# Patient Record
Sex: FEMALE | Race: WHITE | Employment: OTHER | URBAN - NONMETROPOLITAN AREA
[De-identification: names, ages, dates, MRNs, and addresses within clinical notes are randomized per-mention and may not be internally consistent; named-entity substitution may affect disease eponyms.]

---

## 2017-01-09 RX ORDER — HYDROCODONE BITARTRATE AND ACETAMINOPHEN 7.5; 325 MG/1; MG/1
TABLET ORAL
Qty: 90 TABLET | Refills: 0 | Status: SHIPPED | OUTPATIENT
Start: 2017-01-09 | End: 2017-02-10 | Stop reason: SDUPTHER

## 2017-02-06 RX ORDER — FLUOXETINE HYDROCHLORIDE 20 MG/1
CAPSULE ORAL
Qty: 90 CAPSULE | Refills: 3 | Status: SHIPPED | OUTPATIENT
Start: 2017-02-06 | End: 2018-02-02 | Stop reason: SDUPTHER

## 2017-02-10 RX ORDER — HYDROCODONE BITARTRATE AND ACETAMINOPHEN 7.5; 325 MG/1; MG/1
TABLET ORAL
Qty: 90 TABLET | Refills: 0 | Status: SHIPPED | OUTPATIENT
Start: 2017-02-10 | End: 2017-03-09 | Stop reason: SDUPTHER

## 2017-02-23 RX ORDER — FLURANDRENOLIDE 0.5 MG/G
CREAM TOPICAL
Qty: 120 G | Refills: 0 | Status: SHIPPED | OUTPATIENT
Start: 2017-02-23 | End: 2017-05-22 | Stop reason: SDUPTHER

## 2017-03-03 RX ORDER — OMEPRAZOLE 20 MG/1
CAPSULE, DELAYED RELEASE ORAL
Qty: 90 CAPSULE | Refills: 0 | Status: SHIPPED | OUTPATIENT
Start: 2017-03-03 | End: 2017-05-26 | Stop reason: ALTCHOICE

## 2017-03-03 RX ORDER — CELECOXIB 200 MG/1
CAPSULE ORAL
Qty: 90 CAPSULE | Refills: 0 | Status: SHIPPED | OUTPATIENT
Start: 2017-03-03 | End: 2017-06-08 | Stop reason: SDUPTHER

## 2017-03-05 ENCOUNTER — APPOINTMENT (OUTPATIENT)
Dept: GENERAL RADIOLOGY | Age: 72
End: 2017-03-05
Payer: MEDICARE

## 2017-03-05 ENCOUNTER — HOSPITAL ENCOUNTER (EMERGENCY)
Age: 72
Discharge: HOME | End: 2017-03-05
Attending: FAMILY MEDICINE
Payer: MEDICARE

## 2017-03-05 VITALS
BODY MASS INDEX: 20.92 KG/M2 | HEIGHT: 68 IN | DIASTOLIC BLOOD PRESSURE: 70 MMHG | SYSTOLIC BLOOD PRESSURE: 125 MMHG | HEART RATE: 87 BPM | WEIGHT: 138 LBS | OXYGEN SATURATION: 96 % | RESPIRATION RATE: 16 BRPM | TEMPERATURE: 97.4 F

## 2017-03-05 DIAGNOSIS — R82.71 BACTERIA IN URINE: Primary | ICD-10-CM

## 2017-03-05 DIAGNOSIS — R06.02 SOB (SHORTNESS OF BREATH): ICD-10-CM

## 2017-03-05 LAB
ALBUMIN SERPL-MCNC: 4.2 G/DL (ref 3.5–5.2)
ALP BLD-CCNC: 74 U/L (ref 35–104)
ALT SERPL-CCNC: 11 U/L (ref 5–33)
AMPHETAMINE SCREEN, URINE: NEGATIVE
ANION GAP SERPL CALCULATED.3IONS-SCNC: 11 MMOL/L (ref 7–19)
AST SERPL-CCNC: 18 U/L (ref 5–32)
BACTERIA: ABNORMAL /HPF
BARBITURATE SCREEN URINE: NEGATIVE
BASOPHILS ABSOLUTE: 0.1 K/UL (ref 0–0.2)
BASOPHILS RELATIVE PERCENT: 0.6 % (ref 0–1)
BENZODIAZEPINE SCREEN, URINE: POSITIVE
BILIRUB SERPL-MCNC: 0.4 MG/DL (ref 0.2–1.2)
BILIRUBIN URINE: NEGATIVE
BLOOD, URINE: NEGATIVE
BUN BLDV-MCNC: 17 MG/DL (ref 8–23)
CALCIUM SERPL-MCNC: 9.8 MG/DL (ref 8.8–10.2)
CANNABINOID SCREEN URINE: NEGATIVE
CHLORIDE BLD-SCNC: 103 MMOL/L (ref 98–111)
CLARITY: ABNORMAL
CO2: 27 MMOL/L (ref 22–29)
COCAINE METABOLITE SCREEN URINE: NEGATIVE
COLOR: YELLOW
CREAT SERPL-MCNC: 1 MG/DL (ref 0.5–0.9)
EOSINOPHILS ABSOLUTE: 0.2 K/UL (ref 0–0.6)
EOSINOPHILS RELATIVE PERCENT: 1.2 % (ref 0–5)
EPITHELIAL CELLS, UA: 1 /HPF (ref 0–5)
GFR NON-AFRICAN AMERICAN: 54
GLOBULIN: 2.2 G/DL
GLUCOSE BLD-MCNC: 103 MG/DL (ref 74–109)
GLUCOSE URINE: NEGATIVE MG/DL
HCT VFR BLD CALC: 37.4 % (ref 37–47)
HEMOGLOBIN: 11.5 G/DL (ref 12–16)
HYALINE CASTS: 1 /HPF (ref 0–8)
KETONES, URINE: ABNORMAL MG/DL
LEUKOCYTE ESTERASE, URINE: ABNORMAL
LYMPHOCYTES ABSOLUTE: 1.1 K/UL (ref 1.1–4.5)
LYMPHOCYTES RELATIVE PERCENT: 8.4 % (ref 20–40)
Lab: ABNORMAL
MCH RBC QN AUTO: 26.7 PG (ref 27–31)
MCHC RBC AUTO-ENTMCNC: 30.7 G/DL (ref 33–37)
MCV RBC AUTO: 86.8 FL (ref 81–99)
MONOCYTES ABSOLUTE: 0.8 K/UL (ref 0–0.9)
MONOCYTES RELATIVE PERCENT: 6.4 % (ref 0–10)
NEUTROPHILS ABSOLUTE: 10.3 K/UL (ref 1.5–7.5)
NEUTROPHILS RELATIVE PERCENT: 82.8 % (ref 50–65)
NITRITE, URINE: NEGATIVE
OPIATE SCREEN URINE: POSITIVE
PDW BLD-RTO: 15.7 % (ref 11.5–14.5)
PERFORMED ON: NORMAL
PH UA: 5.5
PLATELET # BLD: 342 K/UL (ref 130–400)
PMV BLD AUTO: 9.8 FL (ref 7.4–10.4)
POC TROPONIN I: 0 NG/ML (ref 0–0.08)
POTASSIUM SERPL-SCNC: 3.8 MMOL/L (ref 3.5–5)
PROTEIN UA: NEGATIVE MG/DL
RBC # BLD: 4.31 M/UL (ref 4.2–5.4)
RBC UA: 0 /HPF (ref 0–4)
SODIUM BLD-SCNC: 141 MMOL/L (ref 136–145)
SPECIFIC GRAVITY UA: 1.02
TOTAL PROTEIN: 6.4 G/DL (ref 6.6–8.7)
TROPONIN: <0.01 NG/ML (ref 0–0.03)
UROBILINOGEN, URINE: 0.2 E.U./DL
WBC # BLD: 12.5 K/UL (ref 4.8–10.8)
WBC UA: 9 /HPF (ref 0–5)

## 2017-03-05 PROCEDURE — 99283 EMERGENCY DEPT VISIT LOW MDM: CPT | Performed by: FAMILY MEDICINE

## 2017-03-05 PROCEDURE — 87185 SC STD ENZYME DETCJ PER NZM: CPT

## 2017-03-05 PROCEDURE — 80307 DRUG TEST PRSMV CHEM ANLYZR: CPT

## 2017-03-05 PROCEDURE — 84484 ASSAY OF TROPONIN QUANT: CPT

## 2017-03-05 PROCEDURE — 93005 ELECTROCARDIOGRAM TRACING: CPT

## 2017-03-05 PROCEDURE — 85025 COMPLETE CBC W/AUTO DIFF WBC: CPT

## 2017-03-05 PROCEDURE — 87086 URINE CULTURE/COLONY COUNT: CPT

## 2017-03-05 PROCEDURE — 99284 EMERGENCY DEPT VISIT MOD MDM: CPT

## 2017-03-05 PROCEDURE — 81001 URINALYSIS AUTO W/SCOPE: CPT

## 2017-03-05 PROCEDURE — 96374 THER/PROPH/DIAG INJ IV PUSH: CPT

## 2017-03-05 PROCEDURE — 80053 COMPREHEN METABOLIC PANEL: CPT

## 2017-03-05 PROCEDURE — 96375 TX/PRO/DX INJ NEW DRUG ADDON: CPT

## 2017-03-05 PROCEDURE — 74022 RADEX COMPL AQT ABD SERIES: CPT

## 2017-03-05 PROCEDURE — 6360000002 HC RX W HCPCS: Performed by: FAMILY MEDICINE

## 2017-03-05 PROCEDURE — 36415 COLL VENOUS BLD VENIPUNCTURE: CPT

## 2017-03-05 PROCEDURE — 87077 CULTURE AEROBIC IDENTIFY: CPT

## 2017-03-05 RX ORDER — ONDANSETRON 2 MG/ML
4 INJECTION INTRAMUSCULAR; INTRAVENOUS ONCE
Status: COMPLETED | OUTPATIENT
Start: 2017-03-05 | End: 2017-03-05

## 2017-03-05 RX ORDER — LEVOFLOXACIN 500 MG/1
500 TABLET, FILM COATED ORAL DAILY
Qty: 10 TABLET | Refills: 0 | Status: SHIPPED | OUTPATIENT
Start: 2017-03-05 | End: 2017-03-15

## 2017-03-05 RX ORDER — AMITRIPTYLINE HYDROCHLORIDE 50 MG/1
50 TABLET, FILM COATED ORAL NIGHTLY
COMMUNITY
End: 2017-07-24 | Stop reason: ALTCHOICE

## 2017-03-05 RX ADMIN — HYDROMORPHONE HYDROCHLORIDE 1 MG: 1 INJECTION, SOLUTION INTRAMUSCULAR; INTRAVENOUS; SUBCUTANEOUS at 15:50

## 2017-03-05 RX ADMIN — ONDANSETRON 4 MG: 2 INJECTION INTRAMUSCULAR; INTRAVENOUS at 15:50

## 2017-03-05 ASSESSMENT — ENCOUNTER SYMPTOMS
DIARRHEA: 0
VOMITING: 0
COUGH: 1
SHORTNESS OF BREATH: 0
ABDOMINAL PAIN: 0
NAUSEA: 0
HEMATOCHEZIA: 0
VISUAL CHANGE: 0

## 2017-03-05 ASSESSMENT — PAIN DESCRIPTION - LOCATION
LOCATION: GENERALIZED
LOCATION: BACK

## 2017-03-05 ASSESSMENT — PAIN DESCRIPTION - DESCRIPTORS
DESCRIPTORS: ACHING
DESCRIPTORS: ACHING

## 2017-03-05 ASSESSMENT — PAIN DESCRIPTION - PAIN TYPE
TYPE: CHRONIC PAIN
TYPE: ACUTE PAIN;CHRONIC PAIN

## 2017-03-05 ASSESSMENT — PAIN SCALES - GENERAL
PAINLEVEL_OUTOF10: 8
PAINLEVEL_OUTOF10: 7
PAINLEVEL_OUTOF10: 7

## 2017-03-07 LAB
ORGANISM: ABNORMAL
URINE CULTURE, ROUTINE: ABNORMAL
URINE CULTURE, ROUTINE: ABNORMAL

## 2017-03-09 RX ORDER — HYDROCODONE BITARTRATE AND ACETAMINOPHEN 7.5; 325 MG/1; MG/1
TABLET ORAL
Qty: 90 TABLET | Refills: 0 | Status: SHIPPED | OUTPATIENT
Start: 2017-03-09 | End: 2017-04-07 | Stop reason: SDUPTHER

## 2017-03-13 LAB
EKG P AXIS: 46 DEGREES
EKG P-R INTERVAL: 118 MS
EKG Q-T INTERVAL: 400 MS
EKG QRS DURATION: 80 MS
EKG QTC CALCULATION (BAZETT): 405 MS
EKG T AXIS: 95 DEGREES

## 2017-03-20 ENCOUNTER — OFFICE VISIT (OUTPATIENT)
Dept: PRIMARY CARE CLINIC | Age: 72
End: 2017-03-20
Payer: MEDICARE

## 2017-03-20 VITALS
RESPIRATION RATE: 18 BRPM | TEMPERATURE: 97.8 F | HEIGHT: 69 IN | SYSTOLIC BLOOD PRESSURE: 138 MMHG | WEIGHT: 144 LBS | OXYGEN SATURATION: 95 % | HEART RATE: 73 BPM | DIASTOLIC BLOOD PRESSURE: 84 MMHG | BODY MASS INDEX: 21.33 KG/M2

## 2017-03-20 DIAGNOSIS — R53.83 FATIGUE, UNSPECIFIED TYPE: ICD-10-CM

## 2017-03-20 DIAGNOSIS — F43.9 STRESS: ICD-10-CM

## 2017-03-20 DIAGNOSIS — J06.9 URI, ACUTE: Primary | ICD-10-CM

## 2017-03-20 PROCEDURE — 1036F TOBACCO NON-USER: CPT | Performed by: FAMILY MEDICINE

## 2017-03-20 PROCEDURE — G8484 FLU IMMUNIZE NO ADMIN: HCPCS | Performed by: FAMILY MEDICINE

## 2017-03-20 PROCEDURE — 3017F COLORECTAL CA SCREEN DOC REV: CPT | Performed by: FAMILY MEDICINE

## 2017-03-20 PROCEDURE — 99214 OFFICE O/P EST MOD 30 MIN: CPT | Performed by: FAMILY MEDICINE

## 2017-03-20 PROCEDURE — 1090F PRES/ABSN URINE INCON ASSESS: CPT | Performed by: FAMILY MEDICINE

## 2017-03-20 PROCEDURE — G8427 DOCREV CUR MEDS BY ELIG CLIN: HCPCS | Performed by: FAMILY MEDICINE

## 2017-03-20 PROCEDURE — G8419 CALC BMI OUT NRM PARAM NOF/U: HCPCS | Performed by: FAMILY MEDICINE

## 2017-03-20 PROCEDURE — 1123F ACP DISCUSS/DSCN MKR DOCD: CPT | Performed by: FAMILY MEDICINE

## 2017-03-20 PROCEDURE — G8399 PT W/DXA RESULTS DOCUMENT: HCPCS | Performed by: FAMILY MEDICINE

## 2017-03-20 PROCEDURE — 4040F PNEUMOC VAC/ADMIN/RCVD: CPT | Performed by: FAMILY MEDICINE

## 2017-03-20 PROCEDURE — 3014F SCREEN MAMMO DOC REV: CPT | Performed by: FAMILY MEDICINE

## 2017-03-20 RX ORDER — PREDNISONE 20 MG/1
40 TABLET ORAL DAILY
Qty: 10 TABLET | Refills: 0 | Status: SHIPPED | OUTPATIENT
Start: 2017-03-20 | End: 2017-03-25

## 2017-03-20 RX ORDER — AZITHROMYCIN 250 MG/1
TABLET, FILM COATED ORAL
Qty: 1 PACKET | Refills: 0 | Status: SHIPPED | OUTPATIENT
Start: 2017-03-20 | End: 2017-03-30

## 2017-03-21 ASSESSMENT — ENCOUNTER SYMPTOMS
RHINORRHEA: 1
DIARRHEA: 0
CHEST TIGHTNESS: 0
SINUS PRESSURE: 0
COUGH: 0
ABDOMINAL PAIN: 0
EYE REDNESS: 0
EYE ITCHING: 0
EYE DISCHARGE: 0
COLOR CHANGE: 0
WHEEZING: 0
NAUSEA: 0
VOMITING: 0

## 2017-03-27 ENCOUNTER — TELEPHONE (OUTPATIENT)
Dept: PRIMARY CARE CLINIC | Age: 72
End: 2017-03-27

## 2017-04-06 ENCOUNTER — TELEPHONE (OUTPATIENT)
Dept: PRIMARY CARE CLINIC | Age: 72
End: 2017-04-06

## 2017-04-07 RX ORDER — HYDROCODONE BITARTRATE AND ACETAMINOPHEN 7.5; 325 MG/1; MG/1
TABLET ORAL
Qty: 90 TABLET | Refills: 0 | Status: SHIPPED | OUTPATIENT
Start: 2017-04-07 | End: 2017-05-08 | Stop reason: SDUPTHER

## 2017-04-10 RX ORDER — AMLODIPINE BESYLATE AND BENAZEPRIL HYDROCHLORIDE 5; 10 MG/1; MG/1
CAPSULE ORAL
Qty: 30 CAPSULE | Refills: 5 | Status: SHIPPED | OUTPATIENT
Start: 2017-04-10 | End: 2017-07-24 | Stop reason: ALTCHOICE

## 2017-04-21 ENCOUNTER — OFFICE VISIT (OUTPATIENT)
Dept: PRIMARY CARE CLINIC | Age: 72
End: 2017-04-21
Payer: MEDICARE

## 2017-04-21 VITALS
WEIGHT: 140 LBS | DIASTOLIC BLOOD PRESSURE: 80 MMHG | HEART RATE: 75 BPM | SYSTOLIC BLOOD PRESSURE: 140 MMHG | HEIGHT: 69 IN | TEMPERATURE: 97.3 F | BODY MASS INDEX: 20.73 KG/M2

## 2017-04-21 DIAGNOSIS — G70.00 MYASTHENIA GRAVIS (HCC): Primary | ICD-10-CM

## 2017-04-21 DIAGNOSIS — I10 ESSENTIAL HYPERTENSION: ICD-10-CM

## 2017-04-21 DIAGNOSIS — R26.89 LOSS OF BALANCE: ICD-10-CM

## 2017-04-21 DIAGNOSIS — H90.5 DEAFNESS CONGENITAL: Chronic | ICD-10-CM

## 2017-04-21 DIAGNOSIS — R42 DIZZINESS: ICD-10-CM

## 2017-04-21 DIAGNOSIS — S81.811A LACERATION OF RIGHT LEG EXCLUDING THIGH, INITIAL ENCOUNTER: ICD-10-CM

## 2017-04-21 DIAGNOSIS — R23.3 EASY BRUISING: ICD-10-CM

## 2017-04-21 DIAGNOSIS — R05.3 PERSISTENT COUGH: ICD-10-CM

## 2017-04-21 PROCEDURE — G8419 CALC BMI OUT NRM PARAM NOF/U: HCPCS | Performed by: FAMILY MEDICINE

## 2017-04-21 PROCEDURE — 99214 OFFICE O/P EST MOD 30 MIN: CPT | Performed by: FAMILY MEDICINE

## 2017-04-21 PROCEDURE — G8399 PT W/DXA RESULTS DOCUMENT: HCPCS | Performed by: FAMILY MEDICINE

## 2017-04-21 PROCEDURE — 1036F TOBACCO NON-USER: CPT | Performed by: FAMILY MEDICINE

## 2017-04-21 PROCEDURE — G8427 DOCREV CUR MEDS BY ELIG CLIN: HCPCS | Performed by: FAMILY MEDICINE

## 2017-04-21 PROCEDURE — 3017F COLORECTAL CA SCREEN DOC REV: CPT | Performed by: FAMILY MEDICINE

## 2017-04-21 PROCEDURE — 1090F PRES/ABSN URINE INCON ASSESS: CPT | Performed by: FAMILY MEDICINE

## 2017-04-21 PROCEDURE — 4040F PNEUMOC VAC/ADMIN/RCVD: CPT | Performed by: FAMILY MEDICINE

## 2017-04-21 PROCEDURE — 3014F SCREEN MAMMO DOC REV: CPT | Performed by: FAMILY MEDICINE

## 2017-04-21 PROCEDURE — 1123F ACP DISCUSS/DSCN MKR DOCD: CPT | Performed by: FAMILY MEDICINE

## 2017-04-21 RX ORDER — CEPHALEXIN 500 MG/1
CAPSULE ORAL
Qty: 40 CAPSULE | Refills: 0 | Status: SHIPPED | OUTPATIENT
Start: 2017-04-21 | End: 2017-05-07 | Stop reason: SDUPTHER

## 2017-04-24 ENCOUNTER — TELEPHONE (OUTPATIENT)
Dept: PRIMARY CARE CLINIC | Age: 72
End: 2017-04-24

## 2017-04-27 ASSESSMENT — ENCOUNTER SYMPTOMS
DIARRHEA: 0
CONSTIPATION: 0
COUGH: 1
NAUSEA: 1
BACK PAIN: 1
SHORTNESS OF BREATH: 1
VOMITING: 0
ABDOMINAL PAIN: 1

## 2017-04-28 ENCOUNTER — TELEPHONE (OUTPATIENT)
Dept: NEUROLOGY | Age: 72
End: 2017-04-28

## 2017-05-05 ENCOUNTER — TELEPHONE (OUTPATIENT)
Dept: PRIMARY CARE CLINIC | Age: 72
End: 2017-05-05

## 2017-05-08 RX ORDER — CEPHALEXIN 500 MG/1
CAPSULE ORAL
Qty: 40 CAPSULE | Refills: 0 | Status: SHIPPED | OUTPATIENT
Start: 2017-05-08 | End: 2017-06-27 | Stop reason: SDUPTHER

## 2017-05-08 RX ORDER — HYDROCODONE BITARTRATE AND ACETAMINOPHEN 7.5; 325 MG/1; MG/1
TABLET ORAL
Qty: 90 TABLET | Refills: 0 | Status: SHIPPED | OUTPATIENT
Start: 2017-05-08 | End: 2017-06-08 | Stop reason: SDUPTHER

## 2017-05-15 ENCOUNTER — TELEPHONE (OUTPATIENT)
Dept: PRIMARY CARE CLINIC | Age: 72
End: 2017-05-15

## 2017-05-15 DIAGNOSIS — R11.0 NAUSEA: Primary | ICD-10-CM

## 2017-05-15 DIAGNOSIS — R10.13 EPIGASTRIC PAIN: ICD-10-CM

## 2017-05-23 RX ORDER — FLURANDRENOLIDE 0.5 MG/G
CREAM TOPICAL
Qty: 1 TUBE | Refills: 1 | Status: SHIPPED | OUTPATIENT
Start: 2017-05-23 | End: 2017-07-18 | Stop reason: CLARIF

## 2017-05-26 ENCOUNTER — OFFICE VISIT (OUTPATIENT)
Dept: GASTROENTEROLOGY | Age: 72
End: 2017-05-26
Payer: MEDICARE

## 2017-05-26 VITALS
HEIGHT: 69 IN | SYSTOLIC BLOOD PRESSURE: 138 MMHG | HEART RATE: 76 BPM | DIASTOLIC BLOOD PRESSURE: 82 MMHG | WEIGHT: 139.6 LBS | OXYGEN SATURATION: 96 % | BODY MASS INDEX: 20.68 KG/M2

## 2017-05-26 DIAGNOSIS — R12 HEARTBURN: ICD-10-CM

## 2017-05-26 DIAGNOSIS — R11.2 NAUSEA AND VOMITING, INTRACTABILITY OF VOMITING NOT SPECIFIED, UNSPECIFIED VOMITING TYPE: ICD-10-CM

## 2017-05-26 DIAGNOSIS — K59.00 CONSTIPATION, UNSPECIFIED CONSTIPATION TYPE: Primary | ICD-10-CM

## 2017-05-26 DIAGNOSIS — R19.4 CHANGE IN BOWEL HABITS: ICD-10-CM

## 2017-05-26 DIAGNOSIS — R10.13 EPIGASTRIC PAIN: ICD-10-CM

## 2017-05-26 DIAGNOSIS — R63.4 WEIGHT LOSS, NON-INTENTIONAL: ICD-10-CM

## 2017-05-26 PROCEDURE — 1036F TOBACCO NON-USER: CPT | Performed by: NURSE PRACTITIONER

## 2017-05-26 PROCEDURE — 4040F PNEUMOC VAC/ADMIN/RCVD: CPT | Performed by: NURSE PRACTITIONER

## 2017-05-26 PROCEDURE — 1123F ACP DISCUSS/DSCN MKR DOCD: CPT | Performed by: NURSE PRACTITIONER

## 2017-05-26 PROCEDURE — G8427 DOCREV CUR MEDS BY ELIG CLIN: HCPCS | Performed by: NURSE PRACTITIONER

## 2017-05-26 PROCEDURE — G8399 PT W/DXA RESULTS DOCUMENT: HCPCS | Performed by: NURSE PRACTITIONER

## 2017-05-26 PROCEDURE — 3014F SCREEN MAMMO DOC REV: CPT | Performed by: NURSE PRACTITIONER

## 2017-05-26 PROCEDURE — 1090F PRES/ABSN URINE INCON ASSESS: CPT | Performed by: NURSE PRACTITIONER

## 2017-05-26 PROCEDURE — 99214 OFFICE O/P EST MOD 30 MIN: CPT | Performed by: NURSE PRACTITIONER

## 2017-05-26 PROCEDURE — G8419 CALC BMI OUT NRM PARAM NOF/U: HCPCS | Performed by: NURSE PRACTITIONER

## 2017-05-26 PROCEDURE — 3017F COLORECTAL CA SCREEN DOC REV: CPT | Performed by: NURSE PRACTITIONER

## 2017-05-26 RX ORDER — POLYETHYLENE GLYCOL 3350 17 G/17G
17 POWDER, FOR SOLUTION ORAL DAILY
Qty: 1 BOTTLE | Refills: 3 | Status: SHIPPED | OUTPATIENT
Start: 2017-05-26 | End: 2017-07-18 | Stop reason: CLARIF

## 2017-05-26 RX ORDER — OMEPRAZOLE 40 MG/1
40 CAPSULE, DELAYED RELEASE ORAL DAILY
Qty: 30 CAPSULE | Refills: 5 | Status: SHIPPED | OUTPATIENT
Start: 2017-05-26 | End: 2017-07-24 | Stop reason: ALTCHOICE

## 2017-06-05 ASSESSMENT — ENCOUNTER SYMPTOMS
CONSTIPATION: 1
VOMITING: 1
BACK PAIN: 1
BLOOD IN STOOL: 0
RECTAL PAIN: 0
CHEST TIGHTNESS: 0
VOICE CHANGE: 0
COUGH: 1
NAUSEA: 1
SORE THROAT: 0
ABDOMINAL PAIN: 1
DIARRHEA: 1
SHORTNESS OF BREATH: 1
ABDOMINAL DISTENTION: 0

## 2017-06-07 ENCOUNTER — TELEPHONE (OUTPATIENT)
Dept: PRIMARY CARE CLINIC | Age: 72
End: 2017-06-07

## 2017-06-09 ENCOUNTER — OFFICE VISIT (OUTPATIENT)
Dept: PRIMARY CARE CLINIC | Age: 72
End: 2017-06-09
Payer: MEDICARE

## 2017-06-09 VITALS
HEART RATE: 60 BPM | BODY MASS INDEX: 20.14 KG/M2 | WEIGHT: 136 LBS | HEIGHT: 69 IN | OXYGEN SATURATION: 95 % | SYSTOLIC BLOOD PRESSURE: 140 MMHG | DIASTOLIC BLOOD PRESSURE: 90 MMHG | TEMPERATURE: 96.7 F

## 2017-06-09 DIAGNOSIS — G70.00 MYASTHENIA GRAVIS (HCC): ICD-10-CM

## 2017-06-09 DIAGNOSIS — I10 ESSENTIAL HYPERTENSION: Primary | ICD-10-CM

## 2017-06-09 DIAGNOSIS — F32.A DEPRESSION, UNSPECIFIED DEPRESSION TYPE: ICD-10-CM

## 2017-06-09 DIAGNOSIS — G89.29 CHRONIC BILATERAL LOW BACK PAIN, WITH SCIATICA PRESENCE UNSPECIFIED: ICD-10-CM

## 2017-06-09 DIAGNOSIS — M54.5 CHRONIC BILATERAL LOW BACK PAIN, WITH SCIATICA PRESENCE UNSPECIFIED: ICD-10-CM

## 2017-06-09 DIAGNOSIS — K21.9 GASTROESOPHAGEAL REFLUX DISEASE, ESOPHAGITIS PRESENCE NOT SPECIFIED: ICD-10-CM

## 2017-06-09 DIAGNOSIS — F51.04 CHRONIC INSOMNIA: ICD-10-CM

## 2017-06-09 PROCEDURE — G8399 PT W/DXA RESULTS DOCUMENT: HCPCS | Performed by: FAMILY MEDICINE

## 2017-06-09 PROCEDURE — 4040F PNEUMOC VAC/ADMIN/RCVD: CPT | Performed by: FAMILY MEDICINE

## 2017-06-09 PROCEDURE — G8427 DOCREV CUR MEDS BY ELIG CLIN: HCPCS | Performed by: FAMILY MEDICINE

## 2017-06-09 PROCEDURE — 99214 OFFICE O/P EST MOD 30 MIN: CPT | Performed by: FAMILY MEDICINE

## 2017-06-09 PROCEDURE — 1036F TOBACCO NON-USER: CPT | Performed by: FAMILY MEDICINE

## 2017-06-09 PROCEDURE — 1090F PRES/ABSN URINE INCON ASSESS: CPT | Performed by: FAMILY MEDICINE

## 2017-06-09 PROCEDURE — 3014F SCREEN MAMMO DOC REV: CPT | Performed by: FAMILY MEDICINE

## 2017-06-09 PROCEDURE — G8419 CALC BMI OUT NRM PARAM NOF/U: HCPCS | Performed by: FAMILY MEDICINE

## 2017-06-09 PROCEDURE — 1123F ACP DISCUSS/DSCN MKR DOCD: CPT | Performed by: FAMILY MEDICINE

## 2017-06-09 PROCEDURE — 3017F COLORECTAL CA SCREEN DOC REV: CPT | Performed by: FAMILY MEDICINE

## 2017-06-09 RX ORDER — HYDROCODONE BITARTRATE AND ACETAMINOPHEN 7.5; 325 MG/1; MG/1
TABLET ORAL
Qty: 90 TABLET | Refills: 0 | Status: SHIPPED | OUTPATIENT
Start: 2017-06-09 | End: 2017-07-06 | Stop reason: SDUPTHER

## 2017-06-09 RX ORDER — CELECOXIB 200 MG/1
CAPSULE ORAL
Qty: 90 CAPSULE | Refills: 3 | Status: SHIPPED | OUTPATIENT
Start: 2017-06-09 | End: 2017-07-24 | Stop reason: ALTCHOICE

## 2017-06-10 PROBLEM — F51.04 CHRONIC INSOMNIA: Status: ACTIVE | Noted: 2017-06-10

## 2017-06-10 ASSESSMENT — ENCOUNTER SYMPTOMS
SHORTNESS OF BREATH: 1
BACK PAIN: 1
GASTROINTESTINAL NEGATIVE: 1

## 2017-06-28 RX ORDER — CEPHALEXIN 500 MG/1
CAPSULE ORAL
Qty: 40 CAPSULE | Refills: 1 | Status: SHIPPED | OUTPATIENT
Start: 2017-06-28 | End: 2017-07-18 | Stop reason: CLARIF

## 2017-07-06 ENCOUNTER — ANESTHESIA (OUTPATIENT)
Dept: ENDOSCOPY | Age: 72
End: 2017-07-06
Payer: MEDICARE

## 2017-07-06 ENCOUNTER — ANESTHESIA EVENT (OUTPATIENT)
Dept: ENDOSCOPY | Age: 72
End: 2017-07-06
Payer: MEDICARE

## 2017-07-06 ENCOUNTER — HOSPITAL ENCOUNTER (OUTPATIENT)
Age: 72
Setting detail: OUTPATIENT SURGERY
Discharge: HOME | End: 2017-07-06
Attending: INTERNAL MEDICINE | Admitting: INTERNAL MEDICINE
Payer: MEDICARE

## 2017-07-06 VITALS
DIASTOLIC BLOOD PRESSURE: 88 MMHG | HEART RATE: 65 BPM | HEIGHT: 69 IN | RESPIRATION RATE: 16 BRPM | SYSTOLIC BLOOD PRESSURE: 154 MMHG | TEMPERATURE: 98.4 F | OXYGEN SATURATION: 100 % | BODY MASS INDEX: 20.73 KG/M2 | WEIGHT: 140 LBS

## 2017-07-06 VITALS
OXYGEN SATURATION: 94 % | DIASTOLIC BLOOD PRESSURE: 52 MMHG | SYSTOLIC BLOOD PRESSURE: 101 MMHG | RESPIRATION RATE: 16 BRPM

## 2017-07-06 PROBLEM — R19.5 DARK STOOLS: Status: ACTIVE | Noted: 2017-07-06

## 2017-07-06 PROBLEM — Z83.71 FAMILY HISTORY OF COLONIC POLYPS: Status: ACTIVE | Noted: 2017-07-06

## 2017-07-06 PROBLEM — R19.8 ALTERNATING CONSTIPATION AND DIARRHEA: Status: ACTIVE | Noted: 2017-07-06

## 2017-07-06 PROBLEM — Z83.719 FAMILY HISTORY OF COLONIC POLYPS: Status: ACTIVE | Noted: 2017-07-06

## 2017-07-06 PROBLEM — R10.13 EPIGASTRIC PAIN: Status: ACTIVE | Noted: 2017-07-06

## 2017-07-06 PROCEDURE — 7100000010 HC PHASE II RECOVERY - FIRST 15 MIN: Performed by: INTERNAL MEDICINE

## 2017-07-06 PROCEDURE — 3700000000 HC ANESTHESIA ATTENDED CARE: Performed by: INTERNAL MEDICINE

## 2017-07-06 PROCEDURE — 2580000003 HC RX 258: Performed by: INTERNAL MEDICINE

## 2017-07-06 PROCEDURE — 7100000011 HC PHASE II RECOVERY - ADDTL 15 MIN: Performed by: INTERNAL MEDICINE

## 2017-07-06 PROCEDURE — 6360000002 HC RX W HCPCS: Performed by: NURSE ANESTHETIST, CERTIFIED REGISTERED

## 2017-07-06 PROCEDURE — 87077 CULTURE AEROBIC IDENTIFY: CPT

## 2017-07-06 PROCEDURE — 3700000001 HC ADD 15 MINUTES (ANESTHESIA): Performed by: INTERNAL MEDICINE

## 2017-07-06 PROCEDURE — 45378 DIAGNOSTIC COLONOSCOPY: CPT | Performed by: INTERNAL MEDICINE

## 2017-07-06 PROCEDURE — 3609012400 HC EGD TRANSORAL BIOPSY SINGLE/MULTIPLE: Performed by: INTERNAL MEDICINE

## 2017-07-06 PROCEDURE — 3609009500 HC COLONOSCOPY DIAGNOSTIC OR SCREENING: Performed by: INTERNAL MEDICINE

## 2017-07-06 PROCEDURE — 43239 EGD BIOPSY SINGLE/MULTIPLE: CPT | Performed by: INTERNAL MEDICINE

## 2017-07-06 PROCEDURE — 2500000003 HC RX 250 WO HCPCS: Performed by: INTERNAL MEDICINE

## 2017-07-06 RX ORDER — LIDOCAINE HYDROCHLORIDE 10 MG/ML
1 INJECTION, SOLUTION INFILTRATION; PERINEURAL ONCE
Status: COMPLETED | OUTPATIENT
Start: 2017-07-06 | End: 2017-07-06

## 2017-07-06 RX ORDER — DICYCLOMINE HYDROCHLORIDE 10 MG/1
10 CAPSULE ORAL 4 TIMES DAILY
Qty: 120 CAPSULE | Refills: 3 | Status: SHIPPED | OUTPATIENT
Start: 2017-07-06 | End: 2017-07-18 | Stop reason: CLARIF

## 2017-07-06 RX ORDER — HYDROCODONE BITARTRATE AND ACETAMINOPHEN 7.5; 325 MG/1; MG/1
TABLET ORAL
Qty: 90 TABLET | Refills: 0 | Status: SHIPPED | OUTPATIENT
Start: 2017-07-08 | End: 2017-08-07 | Stop reason: SDUPTHER

## 2017-07-06 RX ORDER — SODIUM CHLORIDE, SODIUM LACTATE, POTASSIUM CHLORIDE, CALCIUM CHLORIDE 600; 310; 30; 20 MG/100ML; MG/100ML; MG/100ML; MG/100ML
INJECTION, SOLUTION INTRAVENOUS CONTINUOUS
Status: DISCONTINUED | OUTPATIENT
Start: 2017-07-06 | End: 2017-07-06 | Stop reason: HOSPADM

## 2017-07-06 RX ORDER — PROPOFOL 10 MG/ML
INJECTION, EMULSION INTRAVENOUS PRN
Status: DISCONTINUED | OUTPATIENT
Start: 2017-07-06 | End: 2017-07-06 | Stop reason: SDUPTHER

## 2017-07-06 RX ADMIN — SODIUM CHLORIDE, POTASSIUM CHLORIDE, SODIUM LACTATE AND CALCIUM CHLORIDE: 600; 310; 30; 20 INJECTION, SOLUTION INTRAVENOUS at 11:08

## 2017-07-06 RX ADMIN — LIDOCAINE HYDROCHLORIDE 5 ML: 10 INJECTION, SOLUTION INFILTRATION; PERINEURAL at 11:24

## 2017-07-06 RX ADMIN — PROPOFOL 450 MG: 10 INJECTION, EMULSION INTRAVENOUS at 11:24

## 2017-07-06 ASSESSMENT — PAIN SCALES - GENERAL
PAINLEVEL_OUTOF10: 0
PAINLEVEL_OUTOF10: 0

## 2017-07-07 ENCOUNTER — TELEPHONE (OUTPATIENT)
Dept: GASTROENTEROLOGY | Age: 72
End: 2017-07-07

## 2017-07-07 LAB — CLOTEST: NEGATIVE

## 2017-07-17 ENCOUNTER — OFFICE VISIT (OUTPATIENT)
Dept: PRIMARY CARE CLINIC | Age: 72
End: 2017-07-17
Payer: MEDICARE

## 2017-07-17 VITALS
DIASTOLIC BLOOD PRESSURE: 80 MMHG | TEMPERATURE: 98.2 F | SYSTOLIC BLOOD PRESSURE: 122 MMHG | RESPIRATION RATE: 20 BRPM | HEIGHT: 68 IN | HEART RATE: 84 BPM | WEIGHT: 127 LBS | BODY MASS INDEX: 19.25 KG/M2

## 2017-07-17 DIAGNOSIS — N39.0 URINARY TRACT INFECTION WITH HEMATURIA, SITE UNSPECIFIED: ICD-10-CM

## 2017-07-17 DIAGNOSIS — J06.9 UPPER RESPIRATORY TRACT INFECTION, UNSPECIFIED TYPE: ICD-10-CM

## 2017-07-17 DIAGNOSIS — R10.9 ABDOMINAL PAIN, UNSPECIFIED LOCATION: Primary | ICD-10-CM

## 2017-07-17 DIAGNOSIS — R31.9 URINARY TRACT INFECTION WITH HEMATURIA, SITE UNSPECIFIED: ICD-10-CM

## 2017-07-17 DIAGNOSIS — T63.304S: ICD-10-CM

## 2017-07-17 LAB
BILIRUBIN, POC: ABNORMAL
BLOOD URINE, POC: ABNORMAL
CLARITY, POC: CLEAR
COLOR, POC: YELLOW
GLUCOSE URINE, POC: ABNORMAL
KETONES, POC: ABNORMAL
LEUKOCYTE EST, POC: ABNORMAL
NITRITE, POC: POSITIVE
PH, POC: 5
PROTEIN, POC: ABNORMAL
SPECIFIC GRAVITY, POC: 1.03
UROBILINOGEN, POC: 0.2

## 2017-07-17 PROCEDURE — G8420 CALC BMI NORM PARAMETERS: HCPCS | Performed by: FAMILY MEDICINE

## 2017-07-17 PROCEDURE — 3014F SCREEN MAMMO DOC REV: CPT | Performed by: FAMILY MEDICINE

## 2017-07-17 PROCEDURE — 1123F ACP DISCUSS/DSCN MKR DOCD: CPT | Performed by: FAMILY MEDICINE

## 2017-07-17 PROCEDURE — 99214 OFFICE O/P EST MOD 30 MIN: CPT | Performed by: FAMILY MEDICINE

## 2017-07-17 PROCEDURE — 3017F COLORECTAL CA SCREEN DOC REV: CPT | Performed by: FAMILY MEDICINE

## 2017-07-17 PROCEDURE — 4040F PNEUMOC VAC/ADMIN/RCVD: CPT | Performed by: FAMILY MEDICINE

## 2017-07-17 PROCEDURE — 1036F TOBACCO NON-USER: CPT | Performed by: FAMILY MEDICINE

## 2017-07-17 PROCEDURE — G8399 PT W/DXA RESULTS DOCUMENT: HCPCS | Performed by: FAMILY MEDICINE

## 2017-07-17 PROCEDURE — 1090F PRES/ABSN URINE INCON ASSESS: CPT | Performed by: FAMILY MEDICINE

## 2017-07-17 PROCEDURE — G8428 CUR MEDS NOT DOCUMENT: HCPCS | Performed by: FAMILY MEDICINE

## 2017-07-17 PROCEDURE — 81002 URINALYSIS NONAUTO W/O SCOPE: CPT | Performed by: FAMILY MEDICINE

## 2017-07-17 RX ORDER — GABAPENTIN 300 MG/1
CAPSULE ORAL
Qty: 60 CAPSULE | Refills: 5 | Status: SHIPPED | OUTPATIENT
Start: 2017-07-17 | End: 2017-11-02 | Stop reason: SDUPTHER

## 2017-07-17 RX ORDER — SULFAMETHOXAZOLE AND TRIMETHOPRIM 800; 160 MG/1; MG/1
1 TABLET ORAL 2 TIMES DAILY
Qty: 20 TABLET | Refills: 0 | Status: SHIPPED | OUTPATIENT
Start: 2017-07-17 | End: 2017-07-18 | Stop reason: CLARIF

## 2017-07-18 ENCOUNTER — OFFICE VISIT (OUTPATIENT)
Dept: VASCULAR SURGERY | Age: 72
End: 2017-07-18
Payer: MEDICARE

## 2017-07-18 ENCOUNTER — TELEPHONE (OUTPATIENT)
Dept: VASCULAR SURGERY | Age: 72
End: 2017-07-18

## 2017-07-18 ENCOUNTER — HOSPITAL ENCOUNTER (OUTPATIENT)
Dept: ULTRASOUND IMAGING | Age: 72
Discharge: HOME | End: 2017-07-18
Payer: MEDICARE

## 2017-07-18 VITALS
SYSTOLIC BLOOD PRESSURE: 112 MMHG | RESPIRATION RATE: 18 BRPM | TEMPERATURE: 96 F | DIASTOLIC BLOOD PRESSURE: 73 MMHG | HEART RATE: 67 BPM

## 2017-07-18 DIAGNOSIS — R10.13 EPIGASTRIC PAIN: ICD-10-CM

## 2017-07-18 DIAGNOSIS — I77.811 ECTATIC ABDOMINAL AORTA (HCC): ICD-10-CM

## 2017-07-18 DIAGNOSIS — N18.2 CKD (CHRONIC KIDNEY DISEASE), STAGE II: Primary | ICD-10-CM

## 2017-07-18 LAB
ANION GAP SERPL CALCULATED.3IONS-SCNC: 18 MMOL/L (ref 7–19)
BUN BLDV-MCNC: 10 MG/DL (ref 8–23)
CALCIUM SERPL-MCNC: 10 MG/DL (ref 8.8–10.2)
CHLORIDE BLD-SCNC: 101 MMOL/L (ref 98–111)
CO2: 25 MMOL/L (ref 22–29)
CREAT SERPL-MCNC: 0.8 MG/DL (ref 0.5–0.9)
GFR NON-AFRICAN AMERICAN: >60
GLUCOSE BLD-MCNC: 83 MG/DL (ref 74–109)
POTASSIUM SERPL-SCNC: 3.1 MMOL/L (ref 3.5–5)
SODIUM BLD-SCNC: 144 MMOL/L (ref 136–145)

## 2017-07-18 PROCEDURE — G8399 PT W/DXA RESULTS DOCUMENT: HCPCS | Performed by: NURSE PRACTITIONER

## 2017-07-18 PROCEDURE — 1036F TOBACCO NON-USER: CPT | Performed by: NURSE PRACTITIONER

## 2017-07-18 PROCEDURE — 4040F PNEUMOC VAC/ADMIN/RCVD: CPT | Performed by: NURSE PRACTITIONER

## 2017-07-18 PROCEDURE — G8427 DOCREV CUR MEDS BY ELIG CLIN: HCPCS | Performed by: NURSE PRACTITIONER

## 2017-07-18 PROCEDURE — 3014F SCREEN MAMMO DOC REV: CPT | Performed by: NURSE PRACTITIONER

## 2017-07-18 PROCEDURE — 1090F PRES/ABSN URINE INCON ASSESS: CPT | Performed by: NURSE PRACTITIONER

## 2017-07-18 PROCEDURE — G8420 CALC BMI NORM PARAMETERS: HCPCS | Performed by: NURSE PRACTITIONER

## 2017-07-18 PROCEDURE — 3017F COLORECTAL CA SCREEN DOC REV: CPT | Performed by: NURSE PRACTITIONER

## 2017-07-18 PROCEDURE — 93978 VASCULAR STUDY: CPT

## 2017-07-18 PROCEDURE — 1123F ACP DISCUSS/DSCN MKR DOCD: CPT | Performed by: NURSE PRACTITIONER

## 2017-07-18 PROCEDURE — 99213 OFFICE O/P EST LOW 20 MIN: CPT | Performed by: NURSE PRACTITIONER

## 2017-07-18 RX ORDER — AZITHROMYCIN 250 MG/1
250 TABLET, FILM COATED ORAL DAILY
COMMUNITY
End: 2017-07-27

## 2017-07-20 ENCOUNTER — HOSPITAL ENCOUNTER (OUTPATIENT)
Dept: CT IMAGING | Age: 72
Discharge: HOME | End: 2017-07-20
Payer: MEDICARE

## 2017-07-20 DIAGNOSIS — R10.13 EPIGASTRIC PAIN: ICD-10-CM

## 2017-07-20 LAB
GFR NON-AFRICAN AMERICAN: 32
PERFORMED ON: ABNORMAL
POC CREATININE: 1.6 MG/DL (ref 0.3–1.3)
POC SAMPLE TYPE: ABNORMAL

## 2017-07-20 PROCEDURE — 82565 ASSAY OF CREATININE: CPT

## 2017-07-20 RX ORDER — ACETYLCYSTEINE 200 MG/ML
SOLUTION ORAL; RESPIRATORY (INHALATION)
Qty: 6 ML | Refills: 0 | OUTPATIENT
Start: 2017-07-20 | End: 2017-07-24 | Stop reason: ALTCHOICE

## 2017-07-21 ENCOUNTER — OFFICE VISIT (OUTPATIENT)
Dept: PRIMARY CARE CLINIC | Age: 72
End: 2017-07-21
Payer: MEDICARE

## 2017-07-21 VITALS
DIASTOLIC BLOOD PRESSURE: 76 MMHG | HEART RATE: 64 BPM | TEMPERATURE: 97.9 F | WEIGHT: 130 LBS | HEIGHT: 69 IN | BODY MASS INDEX: 19.26 KG/M2 | RESPIRATION RATE: 16 BRPM | SYSTOLIC BLOOD PRESSURE: 128 MMHG

## 2017-07-21 DIAGNOSIS — N39.0 URINARY TRACT INFECTION WITH HEMATURIA, SITE UNSPECIFIED: Primary | ICD-10-CM

## 2017-07-21 DIAGNOSIS — R79.89 ELEVATED SERUM CREATININE: ICD-10-CM

## 2017-07-21 DIAGNOSIS — R31.9 URINARY TRACT INFECTION WITH HEMATURIA, SITE UNSPECIFIED: Primary | ICD-10-CM

## 2017-07-21 LAB
ANION GAP SERPL CALCULATED.3IONS-SCNC: 18 MMOL/L (ref 7–19)
BILIRUBIN, POC: NORMAL
BLOOD URINE, POC: NORMAL
BUN BLDV-MCNC: 13 MG/DL (ref 8–23)
CALCIUM SERPL-MCNC: 9.6 MG/DL (ref 8.8–10.2)
CHLORIDE BLD-SCNC: 96 MMOL/L (ref 98–111)
CLARITY, POC: CLEAR
CO2: 23 MMOL/L (ref 22–29)
COLOR, POC: YELLOW
CREAT SERPL-MCNC: 1.6 MG/DL (ref 0.5–0.9)
GFR NON-AFRICAN AMERICAN: 32
GLUCOSE BLD-MCNC: 82 MG/DL (ref 74–109)
GLUCOSE URINE, POC: NORMAL
KETONES, POC: NORMAL
LEUKOCYTE EST, POC: NORMAL
NITRITE, POC: NORMAL
PH, POC: 5
POTASSIUM SERPL-SCNC: 2.7 MMOL/L (ref 3.5–5)
PROTEIN, POC: NORMAL
SODIUM BLD-SCNC: 137 MMOL/L (ref 136–145)
SPECIFIC GRAVITY, POC: 1010
UROBILINOGEN, POC: 0.2

## 2017-07-21 PROCEDURE — 81002 URINALYSIS NONAUTO W/O SCOPE: CPT | Performed by: FAMILY MEDICINE

## 2017-07-21 PROCEDURE — 3014F SCREEN MAMMO DOC REV: CPT | Performed by: FAMILY MEDICINE

## 2017-07-21 PROCEDURE — G8420 CALC BMI NORM PARAMETERS: HCPCS | Performed by: FAMILY MEDICINE

## 2017-07-21 PROCEDURE — G8427 DOCREV CUR MEDS BY ELIG CLIN: HCPCS | Performed by: FAMILY MEDICINE

## 2017-07-21 PROCEDURE — G8399 PT W/DXA RESULTS DOCUMENT: HCPCS | Performed by: FAMILY MEDICINE

## 2017-07-21 PROCEDURE — 1090F PRES/ABSN URINE INCON ASSESS: CPT | Performed by: FAMILY MEDICINE

## 2017-07-21 PROCEDURE — 3017F COLORECTAL CA SCREEN DOC REV: CPT | Performed by: FAMILY MEDICINE

## 2017-07-21 PROCEDURE — 1036F TOBACCO NON-USER: CPT | Performed by: FAMILY MEDICINE

## 2017-07-21 PROCEDURE — 4040F PNEUMOC VAC/ADMIN/RCVD: CPT | Performed by: FAMILY MEDICINE

## 2017-07-21 PROCEDURE — 36415 COLL VENOUS BLD VENIPUNCTURE: CPT | Performed by: FAMILY MEDICINE

## 2017-07-21 PROCEDURE — 99213 OFFICE O/P EST LOW 20 MIN: CPT | Performed by: FAMILY MEDICINE

## 2017-07-21 PROCEDURE — 1123F ACP DISCUSS/DSCN MKR DOCD: CPT | Performed by: FAMILY MEDICINE

## 2017-07-21 RX ORDER — POTASSIUM CHLORIDE 750 MG/1
10 TABLET, EXTENDED RELEASE ORAL DAILY
Qty: 30 TABLET | Refills: 0 | Status: SHIPPED | OUTPATIENT
Start: 2017-07-21 | End: 2017-07-26 | Stop reason: SDUPTHER

## 2017-07-23 ENCOUNTER — TELEPHONE (OUTPATIENT)
Dept: PRIMARY CARE CLINIC | Age: 72
End: 2017-07-23

## 2017-07-23 ASSESSMENT — ENCOUNTER SYMPTOMS
GASTROINTESTINAL NEGATIVE: 1
RHINORRHEA: 1
SORE THROAT: 1
BACK PAIN: 1
COUGH: 1
SHORTNESS OF BREATH: 0
BACK PAIN: 1
RESPIRATORY NEGATIVE: 1
SINUS PRESSURE: 1

## 2017-07-24 ENCOUNTER — OFFICE VISIT (OUTPATIENT)
Dept: SURGERY | Age: 72
End: 2017-07-24
Payer: MEDICARE

## 2017-07-24 VITALS
SYSTOLIC BLOOD PRESSURE: 100 MMHG | WEIGHT: 127 LBS | HEIGHT: 69 IN | DIASTOLIC BLOOD PRESSURE: 60 MMHG | BODY MASS INDEX: 18.81 KG/M2 | TEMPERATURE: 97.7 F

## 2017-07-24 DIAGNOSIS — L97.312 CHRONIC ULCER OF ANKLE, RIGHT, WITH FAT LAYER EXPOSED (HCC): Primary | ICD-10-CM

## 2017-07-24 PROCEDURE — 4040F PNEUMOC VAC/ADMIN/RCVD: CPT | Performed by: SURGERY

## 2017-07-24 PROCEDURE — 1123F ACP DISCUSS/DSCN MKR DOCD: CPT | Performed by: SURGERY

## 2017-07-24 PROCEDURE — 99203 OFFICE O/P NEW LOW 30 MIN: CPT | Performed by: SURGERY

## 2017-07-24 PROCEDURE — G8399 PT W/DXA RESULTS DOCUMENT: HCPCS | Performed by: SURGERY

## 2017-07-24 PROCEDURE — 3014F SCREEN MAMMO DOC REV: CPT | Performed by: SURGERY

## 2017-07-24 PROCEDURE — 1036F TOBACCO NON-USER: CPT | Performed by: SURGERY

## 2017-07-24 PROCEDURE — 1090F PRES/ABSN URINE INCON ASSESS: CPT | Performed by: SURGERY

## 2017-07-24 PROCEDURE — G8420 CALC BMI NORM PARAMETERS: HCPCS | Performed by: SURGERY

## 2017-07-24 PROCEDURE — 3017F COLORECTAL CA SCREEN DOC REV: CPT | Performed by: SURGERY

## 2017-07-24 PROCEDURE — G8427 DOCREV CUR MEDS BY ELIG CLIN: HCPCS | Performed by: SURGERY

## 2017-07-25 ENCOUNTER — TELEPHONE (OUTPATIENT)
Dept: NEUROLOGY | Age: 72
End: 2017-07-25

## 2017-07-25 ENCOUNTER — NURSE ONLY (OUTPATIENT)
Dept: PRIMARY CARE CLINIC | Age: 72
End: 2017-07-25
Payer: MEDICARE

## 2017-07-25 DIAGNOSIS — E87.6 ACUTE HYPOKALEMIA: Primary | ICD-10-CM

## 2017-07-25 LAB
ANION GAP SERPL CALCULATED.3IONS-SCNC: 16 MMOL/L (ref 7–19)
BUN BLDV-MCNC: 10 MG/DL (ref 8–23)
CALCIUM SERPL-MCNC: 10.2 MG/DL (ref 8.8–10.2)
CHLORIDE BLD-SCNC: 100 MMOL/L (ref 98–111)
CO2: 23 MMOL/L (ref 22–29)
CREAT SERPL-MCNC: 1.4 MG/DL (ref 0.5–0.9)
GFR NON-AFRICAN AMERICAN: 37
GLUCOSE BLD-MCNC: 73 MG/DL (ref 74–109)
POTASSIUM SERPL-SCNC: 3.6 MMOL/L (ref 3.5–5)
SODIUM BLD-SCNC: 139 MMOL/L (ref 136–145)

## 2017-07-25 PROCEDURE — 36415 COLL VENOUS BLD VENIPUNCTURE: CPT | Performed by: FAMILY MEDICINE

## 2017-07-25 ASSESSMENT — ENCOUNTER SYMPTOMS
DIARRHEA: 1
COUGH: 1
VOMITING: 1
COLOR CHANGE: 0
BACK PAIN: 1
SHORTNESS OF BREATH: 1
CONSTIPATION: 1
ABDOMINAL PAIN: 1
EYE ITCHING: 1
NAUSEA: 1

## 2017-07-26 RX ORDER — POTASSIUM CHLORIDE 750 MG/1
10 TABLET, EXTENDED RELEASE ORAL 3 TIMES DAILY
Qty: 90 TABLET | Refills: 1 | Status: ON HOLD | OUTPATIENT
Start: 2017-07-26 | End: 2018-10-30 | Stop reason: HOSPADM

## 2017-07-27 ENCOUNTER — OFFICE VISIT (OUTPATIENT)
Dept: NEUROLOGY | Age: 72
End: 2017-07-27
Payer: MEDICARE

## 2017-07-27 VITALS
RESPIRATION RATE: 16 BRPM | HEART RATE: 72 BPM | DIASTOLIC BLOOD PRESSURE: 78 MMHG | WEIGHT: 124 LBS | BODY MASS INDEX: 19.46 KG/M2 | HEIGHT: 67 IN | SYSTOLIC BLOOD PRESSURE: 123 MMHG

## 2017-07-27 DIAGNOSIS — H81.313 VERTIGO, AURAL, BILATERAL: ICD-10-CM

## 2017-07-27 DIAGNOSIS — G44.221 CHRONIC TENSION-TYPE HEADACHE, INTRACTABLE: ICD-10-CM

## 2017-07-27 DIAGNOSIS — R41.3 MEMORY LOSS: ICD-10-CM

## 2017-07-27 DIAGNOSIS — G60.9 IDIOPATHIC PERIPHERAL NEUROPATHY: ICD-10-CM

## 2017-07-27 DIAGNOSIS — E55.9 VITAMIN D DEFICIENCY: ICD-10-CM

## 2017-07-27 DIAGNOSIS — G70.00 MYASTHENIA GRAVIS WITHOUT ACUTE EXACERBATION (HCC): Primary | ICD-10-CM

## 2017-07-27 PROCEDURE — 1123F ACP DISCUSS/DSCN MKR DOCD: CPT | Performed by: PSYCHIATRY & NEUROLOGY

## 2017-07-27 PROCEDURE — 3017F COLORECTAL CA SCREEN DOC REV: CPT | Performed by: PSYCHIATRY & NEUROLOGY

## 2017-07-27 PROCEDURE — G8420 CALC BMI NORM PARAMETERS: HCPCS | Performed by: PSYCHIATRY & NEUROLOGY

## 2017-07-27 PROCEDURE — 99203 OFFICE O/P NEW LOW 30 MIN: CPT | Performed by: PSYCHIATRY & NEUROLOGY

## 2017-07-27 PROCEDURE — 1036F TOBACCO NON-USER: CPT | Performed by: PSYCHIATRY & NEUROLOGY

## 2017-07-27 PROCEDURE — 1090F PRES/ABSN URINE INCON ASSESS: CPT | Performed by: PSYCHIATRY & NEUROLOGY

## 2017-07-27 PROCEDURE — G8399 PT W/DXA RESULTS DOCUMENT: HCPCS | Performed by: PSYCHIATRY & NEUROLOGY

## 2017-07-27 PROCEDURE — G8427 DOCREV CUR MEDS BY ELIG CLIN: HCPCS | Performed by: PSYCHIATRY & NEUROLOGY

## 2017-07-27 PROCEDURE — 3014F SCREEN MAMMO DOC REV: CPT | Performed by: PSYCHIATRY & NEUROLOGY

## 2017-07-27 PROCEDURE — 4040F PNEUMOC VAC/ADMIN/RCVD: CPT | Performed by: PSYCHIATRY & NEUROLOGY

## 2017-07-27 RX ORDER — CELECOXIB 200 MG/1
200 CAPSULE ORAL DAILY
COMMUNITY
End: 2018-06-08

## 2017-07-27 RX ORDER — AMITRIPTYLINE HYDROCHLORIDE 50 MG/1
100 TABLET, FILM COATED ORAL NIGHTLY
Qty: 30 TABLET | Refills: 5 | Status: SHIPPED | OUTPATIENT
Start: 2017-07-27 | End: 2017-11-17 | Stop reason: SDUPTHER

## 2017-07-27 RX ORDER — AMLODIPINE BESYLATE AND BENAZEPRIL HYDROCHLORIDE 5; 10 MG/1; MG/1
1 CAPSULE ORAL
COMMUNITY
End: 2017-11-02 | Stop reason: SDUPTHER

## 2017-07-27 RX ORDER — OMEPRAZOLE 20 MG/1
20 CAPSULE, DELAYED RELEASE ORAL DAILY
COMMUNITY
End: 2017-07-27

## 2017-07-27 RX ORDER — FLURANDRENOLIDE 0.5 MG/G
CREAM TOPICAL 2 TIMES DAILY
COMMUNITY
End: 2018-08-01 | Stop reason: ALTCHOICE

## 2017-07-27 RX ORDER — HYDROXYZINE HYDROCHLORIDE 25 MG/1
25 TABLET, FILM COATED ORAL 3 TIMES DAILY PRN
COMMUNITY
End: 2019-07-22 | Stop reason: SDUPTHER

## 2017-07-28 ENCOUNTER — TELEPHONE (OUTPATIENT)
Dept: GASTROENTEROLOGY | Age: 72
End: 2017-07-28

## 2017-08-03 RX ORDER — ZOLPIDEM TARTRATE 5 MG/1
TABLET ORAL
Qty: 30 TABLET | Refills: 0 | Status: SHIPPED | OUTPATIENT
Start: 2017-08-03 | End: 2018-02-21 | Stop reason: ALTCHOICE

## 2017-08-07 ENCOUNTER — HOSPITAL ENCOUNTER (OUTPATIENT)
Dept: NON INVASIVE DIAGNOSTICS | Age: 72
Discharge: HOME | End: 2017-08-07
Payer: MEDICARE

## 2017-08-07 ENCOUNTER — HOSPITAL ENCOUNTER (OUTPATIENT)
Dept: WOUND CARE | Age: 72
Discharge: HOME | End: 2017-08-07
Payer: MEDICARE

## 2017-08-07 VITALS
TEMPERATURE: 99.2 F | WEIGHT: 122 LBS | SYSTOLIC BLOOD PRESSURE: 126 MMHG | RESPIRATION RATE: 16 BRPM | BODY MASS INDEX: 19.15 KG/M2 | HEART RATE: 72 BPM | DIASTOLIC BLOOD PRESSURE: 80 MMHG | HEIGHT: 67 IN

## 2017-08-07 DIAGNOSIS — L97.312 CHRONIC ULCER OF ANKLE, RIGHT, WITH FAT LAYER EXPOSED (HCC): ICD-10-CM

## 2017-08-07 DIAGNOSIS — L97.312 NON-PRESSURE CHRONIC ULCER OF RIGHT ANKLE WITH FAT LAYER EXPOSED (HCC): Chronic | ICD-10-CM

## 2017-08-07 PROCEDURE — 97597 DBRDMT OPN WND 1ST 20 CM/<: CPT | Performed by: SURGERY

## 2017-08-07 PROCEDURE — 99213 OFFICE O/P EST LOW 20 MIN: CPT

## 2017-08-07 PROCEDURE — 93923 UPR/LXTR ART STDY 3+ LVLS: CPT

## 2017-08-07 PROCEDURE — 97597 DBRDMT OPN WND 1ST 20 CM/<: CPT

## 2017-08-07 RX ORDER — HYDROCODONE BITARTRATE AND ACETAMINOPHEN 7.5; 325 MG/1; MG/1
TABLET ORAL
Qty: 90 TABLET | Refills: 0 | Status: SHIPPED | OUTPATIENT
Start: 2017-08-07 | End: 2017-09-11 | Stop reason: SDUPTHER

## 2017-08-07 ASSESSMENT — PAIN DESCRIPTION - PROGRESSION: CLINICAL_PROGRESSION: NOT CHANGED

## 2017-08-07 ASSESSMENT — PAIN DESCRIPTION - ONSET: ONSET: ON-GOING

## 2017-08-07 ASSESSMENT — PAIN DESCRIPTION - LOCATION: LOCATION: ANKLE

## 2017-08-07 ASSESSMENT — PAIN DESCRIPTION - ORIENTATION: ORIENTATION: RIGHT

## 2017-08-07 ASSESSMENT — PAIN DESCRIPTION - DESCRIPTORS: DESCRIPTORS: TENDER;SORE

## 2017-08-07 ASSESSMENT — PAIN SCALES - GENERAL: PAINLEVEL_OUTOF10: 4

## 2017-08-07 ASSESSMENT — PAIN DESCRIPTION - FREQUENCY: FREQUENCY: CONTINUOUS

## 2017-08-07 ASSESSMENT — PAIN DESCRIPTION - PAIN TYPE: TYPE: ACUTE PAIN

## 2017-08-09 ENCOUNTER — TELEPHONE (OUTPATIENT)
Dept: PRIMARY CARE CLINIC | Age: 72
End: 2017-08-09

## 2017-08-12 PROBLEM — G44.221 CHRONIC TENSION-TYPE HEADACHE, INTRACTABLE: Status: ACTIVE | Noted: 2017-08-12

## 2017-08-12 PROBLEM — G70.00 MYASTHENIA GRAVIS WITHOUT ACUTE EXACERBATION (HCC): Status: ACTIVE | Noted: 2017-08-12

## 2017-08-12 PROBLEM — H81.319 VERTIGO, AURAL: Status: ACTIVE | Noted: 2017-08-12

## 2017-08-12 PROBLEM — G60.9 IDIOPATHIC PERIPHERAL NEUROPATHY: Status: ACTIVE | Noted: 2017-08-12

## 2017-08-12 ASSESSMENT — ENCOUNTER SYMPTOMS
BLURRED VISION: 0
SHORTNESS OF BREATH: 0
NAUSEA: 1
DIARRHEA: 0
HEMOPTYSIS: 0
SPUTUM PRODUCTION: 0
DOUBLE VISION: 0
VOMITING: 0
COUGH: 0
ABDOMINAL PAIN: 1
CONSTIPATION: 0
BACK PAIN: 1
BLOOD IN STOOL: 0

## 2017-08-15 ENCOUNTER — TELEPHONE (OUTPATIENT)
Dept: VASCULAR SURGERY | Age: 72
End: 2017-08-15

## 2017-08-15 DIAGNOSIS — R26.81 UNSTEADY GAIT: ICD-10-CM

## 2017-08-18 ENCOUNTER — HOSPITAL ENCOUNTER (OUTPATIENT)
Dept: GENERAL RADIOLOGY | Age: 72
Discharge: HOME | End: 2017-08-18
Payer: MEDICARE

## 2017-08-18 ENCOUNTER — OFFICE VISIT (OUTPATIENT)
Dept: PRIMARY CARE CLINIC | Age: 72
End: 2017-08-18
Payer: MEDICARE

## 2017-08-18 VITALS
DIASTOLIC BLOOD PRESSURE: 64 MMHG | HEART RATE: 76 BPM | SYSTOLIC BLOOD PRESSURE: 90 MMHG | RESPIRATION RATE: 16 BRPM | HEIGHT: 67 IN | TEMPERATURE: 97.8 F | WEIGHT: 127 LBS | BODY MASS INDEX: 19.93 KG/M2

## 2017-08-18 DIAGNOSIS — R29.6 FALLS FREQUENTLY: ICD-10-CM

## 2017-08-18 DIAGNOSIS — R53.1 WEAKNESS: ICD-10-CM

## 2017-08-18 DIAGNOSIS — R07.81 RIB PAIN ON RIGHT SIDE: ICD-10-CM

## 2017-08-18 DIAGNOSIS — R07.81 RIB PAIN ON RIGHT SIDE: Primary | ICD-10-CM

## 2017-08-18 DIAGNOSIS — M54.2 NECK PAIN: ICD-10-CM

## 2017-08-18 DIAGNOSIS — R41.0 CONFUSION: ICD-10-CM

## 2017-08-18 DIAGNOSIS — I95.9 HYPOTENSION, UNSPECIFIED HYPOTENSION TYPE: ICD-10-CM

## 2017-08-18 DIAGNOSIS — R91.1 NODULE OF LEFT LUNG: ICD-10-CM

## 2017-08-18 LAB
ANION GAP SERPL CALCULATED.3IONS-SCNC: 13 MMOL/L (ref 7–19)
BASOPHILS ABSOLUTE: 0.1 K/UL (ref 0–0.2)
BASOPHILS RELATIVE PERCENT: 0.7 % (ref 0–1)
BUN BLDV-MCNC: 19 MG/DL (ref 8–23)
CALCIUM SERPL-MCNC: 9.2 MG/DL (ref 8.8–10.2)
CHLORIDE BLD-SCNC: 103 MMOL/L (ref 98–111)
CO2: 22 MMOL/L (ref 22–29)
CREAT SERPL-MCNC: 1.5 MG/DL (ref 0.5–0.9)
EOSINOPHILS ABSOLUTE: 0.6 K/UL (ref 0–0.6)
EOSINOPHILS RELATIVE PERCENT: 6.6 % (ref 0–5)
GFR NON-AFRICAN AMERICAN: 34
GLUCOSE BLD-MCNC: 79 MG/DL (ref 74–109)
HCT VFR BLD CALC: 29.5 % (ref 37–47)
HEMOGLOBIN: 9.2 G/DL (ref 12–16)
LYMPHOCYTES ABSOLUTE: 1.7 K/UL (ref 1.1–4.5)
LYMPHOCYTES RELATIVE PERCENT: 19.2 % (ref 20–40)
MCH RBC QN AUTO: 26.2 PG (ref 27–31)
MCHC RBC AUTO-ENTMCNC: 31.2 G/DL (ref 33–37)
MCV RBC AUTO: 84 FL (ref 81–99)
MONOCYTES ABSOLUTE: 0.8 K/UL (ref 0–0.9)
MONOCYTES RELATIVE PERCENT: 9.4 % (ref 0–10)
NEUTROPHILS ABSOLUTE: 5.5 K/UL (ref 1.5–7.5)
NEUTROPHILS RELATIVE PERCENT: 63.4 % (ref 50–65)
PDW BLD-RTO: 17.2 % (ref 11.5–14.5)
PLATELET # BLD: 382 K/UL (ref 130–400)
PMV BLD AUTO: 9.8 FL (ref 9.4–12.3)
POTASSIUM SERPL-SCNC: 4.2 MMOL/L (ref 3.5–5)
RBC # BLD: 3.51 M/UL (ref 4.2–5.4)
SODIUM BLD-SCNC: 138 MMOL/L (ref 136–145)
WBC # BLD: 8.7 K/UL (ref 4.8–10.8)

## 2017-08-18 PROCEDURE — 36415 COLL VENOUS BLD VENIPUNCTURE: CPT | Performed by: FAMILY MEDICINE

## 2017-08-18 PROCEDURE — 4040F PNEUMOC VAC/ADMIN/RCVD: CPT | Performed by: FAMILY MEDICINE

## 2017-08-18 PROCEDURE — G8420 CALC BMI NORM PARAMETERS: HCPCS | Performed by: FAMILY MEDICINE

## 2017-08-18 PROCEDURE — G8427 DOCREV CUR MEDS BY ELIG CLIN: HCPCS | Performed by: FAMILY MEDICINE

## 2017-08-18 PROCEDURE — 1090F PRES/ABSN URINE INCON ASSESS: CPT | Performed by: FAMILY MEDICINE

## 2017-08-18 PROCEDURE — 1123F ACP DISCUSS/DSCN MKR DOCD: CPT | Performed by: FAMILY MEDICINE

## 2017-08-18 PROCEDURE — 1036F TOBACCO NON-USER: CPT | Performed by: FAMILY MEDICINE

## 2017-08-18 PROCEDURE — 3017F COLORECTAL CA SCREEN DOC REV: CPT | Performed by: FAMILY MEDICINE

## 2017-08-18 PROCEDURE — 3014F SCREEN MAMMO DOC REV: CPT | Performed by: FAMILY MEDICINE

## 2017-08-18 PROCEDURE — G8399 PT W/DXA RESULTS DOCUMENT: HCPCS | Performed by: FAMILY MEDICINE

## 2017-08-18 PROCEDURE — 99214 OFFICE O/P EST MOD 30 MIN: CPT | Performed by: FAMILY MEDICINE

## 2017-08-18 PROCEDURE — 71101 X-RAY EXAM UNILAT RIBS/CHEST: CPT

## 2017-08-23 ENCOUNTER — HOSPITAL ENCOUNTER (OUTPATIENT)
Dept: WOUND CARE | Age: 72
Discharge: HOME | End: 2017-08-23
Payer: MEDICARE

## 2017-08-23 ENCOUNTER — TELEPHONE (OUTPATIENT)
Dept: PRIMARY CARE CLINIC | Age: 72
End: 2017-08-23

## 2017-08-23 VITALS
TEMPERATURE: 98.1 F | WEIGHT: 127 LBS | HEIGHT: 67 IN | BODY MASS INDEX: 19.93 KG/M2 | SYSTOLIC BLOOD PRESSURE: 114 MMHG | HEART RATE: 66 BPM | DIASTOLIC BLOOD PRESSURE: 66 MMHG | RESPIRATION RATE: 16 BRPM

## 2017-08-23 DIAGNOSIS — L97.312 NON-PRESSURE CHRONIC ULCER OF RIGHT ANKLE WITH FAT LAYER EXPOSED (HCC): ICD-10-CM

## 2017-08-23 PROCEDURE — 99213 OFFICE O/P EST LOW 20 MIN: CPT | Performed by: SURGERY

## 2017-08-23 PROCEDURE — 99212 OFFICE O/P EST SF 10 MIN: CPT

## 2017-08-23 ASSESSMENT — PAIN DESCRIPTION - LOCATION: LOCATION: BACK;NECK;LEG

## 2017-08-23 ASSESSMENT — PAIN DESCRIPTION - PROGRESSION: CLINICAL_PROGRESSION: NOT CHANGED

## 2017-08-23 ASSESSMENT — PAIN DESCRIPTION - FREQUENCY: FREQUENCY: CONTINUOUS

## 2017-08-23 ASSESSMENT — PAIN DESCRIPTION - DESCRIPTORS: DESCRIPTORS: ACHING;SORE

## 2017-08-23 ASSESSMENT — PAIN DESCRIPTION - ONSET: ONSET: ON-GOING

## 2017-08-23 ASSESSMENT — PAIN DESCRIPTION - PAIN TYPE: TYPE: ACUTE PAIN

## 2017-08-23 ASSESSMENT — PAIN SCALES - GENERAL: PAINLEVEL_OUTOF10: 4

## 2017-08-23 ASSESSMENT — PAIN DESCRIPTION - ORIENTATION: ORIENTATION: RIGHT

## 2017-08-25 ENCOUNTER — HOSPITAL ENCOUNTER (OUTPATIENT)
Dept: GENERAL RADIOLOGY | Age: 72
Discharge: HOME | End: 2017-08-25
Payer: MEDICARE

## 2017-08-25 DIAGNOSIS — R91.1 NODULE OF LEFT LUNG: ICD-10-CM

## 2017-08-25 DIAGNOSIS — R53.1 WEAKNESS: ICD-10-CM

## 2017-08-25 DIAGNOSIS — R07.81 RIB PAIN ON RIGHT SIDE: ICD-10-CM

## 2017-08-25 DIAGNOSIS — E04.1 THYROID NODULE: Primary | ICD-10-CM

## 2017-08-25 PROCEDURE — 71250 CT THORAX DX C-: CPT

## 2017-08-27 ASSESSMENT — ENCOUNTER SYMPTOMS
SHORTNESS OF BREATH: 0
COUGH: 1
BACK PAIN: 1

## 2017-08-28 ENCOUNTER — NURSE ONLY (OUTPATIENT)
Dept: PRIMARY CARE CLINIC | Age: 72
End: 2017-08-28
Payer: MEDICARE

## 2017-08-28 DIAGNOSIS — D50.0 IRON DEFICIENCY ANEMIA DUE TO CHRONIC BLOOD LOSS: Primary | ICD-10-CM

## 2017-08-28 LAB
BASOPHILS ABSOLUTE: 0.1 K/UL (ref 0–0.2)
BASOPHILS RELATIVE PERCENT: 1.1 % (ref 0–1)
EOSINOPHILS ABSOLUTE: 0.7 K/UL (ref 0–0.6)
EOSINOPHILS RELATIVE PERCENT: 10.3 % (ref 0–5)
HCT VFR BLD CALC: 29.9 % (ref 37–47)
HEMOGLOBIN: 9.3 G/DL (ref 12–16)
LYMPHOCYTES ABSOLUTE: 1.8 K/UL (ref 1.1–4.5)
LYMPHOCYTES RELATIVE PERCENT: 28.3 % (ref 20–40)
MCH RBC QN AUTO: 26.1 PG (ref 27–31)
MCHC RBC AUTO-ENTMCNC: 31.1 G/DL (ref 33–37)
MCV RBC AUTO: 83.8 FL (ref 81–99)
MONOCYTES ABSOLUTE: 0.6 K/UL (ref 0–0.9)
MONOCYTES RELATIVE PERCENT: 9.7 % (ref 0–10)
NEUTROPHILS ABSOLUTE: 3.2 K/UL (ref 1.5–7.5)
NEUTROPHILS RELATIVE PERCENT: 50.1 % (ref 50–65)
PDW BLD-RTO: 17.9 % (ref 11.5–14.5)
PLATELET # BLD: 368 K/UL (ref 130–400)
PMV BLD AUTO: 10.2 FL (ref 9.4–12.3)
RBC # BLD: 3.57 M/UL (ref 4.2–5.4)
WBC # BLD: 6.4 K/UL (ref 4.8–10.8)

## 2017-08-28 PROCEDURE — 36415 COLL VENOUS BLD VENIPUNCTURE: CPT | Performed by: FAMILY MEDICINE

## 2017-08-29 ENCOUNTER — HOSPITAL ENCOUNTER (OUTPATIENT)
Dept: GENERAL RADIOLOGY | Age: 72
Discharge: HOME | End: 2017-08-29
Payer: MEDICARE

## 2017-08-29 DIAGNOSIS — E04.1 THYROID NODULE: ICD-10-CM

## 2017-08-29 PROCEDURE — 76536 US EXAM OF HEAD AND NECK: CPT

## 2017-09-06 ENCOUNTER — HOSPITAL ENCOUNTER (OUTPATIENT)
Dept: WOUND CARE | Age: 72
Discharge: HOME | End: 2017-09-06
Payer: MEDICARE

## 2017-09-06 LAB
BASOPHILS ABSOLUTE: 0.1 K/UL (ref 0–0.2)
BASOPHILS RELATIVE PERCENT: 1.4 % (ref 0–1)
EOSINOPHILS ABSOLUTE: 0.7 K/UL (ref 0–0.6)
EOSINOPHILS RELATIVE PERCENT: 10.1 % (ref 0–5)
HCT VFR BLD CALC: 30.1 % (ref 37–47)
HEMOGLOBIN: 9.2 G/DL (ref 12–16)
LYMPHOCYTES ABSOLUTE: 1.6 K/UL (ref 1.1–4.5)
LYMPHOCYTES RELATIVE PERCENT: 25.4 % (ref 20–40)
MCH RBC QN AUTO: 26.4 PG (ref 27–31)
MCHC RBC AUTO-ENTMCNC: 30.6 G/DL (ref 33–37)
MCV RBC AUTO: 86.5 FL (ref 81–99)
MONOCYTES ABSOLUTE: 0.8 K/UL (ref 0–0.9)
MONOCYTES RELATIVE PERCENT: 11.8 % (ref 0–10)
NEUTROPHILS ABSOLUTE: 3.3 K/UL (ref 1.5–7.5)
NEUTROPHILS RELATIVE PERCENT: 51 % (ref 50–65)
PDW BLD-RTO: 17.3 % (ref 11.5–14.5)
PLATELET # BLD: 347 K/UL (ref 130–400)
PMV BLD AUTO: 10 FL (ref 9.4–12.3)
RBC # BLD: 3.48 M/UL (ref 4.2–5.4)
WBC # BLD: 6.4 K/UL (ref 4.8–10.8)

## 2017-09-11 RX ORDER — HYDROCODONE BITARTRATE AND ACETAMINOPHEN 7.5; 325 MG/1; MG/1
TABLET ORAL
Qty: 90 TABLET | Refills: 0 | Status: SHIPPED | OUTPATIENT
Start: 2017-09-11 | End: 2017-10-10 | Stop reason: SDUPTHER

## 2017-09-13 ENCOUNTER — HOSPITAL ENCOUNTER (OUTPATIENT)
Dept: WOUND CARE | Age: 72
Discharge: HOME | End: 2017-09-13
Payer: MEDICARE

## 2017-09-13 VITALS
RESPIRATION RATE: 16 BRPM | HEIGHT: 67 IN | WEIGHT: 127 LBS | TEMPERATURE: 98.1 F | HEART RATE: 68 BPM | DIASTOLIC BLOOD PRESSURE: 67 MMHG | BODY MASS INDEX: 19.93 KG/M2 | SYSTOLIC BLOOD PRESSURE: 125 MMHG

## 2017-09-13 DIAGNOSIS — L97.312 NON-PRESSURE CHRONIC ULCER OF RIGHT ANKLE WITH FAT LAYER EXPOSED (HCC): ICD-10-CM

## 2017-09-13 PROCEDURE — 97597 DBRDMT OPN WND 1ST 20 CM/<: CPT

## 2017-09-13 PROCEDURE — 97597 DBRDMT OPN WND 1ST 20 CM/<: CPT | Performed by: SURGERY

## 2017-09-13 ASSESSMENT — PAIN DESCRIPTION - DESCRIPTORS: DESCRIPTORS: ACHING;SORE

## 2017-09-13 ASSESSMENT — PAIN DESCRIPTION - ORIENTATION: ORIENTATION: RIGHT

## 2017-09-13 ASSESSMENT — PAIN DESCRIPTION - PROGRESSION: CLINICAL_PROGRESSION: NOT CHANGED

## 2017-09-13 ASSESSMENT — PAIN DESCRIPTION - ONSET: ONSET: ON-GOING

## 2017-09-13 ASSESSMENT — PAIN SCALES - GENERAL: PAINLEVEL_OUTOF10: 2

## 2017-09-13 ASSESSMENT — PAIN DESCRIPTION - PAIN TYPE: TYPE: ACUTE PAIN

## 2017-09-13 ASSESSMENT — PAIN DESCRIPTION - LOCATION: LOCATION: BACK;LEG

## 2017-09-13 ASSESSMENT — PAIN DESCRIPTION - FREQUENCY: FREQUENCY: CONTINUOUS

## 2017-09-27 ENCOUNTER — HOSPITAL ENCOUNTER (OUTPATIENT)
Dept: WOUND CARE | Age: 72
Discharge: HOME | End: 2017-09-27
Payer: MEDICARE

## 2017-09-27 VITALS
TEMPERATURE: 98.9 F | HEART RATE: 68 BPM | DIASTOLIC BLOOD PRESSURE: 68 MMHG | BODY MASS INDEX: 19.93 KG/M2 | RESPIRATION RATE: 16 BRPM | WEIGHT: 127 LBS | HEIGHT: 67 IN | SYSTOLIC BLOOD PRESSURE: 124 MMHG

## 2017-09-27 DIAGNOSIS — I73.9 PERIPHERAL VASCULAR DISEASE (HCC): Primary | Chronic | ICD-10-CM

## 2017-09-27 DIAGNOSIS — L97.312 NON-PRESSURE CHRONIC ULCER OF RIGHT ANKLE WITH FAT LAYER EXPOSED (HCC): ICD-10-CM

## 2017-09-27 PROCEDURE — 97597 DBRDMT OPN WND 1ST 20 CM/<: CPT

## 2017-09-27 PROCEDURE — 97597 DBRDMT OPN WND 1ST 20 CM/<: CPT | Performed by: SURGERY

## 2017-09-27 PROCEDURE — 87205 SMEAR GRAM STAIN: CPT

## 2017-09-27 ASSESSMENT — PAIN SCALES - GENERAL: PAINLEVEL_OUTOF10: 2

## 2017-09-27 ASSESSMENT — PAIN DESCRIPTION - FREQUENCY: FREQUENCY: CONTINUOUS

## 2017-09-27 ASSESSMENT — PAIN DESCRIPTION - PAIN TYPE: TYPE: ACUTE PAIN

## 2017-09-27 ASSESSMENT — PAIN DESCRIPTION - PROGRESSION: CLINICAL_PROGRESSION: NOT CHANGED

## 2017-09-27 ASSESSMENT — PAIN DESCRIPTION - DESCRIPTORS: DESCRIPTORS: ACHING;SORE;TENDER

## 2017-09-27 ASSESSMENT — PAIN DESCRIPTION - LOCATION: LOCATION: BACK;LEG

## 2017-09-27 ASSESSMENT — PAIN DESCRIPTION - ORIENTATION: ORIENTATION: RIGHT

## 2017-09-27 ASSESSMENT — PAIN DESCRIPTION - ONSET: ONSET: ON-GOING

## 2017-10-01 LAB
ANAEROBIC CULTURE: ABNORMAL
GRAM STAIN RESULT: ABNORMAL
WOUND/ABSCESS: ABNORMAL

## 2017-10-02 ENCOUNTER — OFFICE VISIT (OUTPATIENT)
Dept: PRIMARY CARE CLINIC | Age: 72
End: 2017-10-02
Payer: MEDICARE

## 2017-10-02 VITALS
WEIGHT: 126 LBS | DIASTOLIC BLOOD PRESSURE: 72 MMHG | HEART RATE: 64 BPM | SYSTOLIC BLOOD PRESSURE: 118 MMHG | HEIGHT: 67 IN | BODY MASS INDEX: 19.78 KG/M2 | TEMPERATURE: 97.7 F | RESPIRATION RATE: 16 BRPM

## 2017-10-02 DIAGNOSIS — D64.9 ANEMIA, UNSPECIFIED TYPE: Primary | ICD-10-CM

## 2017-10-02 LAB
BASOPHILS ABSOLUTE: 0.1 K/UL (ref 0–0.2)
BASOPHILS RELATIVE PERCENT: 1.1 % (ref 0–1)
EOSINOPHILS ABSOLUTE: 0.8 K/UL (ref 0–0.6)
EOSINOPHILS RELATIVE PERCENT: 10.4 % (ref 0–5)
HCT VFR BLD CALC: 29.2 % (ref 37–47)
HEMOGLOBIN: 9 G/DL (ref 12–16)
LYMPHOCYTES ABSOLUTE: 2.3 K/UL (ref 1.1–4.5)
LYMPHOCYTES RELATIVE PERCENT: 28.9 % (ref 20–40)
MCH RBC QN AUTO: 26.2 PG (ref 27–31)
MCHC RBC AUTO-ENTMCNC: 30.8 G/DL (ref 33–37)
MCV RBC AUTO: 84.9 FL (ref 81–99)
MONOCYTES ABSOLUTE: 0.8 K/UL (ref 0–0.9)
MONOCYTES RELATIVE PERCENT: 9.9 % (ref 0–10)
NEUTROPHILS ABSOLUTE: 4 K/UL (ref 1.5–7.5)
NEUTROPHILS RELATIVE PERCENT: 49.6 % (ref 50–65)
PDW BLD-RTO: 17.3 % (ref 11.5–14.5)
PLATELET # BLD: 346 K/UL (ref 130–400)
PMV BLD AUTO: 10.3 FL (ref 9.4–12.3)
RBC # BLD: 3.44 M/UL (ref 4.2–5.4)
WBC # BLD: 8.1 K/UL (ref 4.8–10.8)

## 2017-10-02 PROCEDURE — 3014F SCREEN MAMMO DOC REV: CPT | Performed by: FAMILY MEDICINE

## 2017-10-02 PROCEDURE — 1123F ACP DISCUSS/DSCN MKR DOCD: CPT | Performed by: FAMILY MEDICINE

## 2017-10-02 PROCEDURE — 1036F TOBACCO NON-USER: CPT | Performed by: FAMILY MEDICINE

## 2017-10-02 PROCEDURE — G8484 FLU IMMUNIZE NO ADMIN: HCPCS | Performed by: FAMILY MEDICINE

## 2017-10-02 PROCEDURE — 4040F PNEUMOC VAC/ADMIN/RCVD: CPT | Performed by: FAMILY MEDICINE

## 2017-10-02 PROCEDURE — 3017F COLORECTAL CA SCREEN DOC REV: CPT | Performed by: FAMILY MEDICINE

## 2017-10-02 PROCEDURE — 1090F PRES/ABSN URINE INCON ASSESS: CPT | Performed by: FAMILY MEDICINE

## 2017-10-02 PROCEDURE — G8399 PT W/DXA RESULTS DOCUMENT: HCPCS | Performed by: FAMILY MEDICINE

## 2017-10-02 PROCEDURE — G8420 CALC BMI NORM PARAMETERS: HCPCS | Performed by: FAMILY MEDICINE

## 2017-10-02 PROCEDURE — G8427 DOCREV CUR MEDS BY ELIG CLIN: HCPCS | Performed by: FAMILY MEDICINE

## 2017-10-02 PROCEDURE — 99213 OFFICE O/P EST LOW 20 MIN: CPT | Performed by: FAMILY MEDICINE

## 2017-10-02 ASSESSMENT — ENCOUNTER SYMPTOMS: RESPIRATORY NEGATIVE: 1

## 2017-10-02 NOTE — MR AVS SNAPSHOT
2 to 4 low-dose aspirin. Wait for an ambulance. Do not try to drive yourself. · You passed out (lost consciousness). Call your doctor now or seek immediate medical care if:  · You have new or increased shortness of breath. · You are dizzy or lightheaded, or you feel like you may faint. · Your fatigue and weakness continue or get worse. · You have any abnormal bleeding, such as:  ¨ Nosebleeds. ¨ Vaginal bleeding that is different (heavier, more frequent, at a different time of the month) than what you are used to. ¨ Bloody or black stools, or rectal bleeding. ¨ Bloody or pink urine. Watch closely for changes in your health, and be sure to contact your doctor if:  · You do not get better as expected. Where can you learn more? Go to [email or web address] and sign in to your [employer] account. Enter R301 in the [employer] box to learn more about \"Anemia: Care Instructions. \"     If you do not have an account, please click on the \"Sign Up Now\" link. Current as of: October 13, 2016  Content Version: 11.3  © 7626-2053 StepUp. Care instructions adapted under license by [facility] ([facility]). If you have questions about a medical condition or this instruction, always ask your healthcare professional. [address] any warranty or liability for your use of this information. Medications and Orders      Your Current Medications Are              HYDROcodone-acetaminophen (NORCO) 7.5-325 MG per tablet TAKE ONE TABLET BY MOUTH EVERY 8 HOURS AS NEEDED FOR PAIN. Misc.  Devices (ROLLER WALKER) MISC 1 each by Does not apply route daily DX Code: R26.81, G70.00, H81.313, G60.9    zolpidem (AMBIEN) 5 MG tablet TAKE ONE TABLET BY MOUTH ONCE DAILY AT BEDTIME AS NEEDED FOR SLEEP FOR CHRONIC INSOMNIA.    celecoxib (CELEBREX) 200 MG capsule Take 200 mg by mouth daily    flurandrenolide (CORDRAN) 0.05 % CREA Apply topically 2 times daily Influenza Virus Vaccine 9/30/2015, 10/21/2014, 12/28/2012    Influenza, Intradermal, Preservative free 10/22/2013    Influenza, Quadv, 3 yrs and older, IM, Preservative Free 12/23/2016    Pneumococcal 13-valent Conjugate ([id number]) 6/22/2016    Pneumococcal Polysaccharide (Pyhiqzzec33) 6/30/2010    Td 8/2/2017, 8/31/1995      Preventive Care        Date Due    Mammograms are recommended every 2 years for low/average risk patients aged 48 - 69, and every year for high risk patients per updated national guidelines. However these guidelines can be individualized by your provider. 5/6/2017    Tetanus Combination Vaccine (1 - Tdap) 8/3/2017    Yearly Flu Vaccine (1) 9/1/2017    Cholesterol Screening 8/5/2019    Colonoscopy 7/6/2022            Supply Visiont Signup           Our records indicate that you have an active [employer] account. You can view your After Visit Summary by going to [email or web address] and logging in with your [email or web address] username and password. If you don't have a [email or web address] username and password but a parent or guardian has access to your record, the parent or guardian should login with their own [email or web address] username and password and access your record to view the After Visit Summary. Additional Information  If you have questions, please contact the physician practice where you receive care. Remember, [email or web address] is NOT to be used for urgent needs. For medical emergencies, dial 911. For questions regarding your [email or web address] account call [phone]. If you have a clinical question, please call your doctor's office.

## 2017-10-02 NOTE — PATIENT INSTRUCTIONS
breath. · You are dizzy or lightheaded, or you feel like you may faint. · Your fatigue and weakness continue or get worse. · You have any abnormal bleeding, such as:  ¨ Nosebleeds. ¨ Vaginal bleeding that is different (heavier, more frequent, at a different time of the month) than what you are used to. ¨ Bloody or black stools, or rectal bleeding. ¨ Bloody or pink urine. Watch closely for changes in your health, and be sure to contact your doctor if:  · You do not get better as expected. Where can you learn more? Go to [email or web address] and sign in to your Mobile Health Consumer account. Enter R301 in the RADEUM box to learn more about \"Anemia: Care Instructions. \"     If you do not have an account, please click on the \"Sign Up Now\" link. Current as of: October 13, 2016  Content Version: 11.3  © 5907-2961 AKT, [employer]. Care instructions adapted under license by [facility] ([facility]). If you have questions about a medical condition or this instruction, always ask your healthcare professional. [place] [address] any warranty or liability for your use of this information.

## 2017-10-02 NOTE — PROGRESS NOTES
normal and behavior is normal. Thought content normal. Cognition and memory are normal.   Affect is brighter, seems happier   Vitals reviewed. Assessment:      1. Anemia, unspecified type  CBC    Ferritin           Plan:      MEDICATIONS:  No orders of the defined types were placed in this encounter. Continue current medications. We will refill when needed. ORDERS:  Orders Placed This Encounter   Procedures    CBC    Ferritin     Home Health will Draw the CBC and ferritin today. I really do not want her driving until I see her again in 3 months because of her memory changes. I'm hoping that some of this is just stressed but we will reevaluate in 3 months. Follow-up:  Return in about 3 months (around 1/2/2018) for recheck. PATIENT INSTRUCTIONS:  Patient Instructions        Anemia: Care Instructions  Your Care Instructions    Anemia is a low level of red blood cells, which carry oxygen throughout your body. Many things can cause anemia. Lack of iron is one of the most common causes. Your body needs iron to make hemoglobin, a substance in red blood cells that carries oxygen from the lungs to your body's cells. Without enough iron, the body produces fewer and smaller red blood cells. As a result, your body's cells do not get enough oxygen, and you feel tired and weak. And you may have trouble concentrating. Bleeding is the most common cause of a lack of iron. You may have heavy menstrual bleeding or bleeding caused by conditions such as ulcers, hemorrhoids, or cancer. Regular use of aspirin or other anti-inflammatory medicines (such as ibuprofen) also can cause bleeding in some people. A lack of iron in your diet also can cause anemia, especially at times when the body needs more iron, such as during pregnancy, infancy, and the teen years. Your doctor may have prescribed iron pills.  It may take several months of treatment for your iron levels to return to normal. Your doctor also may suggest that you eat foods that are rich in iron, such as meat and beans. There are many other causes of anemia. It is not always due to a lack of iron. Finding the specific cause of your anemia will help your doctor find the right treatment for you. Follow-up care is a key part of your treatment and safety. Be sure to make and go to all appointments, and call your doctor if you are having problems. It's also a good idea to know your test results and keep a list of the medicines you take. How can you care for yourself at home? · Take your medicines exactly as prescribed. Call your doctor if you think you are having a problem with your medicine. · If your doctor recommends iron pills, take them as directed:  ¨ Try to take the pills on an empty stomach about 1 hour before or 2 hours after meals. But you may need to take iron with food to avoid an upset stomach. ¨ Do not take antacids or drink milk or caffeine drinks (such as coffee, tea, or cola) at the same time or within 2 hours of the time that you take your iron. They can make it hard for your body to absorb the iron. ¨ Vitamin C (from food or supplements) helps your body absorb iron. Try taking iron pills with a glass of orange juice or some other food that is high in vitamin C, such as citrus fruits. ¨ Iron pills may cause stomach problems, such as heartburn, nausea, diarrhea, constipation, and cramps. Be sure to drink plenty of fluids, and include fruits, vegetables, and fiber in your diet each day. Iron pills often make your bowel movements dark or green. ¨ If you forget to take an iron pill, do not take a double dose of iron the next time you take a pill. ¨ Keep iron pills out of the reach of small children. An overdose of iron can be very dangerous. · Follow your doctor's advice about eating iron-rich foods. These include red meat, shellfish, poultry, eggs, beans, raisins, whole-grain bread, and leafy green vegetables.   · Steam vegetables to help them

## 2017-10-03 RX ORDER — FERROUS SULFATE 325(65) MG
325 TABLET ORAL
Qty: 30 TABLET | Refills: 2 | Status: SHIPPED | OUTPATIENT
Start: 2017-10-03 | End: 2018-03-22 | Stop reason: SDUPTHER

## 2017-10-03 RX ORDER — FERROUS SULFATE 325(65) MG
325 TABLET ORAL
COMMUNITY
End: 2017-10-03 | Stop reason: SDUPTHER

## 2017-10-04 ENCOUNTER — HOSPITAL ENCOUNTER (OUTPATIENT)
Dept: WOUND CARE | Age: 72
Discharge: HOME | End: 2017-10-04
Payer: MEDICARE

## 2017-10-04 VITALS
HEIGHT: 67 IN | BODY MASS INDEX: 19.78 KG/M2 | DIASTOLIC BLOOD PRESSURE: 82 MMHG | HEART RATE: 62 BPM | TEMPERATURE: 98 F | RESPIRATION RATE: 16 BRPM | WEIGHT: 126 LBS | SYSTOLIC BLOOD PRESSURE: 120 MMHG

## 2017-10-04 DIAGNOSIS — L97.312 NON-PRESSURE CHRONIC ULCER OF RIGHT ANKLE WITH FAT LAYER EXPOSED (HCC): ICD-10-CM

## 2017-10-04 PROCEDURE — 97597 DBRDMT OPN WND 1ST 20 CM/<: CPT

## 2017-10-04 PROCEDURE — 97597 DBRDMT OPN WND 1ST 20 CM/<: CPT | Performed by: SURGERY

## 2017-10-04 ASSESSMENT — PAIN DESCRIPTION - PAIN TYPE: TYPE: ACUTE PAIN

## 2017-10-04 ASSESSMENT — PAIN DESCRIPTION - DESCRIPTORS: DESCRIPTORS: ACHING;SORE;TENDER

## 2017-10-04 ASSESSMENT — PAIN DESCRIPTION - LOCATION: LOCATION: ANKLE;BACK;LEG

## 2017-10-04 ASSESSMENT — PAIN DESCRIPTION - PROGRESSION: CLINICAL_PROGRESSION: NOT CHANGED

## 2017-10-04 ASSESSMENT — PAIN DESCRIPTION - ONSET: ONSET: ON-GOING

## 2017-10-04 ASSESSMENT — PAIN DESCRIPTION - ORIENTATION: ORIENTATION: RIGHT

## 2017-10-04 ASSESSMENT — PAIN SCALES - GENERAL: PAINLEVEL_OUTOF10: 0

## 2017-10-04 ASSESSMENT — PAIN DESCRIPTION - FREQUENCY: FREQUENCY: CONTINUOUS

## 2017-10-04 NOTE — PROGRESS NOTES
[facility]   Progress Note and Procedure Note      Vishal Kincaid  MEDICAL RECORD NUMBER:  [id number]  AGE: 67 y.o. GENDER: female  : 1945  EPISODE DATE:  10/4/2017    Subjective:     Chief Complaint   Patient presents with    Wound Check     right ankle         HISTORY of PRESENT ILLNESS HPI     Vishal Kincaid is a 67 y.o. female who presents today for wound/ulcer evaluation.    History of Wound Context: Pt has R lateral ankle ulcer here for eval/treat  Wound/Ulcer Pain Timing/Severity: intermittent  Quality of pain: dull  Severity:  1 / 10   Modifying Factors: None  Associated Signs/Symptoms: edema    Ulcer Identification:  Ulcer Type: venous  Contributing Factors: edema    Wound: R lateral ankle wound        PAST MEDICAL HISTORY        Diagnosis Date    Anxiety     Chronic back pain     severe muscle spasms    Deafness congenital     Depression     Headache     Hypertension     Memory loss     early    Myasthenia (HCC)     Neck pain     Osteoarthritis     Ptosis of eyelid     Weakness of face muscles        PAST SURGICAL HISTORY    Past Surgical History:   Procedure Laterality Date    APPENDECTOMY      CARPAL TUNNEL RELEASE      CATARACT REMOVAL      COLONOSCOPY      COLONOSCOPY   or before    [place]-normal per patient    EYE SURGERY      HERNIA REPAIR      on back of neck    HYSTERECTOMY      MI COLONOSCOPY FLX DX W/COLLJ SPEC WHEN PFRMD N/A 2017    Dr Rosalva Quiroz diverticulosis, 5 yr recall    MI EGD TRANSORAL BIOPSY SINGLE/MULTIPLE N/A 2017    Dr Guy Maxwell, [place] (-)    VARICOSE VEIN SURGERY         FAMILY HISTORY    Family History   Problem Relation Age of Onset    High Blood Pressure Mother     Stroke Mother     Cancer Father     Lung Cancer Father     Hearing Loss Brother      deaf    Hearing Loss Brother      deaf    Colon Polyps Sister     Colon Cancer Neg Hx     Esophageal Cancer Neg Hx     Liver Cancer Neg Hx     Liver Disease Neg Hx     Stomach Cancer Neg Hx     Rectal Cancer Neg Hx        SOCIAL HISTORY    Social History   Substance Use Topics    Smoking status: Never Smoker    Smokeless tobacco: Never Used    Alcohol use No       ALLERGIES    No Known Allergies    MEDICATIONS    Current Outpatient Prescriptions on File Prior to Encounter   Medication Sig Dispense Refill    ferrous sulfate 325 (65 Fe) MG tablet Take 1 tablet by mouth daily (with breakfast) 30 tablet 2    HYDROcodone-acetaminophen (NORCO) 7.5-325 MG per tablet TAKE ONE TABLET BY MOUTH EVERY 8 HOURS AS NEEDED FOR PAIN. 90 tablet 0    Misc. Devices (ROLLER WALKER) MISC 1 each by Does not apply route daily DX Code: R26.81, G70.00, H81.313, G60.9 1 each 0    zolpidem (AMBIEN) 5 MG tablet TAKE ONE TABLET BY MOUTH ONCE DAILY AT BEDTIME AS NEEDED FOR SLEEP FOR CHRONIC INSOMNIA. 30 tablet 0    celecoxib (CELEBREX) 200 MG capsule Take 200 mg by mouth daily      flurandrenolide (CORDRAN) 0.05 % CREA Apply topically 2 times daily      amLODIPine-benazepril (LOTREL) 5-10 MG per capsule Take 1 capsule by mouth      hydrOXYzine (ATARAX) 25 MG tablet Take 25 mg by mouth 3 times daily as needed for Itching      amitriptyline (ELAVIL) 50 MG tablet Take 2 tablets by mouth nightly 30 tablet 5    potassium chloride (KLOR-CON M) 10 MEQ extended release tablet Take 1 tablet by mouth 3 times daily 90 tablet 1    gabapentin (NEURONTIN) 300 MG capsule 1 capsule by mouth twice a day for pain in nerves of feet 60 capsule 5    FLUoxetine (PROZAC) 20 MG capsule TAKE ONE CAPSULE BY MOUTH ONCE DAILY 90 capsule 3    donepezil (ARICEPT) 10 MG tablet TAKE ONE TABLET BY MOUTH ONCE DAILY 90 tablet 2    ondansetron (ZOFRAN) 4 MG tablet TAKE ONE TABLET BY MOUTH TWICE DAILY AS NEEDED FOR SEVERE NAUSEA AND VOMITING. 15 tablet 0    sodium chloride 0.9 % irrigation        No current facility-administered medications on file prior to encounter.         REVIEW OF insufficiency I87.2    Ectatic abdominal aorta (HCC) I77.811    Ectatic abdominal aorta (HCC) I77.811    Chronic insomnia F51.04    Epigastric pain R10.13    Dark stools R19.5    Alternating constipation and diarrhea R19.8    Family history of colonic polyps Z83.71    Non-pressure chronic ulcer of right ankle with fat layer exposed ([facility]) L97.312    Myasthenia gravis without acute exacerbation ([facility]) G70.00    Vertigo, aural H81.319    Chronic tension-type headache, intractable G44.221    Idiopathic peripheral neuropathy G60.9    Unsteady gait R26.81        Procedure Note  Indications:  Based on my examination of this patient's wound(s)/ulcer(s) today, debridement is required to promote healing and evaluate the wound base. Performed by: Omer Montes MD    Consent obtained:  Yes    Time out taken:  Yes    Pain Control: Anesthetic  Anesthetic: 2% Xylocaine Gel       Debridement:Non-excisional Debridement    Using curette the wound(s)/ulcer(s) was/were sharply debrided down through and including the removal of viable and non-viable epidermis and dermis. Devitalized Tissue Debrided:  fibrin, biofilm, slough and exudate    Pre Debridement Measurements:  Are located in the [facility]  Documentation Flow Sheet    Wound/Ulcer #: 2    Percent of Wound/Ulcer Debrided: 100%    Total Surface Area Debrided:  2.4 sq cm       Wound 01/12/16 Bite Ankle Right;Lateral WOUND 1 Right lateral ankle (Active)   Number of days:631       Wound 08/07/17 Bite Ankle Right;Lateral Wound 2, Pressure ulcer, Stage 3 (Active)   Wound Type Wound 10/4/2017  4:02 PM   Wound Pressure Stage  2 10/4/2017  4:02 PM   Dressing Status Dry; Intact 9/27/2017  4:06 PM   Dressing Changed Changed/New 9/27/2017  4:38 PM   Wound Cleansed Rinsed/Irrigated with saline 10/4/2017  4:02 PM   Wound Length (cm) 1.5 cm 10/4/2017  4:10 PM   Wound Width (cm) 1.6 cm 10/4/2017  4:10 PM   Wound Depth (cm)  0.2 10/4/2017  4:10 PM   Calculated Wound Size (cm^2) (l*w) 2.4 cm^2 10/4/2017  4:10 PM   Change in Wound Size % (l*w) -17263 10/4/2017  4:10 PM   Tunneling Position ___ O'Clock 0 10/4/2017  4:02 PM   Undermining Starts ___ O'Clock 0 10/4/2017  4:02 PM   Undermining Ends___ O'Clock 0 10/4/2017  4:02 PM   Undermining Maxium Distance (cm) 0 10/4/2017  4:02 PM   Wound Assessment Black; Brown;Dark edges; Red 10/4/2017  4:02 PM   Margins Defined edges 10/4/2017  4:02 PM   Paloma-wound Assessment Brown;Dry 10/4/2017  4:02 PM   Chamberlain%Wound Bed 0 10/4/2017  4:02 PM   Red%Wound Bed 0 10/4/2017  4:02 PM   Yellow%Wound Bed 10 10/4/2017  4:02 PM   Black%Wound Bed 90 10/4/2017  4:02 PM   Purple%Wound Bed 0 10/4/2017  4:02 PM   Other%Wound Bed 0 10/4/2017  4:02 PM   Drainage Amount Scant 10/4/2017  4:02 PM   Drainage Description Serosanguinous 10/4/2017  4:02 PM   Odor None 10/4/2017  4:02 PM   Debridement per physician Full thickness 10/4/2017  4:10 PM   Time out Yes 10/4/2017  4:10 PM   Procedural Pain 2 10/4/2017  4:10 PM   Post procedural Pain 0 10/4/2017  4:10 PM   Number of days:58       Diabetic/Pressure/Non Pressure Ulcers only:  Ulcer: N/A      Estimated Blood Loss:  Minimal    Hemostasis Achieved:  by pressure    Procedural Pain:  0  / 10     Post Procedural Pain:  0 / 10     Response to treatment:  Well tolerated by patient. Plan:     Treatment Note please see attached Discharge Instructions    In my professional opinion this patient would benefit from HBO Therapy: No    Written patient dismissal instructions given to patient and signed by patient or POA. Discharge Instructions       Visit Discharge/Physician Orders    Discharge condition: Stable    Discharge to: Home    Left via:Private automobile    Accompanied by:  and daughter    ECF/HHA:  [facility] - change unna boot dressing Monday, Wednesday and Friday for the weeks of October 9th and the week of October 16th. And then Monday the 23rd.   Patient will follow up in [address] with Dr. Tanmay Perez on Wednesday the  25th.     Dressing Orders: Right Lateral Ankle  Wash with warm water and soap  Apply Medihoney HCS to wound bed, Copa to pad, wrap from toe to knee with Coflex unna boot. Keep dressing clean and dry. Elevate foot to level or above heart 3-4 times daily and as needed for 30 minutes, to reduce swelling        [facility] followup visit _________3 weeks in [facility] on October 25th____________________  (Please note your next appointment above and if you are unable to keep, kindly give a 24 hour notice. Thank you.)          If you experience any of the following, please call the [address] during business hours:    * Increase in Pain  * Temperature over 101  * Increase in drainage from your wound  * Drainage with a foul odor  * Bleeding  * Increase in swelling  * Need for compression bandage changes due to slippage, breakthrough drainage. If you need medical attention outside of the business hours of the [address] please contact your PCP or go to the nearest emergency room.           Electronically signed by Mary Rasheed MD on 10/4/2017 at 4:23 PM

## 2017-10-04 NOTE — IP AVS SNAPSHOT
this packet, you will find information about the topics listed below:    · Instructions about your medications including a list of your home medications  · A summary of your hospital visit  · Follow-up appointments once you have left the hospital  · Your care plan at home      You may receive a survey regarding the care you received during your stay. Your input is valuable to us. We encourage you to complete and return your survey in the envelope provided. We hope you will choose us in the future for your healthcare needs. Patient Information     Patient Name ADDIS Schwartz 1945      Care Provided at:     Name Address Phone       [address] [phone]            Your Visit    Here you will find information about your visit, including the reason for your visit. Please take this sheet with you when you visit your doctor or other health care provider in the future. It will help determine the best possible medical care for you at that time. If you have any questions once you leave the hospital, please call the department phone number listed below. Why you were here     Your primary diagnosis was:  Not on File      Visit Information     Date & Time Department Dept. Phone    10/4/2017 [facility] WOUND CARE [phone]       Follow-up Appointments    Below is a list of your follow-up and future appointments. This may not be a complete list as you may have made appointments directly with providers that we are not aware of or your providers may have made some for you. Please call your providers to confirm appointments. It is important to keep your appointments. Please bring your current insurance card, photo ID, co-pay, and all medication bottles to your appointment. If self-pay, payment is expected at the time of service.         Future Appointments     10/25/2017 2:00 PM Appointment with Kristin Garduno MD at [address] [place] [address]  ([phone])   Please arrive 15 minutes prior to appointment time, bring insurance card and photo ID. [address] [address]       11/2/2017 11:45 AM     Appointment with Sara Briscoe MD at [address] [facility] ([phone])   Please arrive 15 minutes prior to appointment, bring photo ID and insurance card. [address]       1/3/2018 1:15 PM     Appointment with Tessy Kelsey MD at [id number] [place] [address] ([phone])   Please arrive 15 minutes prior to appointment, bring photo ID and insurance card. [address] [facility]         Preventive Care        Date Due    Mammograms are recommended every 2 years for low/average risk patients aged 48 - 69, and every year for high risk patients per updated national guidelines. However these guidelines can be individualized by your provider. 5/6/2017    Tetanus Combination Vaccine (1 - Tdap) 8/3/2017    Yearly Flu Vaccine (1) 9/1/2017    Cholesterol Screening 8/5/2019    Colonoscopy 7/6/2022                 Care Plan Once You Return Home    This section includes instructions you will need to follow once you leave the hospital.  Your care team will discuss these with you, so you and those caring for you know how to best care for your health needs at home. This section may also include educational information about certain health topics that may be of help to you. Discharge Instructions       Visit Discharge/Physician Orders    Discharge condition: Stable    Discharge to: Home    Left via:Private automobile    Accompanied by:  and daughter    ECF/HHA:  [facility] - change unna boot dressing Monday, Wednesday and Friday for the weeks of October 9th and the week of October 16th. And then Monday the 23rd. Patient will follow up in [address] [facility] [address] with Dr. Darryl Clark on Wednesday the  25th.      ? Review your future test results online . ? Review your discharge instructions provided by your caregivers at discharge    Certain functionality such as prescription refills, scheduling appointments or sending messages to your provider are not activated if your provider does not use [facility] in his/her office    For questions regarding your MyChart account call [phone]. If you have a clinical question, please call your doctor's office. The information on all pages of the After Visit Summary has been reviewed with me, the patient and/or responsible adult, by my health care provider(s). I had the opportunity to ask questions regarding this information. I understand I should dispose of my armband safely at home to protect my health information. A complete copy of the After Visit Summary has been given to me, the patient and/or responsible adult.            Patient Signature/Responsible Adult:____________________    Clinician Signature:_____________________    Date:_____________________    Time:_____________________

## 2017-10-05 ENCOUNTER — TELEPHONE (OUTPATIENT)
Dept: PRIMARY CARE CLINIC | Age: 72
End: 2017-10-05

## 2017-10-10 RX ORDER — HYDROCODONE BITARTRATE AND ACETAMINOPHEN 7.5; 325 MG/1; MG/1
TABLET ORAL
Qty: 90 TABLET | Refills: 0 | Status: SHIPPED | OUTPATIENT
Start: 2017-10-10 | End: 2017-11-06 | Stop reason: SDUPTHER

## 2017-10-11 RX ORDER — CEPHALEXIN 500 MG/1
CAPSULE ORAL
Qty: 40 CAPSULE | Refills: 1 | Status: SHIPPED | OUTPATIENT
Start: 2017-10-11 | End: 2017-12-14 | Stop reason: SDUPTHER

## 2017-10-11 RX ORDER — AMLODIPINE BESYLATE AND BENAZEPRIL HYDROCHLORIDE 5; 10 MG/1; MG/1
CAPSULE ORAL
Qty: 30 CAPSULE | Refills: 5 | Status: SHIPPED | OUTPATIENT
Start: 2017-10-11 | End: 2018-05-01 | Stop reason: SDUPTHER

## 2017-10-25 ENCOUNTER — TELEPHONE (OUTPATIENT)
Dept: PRIMARY CARE CLINIC | Age: 72
End: 2017-10-25

## 2017-10-25 ENCOUNTER — HOSPITAL ENCOUNTER (OUTPATIENT)
Dept: WOUND CARE | Age: 72
Discharge: HOME | End: 2017-10-25
Payer: MEDICARE

## 2017-10-25 VITALS
BODY MASS INDEX: 19.78 KG/M2 | HEART RATE: 60 BPM | TEMPERATURE: 98.2 F | DIASTOLIC BLOOD PRESSURE: 78 MMHG | RESPIRATION RATE: 16 BRPM | SYSTOLIC BLOOD PRESSURE: 118 MMHG | WEIGHT: 126 LBS | HEIGHT: 67 IN

## 2017-10-25 DIAGNOSIS — L97.312 NON-PRESSURE CHRONIC ULCER OF RIGHT ANKLE WITH FAT LAYER EXPOSED (HCC): ICD-10-CM

## 2017-10-25 PROCEDURE — 97597 DBRDMT OPN WND 1ST 20 CM/<: CPT

## 2017-10-25 PROCEDURE — 97597 DBRDMT OPN WND 1ST 20 CM/<: CPT | Performed by: SURGERY

## 2017-10-25 ASSESSMENT — PAIN DESCRIPTION - PROGRESSION: CLINICAL_PROGRESSION: NOT CHANGED

## 2017-10-25 ASSESSMENT — PAIN DESCRIPTION - LOCATION: LOCATION: ANKLE;BACK;LEG

## 2017-10-25 ASSESSMENT — PAIN DESCRIPTION - PAIN TYPE: TYPE: ACUTE PAIN

## 2017-10-25 ASSESSMENT — PAIN SCALES - GENERAL: PAINLEVEL_OUTOF10: 0

## 2017-10-25 ASSESSMENT — PAIN DESCRIPTION - DESCRIPTORS: DESCRIPTORS: ACHING;SORE;TENDER

## 2017-10-25 ASSESSMENT — PAIN DESCRIPTION - ONSET: ONSET: ON-GOING

## 2017-10-25 ASSESSMENT — PAIN DESCRIPTION - ORIENTATION: ORIENTATION: RIGHT

## 2017-10-25 ASSESSMENT — PAIN DESCRIPTION - FREQUENCY: FREQUENCY: CONTINUOUS

## 2017-10-25 NOTE — TELEPHONE ENCOUNTER
I would suggested they take her to the emergency room so they may do a CT scan of her head, lab work and make sure her sodium is not low and also fix her up with a neurologist. I think that would be awfully sudden for the next stage of dementia

## 2017-10-25 NOTE — PLAN OF CARE
Problem: Pain:  Goal: Pain level will decrease  Pain level will decrease   Outcome: Ongoing    Goal: Control of acute pain  Control of acute pain   Outcome: Ongoing    Goal: Control of chronic pain  Control of chronic pain   Outcome: Ongoing      Problem: Wound:  Goal: Will show signs of wound healing; wound closure and no evidence of infection  Will show signs of wound healing; wound closure and no evidence of infection   Outcome: Ongoing

## 2017-10-25 NOTE — TELEPHONE ENCOUNTER
Daughter called and voiced her concerns in regards to her Mother on Monday could not move and yesterday was a good day and today she is talking to people that are not even there. She was wondering if her mother is going into the next stages of her Alzheimer disease. She is just really concerned.

## 2017-10-25 NOTE — PROGRESS NOTES
pain M54.9, G89.29    Memory loss R41.3    Neck pain M54.2    Deafness congenital H90.5    Pulsatile abdominal mass R19.00    Descending thoracic aortic aneurysm (HCC) I71.2    Myasthenia gravis (HCC) G70.00    Essential hypertension I10    Peripheral vascular disease (HCC) I73.9    Venous (peripheral) insufficiency I87.2    Ectatic abdominal aorta (HCC) I77.811    Ectatic abdominal aorta (HCC) I77.811    Chronic insomnia F51.04    Epigastric pain R10.13    Dark stools R19.5    Alternating constipation and diarrhea R19.8    Family history of colonic polyps Z83.71    Non-pressure chronic ulcer of right ankle with fat layer exposed ([address] L97.312    Myasthenia gravis without acute exacerbation (HCC) G70.00    Vertigo, aural H81.319    Chronic tension-type headache, intractable G44.221    Idiopathic peripheral neuropathy G60.9    Unsteady gait R26.81        Procedure Note  Indications:  Based on my examination of this patient's wound(s)/ulcer(s) today, debridement is required to promote healing and evaluate the wound base. Performed by: Keaton Naik MD    Consent obtained:  Yes    Time out taken:  Yes    Pain Control: Anesthetic  Anesthetic: 2% Xylocaine Gel       Debridement:Non-excisional Debridement    Using curette the wound(s)/ulcer(s) was/were sharply debrided down through and including the removal of viable and non-viable epidermis and dermis. Devitalized Tissue Debrided:  fibrin, biofilm, slough and exudate    Pre Debridement Measurements:  Are located in the [place]  Documentation Flow Sheet    Wound/Ulcer #: 2    Percent of Wound/Ulcer Debrided: 100%    Total Surface Area Debrided:  4.4 sq cm       Wound 08/07/17 Arterial ulcer Ankle Right;Lateral Wound 2, Pressure ulcer, Stage 3 (Active)   Wound Type Wound 10/25/2017  2:11 PM   Wound Arterial 10/25/2017  2:11 PM   Dressing Status Dry; Intact 9/27/2017  4:06 PM   Dressing Changed Changed/New 10/25/2017  2:35 PM   Wound Cleansed Other (Comment) 10/25/2017  2:11 PM   Wound Length (cm) 2 cm 10/25/2017  2:18 PM   Wound Width (cm) 2.2 cm 10/25/2017  2:18 PM   Wound Depth (cm)  0.2 10/25/2017  2:18 PM   Calculated Wound Size (cm^2) (l*w) 4.4 cm^2 10/25/2017  2:18 PM   Change in Wound Size % (l*w) -72131 10/25/2017  2:18 PM   Tunneling Position ___ O'Clock 0 10/4/2017  4:02 PM   Undermining Starts ___ O'Clock 0 10/4/2017  4:02 PM   Undermining Ends___ O'Clock 0 10/4/2017  4:02 PM   Undermining Maxium Distance (cm) 0 10/4/2017  4:02 PM   Wound Assessment Slough; Yellow 10/25/2017  2:11 PM   Margins Defined edges 10/25/2017  2:11 PM   Paloma-wound Assessment Pink;Red 10/25/2017  2:11 PM   [place]y%Wound Bed 0 10/25/2017  2:11 PM   Red%Wound Bed 0 10/25/2017  2:11 PM   Yellow%Wound Bed 100 10/25/2017  2:11 PM   Black%Wound Bed 0 10/25/2017  2:11 PM   Purple%Wound Bed 0 10/25/2017  2:11 PM   Other%Wound Bed 0 10/25/2017  2:11 PM   Drainage Amount Large 10/25/2017  2:11 PM   Drainage Description Yellow; Tan 10/25/2017  2:11 PM   Odor None 10/25/2017  2:11 PM   Debridement per physician Full thickness 10/25/2017  2:18 PM   Time out Yes 10/25/2017  2:18 PM   Procedural Pain 2 10/25/2017  2:18 PM   Post procedural Pain 0 10/25/2017  2:18 PM   Number of days: 78       Diabetic/Pressure/Non Pressure Ulcers only:  Ulcer: N/A      Estimated Blood Loss:  Minimal    Hemostasis Achieved:  by pressure    Procedural Pain:  1  / 10     Post Procedural Pain:  0 / 10     Response to treatment:  Well tolerated by patient. Plan:     Treatment Note please see attached Discharge Instructions    In my professional opinion this patient would benefit from HBO Therapy: No    Written patient dismissal instructions given to patient and signed by patient or POA.          Discharge Instructions       Visit Discharge/Physician Orders    Discharge condition: Stable    Discharge to: Home    Left via:Private automobile    Accompanied by:  Family,     ECF/HHA: Dressing Orders:  [facility] - change unna boot dressing this Friday, then  Monday, and Friday next week.       Dressing Orders: Right Lateral Ankle  Wash with warm water and soap  Apply Aquacel AG to wound bed, Copa to pad, wrap from toe to knee with Coflex unna boot. Keep dressing clean and dry. Elevate foot to level or above heart 3-4 times daily and as needed for 30 minutes, to reduce swelling           [facility] followup visit ____2 weeks; will schedule appt with vascular surgeon in [place]._________________________  (Please note your next appointment above and if you are unable to keep, kindly give a 24 hour notice. Thank you.)          If you experience any of the following, please call the [facility] during business hours:    * Increase in Pain  * Temperature over 101  * Increase in drainage from your wound  * Drainage with a foul odor  * Bleeding  * Increase in swelling  * Need for compression bandage changes due to slippage, breakthrough drainage. If you need medical attention outside of the business hours of the [facility] please contact your PCP or go to the nearest emergency room.           Electronically signed by Josh Cannon MD on 10/25/2017 at 2:36 PM

## 2017-11-01 ENCOUNTER — HOSPITAL ENCOUNTER (OUTPATIENT)
Dept: WOUND CARE | Age: 72
Discharge: HOME | End: 2017-11-01
Payer: MEDICARE

## 2017-11-01 VITALS
DIASTOLIC BLOOD PRESSURE: 70 MMHG | HEART RATE: 65 BPM | SYSTOLIC BLOOD PRESSURE: 112 MMHG | BODY MASS INDEX: 19.78 KG/M2 | TEMPERATURE: 98.8 F | HEIGHT: 67 IN | RESPIRATION RATE: 16 BRPM | WEIGHT: 126 LBS

## 2017-11-01 DIAGNOSIS — D64.9 ANEMIA, UNSPECIFIED TYPE: ICD-10-CM

## 2017-11-01 DIAGNOSIS — L97.312 NON-PRESSURE CHRONIC ULCER OF RIGHT ANKLE WITH FAT LAYER EXPOSED (HCC): Primary | Chronic | ICD-10-CM

## 2017-11-01 DIAGNOSIS — L97.312 NON-PRESSURE CHRONIC ULCER OF RIGHT ANKLE WITH FAT LAYER EXPOSED (HCC): Chronic | ICD-10-CM

## 2017-11-01 LAB
ALBUMIN SERPL-MCNC: 3.5 G/DL (ref 3.5–5.2)
ALP BLD-CCNC: 95 U/L (ref 35–104)
ALT SERPL-CCNC: <5 U/L (ref 5–33)
ANION GAP SERPL CALCULATED.3IONS-SCNC: 12 MMOL/L (ref 7–19)
AST SERPL-CCNC: 14 U/L (ref 5–32)
BASOPHILS ABSOLUTE: 0.1 K/UL (ref 0–0.2)
BASOPHILS RELATIVE PERCENT: 1.2 % (ref 0–1)
BILIRUB SERPL-MCNC: 0.3 MG/DL (ref 0.2–1.2)
BUN BLDV-MCNC: 14 MG/DL (ref 8–23)
C-REACTIVE PROTEIN: 0.24 MG/DL (ref 0–0.5)
CALCIUM SERPL-MCNC: 9.6 MG/DL (ref 8.8–10.2)
CHLORIDE BLD-SCNC: 101 MMOL/L (ref 98–111)
CO2: 27 MMOL/L (ref 22–29)
CREAT SERPL-MCNC: 1.1 MG/DL (ref 0.5–0.9)
EOSINOPHILS ABSOLUTE: 0.2 K/UL (ref 0–0.6)
EOSINOPHILS RELATIVE PERCENT: 4.4 % (ref 0–5)
FERRITIN: 41.7 NG/ML (ref 13–150)
GFR NON-AFRICAN AMERICAN: 49
GLUCOSE BLD-MCNC: 76 MG/DL (ref 74–109)
HCT VFR BLD CALC: 32.9 % (ref 37–47)
HEMOGLOBIN: 10 G/DL (ref 12–16)
LYMPHOCYTES ABSOLUTE: 1.4 K/UL (ref 1.1–4.5)
LYMPHOCYTES RELATIVE PERCENT: 29.1 % (ref 20–40)
MCH RBC QN AUTO: 26.8 PG (ref 27–31)
MCHC RBC AUTO-ENTMCNC: 30.4 G/DL (ref 33–37)
MCV RBC AUTO: 88.2 FL (ref 81–99)
MONOCYTES ABSOLUTE: 0.5 K/UL (ref 0–0.9)
MONOCYTES RELATIVE PERCENT: 9.5 % (ref 0–10)
NEUTROPHILS ABSOLUTE: 2.7 K/UL (ref 1.5–7.5)
NEUTROPHILS RELATIVE PERCENT: 55.4 % (ref 50–65)
PDW BLD-RTO: 18.9 % (ref 11.5–14.5)
PLATELET # BLD: 303 K/UL (ref 130–400)
PMV BLD AUTO: 10.9 FL (ref 9.4–12.3)
POTASSIUM SERPL-SCNC: 3.7 MMOL/L (ref 3.5–5)
PREALBUMIN: 15 MG/DL (ref 20–40)
RBC # BLD: 3.73 M/UL (ref 4.2–5.4)
SODIUM BLD-SCNC: 140 MMOL/L (ref 136–145)
TOTAL PROTEIN: 6.3 G/DL (ref 6.6–8.7)
WBC # BLD: 5 K/UL (ref 4.8–10.8)

## 2017-11-01 PROCEDURE — 99213 OFFICE O/P EST LOW 20 MIN: CPT

## 2017-11-01 PROCEDURE — 99213 OFFICE O/P EST LOW 20 MIN: CPT | Performed by: SURGERY

## 2017-11-01 ASSESSMENT — PAIN DESCRIPTION - PROGRESSION: CLINICAL_PROGRESSION: NOT CHANGED

## 2017-11-01 ASSESSMENT — PAIN DESCRIPTION - DESCRIPTORS: DESCRIPTORS: ACHING;SORE;TENDER

## 2017-11-01 ASSESSMENT — PAIN DESCRIPTION - LOCATION: LOCATION: ANKLE

## 2017-11-01 ASSESSMENT — PAIN DESCRIPTION - ORIENTATION: ORIENTATION: RIGHT

## 2017-11-01 ASSESSMENT — PAIN DESCRIPTION - FREQUENCY: FREQUENCY: CONTINUOUS

## 2017-11-01 ASSESSMENT — PAIN DESCRIPTION - ONSET: ONSET: ON-GOING

## 2017-11-01 ASSESSMENT — PAIN DESCRIPTION - PAIN TYPE: TYPE: ACUTE PAIN

## 2017-11-01 ASSESSMENT — PAIN SCALES - GENERAL: PAINLEVEL_OUTOF10: 0

## 2017-11-01 NOTE — PROGRESS NOTES
Wound Care Center  Progress Note       Jayashree Marx  AGE: 67 y.o. GENDER: female  : 1945  TODAY'S DATE:  2017    Subjective:        HISTORY of PRESENT ILLNESS HPI   Ryder Valenzuela is a 67 y.o. female who presents today for wound evaluation. Wound Type:venous and arterial  Wound Location:right ankle(s): lateral  Modifying factors:edema and venous stasis    Patient Active Problem List   Diagnosis Code    Osteoarthritis M19.90    Depression F32.9    Chronic back pain M54.9, G89.29    Memory loss R41.3    Neck pain M54.2    Deafness congenital H90.5    Pulsatile abdominal mass R19.00    Descending thoracic aortic aneurysm (HCC) I71.2    Myasthenia gravis (HCC) G70.00    Essential hypertension I10    Peripheral vascular disease (HCC) I73.9    Venous (peripheral) insufficiency I87.2    Ectatic abdominal aorta (HCC) I77.811    Ectatic abdominal aorta (HCC) I77.811    Chronic insomnia F51.04    Epigastric pain R10.13    Dark stools R19.5    Alternating constipation and diarrhea R19.8    Family history of colonic polyps Z83.71    Non-pressure chronic ulcer of right ankle with fat layer exposed ([facility]) L97.312    Myasthenia gravis without acute exacerbation ([facility]) G70.00    Vertigo, aural H81.319    Chronic tension-type headache, intractable G44.221    Idiopathic peripheral neuropathy G60.9    Unsteady gait R26.81       Ms. Marissa Horton has a past medical history of Anxiety; Chronic back pain; Deafness congenital; Depression; Headache; Hypertension; Memory loss; Myasthenia ([address] Neck pain; Osteoarthritis; Ptosis of eyelid; and Weakness of face muscles. She has a past surgical history that includes Hysterectomy; Appendectomy; Carpal tunnel release; Varicose vein surgery;  Colonoscopy (); eye surgery; hernia repair; Cataract removal; Colonoscopy ( or before); egd transoral biopsy single/multiple (N/A, 2017); and colonoscopy flx dx w/collj spec when pfrmd (N/A, 7/6/2017). Her family history includes Cancer in her father; Colon Polyps in her sister; Hearing Loss in her brother and brother; High Blood Pressure in her mother; Audery Kraft in her father; Stroke in her mother. Ms. Sana Pereira reports that she has never smoked. She has never used smokeless tobacco. She reports that she does not drink alcohol or use drugs. Her current medication list consists of     Current Outpatient Prescriptions on File Prior to Encounter   Medication Sig Dispense Refill    cephALEXin (KEFLEX) 500 MG capsule TAKE TWO CAPSULES BY MOUTH TWICE DAILY FOR INFECTION ON  LOWER  LEGS. 40 capsule 1    amLODIPine-benazepril (LOTREL) 5-10 MG per capsule TAKE ONE CAPSULE BY MOUTH ONCE DAILY FOR BLOOD PRESSURE 30 capsule 5    HYDROcodone-acetaminophen (NORCO) 7.5-325 MG per tablet TAKE ONE TABLET BY MOUTH EVERY 8 HOURS AS NEEDED FOR PAIN. 90 tablet 0    ferrous sulfate 325 (65 Fe) MG tablet Take 1 tablet by mouth daily (with breakfast) 30 tablet 2    Misc. Devices (ROLLER WALKER) MISC 1 each by Does not apply route daily DX Code: R26.81, G70.00, H81.313, G60.9 1 each 0    zolpidem (AMBIEN) 5 MG tablet TAKE ONE TABLET BY MOUTH ONCE DAILY AT BEDTIME AS NEEDED FOR SLEEP FOR CHRONIC INSOMNIA.  30 tablet 0    celecoxib (CELEBREX) 200 MG capsule Take 200 mg by mouth daily      flurandrenolide (CORDRAN) 0.05 % CREA Apply topically 2 times daily      amLODIPine-benazepril (LOTREL) 5-10 MG per capsule Take 1 capsule by mouth      hydrOXYzine (ATARAX) 25 MG tablet Take 25 mg by mouth 3 times daily as needed for Itching      amitriptyline (ELAVIL) 50 MG tablet Take 2 tablets by mouth nightly 30 tablet 5    potassium chloride (KLOR-CON M) 10 MEQ extended release tablet Take 1 tablet by mouth 3 times daily 90 tablet 1    gabapentin (NEURONTIN) 300 MG capsule 1 capsule by mouth twice a day for pain in nerves of feet 60 capsule 5    FLUoxetine (PROZAC) 20 MG capsule TAKE ONE CAPSULE BY MOUTH ONCE DAILY 90 capsule 3    donepezil (ARICEPT) 10 MG tablet TAKE ONE TABLET BY MOUTH ONCE DAILY 90 tablet 2    ondansetron (ZOFRAN) 4 MG tablet TAKE ONE TABLET BY MOUTH TWICE DAILY AS NEEDED FOR SEVERE NAUSEA AND VOMITING. 15 tablet 0    sodium chloride 0.9 % irrigation        No current facility-administered medications on file prior to encounter. ALLERGIES    Review of patient's allergies indicates no known allergies. Objective:         Vitals:    11/01/17 1415   BP: 112/70   Pulse: 65   Resp: 16   Temp: 98.8 °F (37.1 °C)        /70   Pulse 65   Temp 98.8 °F (37.1 °C) (Temporal)   Resp 16   Ht 5' 7\" (1.702 m)   Wt 126 lb (57.2 kg)   BMI 19.73 kg/m²     ROS:  Constitutional - Alert and oriented x 3, no complaints, pleasant co-operative  Integumentary - edema improved, no erythema, no cellulitis, no new wounds   The patients pain isPain Level: 0 Pain Type: Acute pain. Wound Measurements and Assessment:  Wound 08/07/17 Arterial ulcer Ankle Right;Lateral Wound 2, Pressure ulcer, Stage 3 (Active)   Wound Type Wound 11/1/2017  2:15 PM   Wound Arterial 11/1/2017  2:15 PM   Dressing Status Dry; Intact 9/27/2017  4:06 PM   Dressing Changed Changed/New 11/1/2017  2:34 PM   Wound Cleansed Other (Comment) 11/1/2017  2:15 PM   Wound Length (cm) 2 cm 11/1/2017  2:15 PM   Wound Width (cm) 2 cm 11/1/2017  2:15 PM   Wound Depth (cm)  0.2 11/1/2017  2:15 PM   Calculated Wound Size (cm^2) (l*w) 4 cm^2 11/1/2017  2:15 PM   Change in Wound Size % (l*w) -19900 11/1/2017  2:15 PM   Tunneling Position ___ O'Clock 0 10/4/2017  4:02 PM   Undermining Starts ___ O'Clock 0 10/4/2017  4:02 PM   Undermining Ends___ O'Clock 0 10/4/2017  4:02 PM   Undermining Maxium Distance (cm) 0 10/4/2017  4:02 PM   Wound Assessment Pink 11/1/2017  2:15 PM   Margins Defined edges 11/1/2017  2:15 PM   Paloma-wound Assessment Pink 11/1/2017  2:15 PM   [place]%Wound Bed 100 11/1/2017  2:15 PM   Red%Wound Bed 0 11/1/2017  2:15 PM     ECF/HHA: [facility] - change unna boot dressing this Friday, then  Monday, and Friday next week.       Dressing Orders: Right Lateral Ankle  Wash with warm water and soap  Apply Aquacel AG to wound bed, Mepilex Border dressing   Keep dressing clean and dry. Elevate foot to level or above heart 3-4 times daily and as needed for 30 minutes, to reduce swelling      Lab work today. Follow up with Dr. Duarte Chavarria in the morning at [place] office. [facility] followup visit ________tomorrow with Dr. Gómez_____________________  (Please note your next appointment above and if you are unable to keep, kindly give a 24 hour notice. Thank you.)          If you experience any of the following, please call the [address] [place] [address] during business hours:    * Increase in Pain  * Temperature over 101  * Increase in drainage from your wound  * Drainage with a foul odor  * Bleeding  * Increase in swelling  * Need for compression bandage changes due to slippage, breakthrough drainage. If you need medical attention outside of the business hours of the [address] [place] [address] [employer] please contact your PCP or go to the nearest emergency room.           Electronically signed by Edilson Julian MD on 11/1/2017 at 2:34 PM

## 2017-11-02 ENCOUNTER — OFFICE VISIT (OUTPATIENT)
Dept: NEUROLOGY | Age: 72
End: 2017-11-02
Payer: MEDICARE

## 2017-11-02 ENCOUNTER — HOSPITAL ENCOUNTER (OUTPATIENT)
Dept: WOUND CARE | Age: 72
Discharge: HOME | End: 2017-11-02
Payer: MEDICARE

## 2017-11-02 VITALS
DIASTOLIC BLOOD PRESSURE: 76 MMHG | OXYGEN SATURATION: 98 % | HEIGHT: 67 IN | SYSTOLIC BLOOD PRESSURE: 123 MMHG | HEART RATE: 74 BPM | WEIGHT: 120 LBS | BODY MASS INDEX: 18.83 KG/M2

## 2017-11-02 VITALS
TEMPERATURE: 98.7 F | SYSTOLIC BLOOD PRESSURE: 106 MMHG | HEART RATE: 69 BPM | HEIGHT: 67 IN | BODY MASS INDEX: 19.78 KG/M2 | RESPIRATION RATE: 16 BRPM | WEIGHT: 126 LBS | DIASTOLIC BLOOD PRESSURE: 68 MMHG

## 2017-11-02 DIAGNOSIS — G44.221 CHRONIC TENSION-TYPE HEADACHE, INTRACTABLE: ICD-10-CM

## 2017-11-02 DIAGNOSIS — G60.9 IDIOPATHIC PERIPHERAL NEUROPATHY: ICD-10-CM

## 2017-11-02 DIAGNOSIS — G70.00 MYASTHENIA GRAVIS WITHOUT ACUTE EXACERBATION (HCC): Primary | ICD-10-CM

## 2017-11-02 DIAGNOSIS — L97.312 NON-PRESSURE CHRONIC ULCER OF RIGHT ANKLE WITH FAT LAYER EXPOSED (HCC): ICD-10-CM

## 2017-11-02 DIAGNOSIS — I87.2 VENOUS (PERIPHERAL) INSUFFICIENCY: ICD-10-CM

## 2017-11-02 DIAGNOSIS — M79.604 RIGHT LEG PAIN: Primary | ICD-10-CM

## 2017-11-02 PROCEDURE — G8399 PT W/DXA RESULTS DOCUMENT: HCPCS | Performed by: PSYCHIATRY & NEUROLOGY

## 2017-11-02 PROCEDURE — 29580 STRAPPING UNNA BOOT: CPT

## 2017-11-02 PROCEDURE — 1123F ACP DISCUSS/DSCN MKR DOCD: CPT | Performed by: PSYCHIATRY & NEUROLOGY

## 2017-11-02 PROCEDURE — 3017F COLORECTAL CA SCREEN DOC REV: CPT | Performed by: PSYCHIATRY & NEUROLOGY

## 2017-11-02 PROCEDURE — 4040F PNEUMOC VAC/ADMIN/RCVD: CPT | Performed by: PSYCHIATRY & NEUROLOGY

## 2017-11-02 PROCEDURE — G8484 FLU IMMUNIZE NO ADMIN: HCPCS | Performed by: PSYCHIATRY & NEUROLOGY

## 2017-11-02 PROCEDURE — 3014F SCREEN MAMMO DOC REV: CPT | Performed by: PSYCHIATRY & NEUROLOGY

## 2017-11-02 PROCEDURE — 1090F PRES/ABSN URINE INCON ASSESS: CPT | Performed by: PSYCHIATRY & NEUROLOGY

## 2017-11-02 PROCEDURE — G8427 DOCREV CUR MEDS BY ELIG CLIN: HCPCS | Performed by: PSYCHIATRY & NEUROLOGY

## 2017-11-02 PROCEDURE — 99213 OFFICE O/P EST LOW 20 MIN: CPT | Performed by: PSYCHIATRY & NEUROLOGY

## 2017-11-02 PROCEDURE — 99213 OFFICE O/P EST LOW 20 MIN: CPT | Performed by: SURGERY

## 2017-11-02 PROCEDURE — 1036F TOBACCO NON-USER: CPT | Performed by: PSYCHIATRY & NEUROLOGY

## 2017-11-02 PROCEDURE — G8420 CALC BMI NORM PARAMETERS: HCPCS | Performed by: PSYCHIATRY & NEUROLOGY

## 2017-11-02 RX ORDER — GABAPENTIN 300 MG/1
CAPSULE ORAL
Qty: 90 CAPSULE | Refills: 5 | Status: SHIPPED | OUTPATIENT
Start: 2017-11-02 | End: 2018-05-29 | Stop reason: SDUPTHER

## 2017-11-02 ASSESSMENT — PAIN DESCRIPTION - LOCATION: LOCATION: ANKLE

## 2017-11-02 ASSESSMENT — PAIN DESCRIPTION - PROGRESSION: CLINICAL_PROGRESSION: NOT CHANGED

## 2017-11-02 ASSESSMENT — PAIN DESCRIPTION - DESCRIPTORS: DESCRIPTORS: ACHING;SORE;TENDER

## 2017-11-02 ASSESSMENT — PAIN SCALES - GENERAL: PAINLEVEL_OUTOF10: 2

## 2017-11-02 ASSESSMENT — PAIN DESCRIPTION - PAIN TYPE: TYPE: ACUTE PAIN

## 2017-11-02 ASSESSMENT — PAIN DESCRIPTION - FREQUENCY: FREQUENCY: CONTINUOUS

## 2017-11-02 ASSESSMENT — PAIN DESCRIPTION - ONSET: ONSET: ON-GOING

## 2017-11-02 ASSESSMENT — PAIN DESCRIPTION - ORIENTATION: ORIENTATION: RIGHT

## 2017-11-02 NOTE — PROGRESS NOTES
Patient Care Team:  Aliyah Virk MD as PCP - General (Family Medicine)  Aliyah Virk MD as PCP - S Attributed Provider  Jasmyn Hadley MD (Family Medicine)  DENIZ Cummings as Advanced Practice Nurse (Nurse Practitioner)  Aliyah Virk MD as Referring Physician (Family Medicine)    TODAY'S DATE:  11/2/2017     HISTORY of PRESENT ILLNESS HPI   Iqra Jasmine is a 67 y.o. female who presents today for wound evaluation. Wound Type:venous and arterial  Wound Location:right ankle(s): lower, lateral  Modifying factors:edema, venous stasis, shear force, arterial insufficiency and decreased tissue oxygenation    Patient Active Problem List   Diagnosis Code    Osteoarthritis M19.90    Depression F32.9    Chronic back pain M54.9, G89.29    Memory loss R41.3    Neck pain M54.2    Deafness congenital H90.5    Pulsatile abdominal mass R19.00    Descending thoracic aortic aneurysm (HCC) I71.2    Myasthenia gravis ([facility]) G70.00    Essential hypertension I10    Peripheral vascular disease (HCC) I73.9    Venous (peripheral) insufficiency I87.2    Ectatic abdominal aorta (HCC) I77.811    Ectatic abdominal aorta (HCC) I77.811    Chronic insomnia F51.04    Epigastric pain R10.13    Dark stools R19.5    Alternating constipation and diarrhea R19.8    Family history of colonic polyps Z83.71    Non-pressure chronic ulcer of right ankle with fat layer exposed ([facility]) L97.312    Myasthenia gravis without acute exacerbation ([facility]) G70.00    Vertigo, aural H81.319    Chronic tension-type headache, intractable G44.221    Idiopathic peripheral neuropathy G60.9    Unsteady gait R26.81       She reports she developed a wound on right leg. This started 3 month(s) ago. She believes this is not healing. She has been applying antibiotic ointment. She has not had  fever or chills. She has a history of see above.     Iqra Jasmine is a 67 y.o. female with the following history reviewed and recorded in [employer]:  Patient Active Problem List    Diagnosis Date Noted    Unsteady gait 08/15/2017    Myasthenia gravis without acute exacerbation ([facility] 08/12/2017    Vertigo, aural 08/12/2017    Chronic tension-type headache, intractable 08/12/2017    Idiopathic peripheral neuropathy 08/12/2017    Non-pressure chronic ulcer of right ankle with fat layer exposed ([facility] 08/07/2017    Epigastric pain 07/06/2017    Dark stools 07/06/2017    Alternating constipation and diarrhea 07/06/2017    Family history of colonic polyps 07/06/2017    Chronic insomnia 06/10/2017    Ectatic abdominal aorta (HCC) 07/13/2016    Ectatic abdominal aorta ([facility] 07/13/2016    Peripheral vascular disease ([facility] 01/12/2016    Venous (peripheral) insufficiency 01/12/2016     Updating Deprecated Diagnoses      Essential hypertension 12/07/2015    Myasthenia gravis ([facility] 06/25/2014    Pulsatile abdominal mass 11/04/2013    Descending thoracic aortic aneurysm (HCC) 11/04/2013    Osteoarthritis     Depression     Chronic back pain     Memory loss     Neck pain     Deafness congenital      Current Outpatient Prescriptions   Medication Sig Dispense Refill    cephALEXin (KEFLEX) 500 MG capsule TAKE TWO CAPSULES BY MOUTH TWICE DAILY FOR INFECTION ON  LOWER  LEGS. 40 capsule 1    amLODIPine-benazepril (LOTREL) 5-10 MG per capsule TAKE ONE CAPSULE BY MOUTH ONCE DAILY FOR BLOOD PRESSURE 30 capsule 5    HYDROcodone-acetaminophen (NORCO) 7.5-325 MG per tablet TAKE ONE TABLET BY MOUTH EVERY 8 HOURS AS NEEDED FOR PAIN. 90 tablet 0    ferrous sulfate 325 (65 Fe) MG tablet Take 1 tablet by mouth daily (with breakfast) 30 tablet 2    Misc. Devices (ROLLER WALKER) MISC 1 each by Does not apply route daily DX Code: R26.81, G70.00, H81.313, G60.9 1 each 0    zolpidem (AMBIEN) 5 MG tablet TAKE ONE TABLET BY MOUTH ONCE DAILY AT BEDTIME AS NEEDED FOR SLEEP FOR CHRONIC INSOMNIA.  30 tablet 0    celecoxib (CELEBREX) 200 MG capsule Take 200 mg by Yellow%Wound Bed 5 11/2/2017  9:22 AM   Black%Wound Bed 0 11/1/2017  2:15 PM   Purple%Wound Bed 0 11/1/2017  2:15 PM   Other%Wound Bed 0 11/1/2017  2:15 PM   Drainage Amount Moderate 11/2/2017  9:22 AM   Drainage Description Yellow; Tan 11/2/2017  9:22 AM   Odor None 11/2/2017  9:22 AM   Debridement per physician None 11/2/2017  9:46 AM   Time out N/A 11/2/2017  9:46 AM   Procedural Pain 0 11/1/2017  2:24 PM   Post procedural Pain 0 11/1/2017  2:24 PM   Number of days: 86      The patients pain isPain Level: 2 Pain Type: Acute pain. Wound is is unchanged. Please refer to nursing measurements and assessment regarding wound pre and post debridement. Risk factors for atherosclerosis of all vascular beds have been reviewed with the patient including:  Family history, tobacco abuse in all forms, elevated cholesterol, hyperlipidemia, and diabetes. Options have been discussed with the patient including continued medical management vs. proceeding with further studies. Patient has opted to proceed with continued medical management. Imaging: VIS ordered         Assessment    1. Right leg pain    2. Non-pressure chronic ulcer of right ankle with fat layer exposed ([address]    3. Venous (peripheral) insufficiency          Plan    Continue with local wound care , compression. Etiology of wound not clear, likely combination venous/arterial, possible neuropathic. Will check venous insufficiency study, pending results consider for arteriogram- if no major vascular issues consider appligraft. May also consider podiatric consult as she has some foot deformity issues. Plan for wound - Dress per physician order  Treatment:     Compression : Yes   Offloading : No   Dressing : as above   Additional Therapy :as above   Discussed appropriate home care of this wound. Wound redressed. Patient instructions were given. Follow up: 1 week.   Recommend no smoking  Offloading instructions given

## 2017-11-02 NOTE — PROGRESS NOTES
adenopathy, no carotid bruit, no JVD, supple, symmetrical, trachea midline and thyroid not enlarged, symmetric, no tenderness/mass/nodules  Lungs: clear to auscultation bilaterally  Heart: regular rate and rhythm, S1, S2 normal, no murmur, click, rub or gallop  Abdomen: soft, non-tender; bowel sounds normal; no masses,  no organomegaly  Extremities: extremities normal, atraumatic, no cyanosis or edema  Neurologic:  Extraocular movements are intact without nystagmus. Visual fields are full to confrontation. Facial movements are symmetrical and normal.  Speech is precise. Extremity strength is normal in both uppers and lowers. Deep tendon reflexes are intact and symmetrical in the UEs and at the knees but absent at the ankles. Rapid alternating movements are unimpaired. Finger-to-nose testing is performed well, without dysmetria. She has a mild postural tremor of her hands and arms. Gait is wide based. Pertinent Diagnostic Studies:  None available. Assessment:       ICD-10-CM ICD-9-CM    1. Myasthenia gravis without acute exacerbation ([facility]) G70.00 358.00    2. Idiopathic peripheral neuropathy G60.9 356.9    3. Chronic tension-type headache, intractable G44.221 339.12    Her MG is stable. She has an idiopathic peripheral neuropathy and chronic tension headaches. Plan:   1. Increase the gabapentin to 300 mg three times a day. She was informed of potential side effects. 2. Get the ordered labs  3. FU in 3 months.     Electronically signed by Fernanda Bell MD on 11/2/2017

## 2017-11-06 RX ORDER — HYDROCODONE BITARTRATE AND ACETAMINOPHEN 7.5; 325 MG/1; MG/1
TABLET ORAL
Qty: 90 TABLET | Refills: 0 | Status: SHIPPED | OUTPATIENT
Start: 2017-11-06 | End: 2017-12-06 | Stop reason: SDUPTHER

## 2017-11-08 ENCOUNTER — HOSPITAL ENCOUNTER (OUTPATIENT)
Dept: WOUND CARE | Age: 72
Discharge: HOME | End: 2017-11-08

## 2017-11-08 ENCOUNTER — TELEPHONE (OUTPATIENT)
Dept: NEUROLOGY | Age: 72
End: 2017-11-08

## 2017-11-08 NOTE — TELEPHONE ENCOUNTER
Patient has been shaking really bad and really week. Patient is needing help with getting up in the morning. Patients daughter called and we need clarification what her elavil dosage is and or if she is to stop. They also want to know lab results.

## 2017-11-09 ENCOUNTER — HOSPITAL ENCOUNTER (OUTPATIENT)
Dept: WOUND CARE | Age: 72
Discharge: HOME | End: 2017-11-09
Payer: MEDICARE

## 2017-11-09 ENCOUNTER — HOSPITAL ENCOUNTER (OUTPATIENT)
Dept: NON INVASIVE DIAGNOSTICS | Age: 72
Discharge: HOME | End: 2017-11-09
Payer: MEDICARE

## 2017-11-09 VITALS
WEIGHT: 120 LBS | SYSTOLIC BLOOD PRESSURE: 124 MMHG | TEMPERATURE: 97.5 F | HEIGHT: 67 IN | RESPIRATION RATE: 18 BRPM | HEART RATE: 61 BPM | BODY MASS INDEX: 18.83 KG/M2 | DIASTOLIC BLOOD PRESSURE: 74 MMHG

## 2017-11-09 DIAGNOSIS — I87.2 VENOUS (PERIPHERAL) INSUFFICIENCY: ICD-10-CM

## 2017-11-09 DIAGNOSIS — M79.604 RIGHT LEG PAIN: ICD-10-CM

## 2017-11-09 DIAGNOSIS — L97.312 NON-PRESSURE CHRONIC ULCER OF RIGHT ANKLE WITH FAT LAYER EXPOSED (HCC): ICD-10-CM

## 2017-11-09 PROCEDURE — 93971 EXTREMITY STUDY: CPT

## 2017-11-09 PROCEDURE — 29580 STRAPPING UNNA BOOT: CPT

## 2017-11-09 ASSESSMENT — PAIN DESCRIPTION - LOCATION: LOCATION: ANKLE

## 2017-11-09 ASSESSMENT — PAIN DESCRIPTION - ONSET: ONSET: GRADUAL

## 2017-11-09 ASSESSMENT — PAIN DESCRIPTION - DESCRIPTORS: DESCRIPTORS: ACHING;SORE;TENDER

## 2017-11-09 ASSESSMENT — PAIN DESCRIPTION - ORIENTATION: ORIENTATION: RIGHT

## 2017-11-09 ASSESSMENT — PAIN SCALES - GENERAL: PAINLEVEL_OUTOF10: 6

## 2017-11-09 ASSESSMENT — PAIN DESCRIPTION - FREQUENCY: FREQUENCY: INTERMITTENT

## 2017-11-09 ASSESSMENT — PAIN DESCRIPTION - PAIN TYPE: TYPE: ACUTE PAIN

## 2017-11-09 ASSESSMENT — PAIN DESCRIPTION - PROGRESSION: CLINICAL_PROGRESSION: GRADUALLY WORSENING

## 2017-11-16 ENCOUNTER — HOSPITAL ENCOUNTER (OUTPATIENT)
Dept: WOUND CARE | Age: 72
Discharge: HOME | End: 2017-11-16
Payer: MEDICARE

## 2017-11-16 DIAGNOSIS — L97.312 NON-PRESSURE CHRONIC ULCER OF RIGHT ANKLE WITH FAT LAYER EXPOSED (HCC): ICD-10-CM

## 2017-11-16 PROCEDURE — 29580 STRAPPING UNNA BOOT: CPT

## 2017-11-16 PROCEDURE — 99213 OFFICE O/P EST LOW 20 MIN: CPT | Performed by: SURGERY

## 2017-11-16 NOTE — PROGRESS NOTES
Epigastric pain    Dark stools    Alternating constipation and diarrhea    Family history of colonic polyps    Non-pressure chronic ulcer of right ankle with fat layer exposed ([facility]    Myasthenia gravis without acute exacerbation (HCC)    Vertigo, aural    Chronic tension-type headache, intractable    Idiopathic peripheral neuropathy    Unsteady gait          Plan:          Plan for wound - Dress per physician order  Treatment:     Compression : Yes   Offloading : Yes   Dressing : as before   Additional Therapy : same     1. Discussed appropriate home care of this wound. Wound redressed. 2. Patient instructions were given. 3. Follow up: 4 week(s). Discharge Instructions       Visit Discharge/Physician Orders    Discharge condition: Stable    Discharge to: Home    Left via:Private automobile    Accompanied by: family  ECF/HHA: [facility]     Dressing Orders: Right Lateral Ankle: Soap and water wash, apply Aquacel Ag to wound bed, Copa to pad, wrap from toe to knee with Coflex unna boot Change 2 x week. Prefer Monday and Thursday. Keep dressing clean and dry. Treatment Orders: Protein rich diet (unless restricted by your physician); Multivitamin daily; Elevate legs when sitting, avoid standing for long periods of time. [address] [place] [address],[address] followup visit _____________________________  (Please note your next appointment above and if you are unable to keep, kindly give a 24 hour notice. Thank you.)          If you experience any of the following, please call the [facility] during business hours:    * Increase in Pain  * Temperature over 101  * Increase in drainage from your wound  * Drainage with a foul odor  * Bleeding  * Increase in swelling  * Need for compression bandage changes due to slippage, breakthrough drainage. If you need medical attention outside of the business hours of the [facility] please contact your PCP or go to the nearest emergency room.              Electronically signed by Zeb Salgado MD on 11/16/2017 at 12:04 PM

## 2017-11-17 RX ORDER — AMITRIPTYLINE HYDROCHLORIDE 50 MG/1
TABLET, FILM COATED ORAL
Qty: 60 TABLET | Refills: 5 | Status: SHIPPED | OUTPATIENT
Start: 2017-11-17 | End: 2018-08-01 | Stop reason: SDUPTHER

## 2017-11-21 RX ORDER — DONEPEZIL HYDROCHLORIDE 10 MG/1
TABLET, FILM COATED ORAL
Qty: 90 TABLET | Refills: 2 | Status: SHIPPED | OUTPATIENT
Start: 2017-11-21 | End: 2018-09-08 | Stop reason: SDUPTHER

## 2017-12-06 RX ORDER — HYDROCODONE BITARTRATE AND ACETAMINOPHEN 7.5; 325 MG/1; MG/1
TABLET ORAL
Qty: 90 TABLET | Refills: 0 | Status: SHIPPED | OUTPATIENT
Start: 2017-12-06 | End: 2018-01-03 | Stop reason: SDUPTHER

## 2017-12-06 RX ORDER — CEPHALEXIN 500 MG/1
CAPSULE ORAL
Qty: 40 CAPSULE | Refills: 1 | OUTPATIENT
Start: 2017-12-06

## 2017-12-14 RX ORDER — CEPHALEXIN 500 MG/1
CAPSULE ORAL
Qty: 40 CAPSULE | Refills: 1 | Status: SHIPPED | OUTPATIENT
Start: 2017-12-14 | End: 2018-02-21 | Stop reason: ALTCHOICE

## 2017-12-21 ENCOUNTER — HOSPITAL ENCOUNTER (OUTPATIENT)
Dept: WOUND CARE | Age: 72
Discharge: HOME | End: 2017-12-21
Payer: MEDICARE

## 2017-12-21 VITALS
RESPIRATION RATE: 16 BRPM | HEIGHT: 67 IN | HEART RATE: 60 BPM | TEMPERATURE: 98.9 F | SYSTOLIC BLOOD PRESSURE: 124 MMHG | BODY MASS INDEX: 18.83 KG/M2 | DIASTOLIC BLOOD PRESSURE: 65 MMHG | WEIGHT: 120 LBS

## 2017-12-21 DIAGNOSIS — L97.312 NON-PRESSURE CHRONIC ULCER OF RIGHT ANKLE WITH FAT LAYER EXPOSED (HCC): ICD-10-CM

## 2017-12-21 PROCEDURE — 29581 APPL MULTLAYER CMPRN SYS LEG: CPT

## 2017-12-21 PROCEDURE — 99213 OFFICE O/P EST LOW 20 MIN: CPT | Performed by: SURGERY

## 2017-12-21 PROCEDURE — 99213 OFFICE O/P EST LOW 20 MIN: CPT

## 2017-12-21 ASSESSMENT — PAIN SCALES - GENERAL: PAINLEVEL_OUTOF10: 0

## 2017-12-21 NOTE — PROGRESS NOTES
Dariusz Gore is a 67 y.o. female patient. Stable NATHAN RLE. No sig change, no worse, measurements about same. Main complaint is itching of wound    Current Outpatient Prescriptions   Medication Sig Dispense Refill    cephALEXin (KEFLEX) 500 MG capsule TAKE TWO CAPSULES BY MOUTH TWICE DAILY FOR INFECTION ON  LOWER  LEGS 40 capsule 1    HYDROcodone-acetaminophen (NORCO) 7.5-325 MG per tablet TAKE ONE TABLET BY MOUTH EVERY 8 HOURS AS NEEDED FOR PAIN. 90 tablet 0    donepezil (ARICEPT) 10 MG tablet TAKE ONE TABLET BY MOUTH ONCE DAILY 90 tablet 2    amitriptyline (ELAVIL) 50 MG tablet TAKE TWO TABLETS BY MOUTH NIGHTLY 60 tablet 5    gabapentin (NEURONTIN) 300 MG capsule 1 capsule by mouth three times a day for nerve pain in feet 90 capsule 5    amLODIPine-benazepril (LOTREL) 5-10 MG per capsule TAKE ONE CAPSULE BY MOUTH ONCE DAILY FOR BLOOD PRESSURE 30 capsule 5    ferrous sulfate 325 (65 Fe) MG tablet Take 1 tablet by mouth daily (with breakfast) 30 tablet 2    zolpidem (AMBIEN) 5 MG tablet TAKE ONE TABLET BY MOUTH ONCE DAILY AT BEDTIME AS NEEDED FOR SLEEP FOR CHRONIC INSOMNIA. 30 tablet 0    celecoxib (CELEBREX) 200 MG capsule Take 200 mg by mouth daily      flurandrenolide (CORDRAN) 0.05 % CREA Apply topically 2 times daily      hydrOXYzine (ATARAX) 25 MG tablet Take 25 mg by mouth 3 times daily as needed for Itching      potassium chloride (KLOR-CON M) 10 MEQ extended release tablet Take 1 tablet by mouth 3 times daily 90 tablet 1    FLUoxetine (PROZAC) 20 MG capsule TAKE ONE CAPSULE BY MOUTH ONCE DAILY 90 capsule 3    ondansetron (ZOFRAN) 4 MG tablet TAKE ONE TABLET BY MOUTH TWICE DAILY AS NEEDED FOR SEVERE NAUSEA AND VOMITING. 15 tablet 0    sodium chloride 0.9 % irrigation       Misc. Devices (ROLLER WALKER) MISC 1 each by Does not apply route daily DX Code: R26.81, G70.00, H81.313, G60.9 1 each 0     No current facility-administered medications for this encounter.       No Known

## 2018-01-03 RX ORDER — HYDROCODONE BITARTRATE AND ACETAMINOPHEN 7.5; 325 MG/1; MG/1
TABLET ORAL
Qty: 90 TABLET | Refills: 0 | Status: SHIPPED | OUTPATIENT
Start: 2018-01-03 | End: 2018-02-05 | Stop reason: SDUPTHER

## 2018-01-04 ENCOUNTER — HOSPITAL ENCOUNTER (OUTPATIENT)
Dept: WOUND CARE | Age: 73
Discharge: HOME | End: 2018-01-04
Payer: MEDICARE

## 2018-01-04 VITALS
DIASTOLIC BLOOD PRESSURE: 62 MMHG | RESPIRATION RATE: 16 BRPM | SYSTOLIC BLOOD PRESSURE: 120 MMHG | HEIGHT: 67 IN | WEIGHT: 120 LBS | BODY MASS INDEX: 18.83 KG/M2 | TEMPERATURE: 98.2 F | HEART RATE: 60 BPM

## 2018-01-04 DIAGNOSIS — I87.2 VENOUS (PERIPHERAL) INSUFFICIENCY: Primary | ICD-10-CM

## 2018-01-04 DIAGNOSIS — L97.312 NON-PRESSURE CHRONIC ULCER OF RIGHT ANKLE WITH FAT LAYER EXPOSED (HCC): ICD-10-CM

## 2018-01-04 PROCEDURE — 15271 SKIN SUB GRAFT TRNK/ARM/LEG: CPT | Performed by: SURGERY

## 2018-01-04 PROCEDURE — C5271 LOW COST SKIN SUBSTITUTE APP: HCPCS

## 2018-01-04 PROCEDURE — 15271 SKIN SUB GRAFT TRNK/ARM/LEG: CPT

## 2018-01-04 ASSESSMENT — PAIN DESCRIPTION - FREQUENCY: FREQUENCY: INTERMITTENT

## 2018-01-04 ASSESSMENT — PAIN DESCRIPTION - DESCRIPTORS: DESCRIPTORS: ACHING;SORE;TENDER

## 2018-01-04 ASSESSMENT — PAIN DESCRIPTION - ONSET: ONSET: GRADUAL

## 2018-01-04 ASSESSMENT — PAIN SCALES - GENERAL: PAINLEVEL_OUTOF10: 0

## 2018-01-04 ASSESSMENT — PAIN DESCRIPTION - LOCATION: LOCATION: ANKLE

## 2018-01-04 ASSESSMENT — PAIN DESCRIPTION - ORIENTATION: ORIENTATION: RIGHT

## 2018-01-04 ASSESSMENT — PAIN DESCRIPTION - PROGRESSION: CLINICAL_PROGRESSION: GRADUALLY IMPROVING

## 2018-01-04 NOTE — PROGRESS NOTES
[facility]   Progress Note and Procedure Note      Jayashreejose Marx  AGE: 67 y.o. GENDER: female  : 1945  TODAY'S DATE:  2018    Subjective:      Here for appligraf insertion for NATHAN, no complaints      HISTORY of PRESENT ILLNESS HPI   Sirena Baker is a 67 y.o. female who presents today for wound evaluation.     Wound Type:venous  Wound Location:right leg  Modifying factors:venous stasis    Patient Active Problem List   Diagnosis Code    Osteoarthritis M19.90    Depression F32.9    Chronic back pain M54.9, G89.29    Memory loss R41.3    Neck pain M54.2    Deafness congenital H90.5    Pulsatile abdominal mass R19.00    Descending thoracic aortic aneurysm (HCC) I71.2    Myasthenia gravis (HCC) G70.00    Essential hypertension I10    Peripheral vascular disease (HCC) I73.9    Venous (peripheral) insufficiency I87.2    Ectatic abdominal aorta (HCC) I77.811    Ectatic abdominal aorta (HCC) I77.811    Chronic insomnia F51.04    Epigastric pain R10.13    Dark stools R19.5    Alternating constipation and diarrhea R19.8    Family history of colonic polyps Z83.71    Non-pressure chronic ulcer of right ankle with fat layer exposed (HCC) L97.312    Myasthenia gravis without acute exacerbation ([facility]) G70.00    Vertigo, aural H81.319    Chronic tension-type headache, intractable G44.221    Idiopathic peripheral neuropathy G60.9    Unsteady gait R26.81       ALLERGIES    No Known Allergies        Objective:      /62   Pulse 60   Temp 98.2 °F (36.8 °C) (Temporal)   Resp 16   Ht 5' 7\" (1.702 m)   Wt 120 lb (54.4 kg)   BMI 18.79 kg/m²     Post Debridement Measurements and Assessment:  Wound 17 Venous ulcer Ankle Right;Lateral Wound 2 (Active)   Wound Image   2018  9:30 AM   Wound Type Wound 2018  9:30 AM   Wound Venous 2018  9:30 AM   Dressing Status Old drainage 2018  9:30 AM   Dressing Changed Changed/New 2017 12:45 PM Dressing/Treatment Other (Comment) 11/2/2017 10:53 AM   Wound Cleansed Other (Comment) 1/4/2018  9:30 AM   Wound Length (cm) 1.1 cm 1/4/2018  9:30 AM   Wound Width (cm) 1.1 cm 1/4/2018  9:30 AM   Wound Depth (cm)  0.1 1/4/2018  9:30 AM   Calculated Wound Size (cm^2) (l*w) 1.21 cm^2 1/4/2018  9:30 AM   Change in Wound Size % (l*w) -5950 1/4/2018  9:30 AM   Tunneling Position ___ O'Clock 0 1/4/2018  9:30 AM   Undermining Starts ___ O'Clock 0 1/4/2018  9:30 AM   Undermining Ends___ O'Clock 0 1/4/2018  9:30 AM   Undermining Maxium Distance (cm) 0 1/4/2018  9:30 AM   Wound Assessment Granulation tissue 1/4/2018  9:30 AM   Drainage Amount Moderate 1/4/2018  9:30 AM   Drainage Description Serosanguinous 1/4/2018  9:30 AM   Odor None 1/4/2018  9:30 AM   Margins Defined edges 1/4/2018  9:30 AM   [place]-wound Assessment Pink 1/4/2018  9:30 AM   [place]%Wound Bed 80 1/4/2018  9:30 AM   Red%Wound Bed 0 1/4/2018  9:30 AM   Yellow%Wound Bed 20 1/4/2018  9:30 AM   Black%Wound Bed 0 1/4/2018  9:30 AM   Purple%Wound Bed 0 1/4/2018  9:30 AM   Other%Wound Bed 0 1/4/2018  9:30 AM   Debridement per physician None 12/21/2017 10:47 AM   Time out N/A 12/21/2017 10:47 AM   Procedural Pain 0 11/1/2017  2:24 PM   Post procedural Pain 0 11/1/2017  2:24 PM   Number of days: 149      The patients pain isPain Level: 0  . Wound is is unchanged. Please refer to nursing measurements and assessment regarding wound pre and post debridement.     Procedure:  Skin Substitute Application    Performed by: Dennis Hylton MD    Ulcer Type:venous    Consent obtained: Yes    Time out taken: Yes    Product Utilized:       yes  [x] Apligraf   [x]44 sq/cm   []88 sq/cm    []132 sq/cm  []176  Sq/cm        [] Grafix   [] 16 mm   [] 1.5 cm x 2 cm     [] 2 cm x 3 cm  [] 3 cm x 4 cm   []  5 x 5 cm         [] PriMatrix   [] 9 sq/cm   []16 sq/cm    []25 sq/cm  []36 sq/cm         [] PuraPly AM   []4 sq/cm   []8 sq/cm    []25 sq/cm   []54sq/cm Wasted 39 sq/cm     Reason for Waste Wound Size smaller then product size      Surgically Fixated: Yes    Secured With: Steri Strips and Mepitel     Procedural Pain: 0/10     Post Procedural Pain: 0 / 10    Response to Treatment:  Well tolerated by patient. Problem List Items Addressed This Visit     * (Principal)Non-pressure chronic ulcer of right ankle with fat layer exposed ([address] (Chronic)      Other Visit Diagnoses    None. Assessment:      Patient Active Problem List   Diagnosis    Osteoarthritis    Depression    Chronic back pain    Memory loss    Neck pain    Deafness congenital    Pulsatile abdominal mass    Descending thoracic aortic aneurysm (HCC)    Myasthenia gravis ([address]    Essential hypertension    Peripheral vascular disease (HCC)    Venous (peripheral) insufficiency    Ectatic abdominal aorta (HCC)    Ectatic abdominal aorta (HCC)    Chronic insomnia    Epigastric pain    Dark stools    Alternating constipation and diarrhea    Family history of colonic polyps    Non-pressure chronic ulcer of right ankle with fat layer exposed ([address]    Myasthenia gravis without acute exacerbation (HCC)    Vertigo, aural    Chronic tension-type headache, intractable    Idiopathic peripheral neuropathy    Unsteady gait          Plan:          Plan for wound - Dress per physician order  Treatment:     Compression : Yes   Offloading : No   Dressing : as below   Additional Therapy : as below     1. Discussed appropriate home care of this wound. Wound redressed. 2. Patient instructions were given. 3. Follow up: 2 week(s).                          Electronically signed by Nickolas Orellana MD on 1/4/2018 at 11:02 AM

## 2018-01-09 ENCOUNTER — HOSPITAL ENCOUNTER (OUTPATIENT)
Dept: WOUND CARE | Age: 73
Discharge: HOME | End: 2018-01-09
Payer: MEDICARE

## 2018-01-09 VITALS
SYSTOLIC BLOOD PRESSURE: 124 MMHG | HEIGHT: 67 IN | BODY MASS INDEX: 18.83 KG/M2 | HEART RATE: 60 BPM | TEMPERATURE: 98.2 F | WEIGHT: 120 LBS | RESPIRATION RATE: 16 BRPM | DIASTOLIC BLOOD PRESSURE: 60 MMHG

## 2018-01-09 DIAGNOSIS — G60.9 IDIOPATHIC PERIPHERAL NEUROPATHY: Primary | ICD-10-CM

## 2018-01-09 DIAGNOSIS — I87.2 VENOUS (PERIPHERAL) INSUFFICIENCY: ICD-10-CM

## 2018-01-09 DIAGNOSIS — I73.9 PERIPHERAL VASCULAR DISEASE (HCC): Chronic | ICD-10-CM

## 2018-01-09 DIAGNOSIS — L97.312 NON-PRESSURE CHRONIC ULCER OF RIGHT ANKLE WITH FAT LAYER EXPOSED (HCC): ICD-10-CM

## 2018-01-09 PROCEDURE — 29581 APPL MULTLAYER CMPRN SYS LEG: CPT

## 2018-01-09 ASSESSMENT — PAIN DESCRIPTION - PROGRESSION: CLINICAL_PROGRESSION: GRADUALLY IMPROVING

## 2018-01-09 ASSESSMENT — PAIN DESCRIPTION - DESCRIPTORS: DESCRIPTORS: ACHING;SORE;TENDER

## 2018-01-09 ASSESSMENT — PAIN SCALES - GENERAL: PAINLEVEL_OUTOF10: 0

## 2018-01-09 ASSESSMENT — PAIN DESCRIPTION - ONSET: ONSET: GRADUAL

## 2018-01-09 ASSESSMENT — PAIN DESCRIPTION - FREQUENCY: FREQUENCY: INTERMITTENT

## 2018-01-09 ASSESSMENT — PAIN DESCRIPTION - PAIN TYPE: TYPE: ACUTE PAIN

## 2018-01-09 ASSESSMENT — PAIN DESCRIPTION - ORIENTATION: ORIENTATION: RIGHT

## 2018-01-09 ASSESSMENT — PAIN DESCRIPTION - LOCATION: LOCATION: ANKLE

## 2018-01-09 NOTE — PLAN OF CARE
Problem: Wound:  Goal: Will show signs of wound healing; wound closure and no evidence of infection  Will show signs of wound healing; wound closure and no evidence of infection   Outcome: Ongoing      Problem: Venous:  Goal: Signs of wound healing will improve  Signs of wound healing will improve   Outcome: Ongoing

## 2018-01-10 ENCOUNTER — OFFICE VISIT (OUTPATIENT)
Dept: PRIMARY CARE CLINIC | Age: 73
End: 2018-01-10
Payer: MEDICARE

## 2018-01-10 VITALS
WEIGHT: 124.4 LBS | SYSTOLIC BLOOD PRESSURE: 132 MMHG | RESPIRATION RATE: 18 BRPM | OXYGEN SATURATION: 97 % | TEMPERATURE: 97.4 F | HEART RATE: 60 BPM | DIASTOLIC BLOOD PRESSURE: 78 MMHG | BODY MASS INDEX: 18.43 KG/M2 | HEIGHT: 69 IN

## 2018-01-10 DIAGNOSIS — G89.29 CHRONIC BILATERAL LOW BACK PAIN, WITH SCIATICA PRESENCE UNSPECIFIED: Primary | ICD-10-CM

## 2018-01-10 DIAGNOSIS — M54.5 CHRONIC BILATERAL LOW BACK PAIN, WITH SCIATICA PRESENCE UNSPECIFIED: Primary | ICD-10-CM

## 2018-01-10 DIAGNOSIS — F32.A DEPRESSION, UNSPECIFIED DEPRESSION TYPE: ICD-10-CM

## 2018-01-10 PROCEDURE — 3017F COLORECTAL CA SCREEN DOC REV: CPT | Performed by: FAMILY MEDICINE

## 2018-01-10 PROCEDURE — 1036F TOBACCO NON-USER: CPT | Performed by: FAMILY MEDICINE

## 2018-01-10 PROCEDURE — G8399 PT W/DXA RESULTS DOCUMENT: HCPCS | Performed by: FAMILY MEDICINE

## 2018-01-10 PROCEDURE — 1123F ACP DISCUSS/DSCN MKR DOCD: CPT | Performed by: FAMILY MEDICINE

## 2018-01-10 PROCEDURE — G8419 CALC BMI OUT NRM PARAM NOF/U: HCPCS | Performed by: FAMILY MEDICINE

## 2018-01-10 PROCEDURE — 1090F PRES/ABSN URINE INCON ASSESS: CPT | Performed by: FAMILY MEDICINE

## 2018-01-10 PROCEDURE — 4040F PNEUMOC VAC/ADMIN/RCVD: CPT | Performed by: FAMILY MEDICINE

## 2018-01-10 PROCEDURE — 99213 OFFICE O/P EST LOW 20 MIN: CPT | Performed by: FAMILY MEDICINE

## 2018-01-10 PROCEDURE — G8484 FLU IMMUNIZE NO ADMIN: HCPCS | Performed by: FAMILY MEDICINE

## 2018-01-10 PROCEDURE — G8427 DOCREV CUR MEDS BY ELIG CLIN: HCPCS | Performed by: FAMILY MEDICINE

## 2018-01-10 PROCEDURE — 3014F SCREEN MAMMO DOC REV: CPT | Performed by: FAMILY MEDICINE

## 2018-01-11 ENCOUNTER — HOSPITAL ENCOUNTER (OUTPATIENT)
Dept: WOUND CARE | Age: 73
Discharge: HOME | End: 2018-01-11
Payer: MEDICARE

## 2018-01-11 VITALS
WEIGHT: 124 LBS | HEIGHT: 69 IN | TEMPERATURE: 97.5 F | SYSTOLIC BLOOD PRESSURE: 138 MMHG | RESPIRATION RATE: 18 BRPM | DIASTOLIC BLOOD PRESSURE: 75 MMHG | HEART RATE: 65 BPM | BODY MASS INDEX: 18.37 KG/M2

## 2018-01-11 DIAGNOSIS — L97.312 NON-PRESSURE CHRONIC ULCER OF RIGHT ANKLE WITH FAT LAYER EXPOSED (HCC): ICD-10-CM

## 2018-01-11 PROCEDURE — 29581 APPL MULTLAYER CMPRN SYS LEG: CPT

## 2018-01-11 ASSESSMENT — PAIN SCALES - GENERAL: PAINLEVEL_OUTOF10: 0

## 2018-01-12 ASSESSMENT — ENCOUNTER SYMPTOMS
RESPIRATORY NEGATIVE: 1
BACK PAIN: 1

## 2018-01-15 ENCOUNTER — HOSPITAL ENCOUNTER (OUTPATIENT)
Dept: WOUND CARE | Age: 73
Discharge: HOME | End: 2018-01-15

## 2018-01-18 ENCOUNTER — HOSPITAL ENCOUNTER (OUTPATIENT)
Dept: WOUND CARE | Age: 73
Discharge: HOME | End: 2018-01-18

## 2018-01-25 ENCOUNTER — HOSPITAL ENCOUNTER (OUTPATIENT)
Dept: WOUND CARE | Age: 73
Discharge: HOME | End: 2018-01-25
Payer: MEDICARE

## 2018-01-25 VITALS
DIASTOLIC BLOOD PRESSURE: 63 MMHG | HEART RATE: 58 BPM | BODY MASS INDEX: 18.37 KG/M2 | HEIGHT: 69 IN | SYSTOLIC BLOOD PRESSURE: 116 MMHG | WEIGHT: 124 LBS | TEMPERATURE: 97.1 F | RESPIRATION RATE: 20 BRPM

## 2018-01-25 DIAGNOSIS — L97.312 NON-PRESSURE CHRONIC ULCER OF RIGHT ANKLE WITH FAT LAYER EXPOSED (HCC): ICD-10-CM

## 2018-01-25 PROCEDURE — 99214 OFFICE O/P EST MOD 30 MIN: CPT | Performed by: NURSE PRACTITIONER

## 2018-01-25 PROCEDURE — 99213 OFFICE O/P EST LOW 20 MIN: CPT

## 2018-01-25 RX ORDER — DOXYCYCLINE HYCLATE 100 MG
100 TABLET ORAL 2 TIMES DAILY
Qty: 20 TABLET | Refills: 0 | Status: SHIPPED | OUTPATIENT
Start: 2018-01-25 | End: 2018-02-04

## 2018-01-25 ASSESSMENT — PAIN DESCRIPTION - DESCRIPTORS: DESCRIPTORS: ACHING

## 2018-01-25 ASSESSMENT — PAIN DESCRIPTION - PAIN TYPE: TYPE: ACUTE PAIN

## 2018-01-25 ASSESSMENT — PAIN DESCRIPTION - LOCATION: LOCATION: ANKLE

## 2018-01-25 ASSESSMENT — PAIN DESCRIPTION - ONSET: ONSET: GRADUAL

## 2018-01-25 ASSESSMENT — PAIN DESCRIPTION - FREQUENCY: FREQUENCY: INTERMITTENT

## 2018-01-25 ASSESSMENT — PAIN DESCRIPTION - ORIENTATION: ORIENTATION: RIGHT

## 2018-01-25 ASSESSMENT — PAIN SCALES - GENERAL: PAINLEVEL_OUTOF10: 4

## 2018-01-25 ASSESSMENT — PAIN DESCRIPTION - PROGRESSION: CLINICAL_PROGRESSION: GRADUALLY WORSENING

## 2018-01-25 NOTE — PROGRESS NOTES
Esophageal Cancer Neg Hx     Liver Cancer Neg Hx     Liver Disease Neg Hx     Stomach Cancer Neg Hx     Rectal Cancer Neg Hx        SOCIAL HISTORY    Social History   Substance Use Topics    Smoking status: Never Smoker    Smokeless tobacco: Never Used    Alcohol use No       ALLERGIES    No Known Allergies    MEDICATIONS    Current Outpatient Prescriptions on File Prior to Encounter   Medication Sig Dispense Refill    HYDROcodone-acetaminophen (Amayayn March) 7.5-325 MG per tablet TAKE ONE TABLET BY MOUTH EVERY 8 HOURS AS NEEDED FOR PAIN. 90 tablet 0    cephALEXin (KEFLEX) 500 MG capsule TAKE TWO CAPSULES BY MOUTH TWICE DAILY FOR INFECTION ON  LOWER  LEGS 40 capsule 1    donepezil (ARICEPT) 10 MG tablet TAKE ONE TABLET BY MOUTH ONCE DAILY 90 tablet 2    amitriptyline (ELAVIL) 50 MG tablet TAKE TWO TABLETS BY MOUTH NIGHTLY 60 tablet 5    gabapentin (NEURONTIN) 300 MG capsule 1 capsule by mouth three times a day for nerve pain in feet 90 capsule 5    amLODIPine-benazepril (LOTREL) 5-10 MG per capsule TAKE ONE CAPSULE BY MOUTH ONCE DAILY FOR BLOOD PRESSURE 30 capsule 5    Misc. Devices (ROLLER WALKER) MISC 1 each by Does not apply route daily DX Code: R26.81, G70.00, H81.313, G60.9 1 each 0    zolpidem (AMBIEN) 5 MG tablet TAKE ONE TABLET BY MOUTH ONCE DAILY AT BEDTIME AS NEEDED FOR SLEEP FOR CHRONIC INSOMNIA. 30 tablet 0    celecoxib (CELEBREX) 200 MG capsule Take 200 mg by mouth daily      flurandrenolide (CORDRAN) 0.05 % CREA Apply topically 2 times daily      hydrOXYzine (ATARAX) 25 MG tablet Take 25 mg by mouth 3 times daily as needed for Itching      potassium chloride (KLOR-CON M) 10 MEQ extended release tablet Take 1 tablet by mouth 3 times daily 90 tablet 1    FLUoxetine (PROZAC) 20 MG capsule TAKE ONE CAPSULE BY MOUTH ONCE DAILY 90 capsule 3    ondansetron (ZOFRAN) 4 MG tablet TAKE ONE TABLET BY MOUTH TWICE DAILY AS NEEDED FOR SEVERE NAUSEA AND VOMITING.  15 tablet 0    ferrous sulfate 325 (65 Pain  * Temperature over 101  * Increase in drainage from your wound  * Drainage with a foul odor  * Bleeding  * Increase in swelling  * Need for compression bandage changes due to slippage, breakthrough drainage. If you need medical attention outside of the business hours of the [address] [place] [address] Road please contact your PCP or go to the nearest emergency room.           Electronically signed by DENIZ Weeks on 1/25/2018 at 11:29 AM

## 2018-01-31 ENCOUNTER — HOSPITAL ENCOUNTER (OUTPATIENT)
Dept: WOUND CARE | Age: 73
Discharge: HOME | End: 2018-01-31
Payer: MEDICARE

## 2018-01-31 VITALS
SYSTOLIC BLOOD PRESSURE: 118 MMHG | WEIGHT: 124 LBS | TEMPERATURE: 96.1 F | HEIGHT: 69 IN | DIASTOLIC BLOOD PRESSURE: 74 MMHG | RESPIRATION RATE: 20 BRPM | HEART RATE: 66 BPM | BODY MASS INDEX: 18.37 KG/M2

## 2018-01-31 DIAGNOSIS — L97.312 NON-PRESSURE CHRONIC ULCER OF RIGHT ANKLE WITH FAT LAYER EXPOSED (HCC): ICD-10-CM

## 2018-01-31 PROCEDURE — 87075 CULTR BACTERIA EXCEPT BLOOD: CPT

## 2018-01-31 PROCEDURE — 99213 OFFICE O/P EST LOW 20 MIN: CPT

## 2018-01-31 PROCEDURE — 87070 CULTURE OTHR SPECIMN AEROBIC: CPT

## 2018-01-31 PROCEDURE — 99214 OFFICE O/P EST MOD 30 MIN: CPT | Performed by: NURSE PRACTITIONER

## 2018-01-31 PROCEDURE — 87205 SMEAR GRAM STAIN: CPT

## 2018-01-31 NOTE — PROGRESS NOTES
Brother      deaf   [facility] Hearing Loss Brother      deaf    Colon Polyps Sister     Colon Cancer Neg Hx     Esophageal Cancer Neg Hx     Liver Cancer Neg Hx     Liver Disease Neg Hx     Stomach Cancer Neg Hx     Rectal Cancer Neg Hx        SOCIAL HISTORY    Social History   Substance Use Topics    Smoking status: Never Smoker    Smokeless tobacco: Never Used    Alcohol use No       ALLERGIES    No Known Allergies    MEDICATIONS    Current Outpatient Prescriptions on File Prior to Encounter   Medication Sig Dispense Refill    doxycycline hyclate (VIBRA-TABS) 100 MG tablet Take 1 tablet by mouth 2 times daily for 10 days 20 tablet 0    HYDROcodone-acetaminophen (NORCO) 7.5-325 MG per tablet TAKE ONE TABLET BY MOUTH EVERY 8 HOURS AS NEEDED FOR PAIN. 90 tablet 0    cephALEXin (KEFLEX) 500 MG capsule TAKE TWO CAPSULES BY MOUTH TWICE DAILY FOR INFECTION ON  LOWER  LEGS 40 capsule 1    donepezil (ARICEPT) 10 MG tablet TAKE ONE TABLET BY MOUTH ONCE DAILY 90 tablet 2    amitriptyline (ELAVIL) 50 MG tablet TAKE TWO TABLETS BY MOUTH NIGHTLY 60 tablet 5    gabapentin (NEURONTIN) 300 MG capsule 1 capsule by mouth three times a day for nerve pain in feet 90 capsule 5    amLODIPine-benazepril (LOTREL) 5-10 MG per capsule TAKE ONE CAPSULE BY MOUTH ONCE DAILY FOR BLOOD PRESSURE 30 capsule 5    ferrous sulfate 325 (65 Fe) MG tablet Take 1 tablet by mouth daily (with breakfast) 30 tablet 2    zolpidem (AMBIEN) 5 MG tablet TAKE ONE TABLET BY MOUTH ONCE DAILY AT BEDTIME AS NEEDED FOR SLEEP FOR CHRONIC INSOMNIA.  30 tablet 0    celecoxib (CELEBREX) 200 MG capsule Take 200 mg by mouth daily      flurandrenolide (CORDRAN) 0.05 % CREA Apply topically 2 times daily      hydrOXYzine (ATARAX) 25 MG tablet Take 25 mg by mouth 3 times daily as needed for Itching      potassium chloride (KLOR-CON M) 10 MEQ extended release tablet Take 1 tablet by mouth 3 times daily 90 tablet 1    FLUoxetine (PROZAC) 20 MG capsule TAKE ONE Patient Active Problem List   Diagnosis Code    Osteoarthritis M19.90    Depression F32.9    Chronic back pain M54.9, G89.29    Memory loss R41.3    Neck pain M54.2    Deafness congenital H90.5    Pulsatile abdominal mass R19.00    Descending thoracic aortic aneurysm (HCC) I71.2    Myasthenia gravis ([facility]) G70.00    Essential hypertension I10    Peripheral vascular disease (HCC) I73.9    Venous (peripheral) insufficiency I87.2    Ectatic abdominal aorta (HCC) I77.811    Ectatic abdominal aorta (HCC) I77.811    Chronic insomnia F51.04    Epigastric pain R10.13    Dark stools R19.5    Alternating constipation and diarrhea R19.8    Family history of colonic polyps Z83.71    Non-pressure chronic ulcer of right ankle with fat layer exposed ([facility]) L97.312    Myasthenia gravis without acute exacerbation ([facility]) G70.00    Vertigo, aural H81.319    Chronic tension-type headache, intractable G44.221    Idiopathic peripheral neuropathy G60.9    Unsteady gait R26.81        Procedure Note  Indications:  Based on my examination of this patient's wound(s)/ulcer(s) today, debridement is not required to promote healing and evaluate the wound base. Performed by: DENIZ Matthews      Wound 08/07/17 Venous ulcer Ankle Right;Lateral Wound 2 (Active)   Wound Image    1/31/2018 10:34 AM   Wound Type Wound 1/31/2018 10:34 AM   Wound Venous 1/31/2018 10:34 AM   Dressing Status Old drainage; Intact 1/31/2018 10:34 AM   Dressing Changed Changed/New 1/25/2018 12:05 PM   Dressing/Treatment Other (Comment) 11/2/2017 10:53 AM   Wound Cleansed Rinsed/Irrigated with saline 1/31/2018 10:34 AM   Wound Length (cm) 2 cm 1/31/2018 10:34 AM   Wound Width (cm) 1.5 cm 1/31/2018 10:34 AM   Wound Depth (cm)  0.2 1/31/2018 10:34 AM   Calculated Wound Size (cm^2) (l*w) 3 cm^2 1/31/2018 10:34 AM   Change in Wound Size % (l*w) -98243 1/31/2018 10:34 AM   Tunneling Position ___ O'Clock 0 1/9/2018 10:06 AM   Undermining Starts

## 2018-02-04 LAB
ANAEROBIC CULTURE: ABNORMAL
GRAM STAIN RESULT: ABNORMAL
WOUND/ABSCESS: ABNORMAL

## 2018-02-05 RX ORDER — HYDROCODONE BITARTRATE AND ACETAMINOPHEN 7.5; 325 MG/1; MG/1
TABLET ORAL
Qty: 90 TABLET | Refills: 0 | Status: CANCELLED | OUTPATIENT
Start: 2018-02-05 | End: 2018-03-07

## 2018-02-06 RX ORDER — HYDROCODONE BITARTRATE AND ACETAMINOPHEN 7.5; 325 MG/1; MG/1
TABLET ORAL
Qty: 90 TABLET | Refills: 0 | Status: SHIPPED | OUTPATIENT
Start: 2018-02-06 | End: 2018-03-05 | Stop reason: SDUPTHER

## 2018-02-07 ENCOUNTER — HOSPITAL ENCOUNTER (OUTPATIENT)
Dept: WOUND CARE | Age: 73
Discharge: HOME | End: 2018-02-07
Payer: MEDICARE

## 2018-02-07 ENCOUNTER — HOSPITAL ENCOUNTER (OUTPATIENT)
Dept: GENERAL RADIOLOGY | Age: 73
Discharge: HOME | End: 2018-02-07
Payer: MEDICARE

## 2018-02-07 ENCOUNTER — PREP FOR PROCEDURE (OUTPATIENT)
Dept: VASCULAR SURGERY | Age: 73
End: 2018-02-07

## 2018-02-07 VITALS
BODY MASS INDEX: 18.37 KG/M2 | HEIGHT: 69 IN | WEIGHT: 124 LBS | TEMPERATURE: 96.8 F | HEART RATE: 63 BPM | SYSTOLIC BLOOD PRESSURE: 109 MMHG | RESPIRATION RATE: 18 BRPM | DIASTOLIC BLOOD PRESSURE: 69 MMHG

## 2018-02-07 DIAGNOSIS — L97.312 NON-PRESSURE CHRONIC ULCER OF RIGHT ANKLE WITH FAT LAYER EXPOSED (HCC): ICD-10-CM

## 2018-02-07 DIAGNOSIS — I73.9 PERIPHERAL VASCULAR DISEASE (HCC): Chronic | ICD-10-CM

## 2018-02-07 DIAGNOSIS — E44.1 MILD PROTEIN-CALORIE MALNUTRITION (HCC): Primary | ICD-10-CM

## 2018-02-07 PROCEDURE — 73610 X-RAY EXAM OF ANKLE: CPT

## 2018-02-07 PROCEDURE — 99213 OFFICE O/P EST LOW 20 MIN: CPT

## 2018-02-07 PROCEDURE — 99214 OFFICE O/P EST MOD 30 MIN: CPT | Performed by: SURGERY

## 2018-02-07 RX ORDER — SODIUM CHLORIDE 9 MG/ML
INJECTION, SOLUTION INTRAVENOUS CONTINUOUS
Status: CANCELLED | OUTPATIENT
Start: 2018-02-07

## 2018-02-07 RX ORDER — TRIAMCINOLONE ACETONIDE 0.25 MG/G
CREAM TOPICAL
Qty: 1 TUBE | Refills: 1 | Status: SHIPPED | OUTPATIENT
Start: 2018-02-07 | End: 2020-01-22

## 2018-02-07 RX ORDER — SODIUM CHLORIDE 0.9 % (FLUSH) 0.9 %
10 SYRINGE (ML) INJECTION PRN
Status: CANCELLED | OUTPATIENT
Start: 2018-02-07

## 2018-02-07 ASSESSMENT — PAIN DESCRIPTION - PAIN TYPE: TYPE: ACUTE PAIN

## 2018-02-07 ASSESSMENT — PAIN DESCRIPTION - PROGRESSION: CLINICAL_PROGRESSION: NOT CHANGED

## 2018-02-07 ASSESSMENT — PAIN DESCRIPTION - DESCRIPTORS: DESCRIPTORS: TENDER

## 2018-02-07 ASSESSMENT — PAIN DESCRIPTION - ONSET: ONSET: GRADUAL

## 2018-02-07 ASSESSMENT — PAIN DESCRIPTION - LOCATION: LOCATION: ANKLE

## 2018-02-07 ASSESSMENT — PAIN DESCRIPTION - ORIENTATION: ORIENTATION: RIGHT

## 2018-02-07 ASSESSMENT — PAIN DESCRIPTION - FREQUENCY: FREQUENCY: INTERMITTENT

## 2018-02-07 ASSESSMENT — PAIN SCALES - GENERAL: PAINLEVEL_OUTOF10: 2

## 2018-02-07 NOTE — PLAN OF CARE
Problem: Pain:  Goal: Pain level will decrease  Pain level will decrease   Outcome: Ongoing    Goal: Control of acute pain  Control of acute pain   Outcome: Ongoing    Goal: Control of chronic pain  Control of chronic pain   Outcome: Ongoing      Problem: Wound:  Goal: Will show signs of wound healing; wound closure and no evidence of infection  Will show signs of wound healing; wound closure and no evidence of infection   Outcome: Ongoing      Problem: Venous:  Goal: Signs of wound healing will improve  Signs of wound healing will improve   Outcome: Ongoing

## 2018-02-19 ENCOUNTER — HOSPITAL ENCOUNTER (OUTPATIENT)
Dept: INTERVENTIONAL RADIOLOGY/VASCULAR | Age: 73
Discharge: HOME | End: 2018-02-19
Payer: MEDICARE

## 2018-02-19 VITALS
BODY MASS INDEX: 18.83 KG/M2 | HEART RATE: 58 BPM | OXYGEN SATURATION: 94 % | SYSTOLIC BLOOD PRESSURE: 158 MMHG | TEMPERATURE: 97.9 F | WEIGHT: 120 LBS | DIASTOLIC BLOOD PRESSURE: 77 MMHG | RESPIRATION RATE: 14 BRPM | HEIGHT: 67 IN

## 2018-02-19 DIAGNOSIS — L97.312 NON-PRESSURE CHRONIC ULCER OF RIGHT ANKLE WITH FAT LAYER EXPOSED (HCC): ICD-10-CM

## 2018-02-19 PROBLEM — S91.001A WOUND OF RIGHT ANKLE: Status: ACTIVE | Noted: 2018-02-19

## 2018-02-19 LAB
ANION GAP SERPL CALCULATED.3IONS-SCNC: 9 MMOL/L (ref 7–19)
BASOPHILS ABSOLUTE: 0.1 K/UL (ref 0–0.2)
BASOPHILS RELATIVE PERCENT: 1.1 % (ref 0–1)
BUN BLDV-MCNC: 22 MG/DL (ref 8–23)
CALCIUM SERPL-MCNC: 9.4 MG/DL (ref 8.8–10.2)
CHLORIDE BLD-SCNC: 102 MMOL/L (ref 98–111)
CO2: 28 MMOL/L (ref 22–29)
CREAT SERPL-MCNC: 1 MG/DL (ref 0.5–0.9)
EOSINOPHILS ABSOLUTE: 1 K/UL (ref 0–0.6)
EOSINOPHILS RELATIVE PERCENT: 15.6 % (ref 0–5)
GFR NON-AFRICAN AMERICAN: 54
GLUCOSE BLD-MCNC: 77 MG/DL (ref 74–109)
HCT VFR BLD CALC: 33.1 % (ref 37–47)
HEMOGLOBIN: 10.6 G/DL (ref 12–16)
LYMPHOCYTES ABSOLUTE: 2.2 K/UL (ref 1.1–4.5)
LYMPHOCYTES RELATIVE PERCENT: 34.9 % (ref 20–40)
MCH RBC QN AUTO: 30.5 PG (ref 27–31)
MCHC RBC AUTO-ENTMCNC: 32 G/DL (ref 33–37)
MCV RBC AUTO: 95.4 FL (ref 81–99)
MONOCYTES ABSOLUTE: 0.7 K/UL (ref 0–0.9)
MONOCYTES RELATIVE PERCENT: 10.5 % (ref 0–10)
NEUTROPHILS ABSOLUTE: 2.4 K/UL (ref 1.5–7.5)
NEUTROPHILS RELATIVE PERCENT: 37.7 % (ref 50–65)
PDW BLD-RTO: 14.3 % (ref 11.5–14.5)
PLATELET # BLD: 231 K/UL (ref 130–400)
PMV BLD AUTO: 9.6 FL (ref 9.4–12.3)
POTASSIUM SERPL-SCNC: 4.2 MMOL/L (ref 3.5–5)
PREALBUMIN: 20 MG/DL (ref 20–40)
RBC # BLD: 3.47 M/UL (ref 4.2–5.4)
SODIUM BLD-SCNC: 139 MMOL/L (ref 136–145)
WBC # BLD: 6.3 K/UL (ref 4.8–10.8)

## 2018-02-19 PROCEDURE — 6360000004 HC RX CONTRAST MEDICATION: Performed by: SURGERY

## 2018-02-19 PROCEDURE — C1769 GUIDE WIRE: HCPCS

## 2018-02-19 PROCEDURE — G0269 OCCLUSIVE DEVICE IN VEIN ART: HCPCS | Performed by: SURGERY

## 2018-02-19 PROCEDURE — 84134 ASSAY OF PREALBUMIN: CPT

## 2018-02-19 PROCEDURE — 2500000003 HC RX 250 WO HCPCS: Performed by: SURGERY

## 2018-02-19 PROCEDURE — 75716 ARTERY X-RAYS ARMS/LEGS: CPT | Performed by: SURGERY

## 2018-02-19 PROCEDURE — 75625 CONTRAST EXAM ABDOMINL AORTA: CPT | Performed by: SURGERY

## 2018-02-19 PROCEDURE — 99153 MOD SED SAME PHYS/QHP EA: CPT | Performed by: SURGERY

## 2018-02-19 PROCEDURE — 2580000003 HC RX 258: Performed by: SURGERY

## 2018-02-19 PROCEDURE — 6360000002 HC RX W HCPCS: Performed by: SURGERY

## 2018-02-19 PROCEDURE — 99152 MOD SED SAME PHYS/QHP 5/>YRS: CPT | Performed by: SURGERY

## 2018-02-19 PROCEDURE — 85025 COMPLETE CBC W/AUTO DIFF WBC: CPT

## 2018-02-19 PROCEDURE — 36200 PLACE CATHETER IN AORTA: CPT | Performed by: SURGERY

## 2018-02-19 PROCEDURE — 80048 BASIC METABOLIC PNL TOTAL CA: CPT

## 2018-02-19 PROCEDURE — 36415 COLL VENOUS BLD VENIPUNCTURE: CPT

## 2018-02-19 RX ORDER — SODIUM CHLORIDE 9 MG/ML
INJECTION, SOLUTION INTRAVENOUS CONTINUOUS
Status: ACTIVE | OUTPATIENT
Start: 2018-02-19 | End: 2018-02-19

## 2018-02-19 RX ORDER — HEPARIN SODIUM 5000 [USP'U]/ML
INJECTION, SOLUTION INTRAVENOUS; SUBCUTANEOUS
Status: COMPLETED | OUTPATIENT
Start: 2018-02-19 | End: 2018-02-19

## 2018-02-19 RX ORDER — SODIUM CHLORIDE 9 MG/ML
INJECTION, SOLUTION INTRAVENOUS CONTINUOUS
Status: DISCONTINUED | OUTPATIENT
Start: 2018-02-19 | End: 2018-02-21 | Stop reason: HOSPADM

## 2018-02-19 RX ORDER — IODIXANOL 320 MG/ML
INJECTION, SOLUTION INTRAVASCULAR
Status: COMPLETED | OUTPATIENT
Start: 2018-02-19 | End: 2018-02-19

## 2018-02-19 RX ORDER — SODIUM CHLORIDE 0.9 % (FLUSH) 0.9 %
10 SYRINGE (ML) INJECTION PRN
Status: DISCONTINUED | OUTPATIENT
Start: 2018-02-19 | End: 2018-02-21 | Stop reason: HOSPADM

## 2018-02-19 RX ORDER — FENTANYL CITRATE 50 UG/ML
INJECTION, SOLUTION INTRAMUSCULAR; INTRAVENOUS
Status: COMPLETED | OUTPATIENT
Start: 2018-02-19 | End: 2018-02-19

## 2018-02-19 RX ORDER — MIDAZOLAM HYDROCHLORIDE 1 MG/ML
INJECTION INTRAMUSCULAR; INTRAVENOUS
Status: COMPLETED | OUTPATIENT
Start: 2018-02-19 | End: 2018-02-19

## 2018-02-19 RX ORDER — LIDOCAINE HYDROCHLORIDE 20 MG/ML
INJECTION, SOLUTION INFILTRATION; PERINEURAL
Status: COMPLETED | OUTPATIENT
Start: 2018-02-19 | End: 2018-02-19

## 2018-02-19 RX ADMIN — MIDAZOLAM HYDROCHLORIDE 0.5 MG: 1 INJECTION, SOLUTION INTRAMUSCULAR; INTRAVENOUS at 09:46

## 2018-02-19 RX ADMIN — MIDAZOLAM HYDROCHLORIDE 0.5 MG: 1 INJECTION, SOLUTION INTRAMUSCULAR; INTRAVENOUS at 09:47

## 2018-02-19 RX ADMIN — HEPARIN SODIUM 10000 UNITS: 5000 INJECTION, SOLUTION INTRAVENOUS; SUBCUTANEOUS at 09:48

## 2018-02-19 RX ADMIN — CEFAZOLIN SODIUM 1 G: 1 INJECTION, SOLUTION INTRAVENOUS at 09:15

## 2018-02-19 RX ADMIN — LIDOCAINE HYDROCHLORIDE 10 ML: 20 INJECTION, SOLUTION INFILTRATION; PERINEURAL at 09:48

## 2018-02-19 RX ADMIN — IODIXANOL 94 ML: 320 INJECTION, SOLUTION INTRAVASCULAR at 10:03

## 2018-02-19 RX ADMIN — SODIUM CHLORIDE: 9 INJECTION, SOLUTION INTRAVENOUS at 10:30

## 2018-02-19 RX ADMIN — FENTANYL CITRATE 25 MCG: 50 INJECTION, SOLUTION INTRAMUSCULAR; INTRAVENOUS at 09:47

## 2018-02-19 RX ADMIN — SODIUM CHLORIDE: 9 INJECTION, SOLUTION INTRAVENOUS at 07:30

## 2018-02-19 RX ADMIN — FENTANYL CITRATE 50 MCG: 50 INJECTION, SOLUTION INTRAMUSCULAR; INTRAVENOUS at 09:50

## 2018-02-19 NOTE — H&P (VIEW-ONLY)
HYSTERECTOMY        [place] COLONOSCOPY FLX DX W/COLLJ SPEC WHEN PFRMD N/A 7/6/2017     Dr Aime Hampton diverticulosis, 5 yr recall    [place] EGD TRANSORAL BIOPSY SINGLE/MULTIPLE N/A 7/6/2017     Dr Nima Vanegas, [place] (-)    VARICOSE VEIN SURGERY             Family History          Family History   Problem Relation Age of Onset    High Blood Pressure Mother      Stroke Mother      Cancer Father      Lung Cancer Father      Hearing Loss Brother         deaf    Hearing Loss Brother         deaf    Colon Polyps Sister      Colon Cancer Neg Hx      Esophageal Cancer Neg Hx      Liver Cancer Neg Hx      Liver Disease Neg Hx      Stomach Cancer Neg Hx      Rectal Cancer Neg Hx                Social History   Substance Use Topics    Smoking status: Never Smoker    Smokeless tobacco: Never Used    Alcohol use No            Review of Systems     Constitutional  no significant activity change, appetite change, or unexpected weight change. No fever or chills. No diaphoresis or significant fatigue. HENT  no significant rhinorrhea or epistaxis. Chronic head aches. Congenital deafness. Eyes  no sudden vision change or amaurosis. Respiratory  no significant shortness of breath, wheezing, or stridor. No apnea, cough, or chest tightness associated with shortness of breath. Cardiovascular  no chest pain, syncope, or significant dizziness. No palpitations or significant leg swelling. Patient reports ? Has a bit of staggering gait claudication. Gastrointestinal  no abdominal swelling or pain. No blood in stool. No severe constipation, diarrhea, nausea, or vomiting. Genitourinary  No difficulty urinating, dysuria, frequency, or urgency. No flank pain or hematuria. Musculoskeletal  no back pain, gait disturbance, or myalgia. Skin  no color change, , pallor, Right ankle wound. Itching and very faint rash where bandages were. Neurologic  no dizziness, facial asymmetry, or light headedness.   No seizures. No speech difficulty or lateralizing weakness. Hematologic  no easy bruising or excessive bleeding. Psychiatric  no severe anxiety or nervousness. No confusion. All other review of systems are negative.     Physical Exam     /69   Pulse 63   Temp 96.8 °F (36 °C) (Temporal)   Resp 18   Ht 5' 9\" (1.753 m)   Wt 124 lb (56.2 kg)   BMI 18.31 kg/m²      Constitutional  well developed, ? well nourished. No diaphoresis or acute distress. HENT  head normocephalic. Septum appears midline. Eyes  conjunctiva normal.  EOMS normal.  No exudate. No icterus. Neck- ROM appears normal, no tracheal deviation. Cardiovascular  Regular rate and rhythm. Heart sounds are normal.  No murmur, rub, or gallop. Carotid pulses are 2+ to palpation bilaterally without bruit. Extremities - Radial and brachial pulses are 2+ to palpation bilaterally. Right femoral pulse: present 2+; Right popliteal pulse: barely palpable Right DP: barely palpable; Right PT present 1+; Left femoral pulse: present 2+; Left popliteal pulse: barely palpable; Left DP: barely palpable; Left PT: present 1+ Would lateral right ankle over malleolus  No cyanosis, clubbing, or significant edema. No signs atheroembolic event. Pulmonary  effort appears normal.  No respiratory distress. Lungs - Breath sounds normal. No wheezes or rales. GI - Abdomen  soft, non tender, bowel sounds X 4 quadrants. No guarding or rebound tenderness. No distension or palpable mass. Genitourinary  deferred. Musculoskeletal  ROM appears normal.  No significant edema. Neurologic  alert and oriented X 3. Physiologic.   Psychiatric  mood, affect, and behavior appear normal.  Judgment and thought processes appear normal.  Skin  wound right ankle, mild faint rash right lower leg.     Post Debridement Measurements and Assessment:       Wound 08/07/17 Venous ulcer Ankle Right;Lateral Wound 2 (Active)   Wound Image    2/7/2018  8:22 AM   Wound 2/7/2018  8:22 AM   Wound Assessment Granulation tissue;Pink;Red;Slough; Yellow 2/7/2018  8:22 AM   Drainage Amount Moderate 2/7/2018  8:22 AM   Drainage Description Serosanguinous 2/7/2018  8:22 AM   Odor None 2/7/2018  8:22 AM   Margins Attached edges 2/7/2018  8:22 AM   [place]-wound Assessment Dry; Intact; Pink 2/7/2018  8:22 AM   [place]%Wound Bed 90 2/7/2018  8:22 AM   Yellow%Wound Bed 10 2/7/2018  8:22 AM   Debridement per physician None 2/7/2018  9:14 AM   Time out N/A 2/7/2018  9:14 AM   Number of days: 0      The patients pain isPain Level: 2 Pain Type: Acute pain. Wound is has slightly improved. According to Edgardo Pennington, I have not seen before. Please refer to nursing measurements and assessment regarding wound      Risk factors for atherosclerosis of all vascular beds have been reviewed with the patient including:  Family history, tobacco abuse in all forms, elevated cholesterol, hyperlipidemia, and diabetes.        Options have been discussed with the patient including continued medical management vs. proceeding with X-ray to check for osteo, and Aortagram and runoff possible angioplasty. Patient has opted to proceed with continued medical management, and proceed with further work up. Impression [facility] 8-7-17        Based on ankle brachial indices and doppler waveforms, the patient has    mildly diminished flow to the bilateral lower extremity arterial system at   [facility]        Signature        ----------------------------------------------------------------    Electronically signed by Jean Paul Castellano MD(Interpreting    physician) on 08/07/2017 05:44 PM    ----------------------------------------------------------------       Velocities are measured in cm/s ; Diameters are measured in mm       Pressures   +--------------------------------------++--------+-----+----+--------+-----+   !                                      ! ! Right   !     !Left!        !     !

## 2018-02-21 ENCOUNTER — HOSPITAL ENCOUNTER (OUTPATIENT)
Dept: WOUND CARE | Age: 73
Discharge: HOME | End: 2018-02-21
Payer: MEDICARE

## 2018-02-21 VITALS
RESPIRATION RATE: 18 BRPM | WEIGHT: 120 LBS | SYSTOLIC BLOOD PRESSURE: 121 MMHG | HEART RATE: 67 BPM | HEIGHT: 67 IN | DIASTOLIC BLOOD PRESSURE: 74 MMHG | BODY MASS INDEX: 18.83 KG/M2 | TEMPERATURE: 98.1 F

## 2018-02-21 DIAGNOSIS — L97.312 NON-PRESSURE CHRONIC ULCER OF RIGHT ANKLE WITH FAT LAYER EXPOSED (HCC): ICD-10-CM

## 2018-02-21 PROCEDURE — 29581 APPL MULTLAYER CMPRN SYS LEG: CPT

## 2018-02-21 PROCEDURE — 99213 OFFICE O/P EST LOW 20 MIN: CPT | Performed by: SURGERY

## 2018-02-21 ASSESSMENT — PAIN DESCRIPTION - PROGRESSION: CLINICAL_PROGRESSION: NOT CHANGED

## 2018-02-21 ASSESSMENT — PAIN DESCRIPTION - PAIN TYPE: TYPE: ACUTE PAIN

## 2018-02-21 ASSESSMENT — PAIN DESCRIPTION - ONSET: ONSET: ON-GOING

## 2018-02-21 ASSESSMENT — PAIN SCALES - GENERAL: PAINLEVEL_OUTOF10: 0

## 2018-02-21 ASSESSMENT — PAIN DESCRIPTION - DESCRIPTORS: DESCRIPTORS: SORE

## 2018-02-21 ASSESSMENT — PAIN DESCRIPTION - LOCATION: LOCATION: ANKLE

## 2018-02-21 ASSESSMENT — PAIN DESCRIPTION - ORIENTATION: ORIENTATION: RIGHT

## 2018-02-21 ASSESSMENT — PAIN DESCRIPTION - FREQUENCY: FREQUENCY: INTERMITTENT

## 2018-02-21 NOTE — PROGRESS NOTES
[facility]   Progress Note and Procedure Note      Jayashree Marx  AGE: 67 y.o. GENDER: female  : 1945  TODAY'S DATE:  2018    Subjective:      CC- right ankle wound  HISTORY of PRESENT ILLNESS HPI   Laura Reza is a 67 y.o. female who presents today for wound evaluation. Wound Type:venous  Wound Location:right ankle(s): lateral  Modifying factors:venous stasis and shear force    Patient Active Problem List   Diagnosis Code    Osteoarthritis M19.90    Depression F32.9    Chronic back pain M54.9, G89.29    Memory loss R41.3    Neck pain M54.2    Deafness congenital H90.5    Pulsatile abdominal mass R19.00    Descending thoracic aortic aneurysm (HCC) I71.2    Myasthenia gravis (HCC) G70.00    Essential hypertension I10    Peripheral vascular disease (HCC) I73.9    Venous (peripheral) insufficiency I87.2    Ectatic abdominal aorta (HCC) I77.811    Ectatic abdominal aorta (HCC) I77.811    Chronic insomnia F51.04    Epigastric pain R10.13    Dark stools R19.5    Alternating constipation and diarrhea R19.8    Family history of colonic polyps Z83.71    Non-pressure chronic ulcer of right ankle with fat layer exposed ([address] L97.312    Myasthenia gravis without acute exacerbation ([facility]) G70.00    Vertigo, aural H81.319    Chronic tension-type headache, intractable G44.221    Idiopathic peripheral neuropathy G60.9    Unsteady gait R26.81    Wound of right ankle S91.001A       ALLERGIES    No Known Allergies        Objective:      /74   Pulse 67   Temp 98.1 °F (36.7 °C) (Temporal)   Resp 18   Ht 5' 7\" (1.702 m)   Wt 120 lb (54.4 kg)   BMI 18.79 kg/m²     Post Debridement Measurements and Assessment:  Wound 17 Venous ulcer Ankle Right;Lateral Wound 2 (Active)   Wound Image    2018  8:22 AM   Wound Type Wound 2018  8:22 AM   Wound Venous 2018  8:22 AM   Dressing Status Old drainage; Intact 2018  8:22 AM   Dressing Changed Changed/New  X ray right ankle 2-7-18   Impression   No acute bony abnormality. Soft tissue swelling and ulceration of the lateral malleolus. No   underlying bony abnormality/bony erosion. The calcaneal spur. Signed by Dr Benedict Sykes on 2/7/2018 10:29 AM      Angiography 2-19-18   Impression   1. 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 2. Left leg with normal arterial findings. Signed by Dr Andrew Cast on 2/19/2018 10:39 AM       I went over findings of angiogram and x rays with patient. Also reviewed the hard copies and reports. At this point I think this wound is mainly venous and as such compression and local care will be mainstay's of treatment. Discussed options of compression with patient. She developed dermatitis with Co-flex. She tolerated Proforte without 3rd layer. Will start with Duoderm to auto lyse slough, may need to be switched to Hypraferra blue or aquacell AG. Steroids if she developes dermatitis. I went over this with VALENTÍN Talamantes who will see her in [address] And went over plan. 20 minutes in [facility]. Plan:          Plan for wound - Dress per physician order  Treatment:     Compression : Yes   Offloading : Yes   Dressing : see avs   Additional Therapy :      1. Discussed appropriate home care of this wound. Wound redressed. 2. Patient instructions were given. 3. Follow up: 1 at [facility] clinic week(s).                            Electronically signed by Kinsey Clemons MD on 2/21/2018 at 10:01 AM

## 2018-02-28 ENCOUNTER — HOSPITAL ENCOUNTER (OUTPATIENT)
Dept: WOUND CARE | Age: 73
Discharge: HOME | End: 2018-02-28
Payer: MEDICARE

## 2018-02-28 VITALS
SYSTOLIC BLOOD PRESSURE: 118 MMHG | HEIGHT: 67 IN | TEMPERATURE: 98 F | BODY MASS INDEX: 18.83 KG/M2 | WEIGHT: 120 LBS | RESPIRATION RATE: 18 BRPM | HEART RATE: 62 BPM | DIASTOLIC BLOOD PRESSURE: 72 MMHG

## 2018-02-28 DIAGNOSIS — L97.312 NON-PRESSURE CHRONIC ULCER OF RIGHT ANKLE WITH FAT LAYER EXPOSED (HCC): ICD-10-CM

## 2018-02-28 PROCEDURE — 99213 OFFICE O/P EST LOW 20 MIN: CPT

## 2018-02-28 PROCEDURE — 99214 OFFICE O/P EST MOD 30 MIN: CPT | Performed by: NURSE PRACTITIONER

## 2018-02-28 ASSESSMENT — PAIN SCALES - GENERAL: PAINLEVEL_OUTOF10: 0

## 2018-02-28 NOTE — PROGRESS NOTES
[address]   Progress Note and Procedure Note      Remy Gould  MEDICAL RECORD NUMBER:  [id number]  AGE: 67 y.o. GENDER: female  : 1945  EPISODE DATE:  2018    Subjective:     Chief Complaint   Patient presents with    Wound Check     wound healing         HISTORY of PRESENT ILLNESS HPI     Remy Gould is a 67 y.o. female who presents today for wound/ulcer evaluation right ankle wound. History of Wound Context: Right ankle wound   Wound/Ulcer Pain Timing/Severity: waxing and waning  Quality of pain: N/A  Severity:  3 / 10   Modifying Factors: None  Associated Signs/Symptoms: edema    Ulcer Identification:  Ulcer Type: venous  Contributing Factors: edema and venous stasis    PAST MEDICAL HISTORY        Diagnosis Date    Anxiety     Chronic back pain     severe muscle spasms    Deafness congenital     Depression     Headache     Hypertension     Memory loss     early    Myasthenia (HCC)     Neck pain     Osteoarthritis     Ptosis of eyelid     Weakness of face muscles     Wound of right ankle 2016    open wound to lateral side of right ankle        PAST SURGICAL HISTORY    Past Surgical History:   Procedure Laterality Date    APPENDECTOMY      CARPAL TUNNEL RELEASE      CATARACT REMOVAL      COLONOSCOPY      COLONOSCOPY   or before    [place]-normal per patient    EYE SURGERY      HERNIA REPAIR      on back of neck    HYSTERECTOMY      MI COLONOSCOPY FLX DX W/COLLJ SPEC WHEN PFRMD N/A 2017    Dr Jerona Nissen diverticulosis, 5 yr recall    [place] EGD TRANSORAL BIOPSY SINGLE/MULTIPLE N/A 2017    Dr Ricardo Ariza, [place] (-)    VARICOSE VEIN SURGERY      VASCULAR SURGERY  2018    [facility]. Aortogram and runoff. Left common femoral artery, 5 Turkmen sheath.        FAMILY HISTORY    Family History   Problem Relation Age of Onset    High Blood Pressure Mother     Stroke Mother     Cancer Father     Lung Cancer Father     congenital H90.5    Pulsatile abdominal mass R19.00    Descending thoracic aortic aneurysm (HCC) I71.2    Myasthenia gravis (HCC) G70.00    Essential hypertension I10    Peripheral vascular disease (HCC) I73.9    Venous (peripheral) insufficiency I87.2    Ectatic abdominal aorta (HCC) I77.811    Ectatic abdominal aorta (HCC) I77.811    Chronic insomnia F51.04    Epigastric pain R10.13    Dark stools R19.5    Alternating constipation and diarrhea R19.8    Family history of colonic polyps Z83.71    Non-pressure chronic ulcer of right ankle with fat layer exposed ([facility] [address] L97.312    Myasthenia gravis without acute exacerbation (HCC) G70.00    Vertigo, aural H81.319    Chronic tension-type headache, intractable G44.221    Idiopathic peripheral neuropathy G60.9    Unsteady gait R26.81    Wound of right ankle S91.001A        Procedure Note  Indications:  Based on my examination of this patient's wound(s)/ulcer(s) today, debridement is not required to promote healing and evaluate the wound base.     Performed by: DENIZ Nava    Wound 01/12/16 Bite Ankle Right;Lateral WOUND 1 Right lateral ankle (Active)   Number of days: 778       Wound 08/07/17 Venous ulcer Ankle Right;Lateral Wound 2 (Active)   Wound Image    2/21/2018  9:01 AM   Wound Type Wound 2/28/2018  1:47 PM   Wound Venous 2/28/2018  1:47 PM   Dressing Status Old drainage 2/28/2018  1:47 PM   Dressing Changed Changed/New 2/7/2018 10:04 AM   Dressing/Treatment Other (Comment) 11/2/2017 10:53 AM   Wound Cleansed Rinsed/Irrigated with saline 2/28/2018  1:47 PM   Wound Length (cm) 1.5 cm 2/28/2018  1:47 PM   Wound Width (cm) 1.1 cm 2/28/2018  1:47 PM   Wound Depth (cm)  0.2 2/28/2018  1:47 PM   Calculated Wound Size (cm^2) (l*w) 1.65 cm^2 2/28/2018  1:47 PM   Change in Wound Size % (l*w) -8150 2/28/2018  1:47 PM   Distance Tunneling (cm) 0 cm 2/28/2018  1:47 PM   Tunneling Position ___ O'Clock 0 2/28/2018  1:47 PM   Undermining Starts ___ O'Clock 0 2/28/2018  1:47 PM   Undermining Ends___ O'Clock 0 2/28/2018  1:47 PM   Undermining Maxium Distance (cm) 0 2/21/2018  9:01 AM   Wound Assessment Slough; Yellow;Pink 2/28/2018  1:47 PM   Drainage Amount Small 2/28/2018  1:47 PM   Drainage Description Serosanguinous 2/28/2018  1:47 PM   Odor None 2/28/2018  1:47 PM   Margins Attached edges 2/28/2018  1:47 PM   Paloma-wound Assessment Red 2/28/2018  1:47 PM   Non-staged Wound Description Full thickness 2/28/2018  1:47 PM   [place]%Wound Bed 10 2/28/2018  1:47 PM   Red%Wound Bed 5 2/28/2018  1:47 PM   Yellow%Wound Bed 85 2/28/2018  1:47 PM   Black%Wound Bed 0 2/28/2018  1:47 PM   Purple%Wound Bed 0 2/28/2018  1:47 PM   Other%Wound Bed 0 2/28/2018  1:47 PM   Debridement per physician None 2/28/2018  1:47 PM   Time out N/A 2/28/2018  1:47 PM   Procedural Pain 0 1/4/2018 10:30 AM   Post procedural Pain 0 1/4/2018 10:30 AM   Number of days: 204       Plan:     Treatment Note please see attached Discharge Instructions    In my professional opinion this patient would benefit from HBO Therapy: No    Written patient dismissal instructions given to patient and signed by patient or POA. 1. [place] [facility]         [place]charge Instructions       Visit Discharge/Physician Orders    Discharge condition: Stable    Discharge to: Home    Left via:Private automobile    Accompanied by:       ECF/HHA:  [facility]  Please change Profore with 3rd layer left out. Do this on Mondays and Fridays until further notice. Thank you! Dressing Orders: Soap and water wash, Duoderm cut slightly larger than the wound, Profore with 3rd layer left out,  Change this 3 times for 2 weeks, then twice weekly. It will be changed at [facility] On Wednesdays. If this wrap gets too tight, have your daughter remove and go back to the gel dressing until time for next wrap to be applied.     Treatment Orders: Protein rich diet (unless restricted by your physician); Multivitamin daily; Elevate legs when sitting, avoid standing for long periods of time. [address] [facility] [facility] followup visit ______1 week [place]/Jt with Tammy______________________  (Please note your next appointment above and if you are unable to keep, kindly give a 24 hour notice. Thank you.)        Dressing Orders:   Right Ankle Wound: At this point I think this wound is mainly venous and as such compression and local care will be mainstay's of treatment. Discussed options of compression with patient. She developed dermatitis with Co-flex. She tolerated Proforte without 3rd layer. Will start with Duoderm to auto lyse slough, may need to be switched to Hypraferra blue or aquacell AG. Steroids if she developes dermatitis. I went over this with VALENTÍN Mata who will see her in [address] And went over plan. 20 minutes in [facility]. If you experience any of the following, please call the [address] [place] [address] during business hours:    * Increase in Pain  * Temperature over 101  * Increase in drainage from your wound  * Drainage with a foul odor  * Bleeding  * Increase in swelling  * Need for compression bandage changes due to slippage, breakthrough drainage. If you need medical attention outside of the business hours of the [facility] [address] please contact your PCP or go to the nearest emergency room.           Electronically signed by DENIZ Randall on 2/28/2018 at 2:16 PM

## 2018-03-05 RX ORDER — HYDROCODONE BITARTRATE AND ACETAMINOPHEN 7.5; 325 MG/1; MG/1
TABLET ORAL
Qty: 90 TABLET | Refills: 0 | Status: SHIPPED | OUTPATIENT
Start: 2018-03-05 | End: 2018-04-05 | Stop reason: SDUPTHER

## 2018-03-07 ENCOUNTER — HOSPITAL ENCOUNTER (OUTPATIENT)
Dept: WOUND CARE | Age: 73
Discharge: HOME | End: 2018-03-07
Payer: MEDICARE

## 2018-03-07 VITALS
WEIGHT: 120 LBS | HEART RATE: 68 BPM | HEIGHT: 67 IN | TEMPERATURE: 99.6 F | RESPIRATION RATE: 18 BRPM | SYSTOLIC BLOOD PRESSURE: 107 MMHG | DIASTOLIC BLOOD PRESSURE: 69 MMHG | BODY MASS INDEX: 18.83 KG/M2

## 2018-03-07 DIAGNOSIS — L97.312 NON-PRESSURE CHRONIC ULCER OF RIGHT ANKLE WITH FAT LAYER EXPOSED (HCC): ICD-10-CM

## 2018-03-07 PROCEDURE — 29581 APPL MULTLAYER CMPRN SYS LEG: CPT

## 2018-03-07 PROCEDURE — 99214 OFFICE O/P EST MOD 30 MIN: CPT | Performed by: NURSE PRACTITIONER

## 2018-03-07 ASSESSMENT — PAIN DESCRIPTION - ORIENTATION: ORIENTATION: RIGHT

## 2018-03-07 ASSESSMENT — PAIN DESCRIPTION - PROGRESSION: CLINICAL_PROGRESSION: NOT CHANGED

## 2018-03-07 ASSESSMENT — PAIN DESCRIPTION - PAIN TYPE: TYPE: ACUTE PAIN

## 2018-03-07 ASSESSMENT — PAIN DESCRIPTION - ONSET: ONSET: ON-GOING

## 2018-03-07 ASSESSMENT — PAIN SCALES - GENERAL: PAINLEVEL_OUTOF10: 0

## 2018-03-07 ASSESSMENT — PAIN DESCRIPTION - FREQUENCY: FREQUENCY: INTERMITTENT

## 2018-03-07 ASSESSMENT — PAIN DESCRIPTION - LOCATION: LOCATION: ANKLE

## 2018-03-07 ASSESSMENT — PAIN DESCRIPTION - DESCRIPTORS: DESCRIPTORS: TENDER;SORE

## 2018-03-07 NOTE — PROGRESS NOTES
[address]   Progress Note and Procedure Note      Grant Carroll  MEDICAL RECORD NUMBER:  [id number]  AGE: 67 y.o. GENDER: female  : 1945  EPISODE DATE:  3/7/2018    Subjective:     No chief complaint on file. HISTORY of PRESENT ILLNESS HPI     Jayashree Sinclair is a 67 y.o. female who presents today for wound/ulcer evaluation to right ankle. History of Wound Context: Right ankle wound   Wound/Ulcer Pain Timing/Severity: none  Quality of pain: N/A  Severity:  0 / 10   Modifying Factors: None  Associated Signs/Symptoms: none    Ulcer Identification:  Ulcer Type: venous  Contributing Factors: edema and venous stasis        PAST MEDICAL HISTORY        Diagnosis Date    Anxiety     Chronic back pain     severe muscle spasms    Deafness congenital     Depression     Headache     Hypertension     Memory loss     early    Myasthenia (HCC)     Neck pain     Osteoarthritis     Ptosis of eyelid     Weakness of face muscles     Wound of right ankle 2016    open wound to lateral side of right ankle        PAST SURGICAL HISTORY    Past Surgical History:   Procedure Laterality Date    APPENDECTOMY      CARPAL TUNNEL RELEASE      CATARACT REMOVAL      COLONOSCOPY      COLONOSCOPY   or before    [place]-normal per patient    EYE SURGERY      HERNIA REPAIR      on back of neck    HYSTERECTOMY      NH COLONOSCOPY FLX DX W/COLLJ SPEC WHEN PFRMD N/A 2017    Dr Yip-minimal diverticulosis, 5 yr recall    [place] EGD TRANSORAL BIOPSY SINGLE/MULTIPLE N/A 2017    Dr Kathya Wing, [place] (-)    [id number] VASCULAR SURGERY  2018    [facility]. Aortogram and runoff. Left common femoral artery, 5 Italian sheath.        FAMILY HISTORY    Family History   Problem Relation Age of Onset    High Blood Pressure Mother     Stroke Mother     Cancer Father     Lung Cancer Father     Hearing Loss Brother      deaf    Hearing Loss Brother deaf    Colon Polyps Sister     Colon Cancer Neg Hx     Esophageal Cancer Neg Hx     Liver Cancer Neg Hx     Liver Disease Neg Hx     Stomach Cancer Neg Hx     Rectal Cancer Neg Hx        SOCIAL HISTORY    Social History   Substance Use Topics    Smoking status: Never Smoker    Smokeless tobacco: Never Used    Alcohol use No       ALLERGIES    No Known Allergies    MEDICATIONS    Current Outpatient Prescriptions on File Prior to Encounter   Medication Sig Dispense Refill    HYDROcodone-acetaminophen ([facility]) 7.5-325 MG per tablet TAKE ONE TABLET BY MOUTH EVERY 8 HOURS AS NEEDED FOR PAIN. 90 tablet 0    triamcinolone (KENALOG) 0.025 % cream Apply Topically 1 Tube 1    collagenase (SANTYL) 250 UNIT/GM ointment Apply topically twice daily. 1 Tube 3    FLUoxetine (PROZAC) 20 MG capsule 1 capsule by mouth once a day for anxiety and depression 90 capsule 3    donepezil (ARICEPT) 10 MG tablet TAKE ONE TABLET BY MOUTH ONCE DAILY 90 tablet 2    amitriptyline (ELAVIL) 50 MG tablet TAKE TWO TABLETS BY MOUTH NIGHTLY 60 tablet 5    gabapentin (NEURONTIN) 300 MG capsule 1 capsule by mouth three times a day for nerve pain in feet 90 capsule 5    amLODIPine-benazepril (LOTREL) 5-10 MG per capsule TAKE ONE CAPSULE BY MOUTH ONCE DAILY FOR BLOOD PRESSURE 30 capsule 5    ferrous sulfate 325 (65 Fe) MG tablet Take 1 tablet by mouth daily (with breakfast) 30 tablet 2    Misc.  Devices (ROLLER WALKER) MISC 1 each by Does not apply route daily DX Code: R26.81, G70.00, H81.313, G60.9 1 each 0    celecoxib (CELEBREX) 200 MG capsule Take 200 mg by mouth daily      flurandrenolide (CORDRAN) 0.05 % CREA Apply topically 2 times daily      hydrOXYzine (ATARAX) 25 MG tablet Take 25 mg by mouth 3 times daily as needed for Itching      potassium chloride (KLOR-CON M) 10 MEQ extended release tablet Take 1 tablet by mouth 3 times daily 90 tablet 1    ondansetron (ZOFRAN) 4 MG tablet TAKE ONE TABLET BY MOUTH TWICE DAILY AS NEEDED

## 2018-03-14 ENCOUNTER — HOSPITAL ENCOUNTER (OUTPATIENT)
Dept: WOUND CARE | Age: 73
Discharge: HOME | End: 2018-03-14
Payer: MEDICARE

## 2018-03-14 VITALS
BODY MASS INDEX: 18.83 KG/M2 | WEIGHT: 120 LBS | SYSTOLIC BLOOD PRESSURE: 125 MMHG | HEIGHT: 67 IN | HEART RATE: 60 BPM | DIASTOLIC BLOOD PRESSURE: 70 MMHG | RESPIRATION RATE: 18 BRPM | TEMPERATURE: 98.3 F

## 2018-03-14 DIAGNOSIS — L97.312 NON-PRESSURE CHRONIC ULCER OF RIGHT ANKLE WITH FAT LAYER EXPOSED (HCC): ICD-10-CM

## 2018-03-14 PROCEDURE — 99212 OFFICE O/P EST SF 10 MIN: CPT

## 2018-03-14 PROCEDURE — 99214 OFFICE O/P EST MOD 30 MIN: CPT | Performed by: NURSE PRACTITIONER

## 2018-03-14 ASSESSMENT — PAIN DESCRIPTION - ONSET: ONSET: ON-GOING

## 2018-03-14 ASSESSMENT — PAIN DESCRIPTION - LOCATION: LOCATION: ANKLE

## 2018-03-14 ASSESSMENT — PAIN DESCRIPTION - ORIENTATION: ORIENTATION: RIGHT

## 2018-03-14 ASSESSMENT — PAIN DESCRIPTION - DESCRIPTORS: DESCRIPTORS: TENDER;SORE

## 2018-03-14 ASSESSMENT — PAIN DESCRIPTION - PAIN TYPE: TYPE: ACUTE PAIN

## 2018-03-14 ASSESSMENT — PAIN SCALES - GENERAL: PAINLEVEL_OUTOF10: 0

## 2018-03-14 ASSESSMENT — PAIN DESCRIPTION - FREQUENCY: FREQUENCY: INTERMITTENT

## 2018-03-14 ASSESSMENT — PAIN DESCRIPTION - PROGRESSION: CLINICAL_PROGRESSION: NOT CHANGED

## 2018-03-14 NOTE — PROGRESS NOTES
[address]   Progress Note and Procedure Note      Jono Anderson  MEDICAL RECORD NUMBER:  [id number]  AGE: 67 y.o. GENDER: female  : 1945  EPISODE DATE:  3/14/2018    Subjective:     Chief Complaint   Patient presents with    Wound Check     right ankle wound         HISTORY of PRESENT ILLNESS HPI     Jono Anderson is a 67 y.o. female who presents today for wound/ulcer evaluation to left ankle. History of Wound Context: left ankle wound   Wound/Ulcer Pain Timing/Severity: none  Quality of pain: N/A  Severity:  0 / 10   Modifying Factors: None  Associated Signs/Symptoms: none    Ulcer Identification:  Ulcer Type: venous  Contributing Factors: edema and venous stasis        PAST MEDICAL HISTORY        Diagnosis Date    Anxiety     Chronic back pain     severe muscle spasms    Deafness congenital     Depression     Headache     Hypertension     Memory loss     early    Myasthenia (HCC)     Neck pain     Osteoarthritis     Ptosis of eyelid     Weakness of face muscles     Wound of right ankle 2016    open wound to lateral side of right ankle        PAST SURGICAL HISTORY    Past Surgical History:   Procedure Laterality Date    APPENDECTOMY      CARPAL TUNNEL RELEASE      CATARACT REMOVAL      COLONOSCOPY      COLONOSCOPY   or before    [place]-normal per patient    EYE SURGERY      HERNIA REPAIR      on back of neck    HYSTERECTOMY      CO COLONOSCOPY FLX DX W/COLLJ SPEC WHEN PFRMD N/A 2017    Dr Yip-minimal diverticulosis, 5 yr recall    [place] EGD TRANSORAL BIOPSY SINGLE/MULTIPLE N/A 2017    Dr Franck Vargas, [place] (-)    [id number] VASCULAR SURGERY  2018    TJR. Aortogram and runoff. Left common femoral artery, 5 Portuguese sheath.        FAMILY HISTORY    Family History   Problem Relation Age of Onset    High Blood Pressure Mother     Stroke Mother     Cancer Father     Lung Cancer Father     Hearing WALKER) MISC 1 each by Does not apply route daily DX Code: R26.81, G70.00, H81.313, G60.9 1 each 0    sodium chloride 0.9 % irrigation        No current facility-administered medications on file prior to encounter. REVIEW OF SYSTEMS    Pertinent items are noted in HPI.     Objective:      /70   Pulse 60   Temp 98.3 °F (36.8 °C) (Temporal)   Resp 18   Ht 5' 7\" (1.702 m)   Wt 120 lb (54.4 kg)   BMI 18.79 kg/m²     Wt Readings from Last 3 Encounters:   03/14/18 120 lb (54.4 kg)   03/07/18 120 lb (54.4 kg)   02/28/18 120 lb (54.4 kg)       PHYSICAL EXAM    General Appearance: alert and oriented to person, place and time, well developed and well- nourished, in no acute distress  Skin: warm and dry, no rash or erythema  Head: normocephalic and atraumatic  Eyes: pupils equal, round, and reactive to light, extraocular eye movements intact, conjunctivae normal  ENT: tympanic membrane, external ear and ear canal normal bilaterally, nose without deformity, nasal mucosa and turbinates normal without polyps  Neck: supple and non-tender without mass, no thyromegaly or thyroid nodules, no cervical lymphadenopathy  Pulmonary/Chest: clear to auscultation bilaterally- no wheezes, rales or rhonchi, normal air movement, no respiratory distress  Cardiovascular: normal rate, regular rhythm, normal S1 and S2, no murmurs, rubs, clicks, or gallops, distal pulses intact, no carotid bruits  Abdomen: soft, non-tender, non-distended, normal bowel sounds, no masses or organomegaly  Extremities: no cyanosis, clubbing or edema  Musculoskeletal: normal range of motion, no joint swelling, deformity or tenderness  Neurologic: reflexes normal and symmetric, no cranial nerve deficit, gait, coordination and speech normal      Assessment:      Patient Active Problem List   Diagnosis Code    Osteoarthritis M19.90    Depression F32.9    Chronic back pain M54.9, G89.29    Memory loss R41.3    Neck pain M54.2    Deafness congenital H90.5    Pulsatile abdominal mass R19.00    Descending thoracic aortic aneurysm (HCC) I71.2    Myasthenia gravis (HCC) G70.00    Essential hypertension I10    Peripheral vascular disease (HCC) I73.9    Venous (peripheral) insufficiency I87.2    Ectatic abdominal aorta (HCC) I77.811    Ectatic abdominal aorta (HCC) I77.811    Chronic insomnia F51.04    Epigastric pain R10.13    Dark stools R19.5    Alternating constipation and diarrhea R19.8    Family history of colonic polyps Z83.71    Non-pressure chronic ulcer of right ankle with fat layer exposed ([facility] [address] L97.312    Myasthenia gravis without acute exacerbation (HCC) G70.00    Vertigo, aural H81.319    Chronic tension-type headache, intractable G44.221    Idiopathic peripheral neuropathy G60.9    Unsteady gait R26.81    Wound of right ankle S91.001A        Procedure Note  Indications:  Based on my examination of this patient's wound(s)/ulcer(s) today, debridement is not required to promote healing and evaluate the wound base.     Performed by: DENIZ Weeks      Wound 01/12/16 Bite Ankle Right;Lateral WOUND 1 Right lateral ankle (Active)   Number of days: 792       Wound 08/07/17 Venous ulcer Ankle Right;Lateral Wound 2 (Active)   Wound Image   3/14/2018  3:20 PM   Wound Type Wound 3/14/2018  3:20 PM   Wound Venous 3/14/2018  3:20 PM   Dressing Status Old drainage 3/14/2018  3:20 PM   Dressing Changed Changed/New 2/7/2018 10:04 AM   Dressing/Treatment Other (Comment) 11/2/2017 10:53 AM   Wound Cleansed Rinsed/Irrigated with saline 3/14/2018  3:20 PM   Wound Length (cm) 1 cm 3/14/2018  3:20 PM   Wound Width (cm) 0.6 cm 3/14/2018  3:20 PM   Wound Depth (cm)  0.1 3/14/2018  3:20 PM   Calculated Wound Size (cm^2) (l*w) 0.6 cm^2 3/14/2018  3:20 PM   Change in Wound Size % (l*w) -2900 3/14/2018  3:20 PM   Distance Tunneling (cm) 0 cm 3/14/2018  3:20 PM   Tunneling Position ___ O'Clock 0 3/14/2018  3:20 PM   Undermining Starts ___ O'Clock 0 3/14/2018  3:20 PM   Undermining Ends___ O'Clock 0 3/14/2018  3:20 PM   Undermining Maxium Distance (cm) 0 3/14/2018  3:20 PM   [address]; Yellow;Pink;Granulation tissue 3/14/2018  3:20 PM   Drainage Amount Small 3/14/2018  3:20 PM   Drainage Description Serosanguinous 3/14/2018  3:20 PM   Odor None 3/14/2018  3:20 PM   Margins Attached edges 3/14/2018  3:20 PM   Paloma-wound Assessment Pink 3/14/2018  3:20 PM   Non-staged Wound Description Full thickness 3/14/2018  3:20 PM   [place]%Wound Bed 25 3/14/2018  3:20 PM   Red%Wound Bed 50 3/14/2018  3:20 PM   Yellow%Wound Bed 25 3/14/2018  3:20 PM   Black%Wound Bed 0 3/14/2018  3:20 PM   Purple%Wound Bed 0 3/14/2018  3:20 PM   Other%Wound Bed 0 3/14/2018  3:20 PM   Debridement per physician None 3/14/2018  3:20 PM   Time out N/A 3/14/2018  3:20 PM   Procedural Pain 0 3/14/2018  3:20 PM   Post procedural Pain 0 3/14/2018  3:20 PM   Number of days: 218       Plan:     Treatment Note please see attached Discharge Instructions    In my professional opinion this patient would benefit from HBO Therapy: No    Written patient dismissal instructions given to patient and signed by patient or POA. 1. Follow up in 1 week  2. Continue wrap and promogran        Discharge Instructions       Visit Discharge/Physician Orders     Discharge condition: Stable     Discharge to: Home     Left via:Private automobile     Accompanied by:        ECF/HHA: [id number] change Profore with 3rd layer left out.  Do this on Mondays and Fridays until further notice.  Thank you!     Dressing Orders: Soap and water wash, Profore with 3rd layer left out,  Change this 3 times for 2 weeks, then twice weekly. Oneda Drier will be changed at [facility] On Wednesdays.     If this wrap gets too tight, have your daughter remove and go back to the gel dressing until time for next wrap to be applied.     Treatment Orders: Protein rich diet (unless restricted by your physician);  Multivitamin daily;

## 2018-03-21 ENCOUNTER — HOSPITAL ENCOUNTER (OUTPATIENT)
Dept: WOUND CARE | Age: 73
Discharge: HOME | End: 2018-03-21
Payer: MEDICARE

## 2018-03-21 VITALS — DIASTOLIC BLOOD PRESSURE: 89 MMHG | TEMPERATURE: 97.8 F | SYSTOLIC BLOOD PRESSURE: 110 MMHG | RESPIRATION RATE: 18 BRPM

## 2018-03-21 PROCEDURE — 99214 OFFICE O/P EST MOD 30 MIN: CPT | Performed by: NURSE PRACTITIONER

## 2018-03-21 PROCEDURE — 29581 APPL MULTLAYER CMPRN SYS LEG: CPT

## 2018-03-21 ASSESSMENT — PAIN SCALES - GENERAL: PAINLEVEL_OUTOF10: 0

## 2018-03-21 NOTE — PROGRESS NOTES
Drainage Amount Small 3/21/2018  3:25 PM   Drainage Description Serosanguinous 3/21/2018  3:25 PM   Odor None 3/21/2018  3:25 PM   Margins Attached edges 3/21/2018  3:25 PM   Paloma-wound Assessment Pink 3/21/2018  3:25 PM   Non-staged Wound Description Full thickness 3/21/2018  3:25 PM   [place]%Wound Bed 25 3/21/2018  3:25 PM   Red%Wound Bed 50 3/21/2018  3:25 PM   Yellow%Wound Bed 25 3/21/2018  3:25 PM   Black%Wound Bed 0 3/21/2018  3:25 PM   Purple%Wound Bed 0 3/21/2018  3:25 PM   Other%Wound Bed 0 3/21/2018  3:25 PM   Debridement per physician None 3/21/2018  3:25 PM   Time out N/A 3/21/2018  3:25 PM   Procedural Pain 0 3/21/2018  3:25 PM   Post procedural Pain 0 3/21/2018  3:25 PM   Number of days: 225      The patients pain isPain Level: 0  . Wound is has improved. Please refer to nursing measurements and assessment regarding wound pre and post debridement. Risk factors for atherosclerosis of all vascular beds have been reviewed with the patient including:  Family history, tobacco abuse in all forms, elevated cholesterol, hyperlipidemia, and diabetes. Assessment    Right ankle wound   Non pressure wound to right ankle     Plan    1. Follow up in 1 week [address] for wound - Dress per physician order  Treatment:     Compression : Yes   Offloading : No   Dressing : SEE AVS     Discussed appropriate home care of this wound. Wound redressed. Patient instructions were given.   Recommend no smoking  Offloading instructions given    Visit Discharge/Physician Orders     Discharge condition: Stable     Discharge to: Home     Left via:Private automobile     Accompanied by:        KESHAWN/MECCA: SHERICE RETREAT  Please change Profore with 3rd layer left out.  Do this on Mondays and Fridays until further notice.  Thank you!     Dressing Orders: Soap and water wash, Promogran to wound bed,  Cover with moistened Aquacel AG, wrap with  Profore with 3rd layer left out,  Change this 3 times for 2 weeks, then twice weekly. Nusrat Rater will be changed at [facility] On Wednesdays.     If this wrap gets too tight, have your daughter remove and go back to the gel dressing until time for next wrap to be applied.     Treatment Orders: Protein rich diet (unless restricted by your physician); Multivitamin daily; Elevate legs when sitting, avoid standing for long periods of time.        [facility] followup visit ______1 week Joan/Jt with DENIZ Eckert   (Please note your next appointment above and if you are unable to keep, kindly give a 24 hour notice. Thank you.)         If you experience any of the following, please call the [facility] during business hours:     * Increase in Pain  * Temperature over 101  * Increase in drainage from your wound  * Drainage with a foul odor  * Bleeding  * Increase in swelling  * Need for compression bandage changes due to slippage, breakthrough drainage.     If you need medical attention outside of the business hours of the [address] please contact your PCP or go to the nearest emergency room.

## 2018-03-28 ENCOUNTER — HOSPITAL ENCOUNTER (OUTPATIENT)
Dept: WOUND CARE | Age: 73
Discharge: HOME | End: 2018-03-28
Payer: MEDICARE

## 2018-03-28 DIAGNOSIS — L97.312 NON-PRESSURE CHRONIC ULCER OF RIGHT ANKLE WITH FAT LAYER EXPOSED (HCC): ICD-10-CM

## 2018-03-28 PROCEDURE — 99214 OFFICE O/P EST MOD 30 MIN: CPT | Performed by: NURSE PRACTITIONER

## 2018-03-28 PROCEDURE — 99212 OFFICE O/P EST SF 10 MIN: CPT

## 2018-03-28 NOTE — PROGRESS NOTES
Myasthenia gravis ([facility] G70.00    Essential hypertension I10    Peripheral vascular disease ([facility] I73.9    Venous (peripheral) insufficiency I87.2    Ectatic abdominal aorta (HCC) I77.811    Ectatic abdominal aorta (HCC) I77.811    Chronic insomnia F51.04    Epigastric pain R10.13    Dark stools R19.5    Alternating constipation and diarrhea R19.8    Family history of colonic polyps Z83.71    Non-pressure chronic ulcer of right ankle with fat layer exposed ([facility]) L97.312    Myasthenia gravis without acute exacerbation ([facility]) G70.00    Vertigo, aural H81.319    Chronic tension-type headache, intractable G44.221    Idiopathic peripheral neuropathy G60.9    Unsteady gait R26.81    Wound of right ankle S91.001A        Procedure Note  Indications:  Based on my examination of this patient's wound(s)/ulcer(s) today, debridement is not required to promote healing and evaluate the wound base.     Performed by: DENIZ Mar      Wound 01/12/16 Bite Ankle Right;Lateral WOUND 1 Right lateral ankle (Active)   Number of days: 806       Wound 08/07/17 Venous ulcer Ankle Right;Lateral Wound 2 (Active)   Wound Image   3/28/2018  2:55 PM   Wound Type Wound 3/28/2018  2:55 PM   Wound Venous 3/28/2018  2:55 PM   Dressing Status Old drainage 3/28/2018  2:55 PM   Wound Cleansed Rinsed/Irrigated with saline 3/28/2018  2:55 PM   Wound Length (cm) 0.3 cm 3/28/2018  2:55 PM   Wound Width (cm) 0.2 cm 3/28/2018  2:55 PM   Wound Depth (cm)  0.1 3/28/2018  2:55 PM   Calculated Wound Size (cm^2) (l*w) 0.06 cm^2 3/28/2018  2:55 PM   Change in Wound Size % (l*w) -200 3/28/2018  2:55 PM   Distance Tunneling (cm) 0 cm 3/28/2018  2:55 PM   Tunneling Position ___ O'Clock 0 3/28/2018  2:55 PM   Undermining Starts ___ O'Clock 0 3/28/2018  2:55 PM   Undermining Ends___ O'Clock 0 3/28/2018  2:55 PM   Undermining Maxium Distance (cm) 0 3/28/2018  2:55 PM   Wound Assessment Pink 3/28/2018  2:55 PM   Drainage Amount Scant 3/28/2018 notice. Thank you.)          If you experience any of the following, please call the [address] during business hours:    * Increase in Pain  * Temperature over 101  * Increase in drainage from your wound  * Drainage with a foul odor  * Bleeding  * Increase in swelling  * Need for compression bandage changes due to slippage, breakthrough drainage. If you need medical attention outside of the business hours of the [address] please contact your PCP or go to the nearest emergency room.           Electronically signed by DENIZ Ontiveros on 3/28/2018 at 3:06 PM

## 2018-04-04 ENCOUNTER — HOSPITAL ENCOUNTER (OUTPATIENT)
Dept: WOUND CARE | Age: 73
Discharge: HOME | End: 2018-04-04
Payer: MEDICARE

## 2018-04-04 VITALS
DIASTOLIC BLOOD PRESSURE: 63 MMHG | SYSTOLIC BLOOD PRESSURE: 95 MMHG | RESPIRATION RATE: 18 BRPM | HEART RATE: 60 BPM | TEMPERATURE: 97.8 F | WEIGHT: 120 LBS | BODY MASS INDEX: 18.83 KG/M2 | HEIGHT: 67 IN

## 2018-04-04 DIAGNOSIS — L97.312 NON-PRESSURE CHRONIC ULCER OF RIGHT ANKLE WITH FAT LAYER EXPOSED (HCC): ICD-10-CM

## 2018-04-04 PROCEDURE — 29581 APPL MULTLAYER CMPRN SYS LEG: CPT

## 2018-04-04 PROCEDURE — 99213 OFFICE O/P EST LOW 20 MIN: CPT | Performed by: NURSE PRACTITIONER

## 2018-04-04 ASSESSMENT — PAIN DESCRIPTION - ORIENTATION: ORIENTATION: RIGHT

## 2018-04-04 ASSESSMENT — PAIN DESCRIPTION - ONSET: ONSET: ON-GOING

## 2018-04-04 ASSESSMENT — PAIN DESCRIPTION - PROGRESSION: CLINICAL_PROGRESSION: NOT CHANGED

## 2018-04-04 ASSESSMENT — PAIN DESCRIPTION - LOCATION: LOCATION: ANKLE

## 2018-04-05 ENCOUNTER — TELEPHONE (OUTPATIENT)
Dept: PRIMARY CARE CLINIC | Age: 73
End: 2018-04-05

## 2018-04-05 RX ORDER — HYDROCODONE BITARTRATE AND ACETAMINOPHEN 7.5; 325 MG/1; MG/1
TABLET ORAL
Qty: 90 TABLET | Refills: 0 | Status: SHIPPED | OUTPATIENT
Start: 2018-04-05 | End: 2018-05-07 | Stop reason: SDUPTHER

## 2018-04-11 ENCOUNTER — HOSPITAL ENCOUNTER (OUTPATIENT)
Dept: WOUND CARE | Age: 73
Discharge: HOME | End: 2018-04-11
Payer: MEDICARE

## 2018-04-11 ENCOUNTER — OFFICE VISIT (OUTPATIENT)
Dept: PRIMARY CARE CLINIC | Age: 73
End: 2018-04-11
Payer: MEDICARE

## 2018-04-11 VITALS
OXYGEN SATURATION: 95 % | WEIGHT: 121.8 LBS | BODY MASS INDEX: 18.04 KG/M2 | TEMPERATURE: 97.6 F | DIASTOLIC BLOOD PRESSURE: 72 MMHG | RESPIRATION RATE: 16 BRPM | HEART RATE: 63 BPM | HEIGHT: 69 IN | SYSTOLIC BLOOD PRESSURE: 110 MMHG

## 2018-04-11 DIAGNOSIS — L97.312 NON-PRESSURE CHRONIC ULCER OF RIGHT ANKLE WITH FAT LAYER EXPOSED (HCC): ICD-10-CM

## 2018-04-11 DIAGNOSIS — N36.2 URETHRAL CARUNCLE: ICD-10-CM

## 2018-04-11 DIAGNOSIS — R39.198 SUBJECTIVE CHANGE IN URINATION: Primary | ICD-10-CM

## 2018-04-11 DIAGNOSIS — H53.2 DIPLOPIA: ICD-10-CM

## 2018-04-11 DIAGNOSIS — N81.10 FEMALE CYSTOCELE: ICD-10-CM

## 2018-04-11 LAB
BILIRUBIN, POC: ABNORMAL
BLOOD URINE, POC: ABNORMAL
CLARITY, POC: CLEAR
COLOR, POC: YELLOW
GLUCOSE URINE, POC: ABNORMAL
KETONES, POC: ABNORMAL
LEUKOCYTE EST, POC: ABNORMAL
NITRITE, POC: ABNORMAL
PH, POC: 5
PROTEIN, POC: ABNORMAL
SPECIFIC GRAVITY, POC: 1.02
UROBILINOGEN, POC: 0.2

## 2018-04-11 PROCEDURE — G8399 PT W/DXA RESULTS DOCUMENT: HCPCS | Performed by: FAMILY MEDICINE

## 2018-04-11 PROCEDURE — 81002 URINALYSIS NONAUTO W/O SCOPE: CPT | Performed by: FAMILY MEDICINE

## 2018-04-11 PROCEDURE — 1090F PRES/ABSN URINE INCON ASSESS: CPT | Performed by: FAMILY MEDICINE

## 2018-04-11 PROCEDURE — G8427 DOCREV CUR MEDS BY ELIG CLIN: HCPCS | Performed by: FAMILY MEDICINE

## 2018-04-11 PROCEDURE — 99213 OFFICE O/P EST LOW 20 MIN: CPT | Performed by: FAMILY MEDICINE

## 2018-04-11 PROCEDURE — G8419 CALC BMI OUT NRM PARAM NOF/U: HCPCS | Performed by: FAMILY MEDICINE

## 2018-04-11 PROCEDURE — 1123F ACP DISCUSS/DSCN MKR DOCD: CPT | Performed by: FAMILY MEDICINE

## 2018-04-11 PROCEDURE — 99212 OFFICE O/P EST SF 10 MIN: CPT

## 2018-04-11 PROCEDURE — 4040F PNEUMOC VAC/ADMIN/RCVD: CPT | Performed by: FAMILY MEDICINE

## 2018-04-11 PROCEDURE — 3014F SCREEN MAMMO DOC REV: CPT | Performed by: FAMILY MEDICINE

## 2018-04-11 PROCEDURE — 99213 OFFICE O/P EST LOW 20 MIN: CPT | Performed by: NURSE PRACTITIONER

## 2018-04-11 PROCEDURE — 1036F TOBACCO NON-USER: CPT | Performed by: FAMILY MEDICINE

## 2018-04-11 PROCEDURE — 3017F COLORECTAL CA SCREEN DOC REV: CPT | Performed by: FAMILY MEDICINE

## 2018-04-11 ASSESSMENT — PATIENT HEALTH QUESTIONNAIRE - PHQ9
SUM OF ALL RESPONSES TO PHQ QUESTIONS 1-9: 1
2. FEELING DOWN, DEPRESSED OR HOPELESS: 1
1. LITTLE INTEREST OR PLEASURE IN DOING THINGS: 0
SUM OF ALL RESPONSES TO PHQ9 QUESTIONS 1 & 2: 1

## 2018-04-11 ASSESSMENT — ENCOUNTER SYMPTOMS
RESPIRATORY NEGATIVE: 1
GASTROINTESTINAL NEGATIVE: 1

## 2018-04-13 LAB
ORGANISM: ABNORMAL
URINE CULTURE, ROUTINE: ABNORMAL
URINE CULTURE, ROUTINE: ABNORMAL

## 2018-04-16 RX ORDER — SULFAMETHOXAZOLE AND TRIMETHOPRIM 800; 160 MG/1; MG/1
1 TABLET ORAL 2 TIMES DAILY
Qty: 20 TABLET | Refills: 0 | Status: SHIPPED | OUTPATIENT
Start: 2018-04-16 | End: 2018-04-26

## 2018-04-25 ENCOUNTER — HOSPITAL ENCOUNTER (OUTPATIENT)
Dept: WOUND CARE | Age: 73
Discharge: HOME | End: 2018-04-25
Payer: MEDICARE

## 2018-04-25 VITALS
TEMPERATURE: 97.2 F | BODY MASS INDEX: 17.92 KG/M2 | RESPIRATION RATE: 16 BRPM | HEIGHT: 69 IN | SYSTOLIC BLOOD PRESSURE: 112 MMHG | HEART RATE: 60 BPM | DIASTOLIC BLOOD PRESSURE: 70 MMHG | WEIGHT: 121 LBS

## 2018-04-25 DIAGNOSIS — L97.312 NON-PRESSURE CHRONIC ULCER OF RIGHT ANKLE WITH FAT LAYER EXPOSED (HCC): ICD-10-CM

## 2018-04-25 PROCEDURE — 99213 OFFICE O/P EST LOW 20 MIN: CPT | Performed by: NURSE PRACTITIONER

## 2018-04-25 PROCEDURE — 99212 OFFICE O/P EST SF 10 MIN: CPT

## 2018-04-25 ASSESSMENT — PAIN SCALES - GENERAL: PAINLEVEL_OUTOF10: 0

## 2018-04-25 ASSESSMENT — PAIN DESCRIPTION - PAIN TYPE: TYPE: ACUTE PAIN

## 2018-04-25 ASSESSMENT — PAIN DESCRIPTION - ONSET: ONSET: ON-GOING

## 2018-04-25 ASSESSMENT — PAIN DESCRIPTION - DESCRIPTORS: DESCRIPTORS: TENDER

## 2018-04-25 ASSESSMENT — PAIN DESCRIPTION - ORIENTATION: ORIENTATION: RIGHT

## 2018-04-25 ASSESSMENT — PAIN DESCRIPTION - LOCATION: LOCATION: ANKLE

## 2018-04-25 ASSESSMENT — PAIN DESCRIPTION - FREQUENCY: FREQUENCY: INTERMITTENT

## 2018-04-25 ASSESSMENT — PAIN DESCRIPTION - PROGRESSION: CLINICAL_PROGRESSION: NOT CHANGED

## 2018-05-02 RX ORDER — AMLODIPINE BESYLATE AND BENAZEPRIL HYDROCHLORIDE 5; 10 MG/1; MG/1
CAPSULE ORAL
Qty: 30 CAPSULE | Refills: 5 | Status: SHIPPED | OUTPATIENT
Start: 2018-05-02 | End: 2018-11-19 | Stop reason: SDUPTHER

## 2018-05-07 ENCOUNTER — TELEPHONE (OUTPATIENT)
Dept: PRIMARY CARE CLINIC | Age: 73
End: 2018-05-07

## 2018-05-07 RX ORDER — HYDROCODONE BITARTRATE AND ACETAMINOPHEN 7.5; 325 MG/1; MG/1
TABLET ORAL
Qty: 90 TABLET | Refills: 0 | Status: SHIPPED | OUTPATIENT
Start: 2018-05-07 | End: 2018-06-06 | Stop reason: SDUPTHER

## 2018-05-29 RX ORDER — GABAPENTIN 300 MG/1
CAPSULE ORAL
Qty: 90 CAPSULE | Refills: 5 | Status: SHIPPED | OUTPATIENT
Start: 2018-05-29 | End: 2019-01-02 | Stop reason: SDUPTHER

## 2018-06-06 ENCOUNTER — TELEPHONE (OUTPATIENT)
Dept: PRIMARY CARE CLINIC | Age: 73
End: 2018-06-06

## 2018-06-06 DIAGNOSIS — M54.50 CHRONIC BILATERAL LOW BACK PAIN WITHOUT SCIATICA: Primary | ICD-10-CM

## 2018-06-06 DIAGNOSIS — G89.29 CHRONIC BILATERAL LOW BACK PAIN WITHOUT SCIATICA: Primary | ICD-10-CM

## 2018-06-06 RX ORDER — HYDROCODONE BITARTRATE AND ACETAMINOPHEN 7.5; 325 MG/1; MG/1
TABLET ORAL
Qty: 90 TABLET | Refills: 0 | Status: SHIPPED | OUTPATIENT
Start: 2018-06-06 | End: 2018-07-05 | Stop reason: SDUPTHER

## 2018-06-08 RX ORDER — CELECOXIB 200 MG/1
CAPSULE ORAL
Qty: 90 CAPSULE | Refills: 3 | Status: SHIPPED | OUTPATIENT
Start: 2018-06-08 | End: 2020-01-22

## 2018-06-21 NOTE — PLAN OF CARE
Problem: Pain:  Goal: Pain level will decrease  Pain level will decrease   Outcome: Ongoing    Goal: Control of acute pain  Control of acute pain   Outcome: Ongoing    Goal: Control of chronic pain  Control of chronic pain   Outcome: Ongoing      Problem: Wound:  Goal: Will show signs of wound healing; wound closure and no evidence of infection  Will show signs of wound healing; wound closure and no evidence of infection   Outcome: Ongoing
(2) assistive person

## 2018-07-05 DIAGNOSIS — G89.29 CHRONIC BILATERAL LOW BACK PAIN WITHOUT SCIATICA: ICD-10-CM

## 2018-07-05 DIAGNOSIS — M54.50 CHRONIC BILATERAL LOW BACK PAIN WITHOUT SCIATICA: ICD-10-CM

## 2018-07-06 RX ORDER — HYDROCODONE BITARTRATE AND ACETAMINOPHEN 7.5; 325 MG/1; MG/1
TABLET ORAL
Qty: 90 TABLET | Refills: 0 | Status: SHIPPED | OUTPATIENT
Start: 2018-07-06 | End: 2018-07-21

## 2018-07-20 ENCOUNTER — OFFICE VISIT (OUTPATIENT)
Dept: PRIMARY CARE CLINIC | Age: 73
End: 2018-07-20
Payer: MEDICARE

## 2018-07-20 VITALS
HEART RATE: 61 BPM | HEIGHT: 69 IN | WEIGHT: 122.8 LBS | TEMPERATURE: 97.5 F | OXYGEN SATURATION: 93 % | SYSTOLIC BLOOD PRESSURE: 122 MMHG | BODY MASS INDEX: 18.19 KG/M2 | DIASTOLIC BLOOD PRESSURE: 82 MMHG | RESPIRATION RATE: 18 BRPM

## 2018-07-20 DIAGNOSIS — G89.29 CHRONIC BILATERAL LOW BACK PAIN, WITH SCIATICA PRESENCE UNSPECIFIED: ICD-10-CM

## 2018-07-20 DIAGNOSIS — I87.2 VENOUS (PERIPHERAL) INSUFFICIENCY: ICD-10-CM

## 2018-07-20 DIAGNOSIS — I77.811 ECTATIC ABDOMINAL AORTA (HCC): ICD-10-CM

## 2018-07-20 DIAGNOSIS — M54.5 CHRONIC BILATERAL LOW BACK PAIN, WITH SCIATICA PRESENCE UNSPECIFIED: ICD-10-CM

## 2018-07-20 DIAGNOSIS — I71.23 DESCENDING THORACIC AORTIC ANEURYSM: ICD-10-CM

## 2018-07-20 DIAGNOSIS — F33.42 RECURRENT MAJOR DEPRESSIVE DISORDER, IN FULL REMISSION (HCC): ICD-10-CM

## 2018-07-20 DIAGNOSIS — I73.9 PERIPHERAL VASCULAR DISEASE (HCC): Chronic | ICD-10-CM

## 2018-07-20 DIAGNOSIS — I10 ESSENTIAL HYPERTENSION: Primary | ICD-10-CM

## 2018-07-20 DIAGNOSIS — Z79.899 MEDICATION MANAGEMENT: ICD-10-CM

## 2018-07-20 DIAGNOSIS — G70.00 MYASTHENIA GRAVIS (HCC): ICD-10-CM

## 2018-07-20 DIAGNOSIS — N28.9 RENAL INSUFFICIENCY: ICD-10-CM

## 2018-07-20 PROBLEM — S91.001A WOUND OF RIGHT ANKLE: Status: RESOLVED | Noted: 2018-02-19 | Resolved: 2018-07-20

## 2018-07-20 PROBLEM — L97.312 NON-PRESSURE CHRONIC ULCER OF RIGHT ANKLE WITH FAT LAYER EXPOSED (HCC): Chronic | Status: RESOLVED | Noted: 2017-08-07 | Resolved: 2018-07-20

## 2018-07-20 PROBLEM — R19.5 DARK STOOLS: Status: RESOLVED | Noted: 2017-07-06 | Resolved: 2018-07-20

## 2018-07-20 LAB
ALBUMIN SERPL-MCNC: 4.2 G/DL (ref 3.5–5.2)
ALP BLD-CCNC: 102 U/L (ref 35–104)
ALT SERPL-CCNC: 10 U/L (ref 5–33)
AMPHETAMINE SCREEN, URINE: NORMAL
ANION GAP SERPL CALCULATED.3IONS-SCNC: 13 MMOL/L (ref 7–19)
AST SERPL-CCNC: 18 U/L (ref 5–32)
BARBITURATE SCREEN, URINE: NORMAL
BENZODIAZEPINE SCREEN, URINE: NORMAL
BILIRUB SERPL-MCNC: <0.2 MG/DL (ref 0.2–1.2)
BUN BLDV-MCNC: 26 MG/DL (ref 8–23)
BUPRENORPHINE URINE: NORMAL
CALCIUM SERPL-MCNC: 9.9 MG/DL (ref 8.8–10.2)
CHLORIDE BLD-SCNC: 104 MMOL/L (ref 98–111)
CO2: 24 MMOL/L (ref 22–29)
COCAINE METABOLITE SCREEN URINE: NORMAL
CREAT SERPL-MCNC: 1 MG/DL (ref 0.5–0.9)
GABAPENTIN SCREEN, URINE: NORMAL
GFR NON-AFRICAN AMERICAN: 54
GLUCOSE BLD-MCNC: 96 MG/DL (ref 74–109)
METHADONE SCREEN, URINE: NORMAL
METHAMPHETAMINE, URINE: NORMAL
OPIATE SCREEN URINE: NORMAL
OXYCODONE SCREEN URINE: NORMAL
PHENCYCLIDINE SCREEN URINE: NORMAL
POTASSIUM SERPL-SCNC: 4.5 MMOL/L (ref 3.5–5)
PROPOXYPHENE SCREEN, URINE: NORMAL
SODIUM BLD-SCNC: 141 MMOL/L (ref 136–145)
THC SCREEN, URINE: NORMAL
TOTAL PROTEIN: 6.8 G/DL (ref 6.6–8.7)
TRICYCLIC ANTIDEPRESSANTS, UR: POSITIVE

## 2018-07-20 PROCEDURE — 3017F COLORECTAL CA SCREEN DOC REV: CPT | Performed by: FAMILY MEDICINE

## 2018-07-20 PROCEDURE — G8427 DOCREV CUR MEDS BY ELIG CLIN: HCPCS | Performed by: FAMILY MEDICINE

## 2018-07-20 PROCEDURE — 1101F PT FALLS ASSESS-DOCD LE1/YR: CPT | Performed by: FAMILY MEDICINE

## 2018-07-20 PROCEDURE — 36415 COLL VENOUS BLD VENIPUNCTURE: CPT | Performed by: FAMILY MEDICINE

## 2018-07-20 PROCEDURE — 1090F PRES/ABSN URINE INCON ASSESS: CPT | Performed by: FAMILY MEDICINE

## 2018-07-20 PROCEDURE — G8419 CALC BMI OUT NRM PARAM NOF/U: HCPCS | Performed by: FAMILY MEDICINE

## 2018-07-20 PROCEDURE — 80305 DRUG TEST PRSMV DIR OPT OBS: CPT | Performed by: FAMILY MEDICINE

## 2018-07-20 PROCEDURE — 1123F ACP DISCUSS/DSCN MKR DOCD: CPT | Performed by: FAMILY MEDICINE

## 2018-07-20 PROCEDURE — 1036F TOBACCO NON-USER: CPT | Performed by: FAMILY MEDICINE

## 2018-07-20 PROCEDURE — 4040F PNEUMOC VAC/ADMIN/RCVD: CPT | Performed by: FAMILY MEDICINE

## 2018-07-20 PROCEDURE — G8399 PT W/DXA RESULTS DOCUMENT: HCPCS | Performed by: FAMILY MEDICINE

## 2018-07-20 PROCEDURE — 99214 OFFICE O/P EST MOD 30 MIN: CPT | Performed by: FAMILY MEDICINE

## 2018-07-20 ASSESSMENT — PATIENT HEALTH QUESTIONNAIRE - PHQ9
1. LITTLE INTEREST OR PLEASURE IN DOING THINGS: 0
2. FEELING DOWN, DEPRESSED OR HOPELESS: 1
SUM OF ALL RESPONSES TO PHQ9 QUESTIONS 1 & 2: 1
SUM OF ALL RESPONSES TO PHQ QUESTIONS 1-9: 1

## 2018-07-22 ASSESSMENT — ENCOUNTER SYMPTOMS
SHORTNESS OF BREATH: 0
WHEEZING: 0
SINUS PAIN: 0
BACK PAIN: 1
RHINORRHEA: 1
SINUS PRESSURE: 1
GASTROINTESTINAL NEGATIVE: 1
COUGH: 1

## 2018-07-22 NOTE — PROGRESS NOTES
Subjective:      Patient ID: Riya Fuentes is a 75 y.o. female who comes in today with her  for a checkup. HPI. She is deaf. She is here initially because she was taking Norco for her chronic back pain with sciatica. She says that this does control her pain. It does not make her sleepy. She also takes gabapentin. Review of her last BMP in February revealed a creatinine of 1.0 with a GFR of 54. BUN was normal at that time. She says by the end of the day she will start \"seeing double\". Her eyes will feel weak and tired. She has a history of myasthenia gravis. She is wearing her compression knee highs for her venous insufficiency and says it does help. She puts them on in the morning and takes them on at night. Her legs are not swelling as much. She also has a history of atherosclerosis in the aorta and enlargement of the aorta. She denies any abdominal pain or back pain other than her back pain mentioned above. She is complaining of a cough which is occasionally productive of clear phlegm, postnasal drainage and head congestion. She is taking Sudafed and an antihistamine but it doesn't seem to help a lot. She denies any fever or chills. She does have a history of depression but says she is doing better. She is currently on Prozac. She occasionally will have trouble falling asleep and uses Elavil which may help some of her pain symptoms also. She denies any suicidal thoughts or ideation.     Riya Fuentes is a 75 y.o. female with the following history as recorded in [facility]:  Patient Active Problem List    Diagnosis Date Noted    Unsteady gait 08/15/2017    [facility] exacerbation [facility] 08/12/2017    Vertigo, aural 08/12/2017    Chronic tension-type headache, intractable 08/12/2017    Idiopathic peripheral neuropathy 08/12/2017    Epigastric pain 07/06/2017    Alternating constipation and diarrhea 07/06/2017    Family history of colonic polyps facility-administered medications for this visit. Allergies: Patient has no known allergies. Past Medical History:   Diagnosis Date    Anxiety     Chronic back pain     severe muscle spasms    Deafness congenital     Depression     Headache     Hypertension     Memory loss     early    Myasthenia ([address]     Neck pain     Osteoarthritis     Ptosis of eyelid     Weakness of face muscles     Wound of right ankle 2016    open wound to lateral side of right ankle      Past Surgical History:   Procedure Laterality Date    APPENDECTOMY      CARPAL TUNNEL RELEASE      CATARACT REMOVAL      COLONOSCOPY  7-2010    COLONOSCOPY  2000 or before    [place]-normal per patient    EYE SURGERY      HERNIA REPAIR      on back of neck    MD COLONOSCOPY FLX DX W/COLLJ SPEC WHEN PFRMD N/A 7/6/2017    Dr Omaira Sam diverticulosis, 5 yr recall    MD EGD TRANSORAL BIOPSY SINGLE/MULTIPLE N/A 7/6/2017    Dr Dylon Quispe, [place] (-)    TERRI AND BSO      VARICOSE VEIN SURGERY      VASCULAR SURGERY  02/19/2018    TJR. Aortogram and runoff. Left common femoral artery, 5 Hebrew sheath. Family History   Problem Relation Age of Onset    High Blood Pressure Mother     Stroke Mother     Cancer Father     Lung Cancer Father     Hearing Loss Brother         deaf   [de-identified] Hearing Loss Brother         deaf    Colon Polyps Sister     Colon Cancer Neg Hx     Esophageal Cancer Neg Hx     Liver Cancer Neg Hx     Liver Disease Neg Hx     Stomach Cancer Neg Hx     Rectal Cancer Neg Hx      Social History   Substance Use Topics    Smoking status: Never Smoker    Smokeless tobacco: Never Used    Alcohol use No     I have reviewed the patient's medical history in detail and updated the computerized patient record. Review of Systems   Constitutional: Positive for fatigue. Negative for chills, diaphoresis and fever. HENT: Positive for congestion, hearing loss, postnasal drip, rhinorrhea and sinus pressure. Recurrent major depressive disorder, in full remission ([facility]    9. Renal insufficiency    10. Medication management            Plan:      MEDICATIONS:  No orders of the defined types were placed in this encounter. Continue current medications. We will refill when needed. ORDERS:  Orders Placed This Encounter   Procedures    Comprehensive Metabolic Panel    [facility]- DENIZ De Le[place]    POCT Rapid Drug Screen     Results for POC orders placed in visit on 07/20/18   POCT Rapid Drug Screen   Result Value Ref Range    Amphetamine Screen, Urine neg     Barbiturate Screen, Urine neg     Benzodiazepine Screen, Urine neg     Buprenorphine Urine neg     Cocaine Metabolite Screen, Urine neg     Gabapentin Screen, Urine neg     Methamphetamine, Urine neg     Methadone Screen, Urine neg     Opiate Scrn, Ur neg     Oxycodone Screen, Ur neg     PCP Screen, Urine neg     Propoxyphene Screen, Urine neg     THC Screen, Urine neg     Tricyclic Antidepressants, Urine positive      We did not realize this until the patient left but evidently after we called her with a  she is not taking her hydrocodone. She is only taking the gabapentin. She has multiple problems that fall under vascular and she is going to see the nurse practitioner to discuss them. Many of them are under her [facility] gap. We will contact her when we get results of her blood work. I feel that she probably just has a cold. I do not think she needs an antibiotic at this time. I would suggest she take over-the-counter Allegra or Allegra-D. Quality & Risk Score Accuracy    Visit Dx:  G70.00 - Myasthenia gravis ([facility]  Assessment and plan:  Stable based upon symptoms and exam. Continue current treatment plan and follow up at least yearly.   Visit Dx:  T76.52 - Recurrent major depressive disorder, in full remission ([facility]  Assessment and plan:  Stable based upon symptoms and exam. Continue current treatment plan and follow up

## 2018-07-27 ENCOUNTER — TELEPHONE (OUTPATIENT)
Dept: VASCULAR SURGERY | Age: 73
End: 2018-07-27

## 2018-07-27 NOTE — TELEPHONE ENCOUNTER
I got a call from Pre-Service at the end of theh day on 7-26-18 with a message pertaining to this patient. The patient's representative wanted to be sure we were aware that the patient will need a  for her new patient appointment on Wednesday August 1. I put in a call to Amira Okeefe and left a voicemail for her to return my call.  Wendelin Rubinstein

## 2018-07-30 ENCOUNTER — TELEPHONE (OUTPATIENT)
Dept: VASCULAR SURGERY | Age: 73
End: 2018-07-30

## 2018-07-30 NOTE — TELEPHONE ENCOUNTER
After trying to get one of the two interpreters listed on our list,Neither on could do it, I contacted [employer] (her number was given to me by Jenn Chou) Richard informed me that her agency has a contract with [facility]. She confirmed and  is coming for the 8:15 appt.  Quin Ventura

## 2018-08-01 ENCOUNTER — OFFICE VISIT (OUTPATIENT)
Dept: VASCULAR SURGERY | Age: 73
End: 2018-08-01
Payer: MEDICARE

## 2018-08-01 VITALS
HEART RATE: 55 BPM | SYSTOLIC BLOOD PRESSURE: 120 MMHG | TEMPERATURE: 97.6 F | OXYGEN SATURATION: 98 % | RESPIRATION RATE: 20 BRPM | DIASTOLIC BLOOD PRESSURE: 72 MMHG

## 2018-08-01 DIAGNOSIS — I73.9 PERIPHERAL VASCULAR DISEASE (HCC): Chronic | ICD-10-CM

## 2018-08-01 DIAGNOSIS — I77.811 ECTATIC ABDOMINAL AORTA (HCC): Primary | ICD-10-CM

## 2018-08-01 PROCEDURE — 3017F COLORECTAL CA SCREEN DOC REV: CPT | Performed by: NURSE PRACTITIONER

## 2018-08-01 PROCEDURE — 1123F ACP DISCUSS/DSCN MKR DOCD: CPT | Performed by: NURSE PRACTITIONER

## 2018-08-01 PROCEDURE — 1101F PT FALLS ASSESS-DOCD LE1/YR: CPT | Performed by: NURSE PRACTITIONER

## 2018-08-01 PROCEDURE — 99212 OFFICE O/P EST SF 10 MIN: CPT | Performed by: NURSE PRACTITIONER

## 2018-08-01 PROCEDURE — 1090F PRES/ABSN URINE INCON ASSESS: CPT | Performed by: NURSE PRACTITIONER

## 2018-08-01 PROCEDURE — G8419 CALC BMI OUT NRM PARAM NOF/U: HCPCS | Performed by: NURSE PRACTITIONER

## 2018-08-01 PROCEDURE — G8399 PT W/DXA RESULTS DOCUMENT: HCPCS | Performed by: NURSE PRACTITIONER

## 2018-08-01 PROCEDURE — G8427 DOCREV CUR MEDS BY ELIG CLIN: HCPCS | Performed by: NURSE PRACTITIONER

## 2018-08-01 PROCEDURE — 1036F TOBACCO NON-USER: CPT | Performed by: NURSE PRACTITIONER

## 2018-08-01 PROCEDURE — 4040F PNEUMOC VAC/ADMIN/RCVD: CPT | Performed by: NURSE PRACTITIONER

## 2018-08-01 RX ORDER — CEPHALEXIN 500 MG/1
500 CAPSULE ORAL 4 TIMES DAILY
COMMUNITY
End: 2018-10-28 | Stop reason: ALTCHOICE

## 2018-08-01 RX ORDER — ZOLPIDEM TARTRATE 5 MG/1
5 TABLET ORAL NIGHTLY PRN
COMMUNITY
End: 2019-07-22

## 2018-08-01 RX ORDER — HYDROCODONE BITARTRATE AND ACETAMINOPHEN 7.5; 325 MG/1; MG/1
1 TABLET ORAL EVERY 6 HOURS PRN
COMMUNITY
End: 2018-08-07 | Stop reason: SDUPTHER

## 2018-08-01 NOTE — PROGRESS NOTES
Patient Care Team:  Mary Lou Sood MD as PCP - General (Family Medicine)  Mary Lou Sood MD as PCP - S Attributed Provider  Myrtha Sandifer, MD (Family Medicine)  DENIZ Shanks as Advanced Practice Nurse (Nurse Practitioner)  Mary Lou Sood MD as Referring Physician (Family Medicine)  Barbie Barnett MD as Neurologist (Neurology)      Lavell Cockayne has a history of peripheral vascular disease of the lower extremities. She has had this for 1 - 5 years. Current treatment includes ASA EC daily. Jayashree has not had new wounds. Recently, she reports no claudication. Bettina Thurston is a 75 y.o. female with the following history reviewed and recorded in [facility]:  Patient Active Problem List    Diagnosis Date Noted    Unsteady gait 08/15/2017    Myasthenia gravis without acute exacerbation ([facility] 08/12/2017    Vertigo, aural 08/12/2017    Chronic tension-type headache, intractable 08/12/2017    Idiopathic peripheral neuropathy 08/12/2017    Epigastric pain 07/06/2017    Alternating constipation and diarrhea 07/06/2017    Family history of colonic polyps 07/06/2017    Chronic insomnia 06/10/2017    Ectatic abdominal aorta (HCC) 07/13/2016    Peripheral vascular disease ([facility] 01/12/2016    Venous (peripheral) insufficiency 01/12/2016     Updating Deprecated Diagnoses      Essential hypertension 12/07/2015    Descending thoracic aortic aneurysm (HCC) 11/04/2013    Osteoarthritis     Depression     Chronic back pain     Memory loss     Neck pain     Deafness congenital      Current Outpatient Prescriptions   Medication Sig Dispense Refill    zolpidem (AMBIEN) 5 MG tablet Take 5 mg by mouth nightly as needed for Sleep. Yasmine Hudson HYDROcodone-acetaminophen (NORCO) 7.5-325 MG per tablet Take 1 tablet by mouth every 6 hours as needed for Pain. Yasmine Hudson cephALEXin (KEFLEX) 500 MG capsule Take 500 mg by mouth 4 times daily      celecoxib (CELEBREX) 200 MG capsule TAKE ONE CAPSULE BY EYE SURGERY      HERNIA REPAIR      on back of neck    CO COLONOSCOPY FLX DX W/COLLJ SPEC WHEN PFRMD N/A 7/6/2017    Dr Yip-minimal diverticulosis, 5 yr recall    CO EGD TRANSORAL BIOPSY SINGLE/MULTIPLE N/A 7/6/2017    Dr Nura Ochoa, [place] (-)    TERRI AND BSO      VARICOSE VEIN SURGERY      VASCULAR SURGERY  02/19/2018    T[facility]. Aortogram and runoff. Left common femoral artery, 5 [place] sheath. Family History   Problem Relation Age of Onset    High Blood Pressure Mother     Stroke Mother     Cancer Father     Lung Cancer Father     Hearing Loss Brother         deaf   Edrie Lama Hearing Loss Brother         deaf    Colon Polyps Sister     Colon Cancer Neg Hx     Esophageal Cancer Neg Hx     Liver Cancer Neg Hx     Liver Disease Neg Hx     Stomach Cancer Neg Hx     Rectal Cancer Neg Hx      Social History   Substance Use Topics    Smoking status: Never Smoker    Smokeless tobacco: Never Used    Alcohol use No         Review of Systems    Constitutional  no significant activity change, appetite change, or unexpected weight change. No fever or chills. No diaphoresis or significant fatigue. HENT  no significant rhinorrhea or epistaxis. No tinnitus or significant hearing loss. Eyes  no sudden vision change or amaurosis. Respiratory  no significant shortness of breath, wheezing, or stridor. No apnea, cough, or chest tightness associated with shortness of breath. Cardiovascular  no chest pain, syncope, or significant dizziness. No palpitations or significant leg swelling. Patient reports has claudication. Gastrointestinal  no abdominal swelling or pain. No blood in stool. No severe constipation, diarrhea, nausea, or vomiting. Genitourinary  No difficulty urinating, dysuria, frequency, or urgency. No flank pain or hematuria. Musculoskeletal  no back pain, gait disturbance, or myalgia. Skin  no color change, rash, pallor, or new wound.   Neurologic  no dizziness, facial asymmetry, or

## 2018-08-02 RX ORDER — AMITRIPTYLINE HYDROCHLORIDE 50 MG/1
TABLET, FILM COATED ORAL
Qty: 60 TABLET | Refills: 5 | Status: SHIPPED | OUTPATIENT
Start: 2018-08-02 | End: 2019-02-26 | Stop reason: SDUPTHER

## 2018-08-07 DIAGNOSIS — M54.5 CHRONIC BILATERAL LOW BACK PAIN, WITH SCIATICA PRESENCE UNSPECIFIED: Primary | ICD-10-CM

## 2018-08-07 DIAGNOSIS — G89.29 CHRONIC BILATERAL LOW BACK PAIN, WITH SCIATICA PRESENCE UNSPECIFIED: Primary | ICD-10-CM

## 2018-08-07 RX ORDER — HYDROCODONE BITARTRATE AND ACETAMINOPHEN 7.5; 325 MG/1; MG/1
1 TABLET ORAL EVERY 6 HOURS PRN
Qty: 120 TABLET | Refills: 0 | Status: SHIPPED | OUTPATIENT
Start: 2018-08-07 | End: 2018-09-06

## 2018-09-10 RX ORDER — DONEPEZIL HYDROCHLORIDE 10 MG/1
TABLET, FILM COATED ORAL
Qty: 90 TABLET | Refills: 2 | Status: SHIPPED | OUTPATIENT
Start: 2018-09-10 | End: 2019-07-05 | Stop reason: SDUPTHER

## 2018-09-20 DIAGNOSIS — G89.29 CHRONIC LOW BACK PAIN, UNSPECIFIED BACK PAIN LATERALITY, WITH SCIATICA PRESENCE UNSPECIFIED: Primary | ICD-10-CM

## 2018-09-20 DIAGNOSIS — M54.5 CHRONIC LOW BACK PAIN, UNSPECIFIED BACK PAIN LATERALITY, WITH SCIATICA PRESENCE UNSPECIFIED: Primary | ICD-10-CM

## 2018-09-20 RX ORDER — HYDROCODONE BITARTRATE AND ACETAMINOPHEN 7.5; 325 MG/1; MG/1
1 TABLET ORAL EVERY 6 HOURS PRN
Qty: 90 TABLET | Refills: 0 | Status: SHIPPED | OUTPATIENT
Start: 2018-09-20 | End: 2018-10-18 | Stop reason: SDUPTHER

## 2018-10-15 NOTE — TELEPHONE ENCOUNTER
Received in basket from [email or web address] advising of approval for Left L5 TFESI for one unit/DOS. Will  Inform Nursing. There are no results for labs that I ordered back on 7/27. I do need those. I do not know if she is getting labs drawn in the [facility] clinic and they are ignoring my orders or if the [facility] lab did not notice them. In any event she is going to have to go to lab (preferably around 10 am some morning) and specifically request the labs Dr. Michael Saleh ordered on 7/27/2017. This is ridiculous. She has had labs drawn 4 times since I ordered them and none of my ordered abs were done. As for her shaking, this is not from amitriptyline. She is supposed to be taking 100 mg q HS. I did recently increase her gabapentin and that could be to blame for the \"shaking\" - asterixis? Reduce the gabapentin to 300 mg BID.

## 2018-10-18 DIAGNOSIS — G89.29 CHRONIC LOW BACK PAIN, UNSPECIFIED BACK PAIN LATERALITY, WITH SCIATICA PRESENCE UNSPECIFIED: ICD-10-CM

## 2018-10-18 DIAGNOSIS — M54.5 CHRONIC LOW BACK PAIN, UNSPECIFIED BACK PAIN LATERALITY, WITH SCIATICA PRESENCE UNSPECIFIED: ICD-10-CM

## 2018-10-19 RX ORDER — HYDROCODONE BITARTRATE AND ACETAMINOPHEN 7.5; 325 MG/1; MG/1
1 TABLET ORAL EVERY 6 HOURS PRN
Qty: 90 TABLET | Refills: 0 | Status: SHIPPED | OUTPATIENT
Start: 2018-10-19 | End: 2018-11-16 | Stop reason: SDUPTHER

## 2018-10-28 ENCOUNTER — APPOINTMENT (OUTPATIENT)
Dept: GENERAL RADIOLOGY | Age: 73
End: 2018-10-28
Payer: MEDICARE

## 2018-10-28 ENCOUNTER — HOSPITAL ENCOUNTER (OUTPATIENT)
Age: 73
Setting detail: OBSERVATION
Discharge: HOME | End: 2018-10-30
Attending: EMERGENCY MEDICINE | Admitting: INTERNAL MEDICINE
Payer: MEDICARE

## 2018-10-28 DIAGNOSIS — R07.9 ACUTE CHEST PAIN: Primary | ICD-10-CM

## 2018-10-28 LAB
ALBUMIN SERPL-MCNC: 4.1 G/DL (ref 3.5–5.2)
ALP BLD-CCNC: 94 U/L (ref 35–104)
ALT SERPL-CCNC: 8 U/L (ref 5–33)
ANION GAP SERPL CALCULATED.3IONS-SCNC: 11 MMOL/L (ref 7–19)
APTT: 25.1 SEC (ref 26–36.2)
AST SERPL-CCNC: 18 U/L (ref 5–32)
BASOPHILS ABSOLUTE: 0 K/UL (ref 0–0.2)
BASOPHILS RELATIVE PERCENT: 0.6 % (ref 0–1)
BILIRUB SERPL-MCNC: <0.2 MG/DL (ref 0.2–1.2)
BUN BLDV-MCNC: 13 MG/DL (ref 8–23)
CALCIUM SERPL-MCNC: 9.7 MG/DL (ref 8.8–10.2)
CHLORIDE BLD-SCNC: 101 MMOL/L (ref 98–111)
CO2: 26 MMOL/L (ref 22–29)
CREAT SERPL-MCNC: 1 MG/DL (ref 0.5–0.9)
EOSINOPHILS ABSOLUTE: 0.4 K/UL (ref 0–0.6)
EOSINOPHILS RELATIVE PERCENT: 6.6 % (ref 0–5)
FOLATE: 8.3 NG/ML (ref 4.8–37.3)
GFR NON-AFRICAN AMERICAN: 54
GLUCOSE BLD-MCNC: 80 MG/DL (ref 74–109)
HCT VFR BLD CALC: 34 % (ref 37–47)
HEMOGLOBIN: 10.7 G/DL (ref 12–16)
INR BLD: 0.93 (ref 0.88–1.18)
IRON SATURATION: 9 % (ref 14–50)
IRON: 31 UG/DL (ref 37–145)
LYMPHOCYTES ABSOLUTE: 2.4 K/UL (ref 1.1–4.5)
LYMPHOCYTES RELATIVE PERCENT: 36.4 % (ref 20–40)
MAGNESIUM: 2.1 MG/DL (ref 1.6–2.4)
MCH RBC QN AUTO: 30.5 PG (ref 27–31)
MCHC RBC AUTO-ENTMCNC: 31.5 G/DL (ref 33–37)
MCV RBC AUTO: 96.9 FL (ref 81–99)
MONOCYTES ABSOLUTE: 0.7 K/UL (ref 0–0.9)
MONOCYTES RELATIVE PERCENT: 11.3 % (ref 0–10)
NEUTROPHILS ABSOLUTE: 2.9 K/UL (ref 1.5–7.5)
NEUTROPHILS RELATIVE PERCENT: 44.8 % (ref 50–65)
PDW BLD-RTO: 12.8 % (ref 11.5–14.5)
PLATELET # BLD: 274 K/UL (ref 130–400)
PMV BLD AUTO: 9.3 FL (ref 9.4–12.3)
POTASSIUM SERPL-SCNC: 3.7 MMOL/L (ref 3.5–5)
PRO-BNP: 69 PG/ML (ref 0–900)
PROTHROMBIN TIME: 12.4 SEC (ref 12–14.6)
RBC # BLD: 3.51 M/UL (ref 4.2–5.4)
SODIUM BLD-SCNC: 138 MMOL/L (ref 136–145)
TOTAL IRON BINDING CAPACITY: 335 UG/DL (ref 250–400)
TOTAL PROTEIN: 6.8 G/DL (ref 6.6–8.7)
TROPONIN: <0.01 NG/ML (ref 0–0.03)
TROPONIN: <0.01 NG/ML (ref 0–0.03)
TSH SERPL DL<=0.05 MIU/L-ACNC: 2.84 UIU/ML (ref 0.27–4.2)
VITAMIN B-12: 150 PG/ML (ref 211–946)
WBC # BLD: 6.6 K/UL (ref 4.8–10.8)

## 2018-10-28 PROCEDURE — 85610 PROTHROMBIN TIME: CPT

## 2018-10-28 PROCEDURE — 99285 EMERGENCY DEPT VISIT HI MDM: CPT | Performed by: EMERGENCY MEDICINE

## 2018-10-28 PROCEDURE — 84443 ASSAY THYROID STIM HORMONE: CPT

## 2018-10-28 PROCEDURE — 6370000000 HC RX 637 (ALT 250 FOR IP): Performed by: HOSPITALIST

## 2018-10-28 PROCEDURE — 2580000003 HC RX 258: Performed by: EMERGENCY MEDICINE

## 2018-10-28 PROCEDURE — 84484 ASSAY OF TROPONIN QUANT: CPT

## 2018-10-28 PROCEDURE — C9113 INJ PANTOPRAZOLE SODIUM, VIA: HCPCS | Performed by: HOSPITALIST

## 2018-10-28 PROCEDURE — 71045 X-RAY EXAM CHEST 1 VIEW: CPT

## 2018-10-28 PROCEDURE — 96374 THER/PROPH/DIAG INJ IV PUSH: CPT

## 2018-10-28 PROCEDURE — 85730 THROMBOPLASTIN TIME PARTIAL: CPT

## 2018-10-28 PROCEDURE — 93005 ELECTROCARDIOGRAM TRACING: CPT

## 2018-10-28 PROCEDURE — 36415 COLL VENOUS BLD VENIPUNCTURE: CPT

## 2018-10-28 PROCEDURE — 83735 ASSAY OF MAGNESIUM: CPT

## 2018-10-28 PROCEDURE — G0378 HOSPITAL OBSERVATION PER HR: HCPCS

## 2018-10-28 PROCEDURE — 83540 ASSAY OF IRON: CPT

## 2018-10-28 PROCEDURE — 83880 ASSAY OF NATRIURETIC PEPTIDE: CPT

## 2018-10-28 PROCEDURE — 82607 VITAMIN B-12: CPT

## 2018-10-28 PROCEDURE — 83550 IRON BINDING TEST: CPT

## 2018-10-28 PROCEDURE — 82746 ASSAY OF FOLIC ACID SERUM: CPT

## 2018-10-28 PROCEDURE — 6360000002 HC RX W HCPCS: Performed by: HOSPITALIST

## 2018-10-28 PROCEDURE — 85025 COMPLETE CBC W/AUTO DIFF WBC: CPT

## 2018-10-28 PROCEDURE — 80053 COMPREHEN METABOLIC PANEL: CPT

## 2018-10-28 PROCEDURE — 99219 PR INITIAL OBSERVATION CARE/DAY 50 MINUTES: CPT | Performed by: HOSPITALIST

## 2018-10-28 PROCEDURE — 99285 EMERGENCY DEPT VISIT HI MDM: CPT

## 2018-10-28 RX ORDER — GABAPENTIN 300 MG/1
300 CAPSULE ORAL 3 TIMES DAILY
Status: DISCONTINUED | OUTPATIENT
Start: 2018-10-28 | End: 2018-10-30 | Stop reason: HOSPADM

## 2018-10-28 RX ORDER — FLUOXETINE HYDROCHLORIDE 20 MG/1
20 CAPSULE ORAL DAILY
Status: DISCONTINUED | OUTPATIENT
Start: 2018-10-29 | End: 2018-10-30 | Stop reason: HOSPADM

## 2018-10-28 RX ORDER — HYDROCODONE BITARTRATE AND ACETAMINOPHEN 7.5; 325 MG/1; MG/1
1 TABLET ORAL EVERY 6 HOURS PRN
Status: DISCONTINUED | OUTPATIENT
Start: 2018-10-28 | End: 2018-10-30 | Stop reason: HOSPADM

## 2018-10-28 RX ORDER — AMITRIPTYLINE HYDROCHLORIDE 50 MG/1
50 TABLET, FILM COATED ORAL NIGHTLY
Status: DISCONTINUED | OUTPATIENT
Start: 2018-10-28 | End: 2018-10-30 | Stop reason: HOSPADM

## 2018-10-28 RX ORDER — 0.9 % SODIUM CHLORIDE 0.9 %
500 INTRAVENOUS SOLUTION INTRAVENOUS ONCE
Status: COMPLETED | OUTPATIENT
Start: 2018-10-28 | End: 2018-10-28

## 2018-10-28 RX ORDER — ZOLPIDEM TARTRATE 5 MG/1
5 TABLET ORAL NIGHTLY PRN
Status: DISCONTINUED | OUTPATIENT
Start: 2018-10-28 | End: 2018-10-30 | Stop reason: HOSPADM

## 2018-10-28 RX ORDER — PANTOPRAZOLE SODIUM 40 MG/10ML
40 INJECTION, POWDER, LYOPHILIZED, FOR SOLUTION INTRAVENOUS 2 TIMES DAILY
Status: DISCONTINUED | OUTPATIENT
Start: 2018-10-28 | End: 2018-10-30

## 2018-10-28 RX ORDER — DONEPEZIL HYDROCHLORIDE 5 MG/1
10 TABLET, FILM COATED ORAL DAILY
Status: DISCONTINUED | OUTPATIENT
Start: 2018-10-29 | End: 2018-10-30 | Stop reason: HOSPADM

## 2018-10-28 RX ADMIN — PANTOPRAZOLE SODIUM 40 MG: 40 INJECTION, POWDER, FOR SOLUTION INTRAVENOUS at 22:51

## 2018-10-28 RX ADMIN — HYDROCODONE BITARTRATE AND ACETAMINOPHEN 1 TABLET: 7.5; 325 TABLET ORAL at 22:51

## 2018-10-28 RX ADMIN — AMITRIPTYLINE HYDROCHLORIDE 50 MG: 50 TABLET, FILM COATED ORAL at 22:51

## 2018-10-28 RX ADMIN — SODIUM CHLORIDE 500 ML: 9 INJECTION, SOLUTION INTRAVENOUS at 19:15

## 2018-10-28 RX ADMIN — GABAPENTIN 300 MG: 300 CAPSULE ORAL at 22:51

## 2018-10-28 RX ADMIN — ZOLPIDEM TARTRATE 5 MG: 5 TABLET ORAL at 22:51

## 2018-10-28 ASSESSMENT — PAIN SCALES - GENERAL
PAINLEVEL_OUTOF10: 5

## 2018-10-28 ASSESSMENT — PAIN DESCRIPTION - PAIN TYPE: TYPE: CHRONIC PAIN

## 2018-10-28 NOTE — ED PROVIDER NOTES
Past Medical History:   Diagnosis Date    Anxiety     Chronic back pain     severe muscle spasms    Deafness congenital     Depression     Headache     Hypertension     Memory loss     early    Myasthenia ([address]     Neck pain     Osteoarthritis     Ptosis of eyelid     Weakness of face muscles     Wound of right ankle 2016    open wound to lateral side of right ankle          SURGICAL HISTORY       Past Surgical History:   Procedure Laterality Date    APPENDECTOMY      CARPAL TUNNEL RELEASE      CATARACT REMOVAL      COLONOSCOPY  7-2010    COLONOSCOPY  2000 or before    [place]-normal per patient    EYE SURGERY      HERNIA REPAIR      on back of neck    TX COLONOSCOPY FLX DX W/COLLJ SPEC WHEN PFRMD N/A 7/6/2017    Dr Jesus Ovalle diverticulosis, 5 yr recall    TX EGD TRANSORAL BIOPSY SINGLE/MULTIPLE N/A 7/6/2017    Dr Kaley Davis, [place] (-)    TERRI AND BSO      VARICOSE VEIN SURGERY      VASCULAR SURGERY  02/19/2018    [facility]. Aortogram and runoff. Left common femoral artery, 5 french sheath. CURRENT MEDICATIONS     Current Discharge Medication List      CONTINUE these medications which have NOT CHANGED    Details   HYDROcodone-acetaminophen (NORCO) 7.5-325 MG per tablet Take 1 tablet by mouth every 6 hours as needed for Pain for up to 30 days. Lacinda Adjutant: 90 tablet, Refills: 0    Comments: Last visit 8/18. Last grant 7/18. Last filled 9/20/18  Associated Diagnoses: Chronic low back pain, unspecified back pain laterality, with sciatica presence unspecified      donepezil (ARICEPT) 10 MG tablet TAKE ONE TABLET BY MOUTH ONCE DAILY  Qty: 90 tablet, Refills: 2      amitriptyline (ELAVIL) 50 MG tablet TAKE TWO TABLETS BY MOUTH ONCE NIGHTLY  Qty: 60 tablet, Refills: 5    Comments: Please consider 90 day supplies to promote better adherence      zolpidem (AMBIEN) 5 MG tablet Take 5 mg by mouth nightly as needed for Sleep. .      celecoxib (CELEBREX) 200 MG capsule TAKE ONE CAPSULE BY MOUTH are read by the radiologist. Plain radiographic images are visualized and preliminarily interpreted bythe emergency physician with the below findings:          XR CHEST PORTABLE   Final Result   Impression:   1. Left basilar densities are favorable for atelectasis. No   radiographic evidence of edema. .   Signed by Dr Ila Walls on 10/28/2018 6:47 PM      NM MYOCARDIAL SPECT REST EXERCISE OR RX    (Results Pending)           LABS:  Labs Reviewed   APTT - Abnormal; Notable for the following:        Result Value    aPTT 25.1 (*)     All other components within normal limits   CBC WITH AUTO DIFFERENTIAL - Abnormal; Notable for the following:     RBC 3.51 (*)     Hemoglobin 10.7 (*)     Hematocrit 34.0 (*)     MCHC 31.5 (*)     MPV 9.3 (*)     Neutrophils % 44.8 (*)     Monocytes % 11.3 (*)     Eosinophils % 6.6 (*)     All other components within normal limits   COMPREHENSIVE METABOLIC PANEL - Abnormal; Notable for the following:     CREATININE 1.0 (*)     GFR Non- 54 (*)     All other components within normal limits   VITAMIN B12 - Abnormal; Notable for the following:     Vitamin B-12 150 (*)     All other components within normal limits    Narrative:     Collection has been rescheduled by [facility] at 10/28/18 22:23. Reason: ok   pe  r rn jessuica to draw at [address] TIBC - Abnormal; Notable for the following:     Iron 31 (*)     Iron Saturation 9 (*)     All other components within normal limits    Narrative:     Collection has been rescheduled by [facility] at 10/28/18 22:23. Reason: ok   pe  r rn jessuica to draw at [address]   TSH WITHOUT REFLEX   TROPONIN    Narrative:     Collection has been rescheduled by [facility] at 10/28/18 22:23. Reason: ok   pe  r rn jessuica to draw at [address]    Narrative:     Collection has been rescheduled by [facility] at 10/28/18 22:23.  Reason: natasha underwood to draw at [address]    Narrative:

## 2018-10-28 NOTE — ED NOTES
Bed: 02-A  Expected date:   Expected time:   Means of arrival:   Comments:     Melita Reyna RN  10/28/18 8220

## 2018-10-29 ENCOUNTER — APPOINTMENT (OUTPATIENT)
Dept: NUCLEAR MEDICINE | Age: 73
End: 2018-10-29
Payer: MEDICARE

## 2018-10-29 PROBLEM — I20.0 UNSTABLE ANGINA (HCC): Status: ACTIVE | Noted: 2018-10-28

## 2018-10-29 LAB
LV EF: 58 %
LVEF MODALITY: NORMAL
TROPONIN: <0.01 NG/ML (ref 0–0.03)

## 2018-10-29 PROCEDURE — 84484 ASSAY OF TROPONIN QUANT: CPT

## 2018-10-29 PROCEDURE — 93458 L HRT ARTERY/VENTRICLE ANGIO: CPT | Performed by: INTERNAL MEDICINE

## 2018-10-29 PROCEDURE — 36415 COLL VENOUS BLD VENIPUNCTURE: CPT

## 2018-10-29 PROCEDURE — C1894 INTRO/SHEATH, NON-LASER: HCPCS

## 2018-10-29 PROCEDURE — 2580000003 HC RX 258: Performed by: INTERNAL MEDICINE

## 2018-10-29 PROCEDURE — 99205 OFFICE O/P NEW HI 60 MIN: CPT | Performed by: INTERNAL MEDICINE

## 2018-10-29 PROCEDURE — 3430000000 HC RX DIAGNOSTIC RADIOPHARMACEUTICAL: Performed by: INTERNAL MEDICINE

## 2018-10-29 PROCEDURE — G0378 HOSPITAL OBSERVATION PER HR: HCPCS

## 2018-10-29 PROCEDURE — 93017 CV STRESS TEST TRACING ONLY: CPT

## 2018-10-29 PROCEDURE — A9500 TC99M SESTAMIBI: HCPCS | Performed by: INTERNAL MEDICINE

## 2018-10-29 PROCEDURE — 93306 TTE W/DOPPLER COMPLETE: CPT

## 2018-10-29 PROCEDURE — 6370000000 HC RX 637 (ALT 250 FOR IP): Performed by: HOSPITALIST

## 2018-10-29 PROCEDURE — 96376 TX/PRO/DX INJ SAME DRUG ADON: CPT

## 2018-10-29 PROCEDURE — 6360000002 HC RX W HCPCS: Performed by: HOSPITALIST

## 2018-10-29 PROCEDURE — C1760 CLOSURE DEV, VASC: HCPCS

## 2018-10-29 PROCEDURE — 2709999900 HC NON-CHARGEABLE SUPPLY

## 2018-10-29 PROCEDURE — 6360000002 HC RX W HCPCS

## 2018-10-29 PROCEDURE — 96372 THER/PROPH/DIAG INJ SC/IM: CPT

## 2018-10-29 PROCEDURE — C9113 INJ PANTOPRAZOLE SODIUM, VIA: HCPCS | Performed by: HOSPITALIST

## 2018-10-29 PROCEDURE — 78452 HT MUSCLE IMAGE SPECT MULT: CPT

## 2018-10-29 PROCEDURE — 99224 PR SBSQ OBSERVATION CARE/DAY 15 MINUTES: CPT | Performed by: INTERNAL MEDICINE

## 2018-10-29 RX ORDER — SODIUM CHLORIDE 0.9 % (FLUSH) 0.9 %
10 SYRINGE (ML) INJECTION EVERY 12 HOURS SCHEDULED
Status: DISCONTINUED | OUTPATIENT
Start: 2018-10-29 | End: 2018-10-30 | Stop reason: HOSPADM

## 2018-10-29 RX ORDER — SODIUM CHLORIDE 0.9 % (FLUSH) 0.9 %
10 SYRINGE (ML) INJECTION PRN
Status: DISCONTINUED | OUTPATIENT
Start: 2018-10-29 | End: 2018-10-29 | Stop reason: SDUPTHER

## 2018-10-29 RX ORDER — SODIUM CHLORIDE 9 MG/ML
INJECTION, SOLUTION INTRAVENOUS CONTINUOUS
Status: DISCONTINUED | OUTPATIENT
Start: 2018-10-29 | End: 2018-10-30 | Stop reason: HOSPADM

## 2018-10-29 RX ORDER — SODIUM CHLORIDE 0.9 % (FLUSH) 0.9 %
10 SYRINGE (ML) INJECTION EVERY 12 HOURS SCHEDULED
Status: DISCONTINUED | OUTPATIENT
Start: 2018-10-29 | End: 2018-10-29 | Stop reason: SDUPTHER

## 2018-10-29 RX ORDER — SODIUM CHLORIDE 0.9 % (FLUSH) 0.9 %
10 SYRINGE (ML) INJECTION PRN
Status: DISCONTINUED | OUTPATIENT
Start: 2018-10-29 | End: 2018-10-30 | Stop reason: HOSPADM

## 2018-10-29 RX ADMIN — TETRAKIS(2-METHOXYISOBUTYLISOCYANIDE)COPPER(I) TETRAFLUOROBORATE 10 MILLICURIE: 1 INJECTION, POWDER, LYOPHILIZED, FOR SOLUTION INTRAVENOUS at 13:38

## 2018-10-29 RX ADMIN — DONEPEZIL HYDROCHLORIDE 10 MG: 5 TABLET, FILM COATED ORAL at 11:06

## 2018-10-29 RX ADMIN — GABAPENTIN 300 MG: 300 CAPSULE ORAL at 13:39

## 2018-10-29 RX ADMIN — TETRAKIS(2-METHOXYISOBUTYLISOCYANIDE)COPPER(I) TETRAFLUOROBORATE 30 MILLICURIE: 1 INJECTION, POWDER, LYOPHILIZED, FOR SOLUTION INTRAVENOUS at 13:38

## 2018-10-29 RX ADMIN — GABAPENTIN 300 MG: 300 CAPSULE ORAL at 11:00

## 2018-10-29 RX ADMIN — HYDROCODONE BITARTRATE AND ACETAMINOPHEN 1 TABLET: 7.5; 325 TABLET ORAL at 13:39

## 2018-10-29 RX ADMIN — ENOXAPARIN SODIUM 40 MG: 40 INJECTION SUBCUTANEOUS at 10:59

## 2018-10-29 RX ADMIN — FLUOXETINE 20 MG: 20 CAPSULE ORAL at 11:06

## 2018-10-29 RX ADMIN — SODIUM CHLORIDE: 9 INJECTION, SOLUTION INTRAVENOUS at 22:13

## 2018-10-29 RX ADMIN — AMITRIPTYLINE HYDROCHLORIDE 50 MG: 50 TABLET, FILM COATED ORAL at 19:39

## 2018-10-29 RX ADMIN — GABAPENTIN 300 MG: 300 CAPSULE ORAL at 19:39

## 2018-10-29 RX ADMIN — PANTOPRAZOLE SODIUM 40 MG: 40 INJECTION, POWDER, FOR SOLUTION INTRAVENOUS at 19:39

## 2018-10-29 RX ADMIN — PANTOPRAZOLE SODIUM 40 MG: 40 INJECTION, POWDER, FOR SOLUTION INTRAVENOUS at 11:00

## 2018-10-29 RX ADMIN — REGADENOSON 0.4 MG: 0.08 INJECTION, SOLUTION INTRAVENOUS at 13:35

## 2018-10-29 ASSESSMENT — ENCOUNTER SYMPTOMS
SORE THROAT: 0
COUGH: 0
VOMITING: 0
RHINORRHEA: 0
BACK PAIN: 0
DIARRHEA: 0
ABDOMINAL PAIN: 0
NAUSEA: 0
SHORTNESS OF BREATH: 0

## 2018-10-29 ASSESSMENT — PAIN SCALES - GENERAL
PAINLEVEL_OUTOF10: 0
PAINLEVEL_OUTOF10: 5

## 2018-10-29 NOTE — H&P
History and Physical    Patient Name:  Kelvin Wynn    :  1945    Chief Complaint:   Chest pain    History of Present Illness:   Kelvin Wynn presents to [facility] with occasional chest pains that are worse with waking and better with rest. She has associated palpitations. Patient is deaf and history is very limited. Her pain is radiating to left arm. She was told today in urgent care she may have a fib. Currently in sinus per telemetry. She denies dyspnea. Past Medical History:   has a past medical history of Anxiety; Chronic back pain; Deafness congenital; Depression; Headache; Hypertension; Memory loss; Myasthenia ([address] Neck pain; Osteoarthritis; Ptosis of eyelid; Weakness of face muscles; and Wound of right ankle. Surgical History:   has a past surgical history that includes Appendectomy; Carpal tunnel release; Varicose vein surgery; Colonoscopy (); eye surgery; hernia repair; Cataract removal; Colonoscopy ( or before); pr egd transoral biopsy single/multiple (N/A, 2017); pr colonoscopy flx dx w/collj spec when pfrmd (N/A, 2017); vascular surgery (2018); and will and bso (cervix removed). Social History:   reports that she has never smoked. She has never used smokeless tobacco. She reports that she does not drink alcohol or use drugs. Family History:  family history includes Cancer in her father; Colon Polyps in her sister; Hearing Loss in her brother and brother; High Blood Pressure in her mother; Ml Shoemaker in her father; Stroke in her mother. Medications:  Prior to Admission medications    Medication Sig Start Date End Date Taking? Authorizing Provider   HYDROcodone-acetaminophen (NORCO) 7.5-325 MG per tablet Take 1 tablet by mouth every 6 hours as needed for Pain for up to 30 days. . 10/19/18 11/18/18 Yes Mohinder Hickey MD   donepezil (ARICEPT) 10 MG tablet TAKE ONE TABLET BY MOUTH ONCE DAILY 9/10/18  Yes Hasmukh Kamara MD

## 2018-10-29 NOTE — PROGRESS NOTES
Jayashree Brown arrived to room # 708 bed 2 . Presented with: Afib (currently NSR)  Mental Status: Patient is alert and oriented x 4. Pt is deaf, sister at bedside to interpret for admission, after sister left, I commununicated w pt via pen and paper. Vitals:    10/28/18 2158   BP: 134/72   Pulse: 60   Resp: 16   Temp: 97.5 °F (36.4 °C)   SpO2: 92%     Patient safety contract and falls prevention contract reviewed with patient yes. Oriented Patient to room. Call light within reach. Yes.   Needs, issues or concerns expressed at this time: no.      Electronically signed by Radha Reno RN on 10/28/2018 at 11:32 PM

## 2018-10-30 VITALS
OXYGEN SATURATION: 97 % | BODY MASS INDEX: 20.16 KG/M2 | HEIGHT: 65 IN | TEMPERATURE: 97.6 F | SYSTOLIC BLOOD PRESSURE: 137 MMHG | RESPIRATION RATE: 16 BRPM | HEART RATE: 72 BPM | WEIGHT: 121 LBS | DIASTOLIC BLOOD PRESSURE: 84 MMHG

## 2018-10-30 PROBLEM — I20.0 UNSTABLE ANGINA (HCC): Status: RESOLVED | Noted: 2018-10-28 | Resolved: 2018-10-30

## 2018-10-30 LAB
ANION GAP SERPL CALCULATED.3IONS-SCNC: 10 MMOL/L (ref 7–19)
BUN BLDV-MCNC: 13 MG/DL (ref 8–23)
CALCIUM SERPL-MCNC: 9.3 MG/DL (ref 8.8–10.2)
CHLORIDE BLD-SCNC: 105 MMOL/L (ref 98–111)
CO2: 26 MMOL/L (ref 22–29)
CREAT SERPL-MCNC: 0.9 MG/DL (ref 0.5–0.9)
EKG P AXIS: 76 DEGREES
EKG P-R INTERVAL: 144 MS
EKG Q-T INTERVAL: 418 MS
EKG QRS DURATION: 92 MS
EKG QTC CALCULATION (BAZETT): 421 MS
EKG T AXIS: 114 DEGREES
GFR NON-AFRICAN AMERICAN: >60
GLUCOSE BLD-MCNC: 86 MG/DL (ref 74–109)
HCT VFR BLD CALC: 34.6 % (ref 37–47)
HEMOGLOBIN: 10.2 G/DL (ref 12–16)
LV EF: 73 %
LVEF MODALITY: NORMAL
MCH RBC QN AUTO: 30.7 PG (ref 27–31)
MCHC RBC AUTO-ENTMCNC: 29.5 G/DL (ref 33–37)
MCV RBC AUTO: 104.2 FL (ref 81–99)
PDW BLD-RTO: 12.6 % (ref 11.5–14.5)
PLATELET # BLD: 228 K/UL (ref 130–400)
PMV BLD AUTO: 9.3 FL (ref 9.4–12.3)
POTASSIUM REFLEX MAGNESIUM: 4.1 MMOL/L (ref 3.5–5)
RBC # BLD: 3.32 M/UL (ref 4.2–5.4)
SODIUM BLD-SCNC: 141 MMOL/L (ref 136–145)
TROPONIN: <0.01 NG/ML (ref 0–0.03)
TROPONIN: <0.01 NG/ML (ref 0–0.03)
WBC # BLD: 8.1 K/UL (ref 4.8–10.8)

## 2018-10-30 PROCEDURE — G0378 HOSPITAL OBSERVATION PER HR: HCPCS

## 2018-10-30 PROCEDURE — 99217 PR OBSERVATION CARE DISCHARGE MANAGEMENT: CPT | Performed by: INTERNAL MEDICINE

## 2018-10-30 PROCEDURE — 6360000002 HC RX W HCPCS: Performed by: HOSPITALIST

## 2018-10-30 PROCEDURE — 85027 COMPLETE CBC AUTOMATED: CPT

## 2018-10-30 PROCEDURE — G0008 ADMIN INFLUENZA VIRUS VAC: HCPCS | Performed by: HOSPITALIST

## 2018-10-30 PROCEDURE — 96372 THER/PROPH/DIAG INJ SC/IM: CPT

## 2018-10-30 PROCEDURE — 84484 ASSAY OF TROPONIN QUANT: CPT

## 2018-10-30 PROCEDURE — 80048 BASIC METABOLIC PNL TOTAL CA: CPT

## 2018-10-30 PROCEDURE — 36415 COLL VENOUS BLD VENIPUNCTURE: CPT

## 2018-10-30 PROCEDURE — 6370000000 HC RX 637 (ALT 250 FOR IP): Performed by: INTERNAL MEDICINE

## 2018-10-30 PROCEDURE — 93005 ELECTROCARDIOGRAM TRACING: CPT

## 2018-10-30 PROCEDURE — 6370000000 HC RX 637 (ALT 250 FOR IP): Performed by: HOSPITALIST

## 2018-10-30 PROCEDURE — 90686 IIV4 VACC NO PRSV 0.5 ML IM: CPT | Performed by: HOSPITALIST

## 2018-10-30 RX ORDER — ALBUTEROL SULFATE 90 UG/1
2 AEROSOL, METERED RESPIRATORY (INHALATION) EVERY 6 HOURS PRN
Qty: 1 INHALER | Refills: 3 | Status: SHIPPED | OUTPATIENT
Start: 2018-10-30 | End: 2019-07-23 | Stop reason: ALTCHOICE

## 2018-10-30 RX ORDER — ASPIRIN 81 MG/1
81 TABLET, CHEWABLE ORAL DAILY
Status: DISCONTINUED | OUTPATIENT
Start: 2018-10-30 | End: 2018-10-30 | Stop reason: HOSPADM

## 2018-10-30 RX ORDER — PANTOPRAZOLE SODIUM 40 MG/1
40 TABLET, DELAYED RELEASE ORAL
Status: DISCONTINUED | OUTPATIENT
Start: 2018-10-30 | End: 2018-10-30 | Stop reason: HOSPADM

## 2018-10-30 RX ADMIN — PANTOPRAZOLE SODIUM 40 MG: 40 TABLET, DELAYED RELEASE ORAL at 06:02

## 2018-10-30 RX ADMIN — ENOXAPARIN SODIUM 40 MG: 40 INJECTION SUBCUTANEOUS at 08:34

## 2018-10-30 RX ADMIN — ASPIRIN 81 MG 81 MG: 81 TABLET ORAL at 11:09

## 2018-10-30 RX ADMIN — INFLUENZA A VIRUS A/MICHIGAN/45/2015 X-275 (H1N1) ANTIGEN (FORMALDEHYDE INACTIVATED), INFLUENZA A VIRUS A/SINGAPORE/INFIMH-16-0019/2016 IVR-186 (H3N2) ANTIGEN (FORMALDEHYDE INACTIVATED), INFLUENZA B VIRUS B/PHUKET/3073/2013 ANTIGEN (FORMALDEHYDE INACTIVATED), AND INFLUENZA B VIRUS B/MARYLAND/15/2016 BX-69A ANTIGEN (FORMALDEHYDE INACTIVATED) 0.5 ML: 15; 15; 15; 15 INJECTION, SUSPENSION INTRAMUSCULAR at 11:09

## 2018-10-30 RX ADMIN — DONEPEZIL HYDROCHLORIDE 10 MG: 5 TABLET, FILM COATED ORAL at 08:34

## 2018-10-30 RX ADMIN — FLUOXETINE 20 MG: 20 CAPSULE ORAL at 08:33

## 2018-10-30 RX ADMIN — GABAPENTIN 300 MG: 300 CAPSULE ORAL at 08:33

## 2018-10-30 ASSESSMENT — PAIN SCALES - GENERAL
PAINLEVEL_OUTOF10: 0

## 2018-10-30 NOTE — PROCEDURES
none      Results:    Hemodynamics:      Site Pressure mmHg        Left ventricular pressure 152/3 mmHg   Left ventricular end-diastolic pressure (LVEDP) 16 mmHg   Aortic pressure 160/69 mmHg   Mean aortic pressure 105 mmHg       Angiography:    Coronary Arteries:    Left Main Coronary Artery:  A large vessel which arises from the left sinus of Valsalva. It divides into the left anterior descending coronary artery and the left circumflex. There is mild diffuse disease throughout the entire length of the vessel. Left Anterior Coronary Artery:  The LAD is a moderate sized vessel with several diagonal branches. There is mild diffuse disease throughout the entire length of the vessel. Left Circumflex Coronary Artery:  The LCx is a moderate sized vessel with several marginal branches. There is mild diffuse disease throughout the entire length of the vessel. Right Coronary Artery:  The RCA is a moderate sized dominant vessel which arises from the right sinus of Valsalva. There is mild diffuse disease throughout the entire length of the vessel. Left Ventriculogram:  The left ventriculogram is obtained in the right oblique projection. All regional wall segments move appropriately. The estimated visual ejection fraction is > 60%. There is no mitral regurgitation nor is there a pull back gradient seen across the aortic valve. Summary:      1. Successful femoral artery ultrasound  2. Successful femoral artery arteriogram  3. Mild coronary artery disease  4. Left ventricular function is normal      RECOMMENDATIONS:    1.  Reassurance  2. Risk factor modification  3. Evaluation of etiologies of non cardiac chest discomfort should symptoms persist or progress  4. Follow  Up with Primary care provider as arranged  5.   Follow up with Cardiology \"prn\"       Electronically signed by Je Mesa MD on 10/29/18

## 2018-10-30 NOTE — CONSULTS
10/19/18 11/18/18 Yes Toi Marie MD   donepezil (ARICEPT) 10 MG tablet TAKE ONE TABLET BY MOUTH ONCE DAILY 9/10/18  Yes Shmuel Whelan MD   amitriptyline (ELAVIL) 50 MG tablet TAKE TWO TABLETS BY MOUTH ONCE NIGHTLY 8/2/18  Yes Scott Tolentino MD   zolpidem (AMBIEN) 5 MG tablet Take 5 mg by mouth nightly as needed for Sleep. .   Yes Historical Provider, MD   celecoxib (CELEBREX) 200 MG capsule TAKE ONE CAPSULE BY MOUTH ONCE DAILY 6/8/18  Yes Toi Marie MD   gabapentin (NEURONTIN) 300 MG capsule TAKE ONE CAPSULE BY MOUTH THREE TIMES DAILY FOR NERVE PAIN IN FEET 5/29/18 11/25/18 Yes Scott Tolentino MD   amLODIPine-benazepril (LOTREL) 5-10 MG per capsule TAKE ONE CAPSULE BY MOUTH ONCE DAILY FOR BLOOD PRESSURE 5/2/18  Yes Toi Marie MD   ferrous sulfate 325 (65 Fe) MG tablet TAKE ONE TABLET BY MOUTH ONCE DAILY WITH BREAKFAST 3/23/18  Yes Toi Marie MD   triamcinolone (KENALOG) 0.025 % cream Apply Topically  Patient taking differently: every 6 hours as needed Apply Topically 2/7/18  Yes Jessa Mack MD   FLUoxetine (PROZAC) 20 MG capsule 1 capsule by mouth once a day for anxiety and depression 2/2/18  Yes Toi Marie MD   hydrOXYzine (ATARAX) 25 MG tablet Take 25 mg by mouth 3 times daily as needed for Itching   Yes Historical Provider, MD   potassium chloride (KLOR-CON M) 10 MEQ extended release tablet Take 1 tablet by mouth 3 times daily 7/26/17  Yes Toi Marie MD   Misc.  Devices (ROLLER WALKER) MISC 1 each by Does not apply route daily DX Code: R26.81, G70.00, H81.313, G60.9 8/15/17   Toi Marie MD   ondansetron (ZOFRAN) 4 MG tablet TAKE ONE TABLET BY MOUTH TWICE DAILY AS NEEDED FOR SEVERE NAUSEA AND VOMITING. 8/1/16   Toi Marie MD   sodium chloride 0.9 % irrigation  5/26/16   Historical Provider, MD        Facility Administered Medications:    influenza virus vaccine  0.5 mL Intramuscular Once    amitriptyline  50 mg Oral Nightly    donepezil  10 mg Oral Daily    FLUoxetine  20 mg Oral Daily    gabapentin  300 mg Oral TID    enoxaparin  40 mg Subcutaneous Daily    pantoprazole  40 mg Intravenous BID       Allergies:  Patient has no known allergies. Social History:       Social History     Social History    Marital status:      Spouse name: N/A    Number of children: N/A    Years of education: N/A     Occupational History    unemployed      Social History Main Topics    Smoking status: Never Smoker    Smokeless tobacco: Never Used    Alcohol use No    Drug use: No    Sexual activity: Not Currently     Other Topics Concern    Not on file     Social History Narrative    No narrative on file       Family History:     Family History   Problem Relation Age of Onset    High Blood Pressure Mother     Stroke Mother     Cancer Father     Lung Cancer Father     Hearing Loss Brother         deaf   Juanita Gordon Hearing Loss Brother         deaf    Colon Polyps Sister     Colon Cancer Neg Hx     Esophageal Cancer Neg Hx     Liver Cancer Neg Hx     Liver Disease Neg Hx     Stomach Cancer Neg Hx     Rectal Cancer Neg Hx          REVIEW OF SYSTEMS:     Except as noted in the HPI, all other systems are negative        PHYSICAL EXAMINATION:     BP (!) 145/80   Pulse 63   Temp 96.7 °F (35.9 °C) (Temporal)   Resp 18   Ht 5' 5\" (1.651 m)   Wt 123 lb (55.8 kg)   SpO2 95%   BMI 20.47 kg/m²     GENERAL - well developed and well nourished, in no amount of generalized distress; is an active participant in this examination  HEENT -  PERRLA, Hearing appears normal, conjunctiva and lids are normal, ears and nose appear normal  NECK - no thyromegaly, no JVD, trachea is in the midline  CARDIOVASCULAR - PMI is in the left mid line clavicular position, Normal S1 and S2 with a grade 1/6 systolic murmur. No S3 or S4    PULMONARY - No respiratory distress. scattered wheezes and rales.   Breath sounds in both  lung fields are Decreased  ABDOMEN  - soft, non tender, no AUC indication 16, AUC score 7   Yes: cardiac catheterization Continue current medications:    Yes:            3. Systemic arterial hypertension Prior evaluation Systolic (58VFE), SEF:402 , Min:90 , JRN:385    Diastolic (89JYF), LAR:05, Min:55, Max:84   No Continue current medications:       yes         PLAN:    1. Continue present medications except for changes as noted above  2. Continue to monitor rhythm  3. Further orders per clinical course. 4. Cardiac catheterization  5. I have discussed the risks, benefits and options with the patient and her family. They appear to understand, have no questions, and wish to proceed. This procedure is scheduled for today. Discussed with patient and  and nursing.     I greatly appreciate the opportunity and your confidence in allowing me to participate in the care of [address] [place] [address] Road [address]    Electronically signed by Fernanda Kearney MD on 10/29/18     [facility] [place]

## 2018-11-02 ENCOUNTER — TELEPHONE (OUTPATIENT)
Dept: PRIMARY CARE CLINIC | Age: 73
End: 2018-11-02

## 2018-11-02 DIAGNOSIS — R07.9 CHEST PAIN, UNSPECIFIED TYPE: Primary | ICD-10-CM

## 2018-11-02 PROBLEM — Z96.1 PSEUDOPHAKIA: Status: ACTIVE | Noted: 2018-04-17

## 2018-11-02 PROBLEM — H26.499 POSTERIOR CAPSULAR OPACIFICATION, NOT OBSCURING VISION: Status: ACTIVE | Noted: 2018-04-17

## 2018-11-02 PROBLEM — H04.129 TEAR FILM INSUFFICIENCY: Status: ACTIVE | Noted: 2018-04-17

## 2018-11-02 PROBLEM — H50.10 MONOCULAR EXOTROPIA: Status: ACTIVE | Noted: 2018-04-17

## 2018-11-02 RX ORDER — NITROGLYCERIN 0.4 MG/1
0.4 TABLET SUBLINGUAL EVERY 5 MIN PRN
Qty: 25 TABLET | Refills: 3 | OUTPATIENT
Start: 2018-11-02 | End: 2019-07-22 | Stop reason: SDUPTHER

## 2018-11-07 ENCOUNTER — OFFICE VISIT (OUTPATIENT)
Dept: PRIMARY CARE CLINIC | Age: 73
End: 2018-11-07
Payer: MEDICARE

## 2018-11-07 VITALS
TEMPERATURE: 98 F | OXYGEN SATURATION: 98 % | HEART RATE: 59 BPM | HEIGHT: 69 IN | BODY MASS INDEX: 18.51 KG/M2 | DIASTOLIC BLOOD PRESSURE: 80 MMHG | WEIGHT: 125 LBS | SYSTOLIC BLOOD PRESSURE: 140 MMHG

## 2018-11-07 DIAGNOSIS — E53.8 B12 DEFICIENCY: ICD-10-CM

## 2018-11-07 DIAGNOSIS — R07.89 ATYPICAL CHEST PAIN: Primary | ICD-10-CM

## 2018-11-07 DIAGNOSIS — F41.9 ANXIETY: ICD-10-CM

## 2018-11-07 DIAGNOSIS — D50.9 IRON DEFICIENCY ANEMIA, UNSPECIFIED IRON DEFICIENCY ANEMIA TYPE: ICD-10-CM

## 2018-11-07 DIAGNOSIS — H90.5 DEAFNESS CONGENITAL: Chronic | ICD-10-CM

## 2018-11-07 DIAGNOSIS — I10 ESSENTIAL HYPERTENSION: ICD-10-CM

## 2018-11-07 PROCEDURE — 99495 TRANSJ CARE MGMT MOD F2F 14D: CPT | Performed by: FAMILY MEDICINE

## 2018-11-07 PROCEDURE — 1111F DSCHRG MED/CURRENT MED MERGE: CPT | Performed by: FAMILY MEDICINE

## 2018-11-07 PROCEDURE — 96372 THER/PROPH/DIAG INJ SC/IM: CPT | Performed by: FAMILY MEDICINE

## 2018-11-07 RX ORDER — CYANOCOBALAMIN 1000 UG/ML
1000 INJECTION INTRAMUSCULAR; SUBCUTANEOUS ONCE
Status: COMPLETED | OUTPATIENT
Start: 2018-11-07 | End: 2018-11-07

## 2018-11-07 RX ADMIN — CYANOCOBALAMIN 1000 MCG: 1000 INJECTION INTRAMUSCULAR; SUBCUTANEOUS at 17:35

## 2018-11-09 NOTE — PROGRESS NOTES
per tablet Take 1 tablet by mouth every 6 hours as needed for Pain for up to 30 days. . 90 tablet 0    donepezil (ARICEPT) 10 MG tablet TAKE ONE TABLET BY MOUTH ONCE DAILY 90 tablet 2    amitriptyline (ELAVIL) 50 MG tablet TAKE TWO TABLETS BY MOUTH ONCE NIGHTLY 60 tablet 5    zolpidem (AMBIEN) 5 MG tablet Take 5 mg by mouth nightly as needed for Sleep. Ova Awad celecoxib (CELEBREX) 200 MG capsule TAKE ONE CAPSULE BY MOUTH ONCE DAILY 90 capsule 3    gabapentin (NEURONTIN) 300 MG capsule TAKE ONE CAPSULE BY MOUTH THREE TIMES DAILY FOR NERVE PAIN IN FEET 90 capsule 5    amLODIPine-benazepril (LOTREL) 5-10 MG per capsule TAKE ONE CAPSULE BY MOUTH ONCE DAILY FOR BLOOD PRESSURE 30 capsule 5    ferrous sulfate 325 (65 Fe) MG tablet TAKE ONE TABLET BY MOUTH ONCE DAILY WITH BREAKFAST 30 tablet 2    triamcinolone (KENALOG) 0.025 % cream Apply Topically (Patient taking differently: every 6 hours as needed Apply Topically) 1 Tube 1    FLUoxetine (PROZAC) 20 MG capsule 1 capsule by mouth once a day for anxiety and depression 90 capsule 3    Misc. Devices (ROLLER WALKER) MISC 1 each by Does not apply route daily DX Code: R26.81, G70.00, H81.313, G60.9 1 each 0    hydrOXYzine (ATARAX) 25 MG tablet Take 25 mg by mouth 3 times daily as needed for Itching      ondansetron (ZOFRAN) 4 MG tablet TAKE ONE TABLET BY MOUTH TWICE DAILY AS NEEDED FOR SEVERE NAUSEA AND VOMITING. 15 tablet 0    sodium chloride 0.9 % irrigation           Medications patient taking as of now reconciled against medications ordered at time of hospital discharge: Yes    Chief Complaint   Patient presents with   [address] from [facility] chest pain       History of Present illness - Follow up of Hospital diagnosis(es): Mrs. Anthony Ramirez had developed chest pain with shortness of breath on 10/28/2018. Her chest pains were worse with waking and better with rest. She would take nitroglycerin and they would improve. Sometimes she had palpitations.  She felt like the pain was radiating to her left arm and she went to urgent care. She was told that she might have atrial fibrillation. She went to the emergency room where she was admitted for workup of her chest pain. A cardiac cath was done on 10/29/2018. She had had a positive [place] scan. Inferior ischemia was noted. Heart cath was done on 10/30/2018. Cardiac cath was done and she did not have PCI. She was felt to be clinically stable and was discharged. Inpatient course: Discharge summary reviewed- see chart. Interval history/Current status: Since she has been at home she has had intermittent chest pain. It turns out that she is very anxious and worried because she keeps getting letters from [employer] saying that she owes thousands of dollars. She doesn't understand how this happened. Everyday she gets another letter and she becomes more anxious and worried. She has been taking some of her nitroglycerin. She is deaf which makes communication difficult. Her  is here with her today. Her daughter feels that she should hire an . Finances are very tight and this makes her even more anxious. A comprehensive review of systems was negative except for what was noted in the HPI. Vitals:    11/07/18 1647   BP: (!) 140/80   Pulse: 59   Temp: 98 °F (36.7 °C)   TempSrc: Temporal   SpO2: 98%   Weight: 125 lb (56.7 kg)   Height: 5' 9\" (1.753 m)     Body mass index is 18.46 kg/m².    Wt Readings from Last 3 Encounters:   11/07/18 125 lb (56.7 kg)   10/30/18 121 lb (54.9 kg)   07/20/18 122 lb 12.8 oz (55.7 kg)     BP Readings from Last 3 Encounters:   11/07/18 (!) 140/80   10/30/18 137/84   08/01/18 120/72        Physical Exam:  General Appearance: alert and oriented to person, place and time, well developed and well- nourished, in no acute distress  Skin: warm and dry, no rash or erythema  Head: normocephalic and atraumatic  Eyes: pupils equal, round, and reactive to light, extraocular eye movements intact, conjunctivae normal  ENT: tympanic membrane, external ear and ear canal normal bilaterally, nose without deformity, nasal mucosa and turbinates normal without polyps. She is deaf. Neck: supple and non-tender without mass, no thyromegaly or thyroid nodules, no cervical lymphadenopathy  Pulmonary/Chest: clear to auscultation bilaterally- no wheezes, rales or rhonchi, normal air movement, no respiratory distress  Cardiovascular: normal rate, regular rhythm, normal S1 and S2, no murmurs, rubs, clicks, or gallops, distal pulses intact, no carotid bruits  Extremities: no cyanosis, clubbing or edema  Musculoskeletal: normal range of motion, no joint swelling, deformity or tenderness  Neurologic: reflexes normal and symmetric, no cranial nerve deficit, gait, coordination and speech normal    Assessment/Plan:  1. B12 deficiency  B12 level was 150 on 10/28/2018.  - cyanocobalamin injection 1,000 mcg; Inject 1 mL into the muscle once    2. Iron deficiency anemia, unspecified iron deficiency anemia type    - cyanocobalamin injection 1,000 mcg; Inject 1 mL into the muscle once    3. Anxiety  Continue current medications. I'm going to see if we can help her regarding [place]. While she is not incompetent yet she does have early memory loss especially short-term and I wonder if that is part of the problem. 4. Atypical chest pain  I feel that her atypical chest pain is related to her anxiety. 5. Deafness congenital  She requires a .     6. Essential hypertension  Controlled        Medical Decision Making: moderate complexity

## 2018-11-13 ENCOUNTER — NURSE ONLY (OUTPATIENT)
Dept: PRIMARY CARE CLINIC | Age: 73
End: 2018-11-13
Payer: MEDICARE

## 2018-11-13 DIAGNOSIS — D64.9 ANEMIA, UNSPECIFIED TYPE: ICD-10-CM

## 2018-11-13 DIAGNOSIS — D51.9 ANEMIA DUE TO VITAMIN B12 DEFICIENCY, UNSPECIFIED B12 DEFICIENCY TYPE: ICD-10-CM

## 2018-11-13 DIAGNOSIS — N28.9 DECREASED RENAL FUNCTION: Primary | ICD-10-CM

## 2018-11-13 PROCEDURE — 96372 THER/PROPH/DIAG INJ SC/IM: CPT | Performed by: FAMILY MEDICINE

## 2018-11-13 PROCEDURE — 36415 COLL VENOUS BLD VENIPUNCTURE: CPT | Performed by: FAMILY MEDICINE

## 2018-11-13 RX ORDER — CYANOCOBALAMIN 1000 UG/ML
1000 INJECTION INTRAMUSCULAR; SUBCUTANEOUS ONCE
Status: COMPLETED | OUTPATIENT
Start: 2018-11-13 | End: 2018-11-13

## 2018-11-13 RX ADMIN — CYANOCOBALAMIN 1000 MCG: 1000 INJECTION INTRAMUSCULAR; SUBCUTANEOUS at 17:36

## 2018-11-15 LAB
ANION GAP SERPL CALCULATED.3IONS-SCNC: 10 MMOL/L (ref 7–19)
BUN BLDV-MCNC: 11 MG/DL (ref 8–23)
CALCIUM SERPL-MCNC: 10.1 MG/DL (ref 8.8–10.2)
CHLORIDE BLD-SCNC: 106 MMOL/L (ref 98–111)
CO2: 27 MMOL/L (ref 22–29)
CREAT SERPL-MCNC: 1 MG/DL (ref 0.5–0.9)
GFR NON-AFRICAN AMERICAN: 54
GLUCOSE BLD-MCNC: 90 MG/DL (ref 74–109)
HCT VFR BLD CALC: 32.6 % (ref 37–47)
HEMOGLOBIN: 10.1 G/DL (ref 12–16)
MCH RBC QN AUTO: 30.7 PG (ref 27–31)
MCHC RBC AUTO-ENTMCNC: 31 G/DL (ref 33–37)
MCV RBC AUTO: 99.1 FL (ref 81–99)
PDW BLD-RTO: 13.2 % (ref 11.5–14.5)
PLATELET # BLD: 348 K/UL (ref 130–400)
PMV BLD AUTO: 10.4 FL (ref 9.4–12.3)
POTASSIUM SERPL-SCNC: 4 MMOL/L (ref 3.5–5)
RBC # BLD: 3.29 M/UL (ref 4.2–5.4)
SODIUM BLD-SCNC: 143 MMOL/L (ref 136–145)
WBC # BLD: 9.4 K/UL (ref 4.8–10.8)

## 2018-11-16 DIAGNOSIS — M54.5 CHRONIC LOW BACK PAIN, UNSPECIFIED BACK PAIN LATERALITY, WITH SCIATICA PRESENCE UNSPECIFIED: ICD-10-CM

## 2018-11-16 DIAGNOSIS — G89.29 CHRONIC LOW BACK PAIN, UNSPECIFIED BACK PAIN LATERALITY, WITH SCIATICA PRESENCE UNSPECIFIED: ICD-10-CM

## 2018-11-16 RX ORDER — HYDROCODONE BITARTRATE AND ACETAMINOPHEN 7.5; 325 MG/1; MG/1
1 TABLET ORAL EVERY 6 HOURS PRN
Qty: 90 TABLET | Refills: 0 | Status: SHIPPED | OUTPATIENT
Start: 2018-11-16 | End: 2018-12-17 | Stop reason: SDUPTHER

## 2018-11-20 RX ORDER — AMLODIPINE BESYLATE AND BENAZEPRIL HYDROCHLORIDE 5; 10 MG/1; MG/1
CAPSULE ORAL
Qty: 30 CAPSULE | Refills: 5 | Status: SHIPPED | OUTPATIENT
Start: 2018-11-20 | End: 2019-05-24 | Stop reason: SDUPTHER

## 2018-12-17 DIAGNOSIS — M54.5 CHRONIC LOW BACK PAIN, UNSPECIFIED BACK PAIN LATERALITY, WITH SCIATICA PRESENCE UNSPECIFIED: ICD-10-CM

## 2018-12-17 DIAGNOSIS — G89.29 CHRONIC LOW BACK PAIN, UNSPECIFIED BACK PAIN LATERALITY, WITH SCIATICA PRESENCE UNSPECIFIED: ICD-10-CM

## 2018-12-17 RX ORDER — HYDROCODONE BITARTRATE AND ACETAMINOPHEN 7.5; 325 MG/1; MG/1
1 TABLET ORAL EVERY 6 HOURS PRN
Qty: 90 TABLET | Refills: 0 | Status: SHIPPED | OUTPATIENT
Start: 2018-12-17 | End: 2019-01-17 | Stop reason: SDUPTHER

## 2019-01-02 DIAGNOSIS — G60.9 IDIOPATHIC PERIPHERAL NEUROPATHY: Primary | ICD-10-CM

## 2019-01-03 RX ORDER — GABAPENTIN 300 MG/1
CAPSULE ORAL
Qty: 90 CAPSULE | Refills: 5 | Status: SHIPPED | OUTPATIENT
Start: 2019-01-03 | End: 2019-07-22

## 2019-01-17 DIAGNOSIS — G89.29 CHRONIC LOW BACK PAIN, UNSPECIFIED BACK PAIN LATERALITY, WITH SCIATICA PRESENCE UNSPECIFIED: ICD-10-CM

## 2019-01-17 DIAGNOSIS — M54.5 CHRONIC LOW BACK PAIN, UNSPECIFIED BACK PAIN LATERALITY, WITH SCIATICA PRESENCE UNSPECIFIED: ICD-10-CM

## 2019-01-17 RX ORDER — HYDROCODONE BITARTRATE AND ACETAMINOPHEN 7.5; 325 MG/1; MG/1
1 TABLET ORAL EVERY 6 HOURS PRN
Qty: 90 TABLET | Refills: 0 | Status: SHIPPED | OUTPATIENT
Start: 2019-01-17 | End: 2019-02-15 | Stop reason: SDUPTHER

## 2019-02-05 ENCOUNTER — HOSPITAL ENCOUNTER (OUTPATIENT)
Dept: VASCULAR LAB | Age: 74
Discharge: HOME | End: 2019-02-05
Payer: MEDICARE

## 2019-02-05 ENCOUNTER — HOSPITAL ENCOUNTER (OUTPATIENT)
Dept: ULTRASOUND IMAGING | Age: 74
Discharge: HOME | End: 2019-02-05
Payer: MEDICARE

## 2019-02-05 ENCOUNTER — OFFICE VISIT (OUTPATIENT)
Dept: VASCULAR SURGERY | Age: 74
End: 2019-02-05
Payer: MEDICARE

## 2019-02-05 VITALS
SYSTOLIC BLOOD PRESSURE: 123 MMHG | RESPIRATION RATE: 18 BRPM | HEART RATE: 62 BPM | TEMPERATURE: 96.4 F | DIASTOLIC BLOOD PRESSURE: 66 MMHG

## 2019-02-05 DIAGNOSIS — I77.811 ECTATIC ABDOMINAL AORTA (HCC): ICD-10-CM

## 2019-02-05 DIAGNOSIS — I73.9 PERIPHERAL VASCULAR DISEASE (HCC): Chronic | ICD-10-CM

## 2019-02-05 DIAGNOSIS — I65.23 BILATERAL CAROTID ARTERY STENOSIS: ICD-10-CM

## 2019-02-05 DIAGNOSIS — R09.89 BRUIT OF RIGHT CAROTID ARTERY: Primary | ICD-10-CM

## 2019-02-05 DIAGNOSIS — R09.89 BRUIT OF RIGHT CAROTID ARTERY: ICD-10-CM

## 2019-02-05 PROCEDURE — G8419 CALC BMI OUT NRM PARAM NOF/U: HCPCS | Performed by: NURSE PRACTITIONER

## 2019-02-05 PROCEDURE — 1101F PT FALLS ASSESS-DOCD LE1/YR: CPT | Performed by: NURSE PRACTITIONER

## 2019-02-05 PROCEDURE — 93923 UPR/LXTR ART STDY 3+ LVLS: CPT

## 2019-02-05 PROCEDURE — 1090F PRES/ABSN URINE INCON ASSESS: CPT | Performed by: NURSE PRACTITIONER

## 2019-02-05 PROCEDURE — 4040F PNEUMOC VAC/ADMIN/RCVD: CPT | Performed by: NURSE PRACTITIONER

## 2019-02-05 PROCEDURE — G8399 PT W/DXA RESULTS DOCUMENT: HCPCS | Performed by: NURSE PRACTITIONER

## 2019-02-05 PROCEDURE — 93978 VASCULAR STUDY: CPT

## 2019-02-05 PROCEDURE — 99213 OFFICE O/P EST LOW 20 MIN: CPT | Performed by: NURSE PRACTITIONER

## 2019-02-05 PROCEDURE — G8427 DOCREV CUR MEDS BY ELIG CLIN: HCPCS | Performed by: NURSE PRACTITIONER

## 2019-02-05 PROCEDURE — 1036F TOBACCO NON-USER: CPT | Performed by: NURSE PRACTITIONER

## 2019-02-05 PROCEDURE — 3017F COLORECTAL CA SCREEN DOC REV: CPT | Performed by: NURSE PRACTITIONER

## 2019-02-05 PROCEDURE — 93880 EXTRACRANIAL BILAT STUDY: CPT

## 2019-02-05 PROCEDURE — G8482 FLU IMMUNIZE ORDER/ADMIN: HCPCS | Performed by: NURSE PRACTITIONER

## 2019-02-05 PROCEDURE — G8599 NO ASA/ANTIPLAT THER USE RNG: HCPCS | Performed by: NURSE PRACTITIONER

## 2019-02-05 PROCEDURE — 1123F ACP DISCUSS/DSCN MKR DOCD: CPT | Performed by: NURSE PRACTITIONER

## 2019-02-06 ENCOUNTER — OFFICE VISIT (OUTPATIENT)
Dept: PRIMARY CARE CLINIC | Age: 74
End: 2019-02-06
Payer: MEDICARE

## 2019-02-06 ENCOUNTER — TELEPHONE (OUTPATIENT)
Dept: VASCULAR SURGERY | Age: 74
End: 2019-02-06

## 2019-02-06 VITALS
RESPIRATION RATE: 18 BRPM | DIASTOLIC BLOOD PRESSURE: 72 MMHG | SYSTOLIC BLOOD PRESSURE: 112 MMHG | WEIGHT: 127.6 LBS | BODY MASS INDEX: 19.34 KG/M2 | TEMPERATURE: 97.4 F | HEART RATE: 60 BPM | OXYGEN SATURATION: 94 % | HEIGHT: 68 IN

## 2019-02-06 DIAGNOSIS — M54.12 CERVICAL RADICULOPATHY: ICD-10-CM

## 2019-02-06 DIAGNOSIS — M54.2 CHRONIC NECK PAIN: ICD-10-CM

## 2019-02-06 DIAGNOSIS — F33.42 RECURRENT MAJOR DEPRESSIVE DISORDER, IN FULL REMISSION (HCC): ICD-10-CM

## 2019-02-06 DIAGNOSIS — G89.29 CHRONIC NECK PAIN: ICD-10-CM

## 2019-02-06 DIAGNOSIS — I77.9 CAROTID ARTERY DISEASE, UNSPECIFIED LATERALITY, UNSPECIFIED TYPE (HCC): ICD-10-CM

## 2019-02-06 DIAGNOSIS — Z00.00 ROUTINE GENERAL MEDICAL EXAMINATION AT A HEALTH CARE FACILITY: Primary | ICD-10-CM

## 2019-02-06 DIAGNOSIS — H04.123 DRY EYES: ICD-10-CM

## 2019-02-06 DIAGNOSIS — G70.00 MYASTHENIA GRAVIS WITHOUT ACUTE EXACERBATION (HCC): ICD-10-CM

## 2019-02-06 PROCEDURE — 1036F TOBACCO NON-USER: CPT | Performed by: FAMILY MEDICINE

## 2019-02-06 PROCEDURE — 1090F PRES/ABSN URINE INCON ASSESS: CPT | Performed by: FAMILY MEDICINE

## 2019-02-06 PROCEDURE — G8482 FLU IMMUNIZE ORDER/ADMIN: HCPCS | Performed by: FAMILY MEDICINE

## 2019-02-06 PROCEDURE — 3017F COLORECTAL CA SCREEN DOC REV: CPT | Performed by: FAMILY MEDICINE

## 2019-02-06 PROCEDURE — G8599 NO ASA/ANTIPLAT THER USE RNG: HCPCS | Performed by: FAMILY MEDICINE

## 2019-02-06 PROCEDURE — 1101F PT FALLS ASSESS-DOCD LE1/YR: CPT | Performed by: FAMILY MEDICINE

## 2019-02-06 PROCEDURE — G0444 DEPRESSION SCREEN ANNUAL: HCPCS | Performed by: FAMILY MEDICINE

## 2019-02-06 PROCEDURE — 1123F ACP DISCUSS/DSCN MKR DOCD: CPT | Performed by: FAMILY MEDICINE

## 2019-02-06 PROCEDURE — 99214 OFFICE O/P EST MOD 30 MIN: CPT | Performed by: FAMILY MEDICINE

## 2019-02-06 PROCEDURE — G8420 CALC BMI NORM PARAMETERS: HCPCS | Performed by: FAMILY MEDICINE

## 2019-02-06 PROCEDURE — G8427 DOCREV CUR MEDS BY ELIG CLIN: HCPCS | Performed by: FAMILY MEDICINE

## 2019-02-06 PROCEDURE — 4040F PNEUMOC VAC/ADMIN/RCVD: CPT | Performed by: FAMILY MEDICINE

## 2019-02-06 PROCEDURE — G0439 PPPS, SUBSEQ VISIT: HCPCS | Performed by: FAMILY MEDICINE

## 2019-02-06 PROCEDURE — G8399 PT W/DXA RESULTS DOCUMENT: HCPCS | Performed by: FAMILY MEDICINE

## 2019-02-06 ASSESSMENT — PATIENT HEALTH QUESTIONNAIRE - PHQ9: SUM OF ALL RESPONSES TO PHQ QUESTIONS 1-9: 10

## 2019-02-06 ASSESSMENT — LIFESTYLE VARIABLES: HOW OFTEN DO YOU HAVE A DRINK CONTAINING ALCOHOL: 0

## 2019-02-08 PROBLEM — I65.23 BILATERAL CAROTID ARTERY STENOSIS: Status: ACTIVE | Noted: 2019-02-08

## 2019-02-09 PROBLEM — F33.42 RECURRENT MAJOR DEPRESSIVE DISORDER, IN FULL REMISSION (HCC): Status: ACTIVE | Noted: 2019-02-09

## 2019-02-09 ASSESSMENT — ENCOUNTER SYMPTOMS
RESPIRATORY NEGATIVE: 1
EYE PAIN: 1
GASTROINTESTINAL NEGATIVE: 1
BACK PAIN: 1

## 2019-02-15 DIAGNOSIS — M54.5 CHRONIC LOW BACK PAIN, UNSPECIFIED BACK PAIN LATERALITY, WITH SCIATICA PRESENCE UNSPECIFIED: ICD-10-CM

## 2019-02-15 DIAGNOSIS — G89.29 CHRONIC LOW BACK PAIN, UNSPECIFIED BACK PAIN LATERALITY, WITH SCIATICA PRESENCE UNSPECIFIED: ICD-10-CM

## 2019-02-15 RX ORDER — HYDROCODONE BITARTRATE AND ACETAMINOPHEN 7.5; 325 MG/1; MG/1
1 TABLET ORAL EVERY 6 HOURS PRN
Qty: 90 TABLET | Refills: 0 | Status: SHIPPED | OUTPATIENT
Start: 2019-02-15 | End: 2019-03-18 | Stop reason: SDUPTHER

## 2019-02-26 RX ORDER — AMITRIPTYLINE HYDROCHLORIDE 50 MG/1
TABLET, FILM COATED ORAL
Qty: 60 TABLET | Refills: 5 | Status: SHIPPED | OUTPATIENT
Start: 2019-02-26 | End: 2019-07-22

## 2019-03-18 DIAGNOSIS — G89.29 CHRONIC LOW BACK PAIN, UNSPECIFIED BACK PAIN LATERALITY, WITH SCIATICA PRESENCE UNSPECIFIED: ICD-10-CM

## 2019-03-18 DIAGNOSIS — M54.5 CHRONIC LOW BACK PAIN, UNSPECIFIED BACK PAIN LATERALITY, WITH SCIATICA PRESENCE UNSPECIFIED: ICD-10-CM

## 2019-03-18 RX ORDER — HYDROCODONE BITARTRATE AND ACETAMINOPHEN 7.5; 325 MG/1; MG/1
1 TABLET ORAL EVERY 6 HOURS PRN
Qty: 90 TABLET | Refills: 0 | Status: SHIPPED | OUTPATIENT
Start: 2019-03-18 | End: 2019-04-16 | Stop reason: SDUPTHER

## 2019-03-25 RX ORDER — FLUOXETINE HYDROCHLORIDE 20 MG/1
CAPSULE ORAL
Qty: 90 CAPSULE | Refills: 3 | Status: SHIPPED | OUTPATIENT
Start: 2019-03-25 | End: 2020-04-07

## 2019-04-16 ENCOUNTER — TELEPHONE (OUTPATIENT)
Dept: PRIMARY CARE CLINIC | Age: 74
End: 2019-04-16

## 2019-04-16 DIAGNOSIS — G89.29 CHRONIC LOW BACK PAIN, UNSPECIFIED BACK PAIN LATERALITY, WITH SCIATICA PRESENCE UNSPECIFIED: ICD-10-CM

## 2019-04-16 DIAGNOSIS — M54.5 CHRONIC LOW BACK PAIN, UNSPECIFIED BACK PAIN LATERALITY, WITH SCIATICA PRESENCE UNSPECIFIED: ICD-10-CM

## 2019-04-16 RX ORDER — HYDROCODONE BITARTRATE AND ACETAMINOPHEN 7.5; 325 MG/1; MG/1
1 TABLET ORAL EVERY 6 HOURS PRN
Qty: 90 TABLET | Refills: 0 | Status: SHIPPED | OUTPATIENT
Start: 2019-04-16 | End: 2019-05-15 | Stop reason: SDUPTHER

## 2019-04-16 NOTE — TELEPHONE ENCOUNTER
I spoke with Pt daughter and she states Pt has DDD in neck and back.  I called the pharmacy and this is a good diagnosis

## 2019-04-16 NOTE — TELEPHONE ENCOUNTER
Mila Hernandez at [facility] called stating new laws require a diagnosis more than low back pain for Pts pain med. Has to be more specific.

## 2019-05-15 DIAGNOSIS — G89.29 CHRONIC LOW BACK PAIN, UNSPECIFIED BACK PAIN LATERALITY, WITH SCIATICA PRESENCE UNSPECIFIED: ICD-10-CM

## 2019-05-15 DIAGNOSIS — M54.5 CHRONIC LOW BACK PAIN, UNSPECIFIED BACK PAIN LATERALITY, WITH SCIATICA PRESENCE UNSPECIFIED: ICD-10-CM

## 2019-05-15 RX ORDER — HYDROCODONE BITARTRATE AND ACETAMINOPHEN 7.5; 325 MG/1; MG/1
1 TABLET ORAL EVERY 6 HOURS PRN
Qty: 90 TABLET | Refills: 0 | Status: SHIPPED | OUTPATIENT
Start: 2019-05-15 | End: 2019-06-14

## 2019-06-18 DIAGNOSIS — M54.5 CHRONIC LOW BACK PAIN, UNSPECIFIED BACK PAIN LATERALITY, WITH SCIATICA PRESENCE UNSPECIFIED: ICD-10-CM

## 2019-06-18 DIAGNOSIS — G89.29 CHRONIC LOW BACK PAIN, UNSPECIFIED BACK PAIN LATERALITY, WITH SCIATICA PRESENCE UNSPECIFIED: ICD-10-CM

## 2019-06-18 RX ORDER — HYDROCODONE BITARTRATE AND ACETAMINOPHEN 7.5; 325 MG/1; MG/1
1 TABLET ORAL EVERY 6 HOURS PRN
Qty: 90 TABLET | Refills: 0 | Status: SHIPPED | OUTPATIENT
Start: 2019-06-18 | End: 2019-07-17 | Stop reason: SDUPTHER

## 2019-07-05 RX ORDER — DONEPEZIL HYDROCHLORIDE 10 MG/1
TABLET, FILM COATED ORAL
Qty: 90 TABLET | Refills: 2 | Status: SHIPPED | OUTPATIENT
Start: 2019-07-05 | End: 2020-01-27 | Stop reason: DRUGHIGH

## 2019-07-17 DIAGNOSIS — M54.5 CHRONIC LOW BACK PAIN, UNSPECIFIED BACK PAIN LATERALITY, WITH SCIATICA PRESENCE UNSPECIFIED: ICD-10-CM

## 2019-07-17 DIAGNOSIS — G89.29 CHRONIC LOW BACK PAIN, UNSPECIFIED BACK PAIN LATERALITY, WITH SCIATICA PRESENCE UNSPECIFIED: ICD-10-CM

## 2019-07-17 RX ORDER — HYDROCODONE BITARTRATE AND ACETAMINOPHEN 7.5; 325 MG/1; MG/1
1 TABLET ORAL EVERY 6 HOURS PRN
Qty: 90 TABLET | Refills: 0 | Status: SHIPPED | OUTPATIENT
Start: 2019-07-17 | End: 2019-07-22 | Stop reason: SDUPTHER

## 2019-07-22 ENCOUNTER — OFFICE VISIT (OUTPATIENT)
Dept: PRIMARY CARE CLINIC | Age: 74
End: 2019-07-22
Payer: MEDICARE

## 2019-07-22 VITALS
HEIGHT: 68 IN | DIASTOLIC BLOOD PRESSURE: 74 MMHG | WEIGHT: 129 LBS | RESPIRATION RATE: 18 BRPM | TEMPERATURE: 97.7 F | OXYGEN SATURATION: 94 % | BODY MASS INDEX: 19.55 KG/M2 | HEART RATE: 64 BPM | SYSTOLIC BLOOD PRESSURE: 126 MMHG

## 2019-07-22 DIAGNOSIS — F33.41 RECURRENT MAJOR DEPRESSIVE DISORDER, IN PARTIAL REMISSION (HCC): ICD-10-CM

## 2019-07-22 DIAGNOSIS — R53.82 CHRONIC FATIGUE: ICD-10-CM

## 2019-07-22 DIAGNOSIS — G89.29 CHRONIC LOW BACK PAIN, UNSPECIFIED BACK PAIN LATERALITY, WITH SCIATICA PRESENCE UNSPECIFIED: Primary | ICD-10-CM

## 2019-07-22 DIAGNOSIS — E78.00 HYPERCHOLESTEROLEMIA: ICD-10-CM

## 2019-07-22 DIAGNOSIS — I10 ESSENTIAL HYPERTENSION: ICD-10-CM

## 2019-07-22 DIAGNOSIS — G60.9 IDIOPATHIC PERIPHERAL NEUROPATHY: ICD-10-CM

## 2019-07-22 DIAGNOSIS — G70.00 MYASTHENIA GRAVIS WITHOUT ACUTE EXACERBATION (HCC): ICD-10-CM

## 2019-07-22 DIAGNOSIS — M54.5 CHRONIC LOW BACK PAIN, UNSPECIFIED BACK PAIN LATERALITY, WITH SCIATICA PRESENCE UNSPECIFIED: Primary | ICD-10-CM

## 2019-07-22 LAB
ALBUMIN SERPL-MCNC: 3.9 G/DL (ref 3.5–5.2)
ALP BLD-CCNC: 96 U/L (ref 35–104)
ALT SERPL-CCNC: 7 U/L (ref 5–33)
ANION GAP SERPL CALCULATED.3IONS-SCNC: 13 MMOL/L (ref 7–19)
AST SERPL-CCNC: 16 U/L (ref 5–32)
BILIRUB SERPL-MCNC: <0.2 MG/DL (ref 0.2–1.2)
BUN BLDV-MCNC: 20 MG/DL (ref 8–23)
CALCIUM SERPL-MCNC: 9.5 MG/DL (ref 8.8–10.2)
CHLORIDE BLD-SCNC: 105 MMOL/L (ref 98–111)
CHOLESTEROL, TOTAL: 193 MG/DL (ref 160–199)
CO2: 25 MMOL/L (ref 22–29)
CREAT SERPL-MCNC: 1 MG/DL (ref 0.5–0.9)
GFR NON-AFRICAN AMERICAN: 54
GLUCOSE BLD-MCNC: 120 MG/DL (ref 74–109)
HCT VFR BLD CALC: 33.5 % (ref 37–47)
HDLC SERPL-MCNC: 65 MG/DL (ref 65–121)
HEMOGLOBIN: 10.3 G/DL (ref 12–16)
LDL CHOLESTEROL CALCULATED: 109 MG/DL
MCH RBC QN AUTO: 30.6 PG (ref 27–31)
MCHC RBC AUTO-ENTMCNC: 30.7 G/DL (ref 33–37)
MCV RBC AUTO: 99.4 FL (ref 81–99)
PDW BLD-RTO: 12.9 % (ref 11.5–14.5)
PLATELET # BLD: 309 K/UL (ref 130–400)
PMV BLD AUTO: 10.2 FL (ref 9.4–12.3)
POTASSIUM SERPL-SCNC: 3.5 MMOL/L (ref 3.5–5)
RBC # BLD: 3.37 M/UL (ref 4.2–5.4)
SODIUM BLD-SCNC: 143 MMOL/L (ref 136–145)
TOTAL PROTEIN: 6.6 G/DL (ref 6.6–8.7)
TRIGL SERPL-MCNC: 96 MG/DL (ref 0–149)
TSH SERPL DL<=0.05 MIU/L-ACNC: 3.63 UIU/ML (ref 0.27–4.2)
WBC # BLD: 7.2 K/UL (ref 4.8–10.8)

## 2019-07-22 PROCEDURE — G8427 DOCREV CUR MEDS BY ELIG CLIN: HCPCS | Performed by: FAMILY MEDICINE

## 2019-07-22 PROCEDURE — 1036F TOBACCO NON-USER: CPT | Performed by: FAMILY MEDICINE

## 2019-07-22 PROCEDURE — G8420 CALC BMI NORM PARAMETERS: HCPCS | Performed by: FAMILY MEDICINE

## 2019-07-22 PROCEDURE — 4040F PNEUMOC VAC/ADMIN/RCVD: CPT | Performed by: FAMILY MEDICINE

## 2019-07-22 PROCEDURE — 36415 COLL VENOUS BLD VENIPUNCTURE: CPT | Performed by: FAMILY MEDICINE

## 2019-07-22 PROCEDURE — 1123F ACP DISCUSS/DSCN MKR DOCD: CPT | Performed by: FAMILY MEDICINE

## 2019-07-22 PROCEDURE — G8599 NO ASA/ANTIPLAT THER USE RNG: HCPCS | Performed by: FAMILY MEDICINE

## 2019-07-22 PROCEDURE — 3017F COLORECTAL CA SCREEN DOC REV: CPT | Performed by: FAMILY MEDICINE

## 2019-07-22 PROCEDURE — 99214 OFFICE O/P EST MOD 30 MIN: CPT | Performed by: FAMILY MEDICINE

## 2019-07-22 PROCEDURE — 1090F PRES/ABSN URINE INCON ASSESS: CPT | Performed by: FAMILY MEDICINE

## 2019-07-22 PROCEDURE — G8399 PT W/DXA RESULTS DOCUMENT: HCPCS | Performed by: FAMILY MEDICINE

## 2019-07-22 RX ORDER — AMITRIPTYLINE HYDROCHLORIDE 75 MG/1
TABLET, FILM COATED ORAL
Qty: 30 TABLET | Refills: 5 | Status: SHIPPED | OUTPATIENT
Start: 2019-07-22 | End: 2020-07-29 | Stop reason: SDUPTHER

## 2019-07-22 RX ORDER — NITROGLYCERIN 0.4 MG/1
0.4 TABLET SUBLINGUAL EVERY 5 MIN PRN
Qty: 25 TABLET | Refills: 3 | Status: SHIPPED | OUTPATIENT
Start: 2019-07-22 | End: 2021-01-01

## 2019-07-22 RX ORDER — HYDROXYZINE HYDROCHLORIDE 25 MG/1
25 TABLET, FILM COATED ORAL 3 TIMES DAILY PRN
Qty: 30 TABLET | Refills: 5 | Status: SHIPPED | OUTPATIENT
Start: 2019-07-22 | End: 2020-01-27 | Stop reason: ALTCHOICE

## 2019-07-22 RX ORDER — HYDROCODONE BITARTRATE AND ACETAMINOPHEN 7.5; 325 MG/1; MG/1
1 TABLET ORAL EVERY 6 HOURS PRN
Qty: 90 TABLET | Refills: 0 | Status: SHIPPED | OUTPATIENT
Start: 2019-07-22 | End: 2019-08-19 | Stop reason: SDUPTHER

## 2019-07-23 ASSESSMENT — ENCOUNTER SYMPTOMS
ABDOMINAL PAIN: 0
DIARRHEA: 1
BACK PAIN: 1
CONSTIPATION: 1
SHORTNESS OF BREATH: 1

## 2019-07-24 ENCOUNTER — TELEPHONE (OUTPATIENT)
Dept: NEUROSURGERY | Age: 74
End: 2019-07-24

## 2019-07-24 DIAGNOSIS — M54.2 NECK PAIN: Primary | ICD-10-CM

## 2019-07-24 NOTE — TELEPHONE ENCOUNTER
If patient has not had XR obtained, patient will need this prior to scheduling with neurosurgery. She will need XR AP, lateral, flexion, and extension.

## 2019-07-24 NOTE — PROGRESS NOTES
 Posterior capsular opacification, not obscuring vision 04/17/2018    Pseudophakia 04/17/2018    Unsteady gait 08/15/2017    Myasthenia gravis without acute exacerbation (HCC) 08/12/2017    Vertigo, aural 08/12/2017    Chronic tension-type headache, intractable 08/12/2017    Idiopathic peripheral neuropathy 08/12/2017    Epigastric pain 07/06/2017    Alternating constipation and diarrhea 07/06/2017    Family history of colonic polyps 07/06/2017    Chronic insomnia 06/10/2017    Ectatic abdominal aorta ([facility] 07/13/2016    Peripheral vascular disease ([facility] 01/12/2016    Venous (peripheral) insufficiency 01/12/2016    Essential hypertension 12/07/2015    Descending thoracic aortic aneurysm (HCC) 11/04/2013    Osteoarthritis     Chronic back pain     Memory loss     Neck pain     Deafness congenital      Current Outpatient Medications   Medication Sig Dispense Refill    HYDROcodone-acetaminophen (NORCO) 7.5-325 MG per tablet Take 1 tablet by mouth every 6 hours as needed for Pain for up to 30 days. 90 tablet 0    hydrOXYzine (ATARAX) 25 MG tablet Take 1 tablet by mouth 3 times daily as needed for Itching 30 tablet 5    amitriptyline (ELAVIL) 75 MG tablet 1 tablet by mouth at bedtime for sleep 30 tablet 5    nitroGLYCERIN (NITROSTAT) 0.4 MG SL tablet Place 1 tablet under the tongue every 5 minutes as needed for Chest pain up to max of 3 total doses.  If no relief after 1 dose, call 911. 25 tablet 3    donepezil (ARICEPT) 10 MG tablet TAKE 1 TABLET BY MOUTH ONCE DAILY 90 tablet 2    amLODIPine-benazepril (LOTREL) 5-10 MG per capsule TAKE 1 CAPSULE BY MOUTH ONCE DAILY FOR BLOOD PRESSURE 90 capsule 1    FLUoxetine (PROZAC) 20 MG capsule TAKE ONE CAPSULE BY MOUTH ONCE DAILY FOR ANXIETY & DEPRESSION 90 capsule 3    celecoxib (CELEBREX) 200 MG capsule TAKE ONE CAPSULE BY MOUTH ONCE DAILY 90 capsule 3    triamcinolone (KENALOG) 0.025 % cream Apply Topically (Patient taking differently: every 6 Hearing Loss Brother         deaf    Hearing Loss Brother         deaf    Colon Polyps Sister     Colon Cancer Neg Hx     Esophageal Cancer Neg Hx     Liver Cancer Neg Hx     Liver Disease Neg Hx     Stomach Cancer Neg Hx     Rectal Cancer Neg Hx      Social History     Tobacco Use    Smoking status: Never Smoker    Smokeless tobacco: Never Used   Substance Use Topics    Alcohol use: No     Past Medical History:   Diagnosis Date    Anxiety     Chronic back pain     severe muscle spasms    Deafness congenital     Depression     Headache     Hypertension     Memory loss     early    Myasthenia ([address]     Neck pain     Osteoarthritis     Ptosis of eyelid     Weakness of face muscles     Wound of right ankle 2016    open wound to lateral side of right ankle       reports that she has never smoked. She has never used smokeless tobacco. She reports that she does not drink alcohol or use drugs. Past Surgical History:   Procedure Laterality Date    APPENDECTOMY      CARPAL TUNNEL RELEASE      CATARACT REMOVAL      COLONOSCOPY  7-2010    COLONOSCOPY  2000 or before    [place]-normal per patient    EYE SURGERY      HERNIA REPAIR      on back of neck    IN COLONOSCOPY FLX DX W/COLLJ SPEC WHEN PFRMD N/A 7/6/2017    Dr Zia Arteaga diverticulosis, 5 yr recall    IN EGD TRANSORAL BIOPSY SINGLE/MULTIPLE N/A 7/6/2017    Dr Koko Romero, [place] (-)    TERRI AND BSO      VARICOSE VEIN SURGERY      VASCULAR SURGERY  02/19/2018    TJR. Aortogram and runoff. Left common femoral artery, 5 [place] sheath. Review of Systems   Constitutional: Positive for activity change and fatigue. HENT: Positive for hearing loss. Eyes: Positive for visual disturbance. Respiratory: Positive for shortness of breath. Cardiovascular: Negative. Gastrointestinal: Positive for constipation and diarrhea. Negative for abdominal pain. Musculoskeletal: Positive for arthralgias, back pain and myalgias. ([facility]    7. Hypercholesterolemia            Plan:      MEDICATIONS:  Orders Placed This Encounter   Medications    HYDROcodone-acetaminophen (NORCO) 7.5-325 MG per tablet     Sig: Take 1 tablet by mouth every 6 hours as needed for Pain for up to 30 days. Dispense:  90 tablet     Refill:  0     Fill in August when due    hydrOXYzine (ATARAX) 25 MG tablet     Sig: Take 1 tablet by mouth 3 times daily as needed for Itching     Dispense:  30 tablet     Refill:  5    amitriptyline (ELAVIL) 75 MG tablet     Si tablet by mouth at bedtime for sleep     Dispense:  30 tablet     Refill:  5    nitroGLYCERIN (NITROSTAT) 0.4 MG SL tablet     Sig: Place 1 tablet under the tongue every 5 minutes as needed for Chest pain up to max of 3 total doses. If no relief after 1 dose, call 911. Dispense:  25 tablet     Refill:  3     We discussed the risks and benefits of her pain medication. She is not increasing the dose. Her last Lund Edman was done on 2019. She has a medication agreement from 2019. I'm going to increase her amitriptyline to see if this will help her sleep. ORDERS:  Orders Placed This Encounter   Procedures    Comprehensive Metabolic Panel    Lipid Panel    CBC    TSH without Reflex   May Gloria MD, Neurosurgery, [place]     I am concerned about her worsening back and shoulder pain. I'm going to go ahead and refer her to [facility] neurosurgery for further evaluation. I do not want to increase her pain medication unless we know what we are treating. We really don't have an MRI and a good handle on what is causing her back pain. We will contact her when we get results of her blood work. I refilled the medications that she needed. She will continue to follow-up with her neurologist regarding her myasthenia gravis. Follow-up:  Return in about 6 months (around 2020) for med monitoring visit.           MD JENY Cabrera/transcription disclaimer: Muchof this encounter note is electronic transcription/translation of spoken language to printed texts. The electronic translation of spoken language may be erroneous, or at times, nonsensical words or phrases may beinadvertently transcribed.   Although I have reviewed the note for such errors, some may still exist.

## 2019-07-29 ENCOUNTER — TELEPHONE (OUTPATIENT)
Dept: NEUROSURGERY | Age: 74
End: 2019-07-29

## 2019-07-30 ENCOUNTER — HOSPITAL ENCOUNTER (OUTPATIENT)
Dept: GENERAL RADIOLOGY | Age: 74
Discharge: HOME | End: 2019-07-30
Payer: MEDICARE

## 2019-07-30 DIAGNOSIS — M54.2 NECK PAIN: ICD-10-CM

## 2019-07-30 PROCEDURE — 72040 X-RAY EXAM NECK SPINE 2-3 VW: CPT

## 2019-08-07 ENCOUNTER — OFFICE VISIT (OUTPATIENT)
Dept: NEUROSURGERY | Age: 74
End: 2019-08-07
Payer: MEDICARE

## 2019-08-07 DIAGNOSIS — R29.898 LEFT ARM WEAKNESS: ICD-10-CM

## 2019-08-07 DIAGNOSIS — M54.50 CHRONIC MIDLINE LOW BACK PAIN WITHOUT SCIATICA: ICD-10-CM

## 2019-08-07 DIAGNOSIS — M54.2 NECK PAIN: ICD-10-CM

## 2019-08-07 DIAGNOSIS — M79.602 BILATERAL ARM PAIN: ICD-10-CM

## 2019-08-07 DIAGNOSIS — M79.601 BILATERAL ARM PAIN: ICD-10-CM

## 2019-08-07 DIAGNOSIS — R20.0 BILATERAL HAND NUMBNESS: ICD-10-CM

## 2019-08-07 DIAGNOSIS — G89.29 CHRONIC MIDLINE LOW BACK PAIN WITHOUT SCIATICA: ICD-10-CM

## 2019-08-07 DIAGNOSIS — R26.81 UNSTEADY GAIT: ICD-10-CM

## 2019-08-07 DIAGNOSIS — M50.30 DDD (DEGENERATIVE DISC DISEASE), CERVICAL: Primary | ICD-10-CM

## 2019-08-07 PROCEDURE — G8599 NO ASA/ANTIPLAT THER USE RNG: HCPCS | Performed by: NURSE PRACTITIONER

## 2019-08-07 PROCEDURE — G8399 PT W/DXA RESULTS DOCUMENT: HCPCS | Performed by: NURSE PRACTITIONER

## 2019-08-07 PROCEDURE — G8420 CALC BMI NORM PARAMETERS: HCPCS | Performed by: NURSE PRACTITIONER

## 2019-08-07 PROCEDURE — 99214 OFFICE O/P EST MOD 30 MIN: CPT | Performed by: NURSE PRACTITIONER

## 2019-08-07 PROCEDURE — 1036F TOBACCO NON-USER: CPT | Performed by: NURSE PRACTITIONER

## 2019-08-07 PROCEDURE — 1090F PRES/ABSN URINE INCON ASSESS: CPT | Performed by: NURSE PRACTITIONER

## 2019-08-07 PROCEDURE — 3017F COLORECTAL CA SCREEN DOC REV: CPT | Performed by: NURSE PRACTITIONER

## 2019-08-07 PROCEDURE — G8428 CUR MEDS NOT DOCUMENT: HCPCS | Performed by: NURSE PRACTITIONER

## 2019-08-07 PROCEDURE — 1123F ACP DISCUSS/DSCN MKR DOCD: CPT | Performed by: NURSE PRACTITIONER

## 2019-08-07 PROCEDURE — 4040F PNEUMOC VAC/ADMIN/RCVD: CPT | Performed by: NURSE PRACTITIONER

## 2019-08-07 ASSESSMENT — ENCOUNTER SYMPTOMS
SINUS PAIN: 1
CONSTIPATION: 1
SORE THROAT: 1
EYE DISCHARGE: 1
NAUSEA: 1
WHEEZING: 1
BLURRED VISION: 1
DIARRHEA: 1
BACK PAIN: 1
DOUBLE VISION: 1
SHORTNESS OF BREATH: 1

## 2019-08-07 NOTE — PROGRESS NOTES
[facility] Neurosurgery  Office Visit      Chief Complaint   Patient presents with    Back Pain     Low back pain    Arm Pain     Shoulders and bilateral arms    Neck Pain       HISTORY OF PRESENT ILLNESS:    Moose Figueredo is a 75 y.o. female disabled that is deaf who presents with multiple complaints that include low back pain, neck pain, and bilateral arm pain that has been present for many years \"almost my entire life\". The neck pain does radiate into the bilateral arms L>R into the shoulders, triceps. Her pain is mostly located in the neck. The patient complains of numbness and cold feeling in her bilateral hands in all her fingers. She states that she has about 80% neck and 20% arm pain. She does not have any fine motor impairment, occasional dropping of objects. She does feel really weak when walking. She also complains of low back pain that is chronic, she denies any leg pain. She denies any numbness or tingling in the legs. Her pain is not changed when going from a seated to standing position. Her pain is worsened with walking. Her pain is improved when lying flat. Overall, indicative that the patient does have a mechanical nature to their pain. The patient states that she can no longer lift things which has dramatically affected her quality of life. The patient has underwent a non-operative treatment course that has included:  NSAIDs (in the past)  Muscle Relaxers (in the past)  Gabapentin   Opiates (Norco)  Physical Therapy   Epidural Steroid Injections (when in [place])      Of note she does not use tobacco and does not take blood thinning medications. Of note she had an  at today's appt.                Past Medical History:   Diagnosis Date    Anxiety     Chronic back pain     severe muscle spasms    Deafness congenital     Depression     Headache     Hypertension     Memory loss     early    Myasthenia ([address]     Neck pain     Osteoarthritis    

## 2019-08-19 DIAGNOSIS — G89.29 CHRONIC LOW BACK PAIN, UNSPECIFIED BACK PAIN LATERALITY, WITH SCIATICA PRESENCE UNSPECIFIED: ICD-10-CM

## 2019-08-19 DIAGNOSIS — M54.5 CHRONIC LOW BACK PAIN, UNSPECIFIED BACK PAIN LATERALITY, WITH SCIATICA PRESENCE UNSPECIFIED: ICD-10-CM

## 2019-08-19 RX ORDER — HYDROCODONE BITARTRATE AND ACETAMINOPHEN 7.5; 325 MG/1; MG/1
1 TABLET ORAL EVERY 6 HOURS PRN
Qty: 90 TABLET | Refills: 0 | Status: SHIPPED | OUTPATIENT
Start: 2019-08-19 | End: 2019-09-18 | Stop reason: SDUPTHER

## 2019-08-26 ENCOUNTER — HOSPITAL ENCOUNTER (OUTPATIENT)
Dept: MRI IMAGING | Age: 74
Discharge: HOME | End: 2019-08-26
Payer: MEDICARE

## 2019-08-26 DIAGNOSIS — M54.2 NECK PAIN: ICD-10-CM

## 2019-08-26 DIAGNOSIS — R26.81 UNSTEADY GAIT: ICD-10-CM

## 2019-08-26 DIAGNOSIS — R20.0 BILATERAL HAND NUMBNESS: ICD-10-CM

## 2019-08-26 DIAGNOSIS — M50.30 DDD (DEGENERATIVE DISC DISEASE), CERVICAL: ICD-10-CM

## 2019-08-26 DIAGNOSIS — R29.898 LEFT ARM WEAKNESS: ICD-10-CM

## 2019-08-26 DIAGNOSIS — M79.601 BILATERAL ARM PAIN: ICD-10-CM

## 2019-08-26 DIAGNOSIS — M54.50 CHRONIC MIDLINE LOW BACK PAIN WITHOUT SCIATICA: ICD-10-CM

## 2019-08-26 DIAGNOSIS — M79.602 BILATERAL ARM PAIN: ICD-10-CM

## 2019-08-26 DIAGNOSIS — G89.29 CHRONIC MIDLINE LOW BACK PAIN WITHOUT SCIATICA: ICD-10-CM

## 2019-08-26 PROCEDURE — 72148 MRI LUMBAR SPINE W/O DYE: CPT

## 2019-08-26 PROCEDURE — 72141 MRI NECK SPINE W/O DYE: CPT

## 2019-09-12 ENCOUNTER — HOSPITAL ENCOUNTER (OUTPATIENT)
Dept: GENERAL RADIOLOGY | Age: 74
Discharge: HOME | End: 2019-09-12
Payer: MEDICARE

## 2019-09-12 ENCOUNTER — OFFICE VISIT (OUTPATIENT)
Dept: NEUROSURGERY | Age: 74
End: 2019-09-12
Payer: MEDICARE

## 2019-09-12 ENCOUNTER — TELEPHONE (OUTPATIENT)
Dept: NEUROSURGERY | Age: 74
End: 2019-09-12

## 2019-09-12 VITALS
HEIGHT: 69 IN | HEART RATE: 57 BPM | WEIGHT: 124 LBS | DIASTOLIC BLOOD PRESSURE: 78 MMHG | BODY MASS INDEX: 18.37 KG/M2 | SYSTOLIC BLOOD PRESSURE: 134 MMHG

## 2019-09-12 DIAGNOSIS — M54.50 CHRONIC MIDLINE LOW BACK PAIN WITHOUT SCIATICA: ICD-10-CM

## 2019-09-12 DIAGNOSIS — M43.16 SPONDYLOLISTHESIS AT L4-L5 LEVEL: ICD-10-CM

## 2019-09-12 DIAGNOSIS — M48.02 CERVICAL STENOSIS OF SPINAL CANAL: ICD-10-CM

## 2019-09-12 DIAGNOSIS — G89.29 CHRONIC MIDLINE LOW BACK PAIN WITHOUT SCIATICA: ICD-10-CM

## 2019-09-12 DIAGNOSIS — R26.81 UNSTEADY GAIT: ICD-10-CM

## 2019-09-12 DIAGNOSIS — M50.30 DDD (DEGENERATIVE DISC DISEASE), CERVICAL: ICD-10-CM

## 2019-09-12 DIAGNOSIS — R20.0 BILATERAL HAND NUMBNESS: ICD-10-CM

## 2019-09-12 DIAGNOSIS — M43.12 SPONDYLOLISTHESIS, CERVICAL REGION: Primary | ICD-10-CM

## 2019-09-12 DIAGNOSIS — M54.2 NECK PAIN: ICD-10-CM

## 2019-09-12 DIAGNOSIS — G89.29 CHRONIC LEFT SHOULDER PAIN: ICD-10-CM

## 2019-09-12 DIAGNOSIS — M25.512 CHRONIC LEFT SHOULDER PAIN: ICD-10-CM

## 2019-09-12 DIAGNOSIS — M48.061 SPINAL STENOSIS OF LUMBAR REGION WITHOUT NEUROGENIC CLAUDICATION: ICD-10-CM

## 2019-09-12 PROCEDURE — 1090F PRES/ABSN URINE INCON ASSESS: CPT | Performed by: NURSE PRACTITIONER

## 2019-09-12 PROCEDURE — 99214 OFFICE O/P EST MOD 30 MIN: CPT | Performed by: NURSE PRACTITIONER

## 2019-09-12 PROCEDURE — 3017F COLORECTAL CA SCREEN DOC REV: CPT | Performed by: NURSE PRACTITIONER

## 2019-09-12 PROCEDURE — 72120 X-RAY BEND ONLY L-S SPINE: CPT

## 2019-09-12 PROCEDURE — G8419 CALC BMI OUT NRM PARAM NOF/U: HCPCS | Performed by: NURSE PRACTITIONER

## 2019-09-12 PROCEDURE — G8399 PT W/DXA RESULTS DOCUMENT: HCPCS | Performed by: NURSE PRACTITIONER

## 2019-09-12 PROCEDURE — 1123F ACP DISCUSS/DSCN MKR DOCD: CPT | Performed by: NURSE PRACTITIONER

## 2019-09-12 PROCEDURE — 4040F PNEUMOC VAC/ADMIN/RCVD: CPT | Performed by: NURSE PRACTITIONER

## 2019-09-12 PROCEDURE — 1036F TOBACCO NON-USER: CPT | Performed by: NURSE PRACTITIONER

## 2019-09-12 PROCEDURE — G8599 NO ASA/ANTIPLAT THER USE RNG: HCPCS | Performed by: NURSE PRACTITIONER

## 2019-09-12 PROCEDURE — G8427 DOCREV CUR MEDS BY ELIG CLIN: HCPCS | Performed by: NURSE PRACTITIONER

## 2019-09-12 ASSESSMENT — ENCOUNTER SYMPTOMS
GASTROINTESTINAL NEGATIVE: 1
EYES NEGATIVE: 1

## 2019-09-12 NOTE — PROGRESS NOTES
congenital     Depression     Headache     Hypertension     Memory loss     early    Myasthenia ([facility] [address]     Neck pain     Osteoarthritis     Ptosis of eyelid     Weakness of face muscles     Wound of right ankle 2016    open wound to lateral side of right ankle        Past Surgical History:   Procedure Laterality Date    APPENDECTOMY      CARPAL TUNNEL RELEASE      CATARACT REMOVAL      COLONOSCOPY  7-2010    COLONOSCOPY  2000 or before    [place]-normal per patient    EYE SURGERY      HERNIA REPAIR      on back of neck    [place] COLONOSCOPY FLX DX W/COLLJ SPEC WHEN PFRMD N/A 7/6/2017    Dr Chen diverticulosis, 5 yr recall    [place] EGD TRANSORAL BIOPSY SINGLE/MULTIPLE N/A 7/6/2017    Dr Dimitri Fung, [place] (-)    TERRI AND BSO      VARICOSE VEIN SURGERY      VASCULAR SURGERY  02/19/2018    [facility]. Aortogram and runoff. Left common femoral artery, 5 french sheath. Current Outpatient Medications   Medication Sig Dispense Refill    HYDROcodone-acetaminophen (NORCO) 7.5-325 MG per tablet Take 1 tablet by mouth every 6 hours as needed for Pain for up to 30 days. 90 tablet 0    hydrOXYzine (ATARAX) 25 MG tablet Take 1 tablet by mouth 3 times daily as needed for Itching 30 tablet 5    amitriptyline (ELAVIL) 75 MG tablet 1 tablet by mouth at bedtime for sleep 30 tablet 5    nitroGLYCERIN (NITROSTAT) 0.4 MG SL tablet Place 1 tablet under the tongue every 5 minutes as needed for Chest pain up to max of 3 total doses.  If no relief after 1 dose, call 911. 25 tablet 3    donepezil (ARICEPT) 10 MG tablet TAKE 1 TABLET BY MOUTH ONCE DAILY 90 tablet 2    amLODIPine-benazepril (LOTREL) 5-10 MG per capsule TAKE 1 CAPSULE BY MOUTH ONCE DAILY FOR BLOOD PRESSURE 90 capsule 1    FLUoxetine (PROZAC) 20 MG capsule TAKE ONE CAPSULE BY MOUTH ONCE DAILY FOR ANXIETY & DEPRESSION 90 capsule 3    celecoxib (CELEBREX) 200 MG capsule TAKE ONE CAPSULE BY MOUTH ONCE DAILY 90 capsule 3    triamcinolone (KENALOG) 0.025 % cream Apply Topically (Patient taking differently: every 6 hours as needed Apply Topically) 1 Tube 1    Misc. Devices (ROLLER WALKER) MISC 1 each by Does not apply route daily DX Code: R26.81, G70.00, H81.313, G60.9 1 each 0    ondansetron (ZOFRAN) 4 MG tablet TAKE ONE TABLET BY MOUTH TWICE DAILY AS NEEDED FOR SEVERE NAUSEA AND VOMITING. 15 tablet 0    sodium chloride 0.9 % irrigation        No current facility-administered medications for this visit. Allergies:  Patient has no known allergies. Social History:   Social History     Tobacco Use   Smoking Status Never Smoker   Smokeless Tobacco Never Used     Social History     Substance and Sexual Activity   Alcohol Use No         Family History:   Family History   Problem Relation Age of Onset    High Blood Pressure Mother     Stroke Mother     Cancer Father     Lung Cancer Father     Hearing Loss Brother         deaf   [facility] Hearing Loss Brother         deaf    Colon Polyps Sister     Colon Cancer Neg Hx     Esophageal Cancer Neg Hx     Liver Cancer Neg Hx     Liver Disease Neg Hx     Stomach Cancer Neg Hx     Rectal Cancer Neg Hx        REVIEW OF SYSTEMS:  ROS   Constitutional: Positive for chills and weight loss. HENT: Positive for hearing loss (patient hearing impaired), sinus pain and sore throat. Eyes: Positive for blurred vision, double vision and discharge. Respiratory: Positive for shortness of breath and wheezing. Cardiovascular: Positive for chest pain. Gastrointestinal: Positive for constipation, diarrhea and nausea. Genitourinary: Positive for urgency. Musculoskeletal: Positive for back pain, falls and myalgias. Skin: Positive for itching. Neurological: Positive for dizziness, tremors, sensory change (taste), focal weakness, weakness and headaches. Endo/Heme/Allergies: Positive for polydipsia. Bruises/bleeds easily.    Psychiatric/Behavioral: Positive for depression and memory loss.        PHYSICAL DDD (degenerative disc disease), cervical M50.30 Devi Cintron MD, Pain Medicine, [place]   4. Spondylolisthesis at L4-L5 level M43.16 Devi Cintron MD, Pain Medicine, [place]   5. Spinal stenosis of lumbar region without neurogenic claudication M48.061 Devi Cintron MD, Pain Medicine, [place]   6. Neck pain M54.2 Devi Cintron MD, Pain Medicine, [place]   7. Chronic left shoulder pain M25.512 Edgardaysi Halley Kiannagail Presley, Devi Tobar MD, Pain Medicine, [place]    G89.29    8. Bilateral hand numbness R20.0 Devi Cintron MD, Pain Medicine, [place]   9. Unsteady gait R26.81 Devi Cintron MD, Pain Medicine, [place]   10. Chronic midline low back pain without sciatica M54.5 Devi Cintron MD, Pain Medicine, [place]    G89.29        PLAN:  -We have discussed and reviewed the results of the MRI cervical and lumbar spine with Mrs. Ricardo at length. We explained that she does have pathology in her lumbar spine that is more concerning than her cervical spine. That being said, we discussed non-operative treatments with her such as pain management.    -We feel that she likely has some left shoulder pathology with the characteristics of her left shoulder pain.   We feel that she should be evaluated by an orthopedic surgeon.    -Refer to Pain management   -Recommend XR left shoulder and referral to Ortho of her choice   -Follow up 2 months       DENIZ Domínguez

## 2019-09-12 NOTE — Clinical Note
Dr. Mark Chase, Dr. Amaya Pino and myself saw this pt together today. We reviewed images and feel that holding off on a surgery at this time would be in her best interest.  If you are okay with us referring her to pain management we feel that they could maybe treat her neck and back pain non-operatively for now, maybe injections or take over her pain med. The other note was to maybe have her left shoulder evaluated by ortho. I am sure they would want at least an XR but I was going to leave this up to you and have her evaluated by ortho of her choosing. Please let me know if you have any concerns or questions.

## 2019-09-18 DIAGNOSIS — G89.29 CHRONIC LOW BACK PAIN, UNSPECIFIED BACK PAIN LATERALITY, WITH SCIATICA PRESENCE UNSPECIFIED: ICD-10-CM

## 2019-09-18 DIAGNOSIS — M54.5 CHRONIC LOW BACK PAIN, UNSPECIFIED BACK PAIN LATERALITY, WITH SCIATICA PRESENCE UNSPECIFIED: ICD-10-CM

## 2019-09-18 RX ORDER — HYDROCODONE BITARTRATE AND ACETAMINOPHEN 7.5; 325 MG/1; MG/1
1 TABLET ORAL EVERY 6 HOURS PRN
Qty: 90 TABLET | Refills: 0 | Status: SHIPPED | OUTPATIENT
Start: 2019-09-18 | End: 2019-10-18 | Stop reason: SDUPTHER

## 2019-09-18 NOTE — TELEPHONE ENCOUNTER
Called and left message on daughters phone of new appointment date and time, also tried calling Arty no answer, voice mail was full, will attempt to call Arty again .

## 2019-09-24 ENCOUNTER — HOSPITAL ENCOUNTER (EMERGENCY)
Age: 74
Discharge: HOME | End: 2019-09-24
Payer: MEDICARE

## 2019-09-24 ENCOUNTER — APPOINTMENT (OUTPATIENT)
Dept: GENERAL RADIOLOGY | Age: 74
End: 2019-09-24
Payer: MEDICARE

## 2019-09-24 VITALS
OXYGEN SATURATION: 96 % | HEART RATE: 68 BPM | TEMPERATURE: 97.7 F | RESPIRATION RATE: 20 BRPM | DIASTOLIC BLOOD PRESSURE: 69 MMHG | SYSTOLIC BLOOD PRESSURE: 138 MMHG

## 2019-09-24 DIAGNOSIS — J18.9 PNEUMONIA DUE TO ORGANISM: Primary | ICD-10-CM

## 2019-09-24 LAB
ALBUMIN SERPL-MCNC: 4 G/DL (ref 3.5–5.2)
ALP BLD-CCNC: 91 U/L (ref 35–104)
ALT SERPL-CCNC: 7 U/L (ref 5–33)
ANION GAP SERPL CALCULATED.3IONS-SCNC: 9 MMOL/L (ref 7–19)
AST SERPL-CCNC: 16 U/L (ref 5–32)
BASOPHILS ABSOLUTE: 0.1 K/UL (ref 0–0.2)
BASOPHILS RELATIVE PERCENT: 0.5 % (ref 0–1)
BILIRUB SERPL-MCNC: <0.2 MG/DL (ref 0.2–1.2)
BUN BLDV-MCNC: 16 MG/DL (ref 8–23)
CALCIUM SERPL-MCNC: 9.8 MG/DL (ref 8.8–10.2)
CHLORIDE BLD-SCNC: 105 MMOL/L (ref 98–111)
CO2: 26 MMOL/L (ref 22–29)
CREAT SERPL-MCNC: 1 MG/DL (ref 0.5–0.9)
EOSINOPHILS ABSOLUTE: 0.6 K/UL (ref 0–0.6)
EOSINOPHILS RELATIVE PERCENT: 6.1 % (ref 0–5)
GFR NON-AFRICAN AMERICAN: 54
GLUCOSE BLD-MCNC: 88 MG/DL (ref 74–109)
HCT VFR BLD CALC: 30 % (ref 37–47)
HEMOGLOBIN: 9.4 G/DL (ref 12–16)
IMMATURE GRANULOCYTES #: 0 K/UL
LYMPHOCYTES ABSOLUTE: 1.5 K/UL (ref 1.1–4.5)
LYMPHOCYTES RELATIVE PERCENT: 16.4 % (ref 20–40)
MCH RBC QN AUTO: 28.5 PG (ref 27–31)
MCHC RBC AUTO-ENTMCNC: 31.3 G/DL (ref 33–37)
MCV RBC AUTO: 90.9 FL (ref 81–99)
MONOCYTES ABSOLUTE: 1 K/UL (ref 0–0.9)
MONOCYTES RELATIVE PERCENT: 11 % (ref 0–10)
NEUTROPHILS ABSOLUTE: 6.2 K/UL (ref 1.5–7.5)
NEUTROPHILS RELATIVE PERCENT: 65.7 % (ref 50–65)
PDW BLD-RTO: 12.5 % (ref 11.5–14.5)
PLATELET # BLD: 314 K/UL (ref 130–400)
PMV BLD AUTO: 9.8 FL (ref 9.4–12.3)
POTASSIUM SERPL-SCNC: 3.6 MMOL/L (ref 3.5–5)
PRO-BNP: 63 PG/ML (ref 0–1800)
RAPID INFLUENZA  B AGN: NEGATIVE
RAPID INFLUENZA A AGN: NEGATIVE
RBC # BLD: 3.3 M/UL (ref 4.2–5.4)
SODIUM BLD-SCNC: 140 MMOL/L (ref 136–145)
TOTAL PROTEIN: 6.7 G/DL (ref 6.6–8.7)
WBC # BLD: 9.4 K/UL (ref 4.8–10.8)

## 2019-09-24 PROCEDURE — 6370000000 HC RX 637 (ALT 250 FOR IP): Performed by: NURSE PRACTITIONER

## 2019-09-24 PROCEDURE — 87804 INFLUENZA ASSAY W/OPTIC: CPT

## 2019-09-24 PROCEDURE — 80053 COMPREHEN METABOLIC PANEL: CPT

## 2019-09-24 PROCEDURE — 36415 COLL VENOUS BLD VENIPUNCTURE: CPT

## 2019-09-24 PROCEDURE — 71046 X-RAY EXAM CHEST 2 VIEWS: CPT

## 2019-09-24 PROCEDURE — 99283 EMERGENCY DEPT VISIT LOW MDM: CPT

## 2019-09-24 PROCEDURE — 83880 ASSAY OF NATRIURETIC PEPTIDE: CPT

## 2019-09-24 PROCEDURE — 94640 AIRWAY INHALATION TREATMENT: CPT

## 2019-09-24 PROCEDURE — 85025 COMPLETE CBC W/AUTO DIFF WBC: CPT

## 2019-09-24 RX ORDER — DOXYCYCLINE HYCLATE 100 MG
100 TABLET ORAL 2 TIMES DAILY
Qty: 20 TABLET | Refills: 0 | Status: SHIPPED | OUTPATIENT
Start: 2019-09-24 | End: 2019-10-04

## 2019-09-24 RX ORDER — IPRATROPIUM BROMIDE AND ALBUTEROL SULFATE 2.5; .5 MG/3ML; MG/3ML
1 SOLUTION RESPIRATORY (INHALATION) ONCE
Status: COMPLETED | OUTPATIENT
Start: 2019-09-24 | End: 2019-09-24

## 2019-09-24 RX ORDER — BENZONATATE 100 MG/1
100 CAPSULE ORAL 2 TIMES DAILY PRN
Qty: 20 CAPSULE | Refills: 0 | Status: SHIPPED | OUTPATIENT
Start: 2019-09-24 | End: 2019-10-01

## 2019-09-24 RX ORDER — ALBUTEROL SULFATE 90 UG/1
2 AEROSOL, METERED RESPIRATORY (INHALATION) EVERY 4 HOURS PRN
Qty: 1 INHALER | Refills: 0 | Status: SHIPPED | OUTPATIENT
Start: 2019-09-24 | End: 2021-01-25

## 2019-09-24 RX ORDER — PREDNISONE 20 MG/1
20 TABLET ORAL 2 TIMES DAILY
Qty: 10 TABLET | Refills: 0 | Status: SHIPPED | OUTPATIENT
Start: 2019-09-24 | End: 2019-09-29

## 2019-09-24 RX ORDER — GUAIFENESIN 600 MG/1
600 TABLET, EXTENDED RELEASE ORAL 2 TIMES DAILY
Qty: 30 TABLET | Refills: 0 | Status: SHIPPED | OUTPATIENT
Start: 2019-09-24 | End: 2019-10-09

## 2019-09-24 RX ADMIN — IPRATROPIUM BROMIDE AND ALBUTEROL SULFATE 1 AMPULE: .5; 3 SOLUTION RESPIRATORY (INHALATION) at 07:52

## 2019-09-24 ASSESSMENT — ENCOUNTER SYMPTOMS
SHORTNESS OF BREATH: 0
COUGH: 1
VOMITING: 0

## 2019-09-24 NOTE — ED PROVIDER NOTES
open wound to lateral side of right ankle          SURGICAL HISTORY       Past Surgical History:   Procedure Laterality Date    APPENDECTOMY      CARPAL TUNNEL RELEASE      CATARACT REMOVAL      COLONOSCOPY  7-2010    COLONOSCOPY  2000 or before    [place]-normal per patient    EYE SURGERY      HERNIA REPAIR      on back of neck    [place] COLONOSCOPY FLX DX W/COLLJ SPEC WHEN PFRMD N/A 7/6/2017    Dr Mega Álvarez diverticulosis, 5 yr recall    [place] EGD TRANSORAL BIOPSY SINGLE/MULTIPLE N/A 7/6/2017    Dr Didier Hearn, [place] (-)    TERRI AND BSO      VARICOSE VEIN SURGERY      VASCULAR SURGERY  02/19/2018    [facility]. Aortogram and runoff. Left common femoral artery, 5 french sheath. CURRENT MEDICATIONS       Previous Medications    AMITRIPTYLINE (ELAVIL) 75 MG TABLET    1 tablet by mouth at bedtime for sleep    AMLODIPINE-BENAZEPRIL (LOTREL) 5-10 MG PER CAPSULE    TAKE 1 CAPSULE BY MOUTH ONCE DAILY FOR BLOOD PRESSURE    CELECOXIB (CELEBREX) 200 MG CAPSULE    TAKE ONE CAPSULE BY MOUTH ONCE DAILY    DONEPEZIL (ARICEPT) 10 MG TABLET    TAKE 1 TABLET BY MOUTH ONCE DAILY    FLUOXETINE (PROZAC) 20 MG CAPSULE    TAKE ONE CAPSULE BY MOUTH ONCE DAILY FOR ANXIETY & DEPRESSION    HYDROCODONE-ACETAMINOPHEN (NORCO) 7.5-325 MG PER TABLET    Take 1 tablet by mouth every 6 hours as needed for Pain for up to 30 days. HYDROXYZINE (ATARAX) 25 MG TABLET    Take 1 tablet by mouth 3 times daily as needed for Itching    MISC. DEVICES (ROLLER WALKER) MISC    1 each by Does not apply route daily DX Code: R26.81, G70.00, H81.313, G60.9    NITROGLYCERIN (NITROSTAT) 0.4 MG SL TABLET    Place 1 tablet under the tongue every 5 minutes as needed for Chest pain up to max of 3 total doses. If no relief after 1 dose, call 911. ONDANSETRON (ZOFRAN) 4 MG TABLET    TAKE ONE TABLET BY MOUTH TWICE DAILY AS NEEDED FOR SEVERE NAUSEA AND VOMITING.     SODIUM CHLORIDE 0.9 % IRRIGATION        TRIAMCINOLONE (KENALOG) 0.025 % CREAM    Apply Exam   Constitutional: She appears well-nourished. HENT:   Head: Normocephalic. Right Ear: External ear normal.   Left Ear: External ear normal.   Mouth/Throat: Oropharynx is clear and moist.   Eyes: Pupils are equal, round, and reactive to light. Conjunctivae and EOM are normal.   Neck: Normal range of motion. Cardiovascular: Normal rate, regular rhythm, normal heart sounds and intact distal pulses. Pulmonary/Chest: Effort normal.   Decreased breath sounds bilateral bases   Abdominal: Soft. Bowel sounds are normal. There is no tenderness. Musculoskeletal: Normal range of motion. No calf muscle ttp  No lower extremity edema   Neurological: She is alert. Skin: Skin is warm and dry. Vitals reviewed. DIAGNOSTIC RESULTS     EKG: All EKG's are interpreted by the Emergency Department Physician who either signs or Co-signs this chart in the absence of acardiologist.        RADIOLOGY:   Non-plain film images such as CT, Ultrasound andMRI are read by the radiologist. Plain radiographic images are visualized and preliminarily interpreted by the emergency physician with the below findings:        Interpretation per the Radiologist below, if available at the time of this note:    XR CHEST STANDARD (2 VW)   Final Result   Right lower lung infiltrate. The above finding are recorded on a digital voice clip in PACS.    Signed by Dr Uriel Solis on 9/24/2019 8:42 AM            ED BEDSIDE ULTRASOUND:   Performed by ED Physician - none    LABS:  Labs Reviewed   CBC WITH AUTO DIFFERENTIAL - Abnormal; Notable for the following components:       Result Value    RBC 3.30 (*)     Hemoglobin 9.4 (*)     Hematocrit 30.0 (*)     MCHC 31.3 (*)     Neutrophils % 65.7 (*)     Lymphocytes % 16.4 (*)     Monocytes % 11.0 (*)     Eosinophils % 6.1 (*)     Monocytes Absolute 1.00 (*)     All other components within normal limits   COMPREHENSIVE METABOLIC PANEL - Abnormal; Notable for the following components:    CREATININE

## 2019-10-07 ENCOUNTER — HOSPITAL ENCOUNTER (OUTPATIENT)
Dept: PAIN MANAGEMENT | Age: 74
Discharge: HOME | End: 2019-10-07
Payer: MEDICARE

## 2019-10-07 ENCOUNTER — HOSPITAL ENCOUNTER (OUTPATIENT)
Dept: GENERAL RADIOLOGY | Age: 74
Discharge: HOME | End: 2019-10-07
Payer: MEDICARE

## 2019-10-07 VITALS
DIASTOLIC BLOOD PRESSURE: 76 MMHG | OXYGEN SATURATION: 97 % | HEART RATE: 71 BPM | RESPIRATION RATE: 16 BRPM | SYSTOLIC BLOOD PRESSURE: 122 MMHG | BODY MASS INDEX: 18.37 KG/M2 | TEMPERATURE: 97.6 F | WEIGHT: 124 LBS | HEIGHT: 69 IN

## 2019-10-07 DIAGNOSIS — M25.512 CHRONIC LEFT SHOULDER PAIN: ICD-10-CM

## 2019-10-07 DIAGNOSIS — G89.29 CHRONIC LEFT SHOULDER PAIN: ICD-10-CM

## 2019-10-07 DIAGNOSIS — M47.819 FACET HYPERTROPHY: ICD-10-CM

## 2019-10-07 PROCEDURE — 99214 OFFICE O/P EST MOD 30 MIN: CPT | Performed by: NURSE PRACTITIONER

## 2019-10-07 PROCEDURE — 73030 X-RAY EXAM OF SHOULDER: CPT

## 2019-10-07 PROCEDURE — 99215 OFFICE O/P EST HI 40 MIN: CPT

## 2019-10-07 RX ORDER — EMOLLIENT BASE
CREAM (GRAM) TOPICAL
Qty: 300 G | Refills: 5
Start: 2019-10-07 | End: 2020-01-22

## 2019-10-07 ASSESSMENT — PAIN - FUNCTIONAL ASSESSMENT: PAIN_FUNCTIONAL_ASSESSMENT: PREVENTS OR INTERFERES SOME ACTIVE ACTIVITIES AND ADLS

## 2019-10-07 ASSESSMENT — PAIN DESCRIPTION - PROGRESSION: CLINICAL_PROGRESSION: NOT CHANGED

## 2019-10-07 ASSESSMENT — PAIN SCALES - GENERAL: PAINLEVEL_OUTOF10: 9

## 2019-10-07 ASSESSMENT — PAIN DESCRIPTION - LOCATION: LOCATION: BACK;NECK

## 2019-10-07 ASSESSMENT — PAIN DESCRIPTION - PAIN TYPE: TYPE: CHRONIC PAIN

## 2019-10-07 ASSESSMENT — PAIN DESCRIPTION - ONSET: ONSET: ON-GOING

## 2019-10-07 ASSESSMENT — PAIN DESCRIPTION - ORIENTATION: ORIENTATION: UPPER;LOWER

## 2019-10-07 ASSESSMENT — PAIN DESCRIPTION - FREQUENCY: FREQUENCY: CONTINUOUS

## 2019-10-10 RX ORDER — AMITRIPTYLINE HYDROCHLORIDE 50 MG/1
TABLET, FILM COATED ORAL
Qty: 60 TABLET | Refills: 5 | OUTPATIENT
Start: 2019-10-10

## 2019-10-18 DIAGNOSIS — M54.50 CHRONIC LOW BACK PAIN: ICD-10-CM

## 2019-10-18 DIAGNOSIS — G89.29 CHRONIC LOW BACK PAIN: ICD-10-CM

## 2019-10-18 RX ORDER — HYDROCODONE BITARTRATE AND ACETAMINOPHEN 7.5; 325 MG/1; MG/1
1 TABLET ORAL EVERY 6 HOURS PRN
Qty: 90 TABLET | Refills: 0 | Status: SHIPPED | OUTPATIENT
Start: 2019-10-18 | End: 2019-12-02 | Stop reason: SDUPTHER

## 2019-11-12 ENCOUNTER — TELEPHONE (OUTPATIENT)
Dept: NEUROSURGERY | Age: 74
End: 2019-11-12

## 2019-11-19 ENCOUNTER — OFFICE VISIT (OUTPATIENT)
Dept: NEUROSURGERY | Age: 74
End: 2019-11-19
Payer: MEDICARE

## 2019-11-19 VITALS
BODY MASS INDEX: 18.49 KG/M2 | HEIGHT: 68 IN | SYSTOLIC BLOOD PRESSURE: 98 MMHG | DIASTOLIC BLOOD PRESSURE: 65 MMHG | OXYGEN SATURATION: 98 % | WEIGHT: 122 LBS | HEART RATE: 66 BPM

## 2019-11-19 DIAGNOSIS — M43.12 SPONDYLOLISTHESIS, CERVICAL REGION: Primary | ICD-10-CM

## 2019-11-19 DIAGNOSIS — M25.512 CHRONIC LEFT SHOULDER PAIN: ICD-10-CM

## 2019-11-19 DIAGNOSIS — M43.16 SPONDYLOLISTHESIS AT L4-L5 LEVEL: ICD-10-CM

## 2019-11-19 DIAGNOSIS — M48.061 SPINAL STENOSIS OF LUMBAR REGION WITHOUT NEUROGENIC CLAUDICATION: ICD-10-CM

## 2019-11-19 DIAGNOSIS — R26.81 UNSTEADY GAIT: ICD-10-CM

## 2019-11-19 DIAGNOSIS — M54.2 NECK PAIN: ICD-10-CM

## 2019-11-19 DIAGNOSIS — G89.29 CHRONIC LEFT SHOULDER PAIN: ICD-10-CM

## 2019-11-19 DIAGNOSIS — M48.02 CERVICAL STENOSIS OF SPINAL CANAL: ICD-10-CM

## 2019-11-19 DIAGNOSIS — M50.30 DDD (DEGENERATIVE DISC DISEASE), CERVICAL: ICD-10-CM

## 2019-11-19 DIAGNOSIS — R20.0 BILATERAL HAND NUMBNESS: ICD-10-CM

## 2019-11-19 PROCEDURE — 1090F PRES/ABSN URINE INCON ASSESS: CPT | Performed by: NURSE PRACTITIONER

## 2019-11-19 PROCEDURE — 4040F PNEUMOC VAC/ADMIN/RCVD: CPT | Performed by: NURSE PRACTITIONER

## 2019-11-19 PROCEDURE — 3017F COLORECTAL CA SCREEN DOC REV: CPT | Performed by: NURSE PRACTITIONER

## 2019-11-19 PROCEDURE — G8484 FLU IMMUNIZE NO ADMIN: HCPCS | Performed by: NURSE PRACTITIONER

## 2019-11-19 PROCEDURE — G8399 PT W/DXA RESULTS DOCUMENT: HCPCS | Performed by: NURSE PRACTITIONER

## 2019-11-19 PROCEDURE — 99213 OFFICE O/P EST LOW 20 MIN: CPT | Performed by: NURSE PRACTITIONER

## 2019-11-19 PROCEDURE — G8420 CALC BMI NORM PARAMETERS: HCPCS | Performed by: NURSE PRACTITIONER

## 2019-11-19 PROCEDURE — 1123F ACP DISCUSS/DSCN MKR DOCD: CPT | Performed by: NURSE PRACTITIONER

## 2019-11-19 PROCEDURE — 1036F TOBACCO NON-USER: CPT | Performed by: NURSE PRACTITIONER

## 2019-11-19 PROCEDURE — G8599 NO ASA/ANTIPLAT THER USE RNG: HCPCS | Performed by: NURSE PRACTITIONER

## 2019-11-19 PROCEDURE — G8427 DOCREV CUR MEDS BY ELIG CLIN: HCPCS | Performed by: NURSE PRACTITIONER

## 2019-11-19 ASSESSMENT — ENCOUNTER SYMPTOMS
EYE DISCHARGE: 1
SHORTNESS OF BREATH: 1
BACK PAIN: 1
WHEEZING: 1
DOUBLE VISION: 1
CONSTIPATION: 1
DIARRHEA: 1
BLURRED VISION: 1

## 2019-11-20 ENCOUNTER — HOSPITAL ENCOUNTER (OUTPATIENT)
Dept: PAIN MANAGEMENT | Age: 74
Discharge: HOME | End: 2019-11-20
Payer: MEDICARE

## 2019-11-20 VITALS
SYSTOLIC BLOOD PRESSURE: 90 MMHG | HEIGHT: 68 IN | WEIGHT: 122.2 LBS | HEART RATE: 63 BPM | BODY MASS INDEX: 18.52 KG/M2 | OXYGEN SATURATION: 98 % | DIASTOLIC BLOOD PRESSURE: 56 MMHG | TEMPERATURE: 97.6 F | RESPIRATION RATE: 18 BRPM

## 2019-11-20 DIAGNOSIS — M54.12 CERVICAL RADICULOPATHY: ICD-10-CM

## 2019-11-20 DIAGNOSIS — M54.16 LUMBAR RADICULOPATHY: ICD-10-CM

## 2019-11-20 PROCEDURE — 99213 OFFICE O/P EST LOW 20 MIN: CPT

## 2019-11-20 PROCEDURE — 99214 OFFICE O/P EST MOD 30 MIN: CPT | Performed by: NURSE PRACTITIONER

## 2019-11-20 ASSESSMENT — PAIN DESCRIPTION - ONSET: ONSET: ON-GOING

## 2019-11-20 ASSESSMENT — PAIN DESCRIPTION - LOCATION: LOCATION: BACK;NECK

## 2019-11-20 ASSESSMENT — PAIN DESCRIPTION - ORIENTATION: ORIENTATION: LOWER;UPPER

## 2019-11-20 ASSESSMENT — PAIN SCALES - GENERAL: PAINLEVEL_OUTOF10: 9

## 2019-11-20 ASSESSMENT — PAIN DESCRIPTION - DESCRIPTORS: DESCRIPTORS: ACHING;CONSTANT

## 2019-11-20 ASSESSMENT — PAIN DESCRIPTION - FREQUENCY: FREQUENCY: CONTINUOUS

## 2019-11-20 ASSESSMENT — PAIN - FUNCTIONAL ASSESSMENT: PAIN_FUNCTIONAL_ASSESSMENT: PREVENTS OR INTERFERES SOME ACTIVE ACTIVITIES AND ADLS

## 2019-11-20 ASSESSMENT — PAIN DESCRIPTION - PROGRESSION: CLINICAL_PROGRESSION: GRADUALLY WORSENING

## 2019-11-20 ASSESSMENT — PAIN DESCRIPTION - PAIN TYPE: TYPE: CHRONIC PAIN

## 2019-12-02 DIAGNOSIS — G89.29 CHRONIC LOW BACK PAIN: ICD-10-CM

## 2019-12-02 DIAGNOSIS — M54.50 CHRONIC LOW BACK PAIN: ICD-10-CM

## 2019-12-02 RX ORDER — HYDROCODONE BITARTRATE AND ACETAMINOPHEN 7.5; 325 MG/1; MG/1
1 TABLET ORAL EVERY 6 HOURS PRN
Qty: 90 TABLET | Refills: 0 | Status: SHIPPED | OUTPATIENT
Start: 2019-12-02 | End: 2020-01-10 | Stop reason: SDUPTHER

## 2019-12-09 RX ORDER — AMLODIPINE BESYLATE AND BENAZEPRIL HYDROCHLORIDE 5; 10 MG/1; MG/1
CAPSULE ORAL
Qty: 90 CAPSULE | Refills: 5 | Status: SHIPPED | OUTPATIENT
Start: 2019-12-09 | End: 2020-12-21

## 2020-01-10 ENCOUNTER — HOSPITAL ENCOUNTER (OUTPATIENT)
Dept: PAIN MANAGEMENT | Age: 75
Discharge: HOME | End: 2020-01-10
Payer: MEDICARE

## 2020-01-10 VITALS
DIASTOLIC BLOOD PRESSURE: 68 MMHG | TEMPERATURE: 97.7 F | RESPIRATION RATE: 18 BRPM | OXYGEN SATURATION: 95 % | SYSTOLIC BLOOD PRESSURE: 134 MMHG | HEART RATE: 62 BPM

## 2020-01-10 PROCEDURE — 6360000002 HC RX W HCPCS

## 2020-01-10 PROCEDURE — 3209999900 FLUORO FOR SURGICAL PROCEDURES

## 2020-01-10 PROCEDURE — 62321 NJX INTERLAMINAR CRV/THRC: CPT

## 2020-01-10 PROCEDURE — 2500000003 HC RX 250 WO HCPCS

## 2020-01-10 PROCEDURE — 2580000003 HC RX 258

## 2020-01-10 RX ORDER — HYDROCODONE BITARTRATE AND ACETAMINOPHEN 7.5; 325 MG/1; MG/1
1 TABLET ORAL EVERY 6 HOURS PRN
Qty: 90 TABLET | Refills: 0 | Status: SHIPPED | OUTPATIENT
Start: 2020-01-10 | End: 2020-02-19 | Stop reason: SDUPTHER

## 2020-01-10 RX ORDER — LIDOCAINE HYDROCHLORIDE 10 MG/ML
INJECTION, SOLUTION EPIDURAL; INFILTRATION; INTRACAUDAL; PERINEURAL
Status: COMPLETED | OUTPATIENT
Start: 2020-01-10 | End: 2020-01-10

## 2020-01-10 RX ORDER — SODIUM CHLORIDE 9 MG/ML
INJECTION INTRAVENOUS
Status: COMPLETED | OUTPATIENT
Start: 2020-01-10 | End: 2020-01-10

## 2020-01-10 RX ORDER — METHYLPREDNISOLONE ACETATE 80 MG/ML
INJECTION, SUSPENSION INTRA-ARTICULAR; INTRALESIONAL; INTRAMUSCULAR; SOFT TISSUE
Status: COMPLETED | OUTPATIENT
Start: 2020-01-10 | End: 2020-01-10

## 2020-01-10 RX ADMIN — LIDOCAINE HYDROCHLORIDE 5 ML: 10 INJECTION, SOLUTION EPIDURAL; INFILTRATION; INTRACAUDAL; PERINEURAL at 10:13

## 2020-01-10 RX ADMIN — METHYLPREDNISOLONE ACETATE 80 MG: 80 INJECTION, SUSPENSION INTRA-ARTICULAR; INTRALESIONAL; INTRAMUSCULAR; SOFT TISSUE at 10:14

## 2020-01-10 RX ADMIN — SODIUM CHLORIDE 5 ML: 9 INJECTION INTRAVENOUS at 10:14

## 2020-01-10 ASSESSMENT — PAIN - FUNCTIONAL ASSESSMENT
PAIN_FUNCTIONAL_ASSESSMENT: 0-10
PAIN_FUNCTIONAL_ASSESSMENT: 0-10

## 2020-01-13 ENCOUNTER — TELEPHONE (OUTPATIENT)
Dept: PAIN MANAGEMENT | Age: 75
End: 2020-01-13

## 2020-01-15 ENCOUNTER — TELEPHONE (OUTPATIENT)
Dept: PAIN MANAGEMENT | Age: 75
End: 2020-01-15

## 2020-01-22 ENCOUNTER — OFFICE VISIT (OUTPATIENT)
Dept: PRIMARY CARE CLINIC | Age: 75
End: 2020-01-22
Payer: MEDICARE

## 2020-01-22 VITALS
SYSTOLIC BLOOD PRESSURE: 100 MMHG | RESPIRATION RATE: 18 BRPM | TEMPERATURE: 97.9 F | HEART RATE: 77 BPM | DIASTOLIC BLOOD PRESSURE: 72 MMHG | BODY MASS INDEX: 18.64 KG/M2 | HEIGHT: 68 IN | WEIGHT: 123 LBS | OXYGEN SATURATION: 99 %

## 2020-01-22 LAB
ALBUMIN SERPL-MCNC: 4.4 G/DL (ref 3.5–5.2)
ALP BLD-CCNC: 91 U/L (ref 35–104)
ALT SERPL-CCNC: 8 U/L (ref 5–33)
ANION GAP SERPL CALCULATED.3IONS-SCNC: 13 MMOL/L (ref 7–19)
AST SERPL-CCNC: 17 U/L (ref 5–32)
BILIRUB SERPL-MCNC: <0.2 MG/DL (ref 0.2–1.2)
BUN BLDV-MCNC: 22 MG/DL (ref 8–23)
CALCIUM SERPL-MCNC: 9.9 MG/DL (ref 8.8–10.2)
CHLORIDE BLD-SCNC: 103 MMOL/L (ref 98–111)
CO2: 21 MMOL/L (ref 22–29)
CREAT SERPL-MCNC: 1.1 MG/DL (ref 0.5–0.9)
GFR NON-AFRICAN AMERICAN: 48
GLUCOSE BLD-MCNC: 79 MG/DL (ref 74–109)
HCT VFR BLD CALC: 25.7 % (ref 37–47)
HEMOGLOBIN: 7.4 G/DL (ref 12–16)
MCH RBC QN AUTO: 24 PG (ref 27–31)
MCHC RBC AUTO-ENTMCNC: 28.8 G/DL (ref 33–37)
MCV RBC AUTO: 83.4 FL (ref 81–99)
PDW BLD-RTO: 14.8 % (ref 11.5–14.5)
PLATELET # BLD: 399 K/UL (ref 130–400)
PMV BLD AUTO: 9.9 FL (ref 9.4–12.3)
POTASSIUM SERPL-SCNC: 4.4 MMOL/L (ref 3.5–5)
RBC # BLD: 3.08 M/UL (ref 4.2–5.4)
SODIUM BLD-SCNC: 137 MMOL/L (ref 136–145)
TOTAL PROTEIN: 7.2 G/DL (ref 6.6–8.7)
TSH SERPL DL<=0.05 MIU/L-ACNC: 5.65 UIU/ML (ref 0.27–4.2)
WBC # BLD: 7.3 K/UL (ref 4.8–10.8)

## 2020-01-22 PROCEDURE — 4040F PNEUMOC VAC/ADMIN/RCVD: CPT | Performed by: FAMILY MEDICINE

## 2020-01-22 PROCEDURE — G8484 FLU IMMUNIZE NO ADMIN: HCPCS | Performed by: FAMILY MEDICINE

## 2020-01-22 PROCEDURE — 1036F TOBACCO NON-USER: CPT | Performed by: FAMILY MEDICINE

## 2020-01-22 PROCEDURE — 1090F PRES/ABSN URINE INCON ASSESS: CPT | Performed by: FAMILY MEDICINE

## 2020-01-22 PROCEDURE — 99214 OFFICE O/P EST MOD 30 MIN: CPT | Performed by: FAMILY MEDICINE

## 2020-01-22 PROCEDURE — G8427 DOCREV CUR MEDS BY ELIG CLIN: HCPCS | Performed by: FAMILY MEDICINE

## 2020-01-22 PROCEDURE — G8399 PT W/DXA RESULTS DOCUMENT: HCPCS | Performed by: FAMILY MEDICINE

## 2020-01-22 PROCEDURE — 3017F COLORECTAL CA SCREEN DOC REV: CPT | Performed by: FAMILY MEDICINE

## 2020-01-22 PROCEDURE — G8420 CALC BMI NORM PARAMETERS: HCPCS | Performed by: FAMILY MEDICINE

## 2020-01-22 PROCEDURE — 1123F ACP DISCUSS/DSCN MKR DOCD: CPT | Performed by: FAMILY MEDICINE

## 2020-01-22 ASSESSMENT — ENCOUNTER SYMPTOMS
DIARRHEA: 0
SHORTNESS OF BREATH: 0
VOMITING: 0
RHINORRHEA: 1
ABDOMINAL PAIN: 0
NAUSEA: 0
SINUS PRESSURE: 1

## 2020-01-22 NOTE — PROGRESS NOTES
SUBJECTIVE:    Luciana Arevalo is 75 y. o.female who comes in for 6-month medication management visit. HPI: Patient is accompanied by daughter and ; daughter helps provide history as patient is poor historian. Patient reports persistent, chronic neck and back pain. She received her first cortisone injection for her neck on 1/10/20 and she thinks that it helped somewhat. She states that she is still taking her pain pill every day but is unsure how much she takes. She does not have a pill box and manages her own medications. Per her daughter, the patient may take between 1/2 a pill-2 pain pills per day. She doesn't take the amitriptyline often for sleep because she is afraid it will interact with her pain pills. She is sleeping poorly and her daughter reports that when her mom can't sleep at night, she will write her \"hateful notes. \" Patient reports increased episodes of palpitations, dizziness, visual floaters, and poor balance. She fell once within the past few months because she tripped on an upturned rug. She did not hit her head. Of note, she visited [place] [facility] after Christmas and was dx with bronchitis and a sinus infection. A CXR at that time showed multiple spots throughout her lungs but the radiologist did not report concerning features. She tested negative for the flu at that time and has received a flu shot. She did receive steroids and an abx which she finished. She reports continued post-nasal drip, rhinorrhea, and yellow congestion and non-productive cough. She is still dealing with her chronic low back pain which does radiate down her legs at times. She feels that her myasthenia gravis is stable at the present time. She is still depressed and sad at times even on Prozac. She is not having any suicidal thoughts or ideation. She is alone a lot during the day and I think this contributes to her depression as does her dementia.       No Known Allergies    Social History Socioeconomic History    Marital status:      Spouse name: None    Number of children: None    Years of education: None    Highest education level: None   Occupational History    Occupation: unemployed   Social Needs    Financial resource strain: None    Food insecurity:     Worry: None     Inability: None    Transportation needs:     Medical: None     Non-medical: None   Tobacco Use    Smoking status: Never Smoker    Smokeless tobacco: Never Used   Substance and Sexual Activity    Alcohol use: No    Drug use: No    Sexual activity: Not Currently   Lifestyle    Physical activity:     Days per week: None     Minutes per session: None    Stress: None   Relationships    Social connections:     Talks on phone: None     Gets together: None     Attends Confucianist service: None     Active member of club or organization: None     Attends meetings of clubs or organizations: None     Relationship status: None    Intimate partner violence:     Fear of current or ex partner: None     Emotionally abused: None     Physically abused: None     Forced sexual activity: None   Other Topics Concern    None   Social History Narrative    None       Review of Systems   HENT: Positive for postnasal drip, rhinorrhea and sinus pressure. Respiratory: Negative for shortness of breath. Cardiovascular: Positive for chest pain and palpitations. Gastrointestinal: Negative for abdominal pain, diarrhea, nausea and vomiting. Neurological: Positive for dizziness. OBJECTIVE:    Wt Readings from Last 3 Encounters:   01/22/20 123 lb (55.8 kg)   11/20/19 122 lb 3.2 oz (55.4 kg)   11/19/19 122 lb (55.3 kg)       /72 (Site: Left Upper Arm, Position: Sitting, Cuff Size: Medium Adult)   Pulse 77   Temp 97.9 °F (36.6 °C) (Temporal)   Resp 18   Ht 5' 8\" (1.727 m)   Wt 123 lb (55.8 kg)   SpO2 99%   BMI 18.70 kg/m²     Physical Exam  Vitals signs and nursing note reviewed.    Constitutional: home health will be able to figure out some of these things. We will contact her when we get results for blood work. Follow-up:  Return in about 3 months (around 4/22/2020) for recheck. PATIENT INSTRUCTIONS:  Patient Instructions   We are committed to providing you with the best care possible. In order to help us achieve these goals please remember to bring all medications, herbal products, and over the counter supplements with you to each visit. If your provider has ordered testing for you, please be sure to follow up with our office if you have not received results within 7 days after the testing took place. *If you receive a survey after visiting one of our offices, please take time to share your experience concerning your physician office visit. These surveys are confidential and no health information about you is shared. We are eager to improve for you and we are counting on your feedback to help make that happen. EMR Dragon/transcription disclaimer:  Much of this encounter note is electronic transcription/translation of spoken language to printed texts. The electronic translation of spoken language may be erroneous, or at times, nonsensical words or phrases may beinadvertently transcribed.   Although I have reviewed the note for such errors, some may still exist.

## 2020-01-24 ENCOUNTER — TELEPHONE (OUTPATIENT)
Dept: PRIMARY CARE CLINIC | Age: 75
End: 2020-01-24

## 2020-01-24 NOTE — TELEPHONE ENCOUNTER
Mag Stephenson from [facility] called and stated that pts start of care will start on Tuesday the 28th, due to the .

## 2020-01-24 NOTE — TELEPHONE ENCOUNTER
Jossy Tijerina from [address] [place] [address] Ronny Holder called and stated that pts start of care will start on Tuesday the 28th, due to the .

## 2020-01-27 ENCOUNTER — TELEPHONE (OUTPATIENT)
Dept: PRIMARY CARE CLINIC | Age: 75
End: 2020-01-27

## 2020-01-27 RX ORDER — DONEPEZIL HYDROCHLORIDE 23 MG/1
23 TABLET, FILM COATED ORAL NIGHTLY
Qty: 30 TABLET | Refills: 3 | Status: SHIPPED | OUTPATIENT
Start: 2020-01-27 | End: 2020-05-29

## 2020-01-27 NOTE — TELEPHONE ENCOUNTER
Khushboo called and stated that she thought you said you were going to increase pts aricept, but the pharmacy has not received it?

## 2020-01-31 ENCOUNTER — TELEPHONE (OUTPATIENT)
Dept: PRIMARY CARE CLINIC | Age: 75
End: 2020-01-31

## 2020-02-03 ENCOUNTER — TELEPHONE (OUTPATIENT)
Dept: PRIMARY CARE CLINIC | Age: 75
End: 2020-02-03

## 2020-02-03 NOTE — TELEPHONE ENCOUNTER
Yes, they need to do a CBC today or tomorrow and recheck a TSH, free T4 and BMP in 1 month. The diagnosis for the CBC is anemia. The diagnosis for the BMP is renal insufficiency.   The diagnosis for the other is abnormal TSH

## 2020-02-05 ENCOUNTER — TELEPHONE (OUTPATIENT)
Dept: PRIMARY CARE CLINIC | Age: 75
End: 2020-02-05

## 2020-02-10 ENCOUNTER — HOSPITAL ENCOUNTER (OUTPATIENT)
Dept: PAIN MANAGEMENT | Age: 75
Discharge: HOME | End: 2020-02-10
Payer: MEDICARE

## 2020-02-10 ENCOUNTER — NURSE ONLY (OUTPATIENT)
Dept: PRIMARY CARE CLINIC | Age: 75
End: 2020-02-10
Payer: MEDICARE

## 2020-02-10 ENCOUNTER — TELEPHONE (OUTPATIENT)
Dept: PAIN MANAGEMENT | Age: 75
End: 2020-02-10

## 2020-02-10 VITALS
HEART RATE: 60 BPM | OXYGEN SATURATION: 98 % | RESPIRATION RATE: 18 BRPM | TEMPERATURE: 97.7 F | DIASTOLIC BLOOD PRESSURE: 62 MMHG | BODY MASS INDEX: 19.44 KG/M2 | SYSTOLIC BLOOD PRESSURE: 108 MMHG | HEIGHT: 68 IN | WEIGHT: 128.25 LBS

## 2020-02-10 LAB
ANISOCYTOSIS: ABNORMAL
BASOPHILS ABSOLUTE: 0.1 K/UL (ref 0–0.2)
BASOPHILS RELATIVE PERCENT: 0.7 % (ref 0–1)
EOSINOPHILS ABSOLUTE: 0.4 K/UL (ref 0–0.6)
EOSINOPHILS RELATIVE PERCENT: 4.4 % (ref 0–5)
HCT VFR BLD CALC: 25.4 % (ref 37–47)
HEMOGLOBIN: 7.2 G/DL (ref 12–16)
HYPOCHROMIA: ABNORMAL
IMMATURE GRANULOCYTES #: 0 K/UL
LYMPHOCYTES ABSOLUTE: 2.2 K/UL (ref 1.1–4.5)
LYMPHOCYTES RELATIVE PERCENT: 27.1 % (ref 20–40)
MCH RBC QN AUTO: 23.8 PG (ref 27–31)
MCHC RBC AUTO-ENTMCNC: 28.3 G/DL (ref 33–37)
MCV RBC AUTO: 83.8 FL (ref 81–99)
MONOCYTES ABSOLUTE: 0.8 K/UL (ref 0–0.9)
MONOCYTES RELATIVE PERCENT: 10 % (ref 0–10)
NEUTROPHILS ABSOLUTE: 4.6 K/UL (ref 1.5–7.5)
NEUTROPHILS RELATIVE PERCENT: 57.3 % (ref 50–65)
OVALOCYTES: ABNORMAL
PDW BLD-RTO: 18.8 % (ref 11.5–14.5)
PLATELET # BLD: 332 K/UL (ref 130–400)
PLATELET SLIDE REVIEW: ADEQUATE
PMV BLD AUTO: 10 FL (ref 9.4–12.3)
POIKILOCYTES: ABNORMAL
RBC # BLD: 3.03 M/UL (ref 4.2–5.4)
WBC # BLD: 8 K/UL (ref 4.8–10.8)

## 2020-02-10 PROCEDURE — 99213 OFFICE O/P EST LOW 20 MIN: CPT

## 2020-02-10 PROCEDURE — 36415 COLL VENOUS BLD VENIPUNCTURE: CPT | Performed by: FAMILY MEDICINE

## 2020-02-10 PROCEDURE — 99214 OFFICE O/P EST MOD 30 MIN: CPT | Performed by: NURSE PRACTITIONER

## 2020-02-10 ASSESSMENT — PAIN DESCRIPTION - PROGRESSION: CLINICAL_PROGRESSION: NOT CHANGED

## 2020-02-10 ASSESSMENT — PAIN DESCRIPTION - PAIN TYPE: TYPE: CHRONIC PAIN

## 2020-02-10 ASSESSMENT — PAIN DESCRIPTION - ONSET: ONSET: ON-GOING

## 2020-02-10 ASSESSMENT — PAIN DESCRIPTION - DESCRIPTORS: DESCRIPTORS: ACHING;BURNING;CONSTANT

## 2020-02-10 ASSESSMENT — PAIN - FUNCTIONAL ASSESSMENT: PAIN_FUNCTIONAL_ASSESSMENT: PREVENTS OR INTERFERES SOME ACTIVE ACTIVITIES AND ADLS

## 2020-02-10 ASSESSMENT — PAIN DESCRIPTION - FREQUENCY: FREQUENCY: CONTINUOUS

## 2020-02-10 ASSESSMENT — PAIN SCALES - GENERAL: PAINLEVEL_OUTOF10: 10

## 2020-02-10 NOTE — TELEPHONE ENCOUNTER
Spoke with Richard from interpretor's office today and confirmed all three pain management appointment dates and times with her, to include 2/2620 at 1500 for shoulder injection, 4/10/20 at 1000 for BHUPINDER, and 5/20/20 at 1345.

## 2020-02-10 NOTE — PROGRESS NOTES
Nausea and Vomiting: Care Instructions  Your Care Instructions    When you are nauseated, you may feel weak and sweaty and notice a lot of saliva in your mouth. Nausea often leads to vomiting. Most of the time you do not need to worry about nausea and vomiting, but they can be signs of other illnesses. Two common causes of nausea and vomiting are stomach flu and food poisoning. Nausea and vomiting from viral stomach flu will usually start to improve within 24 hours. Nausea and vomiting from food poisoning may last from 12 to 48 hours. The doctor has checked you carefully, but problems can develop later. If you notice any problems or new symptoms, get medical treatment right away. Follow-up care is a key part of your treatment and safety. Be sure to make and go to all appointments, and call your doctor if you are having problems. It's also a good idea to know your test results and keep a list of the medicines you take. How can you care for yourself at home? · To prevent dehydration, drink plenty of fluids, enough so that your urine is light yellow or clear like water. Choose water and other caffeine-free clear liquids until you feel better. If you have kidney, heart, or liver disease and have to limit fluids, talk with your doctor before you increase the amount of fluids you drink. · Rest in bed until you feel better. · When you are able to eat, try clear soups, mild foods, and liquids until all symptoms are gone for 12 to 48 hours. Other good choices include dry toast, crackers, cooked cereal, and gelatin dessert, such as Jell-O. When should you call for help? Call 911 anytime you think you may need emergency care. For example, call if:  · You passed out (lost consciousness). Call your doctor now or seek immediate medical care if:  · You have symptoms of dehydration, such as:  ¨ Dry eyes and a dry mouth. ¨ Passing only a little dark urine.   ¨ Feeling thirstier than usual.  · You have new or worsening [facility]    Office Progress Note    Patient Name: Debbi Acharya    MR #: [id number]    Account #: [de-identified]    : 1945    Age: 75 y.o. Sex: female    Date: February 10, 2020    PCP: Licha Cervantes MD         Referring Provider:    Chief Complaint: Neck pain and shoulder pain    History of Present Illness:    Debbi Acharya is a 75 y.o. female who presents to the office for follow-up of BHUPINDER level C3-C4.  and daughter are with patient today. The patient says that she got good relief with the epidural. She explains about 60% relief for 6 weeks. She is wanting it repeated. She also complains of pain in her left shoulder. She says that the pain has been there going on 5 years. The pain has just gotten worse over the last few months. She is wanting an injection to it if she can have it. Employment: Retired []   Disabled  []   Works []    Does Not Work []     Previous Injury:  Yes  []   No [x]     Previous Surgery: Yes []   No [x]     Previous Physical Therapy In the last 6 months? Yes  []     No [x]   Did Physical Therapy make thepain better or worse? Better []   Worse []  Unchanged []    MRI in the last two years? Yes [x]  No []   Results reviewed with patient? Yes []   No []    CT Scan in the last two years? Yes  []   No [x]   Results reviewed with patient? Yes []   No []    X-ray in the last two years? Yes [x]   No  []  Results reviewed with patient? Yes  []  No []    Injections in the past?  Yes [x]   No []   Did the injections help relieve the pain? Yes [x]   No []     Do you have Depression? Yes  []    No [x]   Thinking of harming yourself or others?   Yes  []   No [x]     Past Medical Histoy  Past Medical History:   Diagnosis Date    Anxiety     Chronic back pain     severe muscle spasms    Deafness congenital     Depression     Headache     Hypertension     Memory loss     early    Myasthenia ([address]     belly pain. · You have a new or higher fever. · You vomit blood or what looks like coffee grounds. Watch closely for changes in your health, and be sure to contact your doctor if:  · You have ongoing nausea and vomiting. · Your vomiting is getting worse. · Your vomiting lasts longer than 2 days. · You are not getting better as expected. Where can you learn more? Go to http://[email or web address]-marcela.info/. Enter [id number] in the search box to learn more about \"Nausea and Vomiting: Care Instructions. \"  Current as of: May 27, 2016  Content Version: 11.2  © 6537-1978 [employer]. Care instructions adapted under license by [employer] (which disclaims liability or warranty for this information). If you have questions about a medical condition or this instruction, always ask your healthcare professional. [address] any warranty or liability for your use of this information. [employer] Activation    Thank you for requesting access to [employer]. Please follow the instructions below to securely access and download your online medical record. [employer] allows you to send messages to your doctor, view your test results, renew your prescriptions, schedule appointments, and more. How Do I Sign Up? 1. In your internet browser, go to [email or web address].  2. Click on the First Time User? Click Here link in the Sign In box. You will be redirect to the New Member Sign Up page. 3. Enter your [employer] Access Code exactly as it appears below. You will not need to use this code after youve completed the sign-up process. If you do not sign up before the expiration date, you must request a new code. [employer] Access Code: [email or web address]  Expires: 2017  2:07 PM (This is the date your [employer] access code will )    4. Enter the last four digits of your Social Security Number (xxxx) and Date of Birth (mm/dd/yyyy) as indicated and click Submit.  You will be taken to the next sign-up page. 5. Create a [employer] ID. This will be your [employer] login ID and cannot be changed, so think of one that is secure and easy to remember. 6. Create a [employer] password. You can change your password at any time. 7. Enter your Password Reset Question and Answer. This can be used at a later time if you forget your password. 8. Enter your e-mail address. You will receive e-mail notification when new information is available in [address]. 9. Click Sign Up. You can now view and download portions of your medical record. 10. Click the Download Summary menu link to download a portable copy of your medical information. Additional Information    If you have questions, please visit the Frequently Asked Questions section of the [employer] website at [email or web address] Remember, [employer] is NOT to be used for urgent needs. For medical emergencies, dial 911.  Smoking status: Never Smoker    Smokeless tobacco: Never Used   Substance Use Topics    Alcohol use: No         Family History  family history includes Cancer in her father; Colon Polyps in her sister; Hearing Loss in her brother and brother; High Blood Pressure in her mother; Yasmine Number in her father; Stroke in her mother. Review of Systems:  Constitutional: denies fever, chills, fatigue, change in appetite, weight gain or weight loss  Head: Normocephalic  Skin: denies easy bruising, skin redness, skin rash, hives, sensitivity to sun exposure, tightness, nodules or bumps, hair loss, color changes in the hands or feet with cold. Eyes: denies pain, redness, loss of vision, double or blurred vision, eye drainage, or dryness. ENT and Mouth: denies ringing in the ears, loss of hearing, nasal congestion, nasal discharge, no hoarseness, sore throat, or difficulty swallowing   Respiratory: denies chronic dry cough, coughing up blood, coughing up mucus, waking at night coughing or choking, or wheezing. Cardiovascular: denies chest pain, irregular heartbeats, palpitations, shortness of breath, or edema in legs  Gastrointestinal: denies, nausea, vomiting, heartburn, diarrhea, or constipation  Genitourinary: denies difficult urination, pain or burning with urination, blood in the urine, or cloudy urine  Musculoskeletal: denies arm, buttock, thigh or calf cramps. Has pain in neck and left shoulder, and tenderness in neck and left shoulder. No muscle weakness. No joint swelling. Neurologic: headache, dizziness, fainting, loss of consciousness, no memory loss. no sensitivity. Endocrine: denies intolerance to hot or cold temperature, night sweats, flushing, fingernail changes, increased thirst, or hairloss   Hematologic/ Lymphatic: denies anemia, bleeding tendency or clotting tendency, bruising easily.   Allergic/ Immunologic: denies rhinitis, asthma, skin sensitivity, or allergy to Latex   Psychiatric: denies depression or thoughts of suicide, or voices in head. Current Pain Assessment:   Pain Assessment  Pain Assessment: 0-10  Pain Level: 10  Patient's Stated Pain Goal: No pain  Pain Type: Chronic pain  Pain Location: Back, Neck, Head, Arm  Pain Descriptors: Aching, Burning, Constant  Pain Frequency: Continuous  Pain Onset: On-going  Clinical Progression: Not changed  Functional Pain Assessment: Prevents or interferes some active activities and ADLs    Clinical Progression: gradually improving  Effect of Pain on Daily Activities: limits activity  Patient's Stated Pain Goal: No pain  Pain Intervention(s): Medication (see eMar), Repositioning, Rest, Ice    Current PEG: 10    Past PE.6    Annual Screens:    ORT Score: 1    PHQ-9 Score: 8    Physical Exam:    Vitals:    02/10/20 1547   BP: 108/62   Pulse: 60   Resp: 18   Temp: 97.7 °F (36.5 °C)   TempSrc: Skin   SpO2: 98%   Weight: 128 lb 4 oz (58.2 kg)   Height: 5' 8\" (1.727 m)       Body mass index is 19.5 kg/m². General Appearance: no acute distress. Appears to be well dressed  Skin Exam: Warm and dry, no jaundice, rashes or lesions  Head Exam: NCAT, PERRLA, EOMI, moist mucus membranes, scalp normal  Neck Exam: Supple, no masses palpated, trachea midline. Pain in neck with rotation that radiates down both arms  Lung: Clear to ausculation in all lobes anterior and posterior. Heart[de-identified] Regular rate and rhythm, no gallops, rubs or murmurs, no edema  Abdomen:  Bowel sounds in all quadrants, soft, non-distended, non-tender with palpation, no guarding  Extremities: No rash, cyanosis or bruising  Musculoskeletal: No joint swelling or deformity   Back Exam: Pain in low back with flexion  Hip Exam: Full rotation bilateral   Knee Exam: Full flexion and extension bilateral, no crepitus  Shoulder Exam: Pain with ROM in left shoulder  Neurologic Exam: Gait and coordination normal, speech normal  Reflexes: Normal brachialis, Negative Sanches's bilateral. Normal Patellar Monitoring:   Discussed with patient possible medication side effects, risk of tolerance, dependence and alternative treatments. Discussed thegrowing epidemic in the [place] with the overprescribing and at times the abuse of narcotics. Discussed the detrimental effects of long term narcotic use. Patient encouraged to set daily goals of exercising and decreasing dailynarcotic intake. Discussed with the patient about the development of hyperalgesia with long term narcotic intake. EMR dragon/transcription disclaimer: Much of this encounter note is electronic transcription/translation of spoken language to printed tach. Electronic translation of spoken language may be erroneous, or at times, nonsensical words or phrases may be inadvertently transcribed.  Although, I have reviewed the note for such errors, some may still exist.     CC:  MD Tatum Ospina, DENIZ, 2/10/2020 at 4:19 PM

## 2020-02-10 NOTE — PROGRESS NOTES
[x] PEG Score Calculated [] PHQ Score Calculated [] ORT Score Calculated    [] Compliance Issues Discussed [] Cognitive Behavior Needs [x] Exercise [] Review of Test [] Financial Issues  [x] Tobacco/Alcohol Use Reviewed [x] Teaching [] New Patient [] Picture Obtained    Physician Plan:  [] Outgoing Referral  [] Pharmacy Consult  [] Test Ordered [] Prescription Ordered/Changed [] Blood Thinner Request Form  [] Obtained Test Results / Consult Notes  [] UDS due at next visit, verified per EPIC      [] Suspected Physical Abuse or Suicide Risk assessed - IF YES COMPLETE QUESTIONS BELOW    If any of the following questions are answered yes - contact attending physician for referral:    Has been considering harming self to escape stress, pain problems? [] YES  [] NO  Has a suicide plan? [] YES  [] NO  Has attempted suicide in the past?   [] YES  [] NO  Has a close friend or family member who committed suicide?   [] YES  [] NO    Assessment Completed by:  Electronically signed by Reza Torres RN on 2/10/2020 at 2:32 PM

## 2020-02-19 RX ORDER — HYDROCODONE BITARTRATE AND ACETAMINOPHEN 7.5; 325 MG/1; MG/1
1 TABLET ORAL EVERY 6 HOURS PRN
Qty: 90 TABLET | Refills: 0 | Status: SHIPPED | OUTPATIENT
Start: 2020-02-19 | End: 2020-03-19 | Stop reason: SDUPTHER

## 2020-02-25 ENCOUNTER — TELEPHONE (OUTPATIENT)
Dept: GASTROENTEROLOGY | Age: 75
End: 2020-02-25

## 2020-02-25 LAB
ANISOCYTOSIS: ABNORMAL
BASOPHILS ABSOLUTE: 0.1 K/UL (ref 0–0.2)
BASOPHILS RELATIVE PERCENT: 1.7 % (ref 0–1)
EOSINOPHILS ABSOLUTE: 0.2 K/UL (ref 0–0.6)
EOSINOPHILS RELATIVE PERCENT: 3.2 % (ref 0–5)
HCT VFR BLD CALC: 33.1 % (ref 37–47)
HEMOGLOBIN: 8.7 G/DL (ref 12–16)
HYPOCHROMIA: ABNORMAL
IMMATURE GRANULOCYTES #: 0 K/UL
LYMPHOCYTES ABSOLUTE: 1.4 K/UL (ref 1.1–4.5)
LYMPHOCYTES RELATIVE PERCENT: 22.7 % (ref 20–40)
MCH RBC QN AUTO: 24.9 PG (ref 27–31)
MCHC RBC AUTO-ENTMCNC: 26.3 G/DL (ref 33–37)
MCV RBC AUTO: 94.6 FL (ref 81–99)
MONOCYTES ABSOLUTE: 0.7 K/UL (ref 0–0.9)
MONOCYTES RELATIVE PERCENT: 10.9 % (ref 0–10)
NEUTROPHILS ABSOLUTE: 3.7 K/UL (ref 1.5–7.5)
NEUTROPHILS RELATIVE PERCENT: 61.2 % (ref 50–65)
PDW BLD-RTO: 21.3 % (ref 11.5–14.5)
PLATELET # BLD: 337 K/UL (ref 130–400)
PLATELET SLIDE REVIEW: ADEQUATE
PMV BLD AUTO: 10.1 FL (ref 9.4–12.3)
RBC # BLD: 3.5 M/UL (ref 4.2–5.4)
WBC # BLD: 6 K/UL (ref 4.8–10.8)

## 2020-02-25 NOTE — TELEPHONE ENCOUNTER
Richard the person who sets up Hearing Impaired  was called today. Message was left for her to return my call to let us know if someone had set up an  to be here or if we need to reschedule her.

## 2020-02-26 ENCOUNTER — HOSPITAL ENCOUNTER (OUTPATIENT)
Dept: VASCULAR LAB | Age: 75
Discharge: HOME | End: 2020-02-26
Payer: MEDICARE

## 2020-02-26 ENCOUNTER — TELEPHONE (OUTPATIENT)
Dept: GASTROENTEROLOGY | Age: 75
End: 2020-02-26

## 2020-02-26 ENCOUNTER — HOSPITAL ENCOUNTER (OUTPATIENT)
Dept: PAIN MANAGEMENT | Age: 75
Discharge: HOME | End: 2020-02-26
Payer: MEDICARE

## 2020-02-26 ENCOUNTER — OFFICE VISIT (OUTPATIENT)
Dept: GASTROENTEROLOGY | Age: 75
End: 2020-02-26
Payer: MEDICARE

## 2020-02-26 ENCOUNTER — OFFICE VISIT (OUTPATIENT)
Dept: VASCULAR SURGERY | Age: 75
End: 2020-02-26
Payer: MEDICARE

## 2020-02-26 VITALS
OXYGEN SATURATION: 96 % | HEART RATE: 62 BPM | WEIGHT: 124.4 LBS | HEIGHT: 68 IN | SYSTOLIC BLOOD PRESSURE: 110 MMHG | DIASTOLIC BLOOD PRESSURE: 70 MMHG | BODY MASS INDEX: 18.85 KG/M2

## 2020-02-26 VITALS
SYSTOLIC BLOOD PRESSURE: 119 MMHG | OXYGEN SATURATION: 99 % | DIASTOLIC BLOOD PRESSURE: 71 MMHG | TEMPERATURE: 96.5 F | HEART RATE: 59 BPM | RESPIRATION RATE: 18 BRPM

## 2020-02-26 VITALS
TEMPERATURE: 97.4 F | HEART RATE: 59 BPM | OXYGEN SATURATION: 96 % | SYSTOLIC BLOOD PRESSURE: 125 MMHG | DIASTOLIC BLOOD PRESSURE: 62 MMHG | RESPIRATION RATE: 18 BRPM

## 2020-02-26 DIAGNOSIS — D64.9 ANEMIA, UNSPECIFIED TYPE: ICD-10-CM

## 2020-02-26 PROBLEM — R11.0 CHRONIC NAUSEA: Status: ACTIVE | Noted: 2020-02-26

## 2020-02-26 PROBLEM — H91.90 DEAFNESS: Status: ACTIVE | Noted: 2020-02-26

## 2020-02-26 PROBLEM — R10.9 ABDOMINAL CRAMPING: Status: ACTIVE | Noted: 2020-02-26

## 2020-02-26 LAB
FOLATE: 15.4 NG/ML (ref 4.8–37.3)
IRON SATURATION: 43 % (ref 14–50)
IRON: 162 UG/DL (ref 37–145)
TOTAL IRON BINDING CAPACITY: 381 UG/DL (ref 250–400)
VITAMIN B-12: <150 PG/ML (ref 211–946)

## 2020-02-26 PROCEDURE — G8420 CALC BMI NORM PARAMETERS: HCPCS | Performed by: NURSE PRACTITIONER

## 2020-02-26 PROCEDURE — G8484 FLU IMMUNIZE NO ADMIN: HCPCS | Performed by: NURSE PRACTITIONER

## 2020-02-26 PROCEDURE — 1036F TOBACCO NON-USER: CPT | Performed by: NURSE PRACTITIONER

## 2020-02-26 PROCEDURE — 93880 EXTRACRANIAL BILAT STUDY: CPT

## 2020-02-26 PROCEDURE — 99212 OFFICE O/P EST SF 10 MIN: CPT | Performed by: NURSE PRACTITIONER

## 2020-02-26 PROCEDURE — 99215 OFFICE O/P EST HI 40 MIN: CPT | Performed by: NURSE PRACTITIONER

## 2020-02-26 PROCEDURE — G8427 DOCREV CUR MEDS BY ELIG CLIN: HCPCS | Performed by: NURSE PRACTITIONER

## 2020-02-26 PROCEDURE — 3017F COLORECTAL CA SCREEN DOC REV: CPT | Performed by: NURSE PRACTITIONER

## 2020-02-26 PROCEDURE — 1123F ACP DISCUSS/DSCN MKR DOCD: CPT | Performed by: NURSE PRACTITIONER

## 2020-02-26 PROCEDURE — 1090F PRES/ABSN URINE INCON ASSESS: CPT | Performed by: NURSE PRACTITIONER

## 2020-02-26 PROCEDURE — 20610 DRAIN/INJ JOINT/BURSA W/O US: CPT

## 2020-02-26 PROCEDURE — 2500000003 HC RX 250 WO HCPCS

## 2020-02-26 PROCEDURE — G8399 PT W/DXA RESULTS DOCUMENT: HCPCS | Performed by: NURSE PRACTITIONER

## 2020-02-26 PROCEDURE — 4040F PNEUMOC VAC/ADMIN/RCVD: CPT | Performed by: NURSE PRACTITIONER

## 2020-02-26 PROCEDURE — 6360000002 HC RX W HCPCS

## 2020-02-26 PROCEDURE — 20610 DRAIN/INJ JOINT/BURSA W/O US: CPT | Performed by: NURSE PRACTITIONER

## 2020-02-26 RX ORDER — TRIAMCINOLONE ACETONIDE 40 MG/ML
40 INJECTION, SUSPENSION INTRA-ARTICULAR; INTRAMUSCULAR ONCE
Status: DISCONTINUED | OUTPATIENT
Start: 2020-02-26 | End: 2020-02-28 | Stop reason: HOSPADM

## 2020-02-26 RX ORDER — BUPIVACAINE HYDROCHLORIDE 5 MG/ML
1 INJECTION, SOLUTION EPIDURAL; INTRACAUDAL ONCE
Status: DISCONTINUED | OUTPATIENT
Start: 2020-02-26 | End: 2020-02-28 | Stop reason: HOSPADM

## 2020-02-26 RX ORDER — LIDOCAINE HYDROCHLORIDE 10 MG/ML
1 INJECTION, SOLUTION EPIDURAL; INFILTRATION; INTRACAUDAL; PERINEURAL ONCE
Status: DISCONTINUED | OUTPATIENT
Start: 2020-02-26 | End: 2020-02-28 | Stop reason: HOSPADM

## 2020-02-26 ASSESSMENT — ENCOUNTER SYMPTOMS
ABDOMINAL PAIN: 1
BACK PAIN: 0
DIARRHEA: 1
SORE THROAT: 0
RECTAL PAIN: 0
TROUBLE SWALLOWING: 0
SHORTNESS OF BREATH: 0
COUGH: 1
VOICE CHANGE: 0
ABDOMINAL DISTENTION: 0
VOMITING: 0
NAUSEA: 1
CONSTIPATION: 1
PHOTOPHOBIA: 0
BLOOD IN STOOL: 0
ANAL BLEEDING: 0

## 2020-02-26 NOTE — PROGRESS NOTES
7/6/2017    Dr Festus King diverticulosis, 5 yr recall    RI EGD TRANSORAL BIOPSY SINGLE/MULTIPLE N/A 7/6/2017    Dr Victoria Carrier, [place] (-)    TERRI AND BSO      VARICOSE VEIN SURGERY      VASCULAR SURGERY  02/19/2018    [facility]. Aortogram and runoff. Left common femoral artery, 5 french sheath. Social History     Socioeconomic History    Marital status:      Spouse name: None    Number of children: None    Years of education: None    Highest education level: None   Occupational History    Occupation: unemployed   Social Needs    Financial resource strain: None    Food insecurity:     Worry: None     Inability: None    Transportation needs:     Medical: None     Non-medical: None   Tobacco Use    Smoking status: Never Smoker    Smokeless tobacco: Never Used   Substance and Sexual Activity    Alcohol use: No    Drug use: No    Sexual activity: Not Currently   Lifestyle    Physical activity:     Days per week: None     Minutes per session: None    Stress: None   Relationships    Social connections:     Talks on phone: None     Gets together: None     Attends Confucianism service: None     Active member of club or organization: None     Attends meetings of clubs or organizations: None     Relationship status: None    Intimate partner violence:     Fear of current or ex partner: None     Emotionally abused: None     Physically abused: None     Forced sexual activity: None   Other Topics Concern    None   Social History Narrative    None       No Known Allergies    Current Outpatient Medications   Medication Sig Dispense Refill    Ferrous Sulfate (IRON) 325 (65 Fe) MG TABS TAKE 1 TABLET BY MOUTH ONCE DAILY WITH BREAKFAST 30 tablet 3    HYDROcodone-acetaminophen (NORCO) 7.5-325 MG per tablet Take 1 tablet by mouth every 6 hours as needed for Pain for up to 30 days.  90 tablet 0    Donepezil HCl (ARICEPT) 23 MG TABS tablet Take 1 tablet by mouth nightly For dementia 30 tablet 3    amLODIPine-benazepril (LOTREL) 5-10 MG per capsule TAKE 1 CAPSULE BY MOUTH ONCE DAILY FOR BLOOD PRESSURE 90 capsule 5    albuterol sulfate  (90 Base) MCG/ACT inhaler Inhale 2 puffs into the lungs every 4 hours as needed for Wheezing 1 Inhaler 0    amitriptyline (ELAVIL) 75 MG tablet 1 tablet by mouth at bedtime for sleep 30 tablet 5    nitroGLYCERIN (NITROSTAT) 0.4 MG SL tablet Place 1 tablet under the tongue every 5 minutes as needed for Chest pain up to max of 3 total doses. If no relief after 1 dose, call 911. 25 tablet 3    FLUoxetine (PROZAC) 20 MG capsule TAKE ONE CAPSULE BY MOUTH ONCE DAILY FOR ANXIETY & DEPRESSION 90 capsule 3    Misc. Devices (ROLLER WALKER) MISC 1 each by Does not apply route daily DX Code: R26.81, G70.00, H81.313, G60.9 1 each 0     No current facility-administered medications for this visit. Review of Systems   Constitutional: Positive for fatigue. Negative for appetite change, fever and unexpected weight change. HENT: Negative for sore throat, trouble swallowing and voice change. Eyes: Negative for photophobia and visual disturbance. Respiratory: Positive for cough. Negative for shortness of breath. Cardiovascular: Negative for chest pain, palpitations and leg swelling. Gastrointestinal: Positive for abdominal pain, constipation, diarrhea and nausea. Negative for abdominal distention, anal bleeding, blood in stool, rectal pain and vomiting. Genitourinary: Negative for hematuria. Musculoskeletal: Negative for arthralgias, back pain and neck pain. Allergic/Immunologic: Negative for immunocompromised state. Neurological: Positive for weakness. Negative for syncope. Hematological: Negative for adenopathy. Does not bruise/bleed easily. Psychiatric/Behavioral: Positive for confusion and dysphoric mood. Negative for sleep disturbance. The patient is nervous/anxious. All other systems reviewed and are negative.       Objective:     Physical Exam  Vitals signs and nursing note reviewed. Constitutional:       General: She is not in acute distress. Appearance: She is well-developed. Comments: /70   Pulse 62   Ht 5' 8\" (1.727 m)   Wt 124 lb 6.4 oz (56.4 kg)   SpO2 96%   BMI 18.91 kg/m²    HENT:      Head: Normocephalic and atraumatic. Right Ear: External ear normal.      Left Ear: External ear normal.   Eyes:      General: No scleral icterus. Conjunctiva/sclera: Conjunctivae normal.      Pupils: Pupils are equal, round, and reactive to light. Neck:      Musculoskeletal: Normal range of motion and neck supple. Thyroid: No thyromegaly. Cardiovascular:      Rate and Rhythm: Normal rate and regular rhythm. Heart sounds: Normal heart sounds. No murmur. No friction rub. No gallop. Comments: No edema noted to augustus lower extremities   Pulmonary:      Effort: Pulmonary effort is normal. No respiratory distress. Breath sounds: Normal breath sounds. Abdominal:      General: Bowel sounds are normal. There is no distension. Palpations: Abdomen is soft. Tenderness: There is no abdominal tenderness. There is no rebound. Comments: Hepatosplenomegaly not appreciated  No TTP abdomen on exam   Musculoskeletal: Normal range of motion. General: No deformity. Comments: Generalized weakness noted, pt in wheelchair   Neurological:      Mental Status: She is alert and oriented to person, place, and time. Cranial Nerves: No cranial nerve deficit. Psychiatric:         Judgment: Judgment normal.           Assessment:       Diagnosis Orders   1. Anemia, unspecified type  Blood Occult Stool Screen #3    Blood Occult Stool Screen #2    Blood Occult Stool Screen #1    Iron and TIBC    Vitamin B12    Folate    COLONOSCOPY W/ OR W/O BIOPSY    ESOPHAGOSCOPY / EGD   2. Abdominal cramping  COLONOSCOPY W/ OR W/O BIOPSY   3. Chronic nausea  ESOPHAGOSCOPY / EGD   4.  Alternating constipation and diarrhea

## 2020-02-26 NOTE — PROCEDURES
voluntary, informed and signed consent obtained the patient was placed in a sitting position with the patients left arm placed to the side and elbow flexed at 90 degrees. Appropriate time out was obtained per policy. The proposed injection site was palpated at the point of maximal tenderness  [] Anterior  [x] Posterior [] Lateral and the skin over the proposed injection entry point was marked. The skin was then sprayed with Gebauer's Solution while protecting patients eyes and prepped in a sterile fashion with Prevantics swab. Under sterile technique a 22 gauge 1 1/2 inch needle was introduced and after a negative aspiration a solution of 1 ml of 0.5% Marcaine Plain, 1 ml of 1% Lidocaine Plain and     [x] 1 ml of Kenalog (40mg/ml)   [] Toradol 30 mg/ml was injected. The needle was withdrawn and a sterile dressing applied. Discharge: The patient tolerated the procedure well. There were no complications during the procedure and the patient was discharged home with discharge instructions. The patient has been instructed to contact the office should there be any complications or questions to arise between today and their next appointment.     Plan:  [x] Will return to the office in   [] 1 month  [x] 4 - 6 weeks   [] 2 months   []  3 months for:  [] Planned Procedure  [x] Procedure Follow-up   [] Office Visit      1 [facility], 9/5/2019 at 2:37 PM

## 2020-02-26 NOTE — PATIENT INSTRUCTIONS

## 2020-02-26 NOTE — PROGRESS NOTES
Patient Care Team:  Alvarez Montana MD as PCP - General (Family Medicine)  Alvarez Montana MD as PCP - [facility] Empaneled Provider  Jeannine Lynch MD (Family Medicine)  Alvarez Montana MD as Referring Physician (Family Medicine)  Vinay Zee MD as Neurologist (Neurology)  DENIZ Stevens as Advanced Practice Nurse (Family Nurse Practitioner)  Zulma Galindo DO as Consulting Physician (Vascular Surgery)      Subjective    She presents for follow-up of carotid artery stenosis. She has prior history of carotid occlusive disease for 1 - 5 years. Her current treatment includes ASA EC daily. She denies a history of CVA. She reports no TIA's, episodes of amaurosis fugax and episodes of lateralizing weakness.     Louis Reyes is a 75 y.o. female with the following history reviewed and recorded in [facility]:  Patient Active Problem List    Diagnosis Date Noted    Anemia 02/26/2020    Abdominal cramping 02/26/2020    Chronic nausea 02/26/2020    Deafness 02/26/2020    Lumbar radiculopathy 11/20/2019    Cervical radiculopathy 11/20/2019    Left shoulder pain 10/07/2019    Facet hypertrophy 10/07/2019    Recurrent major depressive disorder, in full remission ([address] 02/09/2019    Bilateral carotid artery stenosis 02/08/2019    Tear film insufficiency 04/17/2018    Monocular exotropia 04/17/2018    Posterior capsular opacification, not obscuring vision 04/17/2018    Pseudophakia 04/17/2018    Unsteady gait 08/15/2017    Myasthenia gravis without acute exacerbation ([address] 08/12/2017    Vertigo, aural 08/12/2017    Chronic tension-type headache, intractable 08/12/2017    Idiopathic peripheral neuropathy 08/12/2017    Epigastric pain 07/06/2017    Alternating constipation and diarrhea 07/06/2017    Family history of colonic polyps 07/06/2017    Chronic insomnia 06/10/2017    Ectatic abdominal aorta ([address] 07/13/2016    Peripheral vascular disease ([address] 01/12/2016    Venous (peripheral) insufficiency 01/12/2016     Updating Deprecated Diagnoses      Essential hypertension 12/07/2015    Descending thoracic aortic aneurysm (HCC) 11/04/2013    Osteoarthritis     Chronic back pain     Memory loss     Neck pain     Deafness congenital      Current Outpatient Medications   Medication Sig Dispense Refill    Ferrous Sulfate (IRON) 325 (65 Fe) MG TABS TAKE 1 TABLET BY MOUTH ONCE DAILY WITH BREAKFAST 30 tablet 3    HYDROcodone-acetaminophen (NORCO) 7.5-325 MG per tablet Take 1 tablet by mouth every 6 hours as needed for Pain for up to 30 days. 90 tablet 0    Donepezil HCl (ARICEPT) 23 MG TABS tablet Take 1 tablet by mouth nightly For dementia 30 tablet 3    amLODIPine-benazepril (LOTREL) 5-10 MG per capsule TAKE 1 CAPSULE BY MOUTH ONCE DAILY FOR BLOOD PRESSURE 90 capsule 5    albuterol sulfate  (90 Base) MCG/ACT inhaler Inhale 2 puffs into the lungs every 4 hours as needed for Wheezing 1 Inhaler 0    amitriptyline (ELAVIL) 75 MG tablet 1 tablet by mouth at bedtime for sleep 30 tablet 5    nitroGLYCERIN (NITROSTAT) 0.4 MG SL tablet Place 1 tablet under the tongue every 5 minutes as needed for Chest pain up to max of 3 total doses. If no relief after 1 dose, call 911. 25 tablet 3    FLUoxetine (PROZAC) 20 MG capsule TAKE ONE CAPSULE BY MOUTH ONCE DAILY FOR ANXIETY & DEPRESSION 90 capsule 3    Misc. Devices (ROLLER WALKER) MISC 1 each by Does not apply route daily DX Code: R26.81, G70.00, H81.313, G60.9 1 each 0     No current facility-administered medications for this visit.       Facility-Administered Medications Ordered in Other Visits   Medication Dose Route Frequency Provider Last Rate Last Dose    lidocaine PF 1 % injection 1 mL  1 mL Other Once Laura G DENIZ Chapman        bupivacaine (PF) (MARCAINE) 0.5 % injection 5 mg  1 mL Intra-articular Once Laura G EDNIZ Chapman        triamcinolone acetonide (KENALOG-40) injection 40 mg  40 mg Intra-articular Once Laura G DENIZ Chapman         Current Outpatient Medications on File Prior to Visit   Medication Sig Dispense Refill    Ferrous Sulfate (IRON) 325 (65 Fe) MG TABS TAKE 1 TABLET BY MOUTH ONCE DAILY WITH BREAKFAST 30 tablet 3    HYDROcodone-acetaminophen (NORCO) 7.5-325 MG per tablet Take 1 tablet by mouth every 6 hours as needed for Pain for up to 30 days. 90 tablet 0    Donepezil HCl (ARICEPT) 23 MG TABS tablet Take 1 tablet by mouth nightly For dementia 30 tablet 3    amLODIPine-benazepril (LOTREL) 5-10 MG per capsule TAKE 1 CAPSULE BY MOUTH ONCE DAILY FOR BLOOD PRESSURE 90 capsule 5    albuterol sulfate  (90 Base) MCG/ACT inhaler Inhale 2 puffs into the lungs every 4 hours as needed for Wheezing 1 Inhaler 0    amitriptyline (ELAVIL) 75 MG tablet 1 tablet by mouth at bedtime for sleep 30 tablet 5    nitroGLYCERIN (NITROSTAT) 0.4 MG SL tablet Place 1 tablet under the tongue every 5 minutes as needed for Chest pain up to max of 3 total doses. If no relief after 1 dose, call 911. 25 tablet 3    FLUoxetine (PROZAC) 20 MG capsule TAKE ONE CAPSULE BY MOUTH ONCE DAILY FOR ANXIETY & DEPRESSION 90 capsule 3    Misc. Devices (ROLLER WALKER) MISC 1 each by Does not apply route daily DX Code: R26.81, G70.00, H81.313, G60.9 1 each 0     No current facility-administered medications on file prior to visit. Allergies: Patient has no known allergies.   Past Medical History:   Diagnosis Date    Anxiety     Chronic back pain     severe muscle spasms    Deafness congenital     Depression     Headache     Hypertension     Memory loss     early    [facility]ia [facility]     Neck pain     Osteoarthritis     Ptosis of eyelid     Weakness of face muscles     Wound of right ankle 2016    open wound to lateral side of right ankle      Past Surgical History:   Procedure Laterality Date    APPENDECTOMY      CARPAL TUNNEL RELEASE      CATARACT REMOVAL      COLONOSCOPY  7-2010    COLONOSCOPY  2000 or before [place]-normal per patient    EYE SURGERY      HERNIA REPAIR      on back of neck    CT COLONOSCOPY FLX DX W/COLLJ SPEC WHEN PFRMD N/A 7/6/2017    Dr Dayanna Hernandez diverticulosis, 5 yr recall    CT EGD TRANSORAL BIOPSY SINGLE/MULTIPLE N/A 7/6/2017    Dr Porter Needle, [place] (-)    TERRI AND BSO      VARICOSE VEIN SURGERY      VASCULAR SURGERY  02/19/2018    [facility]. Aortogram and runoff. Left common femoral artery, 5 [place] sheath. Family History   Problem Relation Age of Onset    High Blood Pressure Mother     Stroke Mother     Cancer Father     Lung Cancer Father     Hearing Loss Brother         deaf   Sherren Kelaura Hearing Loss Brother         deaf    Colon Polyps Sister     Colon Cancer Neg Hx     Esophageal Cancer Neg Hx     Liver Cancer Neg Hx     Liver Disease Neg Hx     Stomach Cancer Neg Hx     Rectal Cancer Neg Hx      Social History     Tobacco Use    Smoking status: Never Smoker    Smokeless tobacco: Never Used   Substance Use Topics    Alcohol use: No           Review of Systems    Constitutional - no significant activity change, appetite change, or unexpected weight change. No fever or chills. No diaphoresis or significant fatigue. HENT - no significant rhinorrhea or epistaxis. No tinnitus, she is deaf  Eyes - no sudden vision change or amaurosis. Respiratory - no significant shortness of breath, wheezing, or stridor. No apnea, cough, or chest tightness associated with shortness of breath. Cardiovascular - no chest pain, syncope, or significant dizziness. No palpitations or significant leg swelling. No claudication. Gastrointestinal - no abdominal swelling or pain. No blood in stool. No severe constipation, diarrhea, nausea, or vomiting. Genitourinary - No difficulty urinating, dysuria, frequency, or urgency. No flank pain or hematuria. Musculoskeletal - no back pain, gait disturbance, or myalgia. Has shoulder pain  Skin - no color change, rash, pallor, or new wound.   Neurologic with the patient including continued medical management. Patient has opted to proceed with continued medical management. Assessment    1. Bilateral carotid artery stenosis          Plan    Start/Continue ASA EC 81 mg daily  Follow up in  12  Month(s)  Strongly encourage start/continue statin therapy  Recommend no smoking  Patient instructed to call or proceed to the emergency room with any symptoms of lateralizing weakness, loss of vision in one eye, or episodes slurred speech.

## 2020-02-26 NOTE — TELEPHONE ENCOUNTER
Please let pt know I have reviewed results of her labs. Iron is high. Iron saturation is normal.  Folate is normal.  B12 is low. She can call her PCP and find out if she recommends an B12 injection or otherwise what she recommends to replace this. Thanks!

## 2020-03-18 ENCOUNTER — ANESTHESIA EVENT (OUTPATIENT)
Dept: ENDOSCOPY | Age: 75
End: 2020-03-18
Payer: MEDICARE

## 2020-03-19 LAB
ANION GAP SERPL CALCULATED.3IONS-SCNC: 12 MMOL/L (ref 7–19)
BASOPHILS ABSOLUTE: 0.1 K/UL (ref 0–0.2)
BASOPHILS RELATIVE PERCENT: 0.7 % (ref 0–1)
BUN BLDV-MCNC: 20 MG/DL (ref 8–23)
CALCIUM SERPL-MCNC: 10 MG/DL (ref 8.8–10.2)
CHLORIDE BLD-SCNC: 106 MMOL/L (ref 98–111)
CO2: 24 MMOL/L (ref 22–29)
CREAT SERPL-MCNC: 0.9 MG/DL (ref 0.5–0.9)
EOSINOPHILS ABSOLUTE: 0.3 K/UL (ref 0–0.6)
EOSINOPHILS RELATIVE PERCENT: 2.5 % (ref 0–5)
GFR NON-AFRICAN AMERICAN: >60
GLUCOSE BLD-MCNC: 65 MG/DL (ref 74–109)
HCT VFR BLD CALC: 33.1 % (ref 37–47)
HEMOGLOBIN: 10 G/DL (ref 12–16)
IMMATURE GRANULOCYTES #: 0 K/UL
LYMPHOCYTES ABSOLUTE: 1.8 K/UL (ref 1.1–4.5)
LYMPHOCYTES RELATIVE PERCENT: 17 % (ref 20–40)
MCH RBC QN AUTO: 26.3 PG (ref 27–31)
MCHC RBC AUTO-ENTMCNC: 30.2 G/DL (ref 33–37)
MCV RBC AUTO: 87.1 FL (ref 81–99)
MONOCYTES ABSOLUTE: 0.8 K/UL (ref 0–0.9)
MONOCYTES RELATIVE PERCENT: 7.3 % (ref 0–10)
NEUTROPHILS ABSOLUTE: 7.7 K/UL (ref 1.5–7.5)
NEUTROPHILS RELATIVE PERCENT: 72.2 % (ref 50–65)
PDW BLD-RTO: 21.9 % (ref 11.5–14.5)
PLATELET # BLD: 288 K/UL (ref 130–400)
PMV BLD AUTO: 10.3 FL (ref 9.4–12.3)
POTASSIUM SERPL-SCNC: 4.8 MMOL/L (ref 3.5–5)
RBC # BLD: 3.8 M/UL (ref 4.2–5.4)
SODIUM BLD-SCNC: 142 MMOL/L (ref 136–145)
T4 TOTAL: 7.7 UG/DL (ref 4.5–11.7)
TSH SERPL DL<=0.05 MIU/L-ACNC: 3.24 UIU/ML (ref 0.27–4.2)
WBC # BLD: 10.7 K/UL (ref 4.8–10.8)

## 2020-03-20 ENCOUNTER — ANESTHESIA (OUTPATIENT)
Dept: ENDOSCOPY | Age: 75
End: 2020-03-20
Payer: MEDICARE

## 2020-03-20 RX ORDER — HYDROCODONE BITARTRATE AND ACETAMINOPHEN 7.5; 325 MG/1; MG/1
1 TABLET ORAL EVERY 6 HOURS PRN
Qty: 90 TABLET | Refills: 0 | Status: SHIPPED | OUTPATIENT
Start: 2020-03-20 | End: 2020-04-20 | Stop reason: SDUPTHER

## 2020-04-07 RX ORDER — FLUOXETINE HYDROCHLORIDE 20 MG/1
CAPSULE ORAL
Qty: 90 CAPSULE | Refills: 0 | Status: SHIPPED | OUTPATIENT
Start: 2020-04-07 | End: 2020-07-20

## 2020-04-14 ENCOUNTER — OFFICE VISIT (OUTPATIENT)
Age: 75
End: 2020-04-14
Payer: MEDICARE

## 2020-04-14 VITALS
HEIGHT: 68 IN | OXYGEN SATURATION: 91 % | DIASTOLIC BLOOD PRESSURE: 62 MMHG | BODY MASS INDEX: 18.79 KG/M2 | WEIGHT: 124 LBS | RESPIRATION RATE: 16 BRPM | SYSTOLIC BLOOD PRESSURE: 108 MMHG | HEART RATE: 64 BPM | TEMPERATURE: 98.2 F

## 2020-04-14 PROCEDURE — 4040F PNEUMOC VAC/ADMIN/RCVD: CPT | Performed by: NURSE PRACTITIONER

## 2020-04-14 PROCEDURE — 3017F COLORECTAL CA SCREEN DOC REV: CPT | Performed by: NURSE PRACTITIONER

## 2020-04-14 PROCEDURE — 1090F PRES/ABSN URINE INCON ASSESS: CPT | Performed by: NURSE PRACTITIONER

## 2020-04-14 PROCEDURE — 1036F TOBACCO NON-USER: CPT | Performed by: NURSE PRACTITIONER

## 2020-04-14 PROCEDURE — G8427 DOCREV CUR MEDS BY ELIG CLIN: HCPCS | Performed by: NURSE PRACTITIONER

## 2020-04-14 PROCEDURE — G8420 CALC BMI NORM PARAMETERS: HCPCS | Performed by: NURSE PRACTITIONER

## 2020-04-14 PROCEDURE — 1123F ACP DISCUSS/DSCN MKR DOCD: CPT | Performed by: NURSE PRACTITIONER

## 2020-04-14 PROCEDURE — 99213 OFFICE O/P EST LOW 20 MIN: CPT | Performed by: NURSE PRACTITIONER

## 2020-04-14 PROCEDURE — G8399 PT W/DXA RESULTS DOCUMENT: HCPCS | Performed by: NURSE PRACTITIONER

## 2020-04-14 RX ORDER — AMOXICILLIN AND CLAVULANATE POTASSIUM 875; 125 MG/1; MG/1
1 TABLET, FILM COATED ORAL 2 TIMES DAILY
Qty: 20 TABLET | Refills: 0 | Status: SHIPPED | OUTPATIENT
Start: 2020-04-14 | End: 2020-04-24

## 2020-04-14 RX ORDER — BENZONATATE 100 MG/1
100 CAPSULE ORAL 2 TIMES DAILY PRN
Qty: 14 CAPSULE | Refills: 0 | Status: SHIPPED | OUTPATIENT
Start: 2020-04-14 | End: 2020-04-21

## 2020-04-14 ASSESSMENT — ENCOUNTER SYMPTOMS
COUGH: 1
SHORTNESS OF BREATH: 1
SORE THROAT: 1

## 2020-04-14 NOTE — PROGRESS NOTES
Donepezil HCl (ARICEPT) 23 MG TABS tablet Take 1 tablet by mouth nightly For dementia 30 tablet 3    amLODIPine-benazepril (LOTREL) 5-10 MG per capsule TAKE 1 CAPSULE BY MOUTH ONCE DAILY FOR BLOOD PRESSURE 90 capsule 5    albuterol sulfate  (90 Base) MCG/ACT inhaler Inhale 2 puffs into the lungs every 4 hours as needed for Wheezing 1 Inhaler 0    amitriptyline (ELAVIL) 75 MG tablet 1 tablet by mouth at bedtime for sleep 30 tablet 5    nitroGLYCERIN (NITROSTAT) 0.4 MG SL tablet Place 1 tablet under the tongue every 5 minutes as needed for Chest pain up to max of 3 total doses. If no relief after 1 dose, call 911. 25 tablet 3    Misc. Devices (ROLLER WALKER) MISC 1 each by Does not apply route daily DX Code: R26.81, G70.00, H81.313, G60.9 1 each 0     No current facility-administered medications for this visit. No Known Allergies    Health Maintenance   Topic Date Due    DTaP/Tdap/Td vaccine (1 - Tdap) 06[birth year]29/1963    Shingles Vaccine (1 of 2) 06/29/1994    Annual Wellness Visit (AWV)  05/29/2019    Flu vaccine (Season Ended) 09/01/2020    Potassium monitoring  03/19/2021    Creatinine monitoring  03/19/2021    Colon cancer screen colonoscopy  07/06/2022    Lipid screen  07/22/2024    DEXA (modify frequency per FRAX score)  Completed    Pneumococcal 65+ years Vaccine  Completed    Hepatitis A vaccine  Aged Out    Hepatitis B vaccine  Aged Out    Hib vaccine  Aged Out    Meningococcal (ACWY) vaccine  Aged Out       Subjective:      Review of Systems   Constitutional: Positive for chills. HENT: Positive for sore throat. Respiratory: Positive for cough and shortness of breath. Objective:     Physical Exam  HENT:      Head: Normocephalic. Right Ear: Tympanic membrane normal.      Left Ear: Tympanic membrane normal.      Nose: Rhinorrhea present. Mouth/Throat:      Pharynx: Posterior oropharyngeal erythema present.    Eyes:      Pupils: Pupils are equal, round, and reactive to light. Neck:      Musculoskeletal: Normal range of motion and neck supple. Cardiovascular:      Rate and Rhythm: Normal rate and regular rhythm. Pulses: Normal pulses. Heart sounds: Normal heart sounds. Pulmonary:      Effort: Pulmonary effort is normal.      Breath sounds: Normal breath sounds. Neurological:      Mental Status: She is alert. /62 (Site: Left Upper Arm)   Pulse 64   Temp 98.2 °F (36.8 °C)   Resp 16   Ht 5' 8\" (1.727 m)   Wt 124 lb (56.2 kg)   SpO2 91%   BMI 18.85 kg/m²   No results found for this visit on 04/14/20. Assessment/ Plan       Diagnosis Orders   1. Bronchitis       Full course of antibiotic, tessalon prn cough. Increase fluids, rest. Tylenol prn fever. Patient given educational materials - see patient instructions. Discussed use, benefit, and side effects of prescribed medications. All patient questions answered. Pt voiced understanding. Patient agreedwith treatment plan.  Follow up as needed      Electronically signed by DENIZ Martin NP on 4/14/2020 at 10:25 AM

## 2020-04-20 RX ORDER — HYDROCODONE BITARTRATE AND ACETAMINOPHEN 7.5; 325 MG/1; MG/1
1 TABLET ORAL EVERY 6 HOURS PRN
Qty: 90 TABLET | Refills: 0 | Status: SHIPPED | OUTPATIENT
Start: 2020-04-20 | End: 2020-05-26 | Stop reason: SDUPTHER

## 2020-04-24 ENCOUNTER — TELEMEDICINE (OUTPATIENT)
Dept: PRIMARY CARE CLINIC | Age: 75
End: 2020-04-24
Payer: MEDICARE

## 2020-04-24 PROCEDURE — 36415 COLL VENOUS BLD VENIPUNCTURE: CPT | Performed by: FAMILY MEDICINE

## 2020-04-24 PROCEDURE — 1123F ACP DISCUSS/DSCN MKR DOCD: CPT | Performed by: FAMILY MEDICINE

## 2020-04-24 PROCEDURE — 3017F COLORECTAL CA SCREEN DOC REV: CPT | Performed by: FAMILY MEDICINE

## 2020-04-24 PROCEDURE — G8399 PT W/DXA RESULTS DOCUMENT: HCPCS | Performed by: FAMILY MEDICINE

## 2020-04-24 PROCEDURE — 99213 OFFICE O/P EST LOW 20 MIN: CPT | Performed by: FAMILY MEDICINE

## 2020-04-24 PROCEDURE — 1090F PRES/ABSN URINE INCON ASSESS: CPT | Performed by: FAMILY MEDICINE

## 2020-04-24 PROCEDURE — G8428 CUR MEDS NOT DOCUMENT: HCPCS | Performed by: FAMILY MEDICINE

## 2020-04-24 PROCEDURE — 4040F PNEUMOC VAC/ADMIN/RCVD: CPT | Performed by: FAMILY MEDICINE

## 2020-04-24 NOTE — PROGRESS NOTES
and has follow-up appointment scheduled. They will be monitoring her carotid arteries. Return if symptoms worsen or fail to improve. An  electronic signature was used to authenticate this note. --Eleonora Galo MD on 5/3/2020 at 7:17 PM        Pursuant to the emergency declaration under the [address] [place] [address], [facility] waiver authority and the [employer] Act, this Virtual  Visit was conducted, with patient's consent, to reduce the patient's risk of exposure to COVID-19 and provide continuity of care for an established patient. Services were provided through a video synchronous discussion virtually to substitute for in-person clinic visit.

## 2020-04-25 ENCOUNTER — HOSPITAL ENCOUNTER (OUTPATIENT)
Dept: GENERAL RADIOLOGY | Age: 75
Discharge: HOME | End: 2020-04-25
Payer: MEDICARE

## 2020-04-25 ENCOUNTER — HOSPITAL ENCOUNTER (OUTPATIENT)
Dept: LAB | Age: 75
Discharge: HOME | End: 2020-04-25
Payer: MEDICARE

## 2020-04-25 LAB
ANION GAP SERPL CALCULATED.3IONS-SCNC: 11 MMOL/L (ref 7–19)
BASOPHILS ABSOLUTE: 0.1 K/UL (ref 0–0.2)
BASOPHILS RELATIVE PERCENT: 1 % (ref 0–1)
BUN BLDV-MCNC: 12 MG/DL (ref 8–23)
CALCIUM SERPL-MCNC: 9.7 MG/DL (ref 8.8–10.2)
CHLORIDE BLD-SCNC: 101 MMOL/L (ref 98–111)
CO2: 28 MMOL/L (ref 22–29)
CREAT SERPL-MCNC: 0.8 MG/DL (ref 0.5–0.9)
EOSINOPHILS ABSOLUTE: 0.2 K/UL (ref 0–0.6)
EOSINOPHILS RELATIVE PERCENT: 2.8 % (ref 0–5)
FERRITIN: 23.2 NG/ML (ref 13–150)
GFR NON-AFRICAN AMERICAN: >60
GLUCOSE BLD-MCNC: 116 MG/DL (ref 74–109)
HCT VFR BLD CALC: 35 % (ref 37–47)
HEMOGLOBIN: 11.2 G/DL (ref 12–16)
IMMATURE GRANULOCYTES #: 0 K/UL
LYMPHOCYTES ABSOLUTE: 1.6 K/UL (ref 1.1–4.5)
LYMPHOCYTES RELATIVE PERCENT: 23.1 % (ref 20–40)
MCH RBC QN AUTO: 28.4 PG (ref 27–31)
MCHC RBC AUTO-ENTMCNC: 32 G/DL (ref 33–37)
MCV RBC AUTO: 88.8 FL (ref 81–99)
MONOCYTES ABSOLUTE: 0.7 K/UL (ref 0–0.9)
MONOCYTES RELATIVE PERCENT: 9.6 % (ref 0–10)
NEUTROPHILS ABSOLUTE: 4.3 K/UL (ref 1.5–7.5)
NEUTROPHILS RELATIVE PERCENT: 63.4 % (ref 50–65)
PDW BLD-RTO: 17.6 % (ref 11.5–14.5)
PLATELET # BLD: 283 K/UL (ref 130–400)
PMV BLD AUTO: 9.6 FL (ref 9.4–12.3)
POTASSIUM SERPL-SCNC: 3.3 MMOL/L (ref 3.5–5)
RBC # BLD: 3.94 M/UL (ref 4.2–5.4)
SODIUM BLD-SCNC: 140 MMOL/L (ref 136–145)
WBC # BLD: 6.7 K/UL (ref 4.8–10.8)

## 2020-04-25 PROCEDURE — 71046 X-RAY EXAM CHEST 2 VIEWS: CPT

## 2020-04-29 ENCOUNTER — TELEPHONE (OUTPATIENT)
Dept: PRIMARY CARE CLINIC | Age: 75
End: 2020-04-29

## 2020-05-01 ENCOUNTER — OFFICE VISIT (OUTPATIENT)
Dept: PRIMARY CARE CLINIC | Age: 75
End: 2020-05-01
Payer: MEDICARE

## 2020-05-01 VITALS
BODY MASS INDEX: 18.91 KG/M2 | OXYGEN SATURATION: 96 % | HEART RATE: 75 BPM | TEMPERATURE: 98.8 F | SYSTOLIC BLOOD PRESSURE: 112 MMHG | WEIGHT: 124.4 LBS | RESPIRATION RATE: 18 BRPM | DIASTOLIC BLOOD PRESSURE: 76 MMHG

## 2020-05-01 LAB
APPEARANCE FLUID: ABNORMAL
BILIRUBIN, POC: ABNORMAL
BLOOD URINE, POC: ABNORMAL
CLARITY, POC: ABNORMAL
COLOR, POC: YELLOW
GLUCOSE URINE, POC: ABNORMAL
KETONES, POC: ABNORMAL
LEUKOCYTE EST, POC: ABNORMAL
NITRITE, POC: ABNORMAL
PH, POC: ABNORMAL
PROTEIN, POC: ABNORMAL
SPECIFIC GRAVITY, POC: 1.02
UROBILINOGEN, POC: ABNORMAL

## 2020-05-01 PROCEDURE — G8420 CALC BMI NORM PARAMETERS: HCPCS | Performed by: FAMILY MEDICINE

## 2020-05-01 PROCEDURE — 1036F TOBACCO NON-USER: CPT | Performed by: FAMILY MEDICINE

## 2020-05-01 PROCEDURE — 3017F COLORECTAL CA SCREEN DOC REV: CPT | Performed by: FAMILY MEDICINE

## 2020-05-01 PROCEDURE — 99214 OFFICE O/P EST MOD 30 MIN: CPT | Performed by: FAMILY MEDICINE

## 2020-05-01 PROCEDURE — 1090F PRES/ABSN URINE INCON ASSESS: CPT | Performed by: FAMILY MEDICINE

## 2020-05-01 PROCEDURE — G8399 PT W/DXA RESULTS DOCUMENT: HCPCS | Performed by: FAMILY MEDICINE

## 2020-05-01 PROCEDURE — 81002 URINALYSIS NONAUTO W/O SCOPE: CPT | Performed by: FAMILY MEDICINE

## 2020-05-01 PROCEDURE — G8427 DOCREV CUR MEDS BY ELIG CLIN: HCPCS | Performed by: FAMILY MEDICINE

## 2020-05-01 PROCEDURE — 4040F PNEUMOC VAC/ADMIN/RCVD: CPT | Performed by: FAMILY MEDICINE

## 2020-05-01 PROCEDURE — 1123F ACP DISCUSS/DSCN MKR DOCD: CPT | Performed by: FAMILY MEDICINE

## 2020-05-01 PROCEDURE — 0509F URINE INCON PLAN DOCD: CPT | Performed by: FAMILY MEDICINE

## 2020-05-01 RX ORDER — SULFAMETHOXAZOLE AND TRIMETHOPRIM 800; 160 MG/1; MG/1
1 TABLET ORAL 2 TIMES DAILY
Qty: 14 TABLET | Refills: 0 | Status: SHIPPED | OUTPATIENT
Start: 2020-05-01 | End: 2020-05-08

## 2020-05-01 ASSESSMENT — PATIENT HEALTH QUESTIONNAIRE - PHQ9
1. LITTLE INTEREST OR PLEASURE IN DOING THINGS: 1
SUM OF ALL RESPONSES TO PHQ9 QUESTIONS 1 & 2: 2
2. FEELING DOWN, DEPRESSED OR HOPELESS: 1
SUM OF ALL RESPONSES TO PHQ QUESTIONS 1-9: 2
SUM OF ALL RESPONSES TO PHQ QUESTIONS 1-9: 2

## 2020-05-01 NOTE — PATIENT INSTRUCTIONS
Patient Education        Vaginal Prolapse: Care Instructions  Your Care Instructions    When the top of the vagina sags near or through the opening of the vagina, it is called vaginal prolapse. This may happen after surgery to remove the uterus. This is because the uterus no longer supports the vagina. This problem may cause you to leak urine or stool. Or you may have trouble passing urine or stool. You may feel pain during sex. Or you may feel pressure on your genitals. Medicine may help you feel better. You can also talk to your doctor about a device you put in your vagina. It may help with symptoms. Follow-up care is a key part of your treatment and safety. Be sure to make and go to all appointments, and call your doctor if you are having problems. It's also a good idea to know your test results and keep a list of the medicines you take. How can you care for yourself at home? · Do not do activities that put pressure on your pelvic muscles. This includes heavy lifting and straining. · Do exercises to tighten and strengthen your pelvic muscles. These are called Kegel exercises. To do them:  ? Squeeze the muscles you use to stop urine. Your belly and thighs should not move. ? Hold the squeeze for 3 seconds. Then relax for 3 seconds. ? Start with 3 seconds. Then add 1 second each week until you are able to squeeze for 10 seconds. ? Repeat this 10 to 15 times. Do these exercises 3 or more times a day. · Take an over-the-counter pain medicine, such as acetaminophen (Tylenol), ibuprofen (Advil, Motrin), or naproxen (Aleve), to relieve pain. Read and follow all instructions on the label. · Do not take two or more pain medicines at the same time unless the doctor told you to. Many pain medicines have acetaminophen, which is Tylenol. Too much acetaminophen (Tylenol) can be harmful. · Talk with your doctor about a vaginal pessary. This is a device that you put in your vagina to support it.  Your doctor can may use Kegel exercises to treat urine leakage. You do Kegel exercises by tightening the muscles you use when you urinate. You will likely need to do these exercises for several weeks to get better. Follow-up care is a key part of your treatment and safety. Be sure to make and go to all appointments, and call your doctor if you are having problems. It's also a good idea to know your test results and keep a list of the medicines you take. How can you care for yourself at home? · Do Kegel exercises. ? Find the muscles you need to strengthen. To do this, tighten the muscles that stop your urine while you are going to the bathroom. These are the same muscles you squeeze during Kegel exercises. ? Squeeze the muscles as hard as you can. Your belly and thighs should not move. ? Hold the squeeze for 3 seconds. Then relax for 3 seconds. ? Start with 3 seconds, and then add 1 second each week until you are able to squeeze for 10 seconds. ? Repeat the exercise 10 to 15 times for each session. Do three or more sessions each day. · You can check to see if you are using the right muscles. Place a finger in your vagina and squeeze around it. You are doing them right when you feel pressure around your finger. Your doctor may also suggest that you put special weights in your vagina while you do the exercises. · Do not smoke. It can irritate the bladder. If you need help quitting, talk to your doctor about stop-smoking programs and medicines. These can increase your chances of quitting for good. Where can you learn more? Go to https://Planwiseestefanía.Anadys. org and sign in to your [employer] (T/A) account. Enter A123 in the [employer] box to learn more about \"Kegel Exercises: Care Instructions. \"     If you do not have an account, please click on the \"Sign Up Now\" link. Current as of: August 21, 2019Content Version: 12.4  © 2761-3600 Healthwise, Incorporated.   Care instructions adapted under license by [facility]. If you have questions about a medical condition or this instruction, always ask your healthcare professional. [address] any warranty or liability for your use of this information. Patient Education         Pelvic Exercises for Urinary Incontinence (02:24)  Your health professional recommends that you watch this short online health video. Learn how to do exercises that can help prevent urine leakage. How to watch the video    Scan the QR code   OR Visit the website    [email or web address]   Current as of: August 21, 2019Content Version: 12.4  © 0026-4224 Healthwise, Incorporated. Care instructions adapted under license by [facility] ([facility]). If you have questions about a medical condition or this instruction, always ask your healthcare professional. [address] any warranty or liability for your use of this information. Patient Education        Allergies: Care Instructions  Your Care Instructions    Allergies occur when your body's defense system (immune system) overreacts to certain substances. The immune system treats a harmless substance as if it were a harmful germ or virus. Many things can cause this overreaction, including pollens, medicine, food, dust, animal dander, and mold. Allergies can be mild or severe. Mild allergies can be managed with home treatment. But medicine may be needed to prevent problems. Managing your allergies is an important part of staying healthy. Your doctor may suggest that you have allergy testing to help find out what is causing your allergies. When you know what things trigger your symptoms, you can avoid them. This can prevent allergy symptoms and other health problems. For severe allergies that cause reactions that affect your whole body (anaphylactic reactions), your doctor may prescribe a shot of epinephrine to carry with you in case you have a severe reaction.  Learn how to give yourself the shot

## 2020-05-03 LAB
ORGANISM: ABNORMAL
URINE CULTURE, ROUTINE: ABNORMAL
URINE CULTURE, ROUTINE: ABNORMAL

## 2020-05-03 ASSESSMENT — ENCOUNTER SYMPTOMS
BACK PAIN: 1
WHEEZING: 0
COUGH: 1
RHINORRHEA: 1
SHORTNESS OF BREATH: 0
RHINORRHEA: 1
COUGH: 1
BACK PAIN: 1

## 2020-05-03 NOTE — PROGRESS NOTES
together: Not on file     Attends Nondenominational service: Not on file     Active member of club or organization: Not on file     Attends meetings of clubs or organizations: Not on file     Relationship status: Not on file    Intimate partner violence     Fear of current or ex partner: Not on file     Emotionally abused: Not on file     Physically abused: Not on file     Forced sexual activity: Not on file   Other Topics Concern    Not on file   Social History Narrative    Not on file       Review of Systems   Constitutional: Positive for activity change and fatigue. HENT: Positive for hearing loss, postnasal drip and rhinorrhea. Respiratory: Positive for cough (occasionally). Cardiovascular: Negative. Genitourinary: Positive for dysuria, frequency and urgency. Negative for vaginal bleeding, vaginal discharge and vaginal pain. Musculoskeletal: Positive for arthralgias and back pain. Neurological: Positive for numbness. Hematological: Bruises/bleeds easily. Psychiatric/Behavioral: Positive for decreased concentration and dysphoric mood. Negative for self-injury, sleep disturbance and suicidal ideas. The patient is nervous/anxious. All other systems reviewed and are negative. OBJECTIVE:    Wt Readings from Last 3 Encounters:   05/01/20 124 lb 6.4 oz (56.4 kg)   04/14/20 124 lb (56.2 kg)   02/26/20 124 lb 6.4 oz (56.4 kg)       /76   Pulse 75   Temp 98.8 °F (37.1 °C)   Resp 18   Wt 124 lb 6.4 oz (56.4 kg)   SpO2 96%   BMI 18.91 kg/m²     Physical Exam  Vitals signs and nursing note reviewed. Constitutional:       General: She is not in acute distress. Appearance: Normal appearance. She is well-developed. She is not ill-appearing or diaphoretic. HENT:      Head: Normocephalic. Right Ear: Tympanic membrane, ear canal and external ear normal.      Left Ear: Tympanic membrane, ear canal and external ear normal.      Nose: Nose normal. No rhinorrhea.       Mouth/Throat: exercises. ? Squeeze the muscles as hard as you can. Your belly and thighs should not move. ? Hold the squeeze for 3 seconds. Then relax for 3 seconds. ? Start with 3 seconds, and then add 1 second each week until you are able to squeeze for 10 seconds. ? Repeat the exercise 10 to 15 times for each session. Do three or more sessions each day. · You can check to see if you are using the right muscles. Place a finger in your vagina and squeeze around it. You are doing them right when you feel pressure around your finger. Your doctor may also suggest that you put special weights in your vagina while you do the exercises. · Do not smoke. It can irritate the bladder. If you need help quitting, talk to your doctor about stop-smoking programs and medicines. These can increase your chances of quitting for good. Where can you learn more? Go to https://VetComparepepiceweb.The Del[employer]. org and sign in to your NUMBER26 account. Enter B240 in the InsureWorx box to learn more about \"Kegel Exercises: Care Instructions. \"     If you do not have an account, please click on the \"Sign Up Now\" link. Current as of: August 21, 2019Content Version: 12.4  © 7068-5821 [employer]. Care instructions adapted under license by [facility] ([facility]). If you have questions about a medical condition or this instruction, always ask your healthcare professional. [place] [address] any warranty or liability for your use of this information. Patient Education         Pelvic Exercises for Urinary Incontinence (02:24)  Your health professional recommends that you watch this short online health video. Learn how to do exercises that can help prevent urine leakage. How to watch the video    Scan the QR code   OR Visit the website    [email or web address]   Current as of: August 21, 2019Content Version: 12.4  © 2299-5538 [employer].   Care instructions adapted under license by [facility]

## 2020-05-20 ENCOUNTER — HOSPITAL ENCOUNTER (OUTPATIENT)
Dept: PAIN MANAGEMENT | Age: 75
Discharge: HOME | End: 2020-05-20
Payer: MEDICARE

## 2020-05-20 VITALS
RESPIRATION RATE: 18 BRPM | DIASTOLIC BLOOD PRESSURE: 74 MMHG | WEIGHT: 124 LBS | HEIGHT: 68 IN | TEMPERATURE: 98.6 F | HEART RATE: 65 BPM | BODY MASS INDEX: 18.79 KG/M2 | OXYGEN SATURATION: 100 % | SYSTOLIC BLOOD PRESSURE: 110 MMHG

## 2020-05-20 PROBLEM — M25.511 CHRONIC PAIN OF BOTH SHOULDERS: Status: ACTIVE | Noted: 2020-05-20

## 2020-05-20 PROBLEM — G89.29 CHRONIC PAIN OF BOTH SHOULDERS: Status: ACTIVE | Noted: 2020-05-20

## 2020-05-20 PROBLEM — M25.512 CHRONIC PAIN OF BOTH SHOULDERS: Status: ACTIVE | Noted: 2020-05-20

## 2020-05-20 PROCEDURE — 99213 OFFICE O/P EST LOW 20 MIN: CPT | Performed by: NURSE PRACTITIONER

## 2020-05-20 PROCEDURE — 99213 OFFICE O/P EST LOW 20 MIN: CPT

## 2020-05-20 ASSESSMENT — PAIN DESCRIPTION - ORIENTATION: ORIENTATION: LOWER;UPPER;LEFT;RIGHT

## 2020-05-20 ASSESSMENT — PAIN DESCRIPTION - LOCATION: LOCATION: BACK;NECK;ARM

## 2020-05-20 ASSESSMENT — PAIN SCALES - GENERAL: PAINLEVEL_OUTOF10: 10

## 2020-05-20 ASSESSMENT — PAIN DESCRIPTION - PAIN TYPE: TYPE: CHRONIC PAIN

## 2020-05-20 NOTE — PROGRESS NOTES
[facility]    Office Progress Note    Patient Name: Livia Juan    MR #: [id number]    Account #: [de-identified]    : 1945    Age: 75 y.o. Sex: female    Date: May 20, 2020    PCP: Julio César Branch MD         Referring Provider:    Chief Complaint:   Chief Complaint   Patient presents with    Neck Pain    Arm Pain     Bilateral    Lower Back Pain       History of Present Illness:    Livia Juan is a 75 y.o. female who presents to the office for follow-up of left shoulder injection. She says that she obtained 60% relief for 6 weeks. She says today (with help of ) that both of her shoulders are hurting and would like both of them injected. Today if possible. She also was going to have a BHUPINDER C3-C4, but due to virus it had to be cancelled. She has had one in the past with excellent relief of 60% and for 6 weeks. She is wanting to get schedule again. She continues to take her Norco occasionally with relief in her pain. Last pain management HPI note read    Employment: Retired []   Disabled  []   Works []    Does Not Work [x]     Previous Injury:  Yes  []   No [x]     Previous Surgery: Yes []   No [x]     Previous Physical Therapy In the last 6 months? Yes  []     No [x]   Did Physical Therapy make thepain better or worse? Better []   Worse []  Unchanged []    MRI in the last two years? Yes [x]  No []   Results reviewed with patient? Yes []   No []    CT Scan in the last two years? Yes  []   No [x]   Results reviewed with patient? Yes []   No []    X-ray in the last two years? Yes [x]   No  []  Results reviewed with patient? Yes  []  No []    Injections in the past?  Yes [x]   No []   Did the injections help relieve the pain? Yes [x]   No []     Do you have Depression? Yes  []    No [x]   Thinking of harming yourself or others?   Yes  []   No [x]     Past Medical Histoy  Past Medical History:   Diagnosis Date    nitroGLYCERIN (NITROSTAT) 0.4 MG SL tablet Place 1 tablet under the tongue every 5 minutes as needed for Chest pain up to max of 3 total doses. If no relief after 1 dose, call 911. 25 tablet 3    Misc. Devices (ROLLER WALKER) MISC 1 each by Does not apply route daily DX Code: R26.81, G70.00, H81.313, G60.9 1 each 0     No current facility-administered medications for this encounter. Social History    Social History     Tobacco Use    Smoking status: Never Smoker    Smokeless tobacco: Never Used   Substance Use Topics    Alcohol use: No         Family History  family history includes Cancer in her father; Colon Polyps in her sister; Hearing Loss in her brother and brother; High Blood Pressure in her mother; [place] in her father; Stroke in her mother. Review of Systems:  Constitutional: denies fever, chills, fatigue, change in appetite, weight gain or weight loss  Head: Normocephalic  Skin: denies easy bruising, skin redness, skin rash, hives, sensitivity to sun exposure, tightness, nodules or bumps, hair loss, color changes in the hands or feet with cold. Eyes: denies pain, redness, loss of vision, double or blurred vision, eye drainage, or dryness. ENT and Mouth: denies ringing in the ears, loss of hearing, nasal congestion, nasal discharge, no hoarseness, sore throat, or difficulty swallowing   Respiratory: denies chronic dry cough, coughing up blood, coughing up mucus, waking at night coughing or choking, or wheezing. Cardiovascular: denies chest pain, irregular heartbeats, palpitations, shortness of breath, or edema in legs  Gastrointestinal: denies, nausea, vomiting, heartburn, diarrhea, or constipation  Genitourinary: denies difficult urination, pain or burning with urination, blood in the urine, or cloudy urine  Musculoskeletal: denies arm, buttock, thigh or calf cramps. Has pain in neck and bilateral shoulders, and tenderness in neck and bilateral shoulders and shoulder joints.  No muscle weakness. No joint swelling. Neurologic: headache, dizziness, fainting, loss of consciousness, no memory loss. no sensitivity. Endocrine: denies intolerance to hot or cold temperature, night sweats, flushing, fingernail changes, increased thirst, or hairloss   Hematologic/ Lymphatic: denies anemia, bleeding tendency or clotting tendency, bruising easily. Allergic/ Immunologic: denies rhinitis, asthma, skin sensitivity, or allergy to Latex   Psychiatric: denies depression or thoughts of suicide, or voices in head. Current Pain Assessment:   Pain Assessment  Pain Assessment: 0-10  Pain Level: 10  Pain Type: Chronic pain  Pain Location: Back, Neck, Arm  Pain Orientation: Lower, Upper, Left, Right    Clinical Progression: gradually improving  Effect of Pain on Daily Activities: limits activity  Patient's Stated Pain Goal: No pain  Pain Intervention(s): Medication (see eMar), Repositioning, Rest, Ice    Current PEG: 10    Past PEG: 10    Annual Screens:    ORT Score: 1    PHQ-9 Score: 8    Physical Exam:    Vitals:    05/20/20 1349   BP: 110/74   Pulse: 65   Resp: 18   Temp: 98.6 °F (37 °C)   TempSrc: Temporal   SpO2: 100%   Weight: 124 lb (56.2 kg)   Height: 5' 8\" (1.727 m)       Body mass index is 18.85 kg/m². General Appearance: no acute distress. Appears to be well dressed  Skin Exam: Warm and dry, no jaundice, rashes or lesions  Head Exam: NCAT, PERRLA, EOMI, moist mucus membranes, scalp normal  Neck Exam: Supple, no masses palpated, trachea midline. Pain that radiates into bilateral shoulders. Pain with rotation in neck  Lung: Clear to ausculation in all lobes anterior and posterior. Heart[de-identified] Regular rate and rhythm, no gallops, rubs or murmurs, no edema  Abdomen:  Bowel sounds in all quadrants, soft, non-distended, non-tender with palpation, no guarding  Extremities: No rash, cyanosis or bruising  Musculoskeletal: No joint swelling or deformity   Back Exam: full ROM  Hip Exam: Full rotation bilateral   Knee Exam: Full flexion and extension bilateral, no crepitus  Shoulder Exam: Full ROM bilateral  Neurologic Exam: Gait and coordination normal, speech normal  Reflexes: Normal brachialis, Negative Sanches's bilateral. Normal Patellar bilateral,   CN EXAM: II-XII intact, face symmetrical, tongue symmetrical, the trapezius and sternocleidomastoid muscle appearance and strength symmetrical, normal achilles bilateral, ankle clonus negative bilateral  Strength: 5/5 RUE Bi's/Tri's, 5/5 LUE Bi's/Tri's, 5/5 RLE knee flex/ext, 5/5 RLE DF/PF, 5/5 LLE knee flex/ext, 5/5 LLE DF/PF  Sensation: Equal and intact to fine touch in all extremities  Mood and affect: Normal   Nurses note reviewed along with current vital signs    Active Problem(s)  Active Problems:    Cervical radiculopathy    Chronic pain of both shoulders  Resolved Problems:    * No resolved hospital problems. *                                                                                                                            PLAN:  1. Patient is to call the office with any questions or concerns that may arise prior to next appointment. 2. Continue Norco  3. Schedule patient for bilateral steroid shoulder injections  4. 2 weeks after above schedule patient for BHUPINDER level C3-C4    Urine Drug Screen Current/Today:  Yes  []  No [x]     Discussion:  Discussed exam findings and plan of care with patient. Patient agreed with the current plan of care at this time. All questions from the patient were answered by the provider. Activity:   Discussed exercise as beneficial to pain reduction, encouraged stretching exercise with a focus on torso strengthening.     Education Provided:  Review of Lady Singh [x] Agreement Review [x]  Reviewed PHQ-9  [x]    Review of Test [] Compliance Issues Discussed []   Cognitive Behavior Needs [] Exercise [x]  Financial Issues []   Tobacco/Alcohol Use [] Teaching  [x]     Goal:  Pain Management Goals of Therapy:   [] Resolution in pain  [x]        Decrease in pain level  [x]        Improvement in ADL's  [x]        Increase in activities with less pain  [x]        Decrease in medication      [] Benzodiazapine's and Narcotics:  Patient educated on the possible effects of combining Benzodiazapine's and Opioids. Explained \"Black Box Warnings\" such as; possible suppressed breathing, hypoxia, anoxia, depressed cognition, heart arrhythmia, coma and possible death. Patient verbalized understanding concerning possible effects. Controlled Substance Monitoring:   Discussed with patient possible medication side effects, risk of tolerance, dependence and alternative treatments. Discussed thegrowing epidemic in the [place] with the overprescribing and at times the abuse of narcotics. Discussed the detrimental effects of long term narcotic use. Patient encouraged to set daily goals of exercising and decreasing dailynarcotic intake. Discussed with the patient about the development of hyperalgesia with long term narcotic intake. EMR dragon/transcription disclaimer: Much of this encounter note is electronic transcription/translation of spoken language to printed tach. Electronic translation of spoken language may be erroneous, or at times, nonsensical words or phrases may be inadvertently transcribed.  Although, I have reviewed the note for such errors, some may still exist.     CC:  MD Suzi Adkins, APRN, 5/20/2020 at 2:01 PM

## 2020-05-20 NOTE — PROGRESS NOTES
Spoke with Richard,  services, to schedule a  for patients appointments.  scheduled for 6/11/2020, 6/26/2020, & 8/26/2020. LVM with Khushboo patients daughter that Richard confirmed appointments for  to be here.

## 2020-05-20 NOTE — PROGRESS NOTES
Nursing Admission Record    Current Issues / Falls / ER Visits: Follow up procedure    Was Percentage of Pain Relief after Left Shoulder Injection on 2/26/2020:  60 %    How long lasted:  6 weeks Yes  Were you able to decrease pain medication after treatment? Yes  Were you able to increase activity after treatment? Yes  Did you have any adverse reactions to treatment? No    Opioids Prescribed: Norco 7.5mg Q6HRS PRN #90    Was Medication Brought to Appt: N/A    Hx of any Neck/Back Surgeries? None    Is Patient currently taking a blood thinner?  None    MRI exams received in the past 2 years:  Yes MRI Lumbar & Cervical Spine       When 2019                                              Where [facility]       Imaging on chart: Yes         Imaging records requested: No    CT exam received during the last 12 months: No       When na                                            Where na       Imaging on chart: No       Imaging records requested: No    X-ray exam received during the last 12 months: Yes XR Lumbar Spine & XR Left Shoulder       When 2019                                              Where [address] on chart: Yes         Imaging records requested: No    Nerve Conduction Study/EMG:  No       When na                                              Where na       Imaging on chart: No         Imaging records requested: No    Physical therapy: Yes       When: 2019                        Where  [facility] No       When: na                        Where na    Labs  Yes       When: 2020                                             Where  [facility]    PEG Score: 10    Last PEG Score: 10    Annual ORT Score: 1    Annual PHQ Score: 8    Last UDS Results: 11/20/2019 compliant    Education Provided:  [x] Review of Lyndall Carrel  [] Agreement Review  [x] PEG Score Calculated [] PHQ Score Calculated [] ORT Score Calculated    [] Compliance Issues Discussed [] Cognitive Behavior Needs [x] Exercise [] Review of

## 2020-05-26 ENCOUNTER — OFFICE VISIT (OUTPATIENT)
Dept: PRIMARY CARE CLINIC | Age: 75
End: 2020-05-26

## 2020-05-26 RX ORDER — HYDROCODONE BITARTRATE AND ACETAMINOPHEN 7.5; 325 MG/1; MG/1
1 TABLET ORAL EVERY 6 HOURS PRN
Qty: 90 TABLET | Refills: 0 | Status: SHIPPED | OUTPATIENT
Start: 2020-05-26 | End: 2020-06-30 | Stop reason: SDUPTHER

## 2020-05-26 NOTE — PROGRESS NOTES
S-Patient presented to the flu clinic today for testing only. Pt does not need any clinical assessment, therefore was not seen by a provider. The patient is either scheduled for surgery so needs preop testing, has had a visit with their PCP so needs testing only, or an order for another reason has been sent to the [facility] Flu clinic from and outside provider. O-COVID testing was performed; pt was not seen in clinic    A- Screening for viral illness; or Suspected COVID illness    P- Patient will be contacted with their results from this clinic. If they are ill then they will return to clinic to be seen or follow up with their PCP for further directions. If pre-op then the provider/staff handling the procedure will also review results. If pt was sent to clinic by an employer or other provider, the patient will be responsible for informing them of the result. Call or return to clinic prn if these symptoms worsen or fail to improve as anticipated.

## 2020-05-27 LAB
REPORT: NORMAL
SARS-COV-2: NOT DETECTED
THIS TEST SENT TO: NORMAL

## 2020-05-29 ENCOUNTER — HOSPITAL ENCOUNTER (OUTPATIENT)
Age: 75
Setting detail: OUTPATIENT SURGERY
Discharge: HOME | End: 2020-05-29
Attending: INTERNAL MEDICINE | Admitting: INTERNAL MEDICINE
Payer: MEDICARE

## 2020-05-29 VITALS
WEIGHT: 124 LBS | DIASTOLIC BLOOD PRESSURE: 61 MMHG | TEMPERATURE: 97.8 F | RESPIRATION RATE: 18 BRPM | HEART RATE: 62 BPM | SYSTOLIC BLOOD PRESSURE: 187 MMHG | HEIGHT: 69 IN | OXYGEN SATURATION: 96 % | BODY MASS INDEX: 18.37 KG/M2

## 2020-05-29 VITALS
SYSTOLIC BLOOD PRESSURE: 162 MMHG | OXYGEN SATURATION: 99 % | DIASTOLIC BLOOD PRESSURE: 84 MMHG | RESPIRATION RATE: 18 BRPM

## 2020-05-29 PROCEDURE — 7100000011 HC PHASE II RECOVERY - ADDTL 15 MIN: Performed by: INTERNAL MEDICINE

## 2020-05-29 PROCEDURE — 2709999900 HC NON-CHARGEABLE SUPPLY: Performed by: INTERNAL MEDICINE

## 2020-05-29 PROCEDURE — 3609027000 HC COLONOSCOPY: Performed by: INTERNAL MEDICINE

## 2020-05-29 PROCEDURE — 2500000003 HC RX 250 WO HCPCS

## 2020-05-29 PROCEDURE — 2580000003 HC RX 258: Performed by: INTERNAL MEDICINE

## 2020-05-29 PROCEDURE — 45378 DIAGNOSTIC COLONOSCOPY: CPT | Performed by: INTERNAL MEDICINE

## 2020-05-29 PROCEDURE — 88305 TISSUE EXAM BY PATHOLOGIST: CPT

## 2020-05-29 PROCEDURE — 6360000002 HC RX W HCPCS

## 2020-05-29 PROCEDURE — 7100000010 HC PHASE II RECOVERY - FIRST 15 MIN: Performed by: INTERNAL MEDICINE

## 2020-05-29 PROCEDURE — 3609012400 HC EGD TRANSORAL BIOPSY SINGLE/MULTIPLE: Performed by: INTERNAL MEDICINE

## 2020-05-29 PROCEDURE — 88342 IMHCHEM/IMCYTCHM 1ST ANTB: CPT

## 2020-05-29 PROCEDURE — 43239 EGD BIOPSY SINGLE/MULTIPLE: CPT | Performed by: INTERNAL MEDICINE

## 2020-05-29 PROCEDURE — 3700000000 HC ANESTHESIA ATTENDED CARE: Performed by: INTERNAL MEDICINE

## 2020-05-29 PROCEDURE — 3700000001 HC ADD 15 MINUTES (ANESTHESIA): Performed by: INTERNAL MEDICINE

## 2020-05-29 RX ORDER — PROPOFOL 10 MG/ML
INJECTION, EMULSION INTRAVENOUS PRN
Status: DISCONTINUED | OUTPATIENT
Start: 2020-05-29 | End: 2020-05-29 | Stop reason: SDUPTHER

## 2020-05-29 RX ORDER — LIDOCAINE HYDROCHLORIDE 20 MG/ML
INJECTION, SOLUTION EPIDURAL; INFILTRATION; INTRACAUDAL; PERINEURAL PRN
Status: DISCONTINUED | OUTPATIENT
Start: 2020-05-29 | End: 2020-05-29 | Stop reason: SDUPTHER

## 2020-05-29 RX ORDER — HYDROXYZINE PAMOATE 25 MG/1
25 CAPSULE ORAL 3 TIMES DAILY PRN
COMMUNITY
End: 2021-01-01

## 2020-05-29 RX ORDER — SODIUM CHLORIDE, SODIUM LACTATE, POTASSIUM CHLORIDE, CALCIUM CHLORIDE 600; 310; 30; 20 MG/100ML; MG/100ML; MG/100ML; MG/100ML
INJECTION, SOLUTION INTRAVENOUS CONTINUOUS
Status: DISCONTINUED | OUTPATIENT
Start: 2020-05-29 | End: 2020-05-29 | Stop reason: HOSPADM

## 2020-05-29 RX ORDER — DONEPEZIL HYDROCHLORIDE 23 MG/1
23 TABLET, FILM COATED ORAL NIGHTLY
Qty: 60 TABLET | Refills: 0 | Status: SHIPPED | OUTPATIENT
Start: 2020-05-29 | End: 2020-07-29 | Stop reason: SDUPTHER

## 2020-05-29 RX ADMIN — LIDOCAINE HYDROCHLORIDE 2 ML: 20 INJECTION, SOLUTION EPIDURAL; INFILTRATION; INTRACAUDAL; PERINEURAL at 10:44

## 2020-05-29 RX ADMIN — PROPOFOL 280 MG: 10 INJECTION, EMULSION INTRAVENOUS at 10:44

## 2020-05-29 RX ADMIN — SODIUM CHLORIDE, POTASSIUM CHLORIDE, SODIUM LACTATE AND CALCIUM CHLORIDE: 600; 310; 30; 20 INJECTION, SOLUTION INTRAVENOUS at 09:52

## 2020-05-29 ASSESSMENT — PAIN - FUNCTIONAL ASSESSMENT: PAIN_FUNCTIONAL_ASSESSMENT: 0-10

## 2020-05-29 ASSESSMENT — LIFESTYLE VARIABLES: SMOKING_STATUS: 0

## 2020-05-29 NOTE — OP NOTE
Patient: Ruben Mello : 1945  Med Rec#: [id number] Acc#: [id number]   Primary Care Provider Manasa Cole MD    Date of Procedure:  2020    Endoscopist: Linda Dc MD    Referring Provider: Manasa Cole MD,     Operation Performed: Colonoscopy up to the Terminal ileum    Indications: For both EGD and colonoscopy exams done today:  1. Anemia, unspecified type                                        2. Abdominal cramping     3. Chronic nausea     4. Alternating constipation and diarrhea     5. Family history of colonic polyps         Anesthesia:  Sedation was administered by anesthesia who monitored the patient during the procedure. I met with Ruben Mello prior to procedure. We discussed the procedure itself, and I have discussed the risks of endoscopy (including-- but not limited to-- pain, discomfort, bleeding potentially requiring second endoscopic procedure and/or blood transfusion, organ perforation requiring operative repair, damage to organs near the colon, infection, aspiration, cardiopulmonary/allergic reaction), benefits, indications to endoscopy. Additionally, we discussed options other than colonoscopy. The patient expressed understanding. All questions answered. The patient decided to proceed with the procedure. Signed informed consent was placed on the chart. Blood Loss: minimal    Withdrawal time: >6 mins  Bowel Prep: Fair to poor with thick solid and semisolid stool scattered in segments throughout the colon obscuring the underlying mucosa. Small lesions including polyps may have been missed. Complications: no immediate complications    DESCRIPTION OF PROCEDURE:     A time out was performed. After written informed consent was obtained, the patient was placed in the left lateral position. The perianal area was inspected, and a digital rectal exam was performed. A rectal exam was performed: normal tone, no palpable lesions.  At this point, a

## 2020-05-29 NOTE — ANESTHESIA POSTPROCEDURE EVALUATION
Department of Anesthesiology  Postprocedure Note    Patient: Susanna Mancera  MRN: [id number]  YOB: 1945  Date of evaluation: 5/29/2020  Time:  11:18 AM     Procedure Summary     Date:  05/29/20 Room / Location:  [address]    Anesthesia Start:  5412 Anesthesia Stop:  1118    Procedures:       EGD BIOPSY (N/A Abdomen)      COLONOSCOPY DIAGNOSTIC (N/A ) Diagnosis:  ([address] ANEMIA)    Surgeon:  Hazel Russell MD Responsible Provider:  DENIZ Ramírez CRNA    Anesthesia Type:  general, TIVA ASA Status:  4          Anesthesia Type: general, TIVA    Victor Manuel Phase I: Victor Manuel Score: 10    Victor Manuel Phase II:      Last vitals: Reviewed and per EMR flowsheets.        Anesthesia Post Evaluation    Patient location during evaluation: bedside  Patient participation: waiting for patient participation  Level of consciousness: sleepy but conscious  Pain score: 0  Airway patency: patent  Nausea & Vomiting: no nausea and no vomiting  Complications: no  Cardiovascular status: hemodynamically stable and blood pressure returned to baseline  Respiratory status: acceptable and room air  Hydration status: stable

## 2020-05-29 NOTE — OP NOTE
Endoscopic Procedure Note    Patient: Khushboo Adame : 1945  Med Rec#: [id number] Acc#: [id number]     Primary Care Provider Devika Duckworth MD    Endoscopist: Ma Meckel, MD    Date of Procedure:  2020     Procedure:   1. EGD with cold biopsies    Indications: For both EGD and colonoscopy exams done today:  1. Anemia, unspecified type                                        2. Abdominal cramping     3. Chronic nausea     4. Alternating constipation and diarrhea     5. Family history of colonic polyps         Anesthesia:  Sedation was administered by anesthesia who monitored the patient during the procedure. Estimated Blood Loss: minimal    Procedure:   After reviewing the patient's chart and obtaining informed consent, the patient was placed in the left lateral decubitus position. A forward-viewing Olympus endoscope was lubricated and inserted through the mouth into the oropharynx. Under direct visualization, the upper esophagus was intubated. The scope was advanced to the level of the third portion of duodenum. Scope was slowly withdrawn with careful inspection of the mucosal surfaces. The scope was retroflexed for inspection of the gastric fundus and incisura. Findings and maneuvers are listed in impression below. The patient tolerated the procedure well. The scope was removed. There were no immediate complications. Findings/IMPRESSION:  Esophagus: normal; NO erosions or ulcers or nodules or strictures or webs or rings or mass lesions or extrinsic compression or diverticula noted. EG junction was at 36 cm. There is a small 2-3 cm sliding hiatal hernia present. Stomach:  abnormal:  mucosal changes suggestive of moderate antral gastritis noted with patchy erythema and friable mucosa and a few small 2-3 mm sessile polyps in the body-  Gastric biopsies were taken from the antrum to rule out Helicobacter pylori infection.     NO ulcers or masses or gastric outlet obstruction or retained food or fluid. Rugae were normal and lumen distended well with insufflation. Retroflexed views otherwise revealed a normal GE junction, fundus and cardia as well. Duodenum: normal; random biopsies were taken to check for villous atrophy. RECOMMENDATIONS:    1. Await path results, the patient will be contacted in 7-10 days with biopsy results. 2.  Monitor H/H periodically  3. Avoid NSAIDs    The results were discussed with the patient and family. A copy of the images obtained were given to the patient.      Luisito Grossman MD  5/29/2020  10:14 AM

## 2020-05-29 NOTE — H&P
ONCE DAILY FOR ANXIETY AND DEPRESSION 4/7/20   Suleiman Watts MD   Ferrous Sulfate (IRON) 325 (65 Fe) MG TABS TAKE 1 TABLET BY MOUTH ONCE DAILY WITH BREAKFAST 2/25/20   Suleiman Watts MD   Donepezil HCl (ARICEPT) 23 MG TABS tablet Take 1 tablet by mouth nightly For dementia 1/27/20   Suleiman Watts MD   amLODIPine-benazepril (LOTREL) 5-10 MG per capsule TAKE 1 CAPSULE BY MOUTH ONCE DAILY FOR BLOOD PRESSURE 12/9/19   Suleiman Watts MD   albuterol sulfate  (90 Base) MCG/ACT inhaler Inhale 2 puffs into the lungs every 4 hours as needed for Wheezing 9/24/19   DENIZ Zhou   amitriptyline (ELAVIL) 75 MG tablet 1 tablet by mouth at bedtime for sleep 7/22/19   Suleiman Watts MD   nitroGLYCERIN (NITROSTAT) 0.4 MG SL tablet Place 1 tablet under the tongue every 5 minutes as needed for Chest pain up to max of 3 total doses. If no relief after 1 dose, call 911. 7/22/19   Suleiman Watts MD   Misc.  Devices (Randolm Peach) MISC 1 each by Does not apply route daily DX Code: R26.81, G70.00, H81.313, G60.9 8/15/17   Suleiman Watts MD       Past Medical History:  Past Medical History:   Diagnosis Date    Anemia     Anxiety     Chronic back pain     severe muscle spasms    Deafness congenital     Depression     Headache     Hypertension     Memory loss     early    Myasthenia ([address]     Neck pain     Osteoarthritis     Ptosis of eyelid     PVD [facility]peripheral vascular disease) ([facility])     Weakness of face muscles     Wound of right ankle 2016    open wound to lateral side of right ankle        Past Surgical History:  Past Surgical History:   Procedure Laterality Date    APPENDECTOMY      CARPAL TUNNEL RELEASE      CATARACT REMOVAL      COLONOSCOPY  7-2010    COLONOSCOPY  2000 or before    [place]-normal per patient    EYE SURGERY Bilateral     cataract extraction    HERNIA REPAIR      on back of neck    HYSTERECTOMY      NM COLONOSCOPY FLX DX W/COLLJ SPEC WHEN PFRMD N/A 7/6/2017    Dr Arlene Viera diverticulosis, 5 yr recall    IN EGD TRANSORAL BIOPSY SINGLE/MULTIPLE N/A 7/6/2017    Dr Tay Alegre, [place] (-)    TERRI AND BSO      VARICOSE VEIN SURGERY      VASCULAR SURGERY  02/19/2018    [facility]. Aortogram and runoff. Left common femoral artery, 5 Malian sheath. Social History:  Social History     Tobacco Use    Smoking status: Never Smoker    Smokeless tobacco: Never Used   Substance Use Topics    Alcohol use: No    Drug use: No       Vital Signs:   Vitals:    05/29/20 0933   BP: 139/60   Pulse: 62   Resp: 18   Temp: 97.7 °F (36.5 °C)   SpO2: 97%        Physical Exam:  Cardiac:  [x]WNL  []Comments:  Pulmonary:  [x]WNL   []Comments:  Neuro/Mental Status:  [x]WNL  []Comments:  Abdominal:  [x]WNL    []Comments:  Other:   []WNL  []Comments:    Informed Consent:  The risks and benefits of the procedure have been discussed with either the patient or if they cannot consent, their representative. Assessment:  Patient examined and appropriate for planned sedation and procedure. Plan:  Proceed with planned sedation and procedure as above.          Sheba Gutierrez MD

## 2020-05-29 NOTE — ANESTHESIA PRE PROCEDURE
GLUCOSE 116 04/25/2020    PROT 7.2 01/22/2020    PROT 7.5 12/28/2012    CALCIUM 9.7 04/25/2020    BILITOT <0.2 01/22/2020    ALKPHOS 91 01/22/2020    AST 17 01/22/2020    ALT 8 01/22/2020       POC Tests: No results for input(s): POCGLU, POCNA, POCK, POCCL, POCBUN, POCHEMO, POCHCT in the last 72 hours. Coags:   Lab Results   Component Value Date    PROTIME 12.4 10/28/2018    INR 0.93 10/28/2018    APTT 25.1 10/28/2018       HCG (If Applicable): No results found for: PREGTESTUR, PREGSERUM, HCG, HCGQUANT     ABGs:   Lab Results   Component Value Date    PHART 7.430 09/18/2016    PO2ART 81.0 09/18/2016    CUX4LKL 33.0 09/18/2016    QHB1FRK 21.9 09/18/2016    BEART -1.8 09/18/2016    V6KCUYVX 95.3 09/18/2016        Type & Screen (If Applicable):  No results found for: LABABO, LABRH    Drug/Infectious Status (If Applicable):  No results found for: HIV, HEPCAB    COVID-19 Screening (If Applicable):   Lab Results   Component Value Date    COVID19 Not Detected 05/26/2020         Anesthesia Evaluation  Patient summary reviewed and Nursing notes reviewed no history of anesthetic complications:   Airway: Mallampati: I  TM distance: >3 FB   Neck ROM: full  Mouth opening: > = 3 FB Dental:          Pulmonary:       (-) not a current smoker                           Cardiovascular:    (+) hypertension:,       ECG reviewed    Rate: normal  Echocardiogram reviewed         Beta Blocker:  Not on Beta Blocker      ROS comment: Descending thoracic aortic aneurysm  Ectatic abdominal aorta  Bilateral carotid artery stenosis    Findings      Mitral Valve   Structurally normal.   Normal mitral valve leaflet mobility. Trace mitral regurgitation. Aortic Valve   Aortic valve appears to be tricuspid. Structurally normal aortic valve. No significant aortic regurgitation or stenosis is noted. Tricuspid Valve   Tricuspid valve is structurally normal.   Mild tricuspid regurgitation.       Pulmonic Valve   The pulmonic valve was

## 2020-06-08 ENCOUNTER — NURSE ONLY (OUTPATIENT)
Dept: PRIMARY CARE CLINIC | Age: 75
End: 2020-06-08
Payer: MEDICARE

## 2020-06-08 LAB
ANION GAP SERPL CALCULATED.3IONS-SCNC: 7 MMOL/L (ref 7–19)
BUN BLDV-MCNC: 14 MG/DL (ref 8–23)
CALCIUM SERPL-MCNC: 9.6 MG/DL (ref 8.8–10.2)
CHLORIDE BLD-SCNC: 105 MMOL/L (ref 98–111)
CO2: 27 MMOL/L (ref 22–29)
CREAT SERPL-MCNC: 1.1 MG/DL (ref 0.5–0.9)
GFR NON-AFRICAN AMERICAN: 48
GLUCOSE BLD-MCNC: 134 MG/DL (ref 74–109)
POTASSIUM SERPL-SCNC: 3.8 MMOL/L (ref 3.5–5)
SODIUM BLD-SCNC: 139 MMOL/L (ref 136–145)

## 2020-06-08 PROCEDURE — 36415 COLL VENOUS BLD VENIPUNCTURE: CPT | Performed by: FAMILY MEDICINE

## 2020-06-11 ENCOUNTER — HOSPITAL ENCOUNTER (OUTPATIENT)
Dept: PAIN MANAGEMENT | Age: 75
Discharge: HOME | End: 2020-06-11
Payer: MEDICARE

## 2020-06-11 VITALS
TEMPERATURE: 97.3 F | RESPIRATION RATE: 18 BRPM | OXYGEN SATURATION: 96 % | HEART RATE: 66 BPM | DIASTOLIC BLOOD PRESSURE: 49 MMHG | SYSTOLIC BLOOD PRESSURE: 97 MMHG

## 2020-06-11 PROCEDURE — 6360000002 HC RX W HCPCS

## 2020-06-11 PROCEDURE — 20610 DRAIN/INJ JOINT/BURSA W/O US: CPT

## 2020-06-11 PROCEDURE — 2500000003 HC RX 250 WO HCPCS

## 2020-06-11 PROCEDURE — 20610 DRAIN/INJ JOINT/BURSA W/O US: CPT | Performed by: NURSE PRACTITIONER

## 2020-06-12 NOTE — PROCEDURES
[facility] Physical and Pain Medicine        Patient Name: Josemanuel Gardner    : 1945    Age: 75 y.o. Sex: female    Date: 2020    Preop Diagnosis: Osteoarthritis of  [] Right  []  Left  [x]  Bilateral Shoulder(s)    Postop Diagnosis: Osteoarthritis of [] Right  []  Left  [x]  Bilateral Shoulder(s)    Procedure: Steroid Injection of  [] Right  []  Left   [x]  Bilateral Shoulder(s)    Performing Procedure:  Atilio Mauricio, MSN, APRN, FNP-C    Previously Had Procedure:   [x] Yes   []  No    Patient Vitals for the past 24 hrs:   BP Temp Temp src Pulse Resp SpO2   20 1355 (!) 97/49 97.3 °F (36.3 °C) Temporal 66 18 96 %       Description of Procedure:    After a brief physical assessment and failure to improve with conservative measures the patient presented for an injection of the [] Right  [] Left   [x] Bilateral Shoulder(s). The indications, limitations and possible complications were discussed with the patient and the patient elected to proceed with the procedure. [x] Right Shoulder    After voluntary, informed and signed consent obtained the patient was placed in a sitting position with the patients right arm placed to the side and elbow flexed at 90 degrees. Appropriate time out was obtained per policy. The proposed injection site was palpated at the point of maximal tenderness [] Anterior  [x] Posterior [] Lateral and the skin over the proposed injection entry point was marked. The skin was then sprayed with Gebauer's Solution while protecting patients eyes and prepped in a sterile fashion with Prevantics swab. Under sterile technique a 22 gauge 1 1/2 inch needle was introduced and after a negative aspiration a solution of 1 ml of 0.5% Marcaine Plain, 1 ml of 1% Lidocaine Plain and       [x] 1 ml of Kenalog (40mg/ml)   [] Toradol 30 mg/ml was injected. The needle was withdrawn and a sterile dressing applied.     [x] Left Shoulder     After

## 2020-06-22 RX ORDER — PNV NO.95/FERROUS FUM/FOLIC AC 28MG-0.8MG
TABLET ORAL
Qty: 60 TABLET | Refills: 0 | Status: SHIPPED | OUTPATIENT
Start: 2020-06-22 | End: 2020-08-24

## 2020-06-26 ENCOUNTER — HOSPITAL ENCOUNTER (OUTPATIENT)
Dept: PAIN MANAGEMENT | Age: 75
Discharge: HOME | End: 2020-06-26
Payer: MEDICARE

## 2020-06-26 VITALS
DIASTOLIC BLOOD PRESSURE: 64 MMHG | SYSTOLIC BLOOD PRESSURE: 113 MMHG | TEMPERATURE: 98.1 F | RESPIRATION RATE: 20 BRPM | HEART RATE: 62 BPM | OXYGEN SATURATION: 94 %

## 2020-06-26 PROCEDURE — 2500000003 HC RX 250 WO HCPCS

## 2020-06-26 PROCEDURE — 3209999900 FLUORO FOR SURGICAL PROCEDURES

## 2020-06-26 PROCEDURE — 2580000003 HC RX 258

## 2020-06-26 PROCEDURE — 62321 NJX INTERLAMINAR CRV/THRC: CPT

## 2020-06-26 PROCEDURE — 6360000002 HC RX W HCPCS

## 2020-06-26 RX ORDER — SODIUM CHLORIDE 9 MG/ML
INJECTION INTRAVENOUS
Status: COMPLETED | OUTPATIENT
Start: 2020-06-26 | End: 2020-06-26

## 2020-06-26 RX ORDER — METHYLPREDNISOLONE ACETATE 80 MG/ML
INJECTION, SUSPENSION INTRA-ARTICULAR; INTRALESIONAL; INTRAMUSCULAR; SOFT TISSUE
Status: COMPLETED | OUTPATIENT
Start: 2020-06-26 | End: 2020-06-26

## 2020-06-26 RX ORDER — LIDOCAINE HYDROCHLORIDE 10 MG/ML
INJECTION, SOLUTION EPIDURAL; INFILTRATION; INTRACAUDAL; PERINEURAL
Status: COMPLETED | OUTPATIENT
Start: 2020-06-26 | End: 2020-06-26

## 2020-06-26 RX ADMIN — METHYLPREDNISOLONE ACETATE 80 MG: 80 INJECTION, SUSPENSION INTRA-ARTICULAR; INTRALESIONAL; INTRAMUSCULAR; SOFT TISSUE at 11:28

## 2020-06-26 RX ADMIN — LIDOCAINE HYDROCHLORIDE 5 ML: 10 INJECTION, SOLUTION EPIDURAL; INFILTRATION; INTRACAUDAL; PERINEURAL at 11:27

## 2020-06-26 RX ADMIN — SODIUM CHLORIDE 5 ML: 9 INJECTION INTRAVENOUS at 11:28

## 2020-06-26 ASSESSMENT — PAIN - FUNCTIONAL ASSESSMENT
PAIN_FUNCTIONAL_ASSESSMENT: 0-10
PAIN_FUNCTIONAL_ASSESSMENT: 0-10
PAIN_FUNCTIONAL_ASSESSMENT: PREVENTS OR INTERFERES SOME ACTIVE ACTIVITIES AND ADLS

## 2020-06-26 NOTE — PROGRESS NOTES
Procedure:  Level of Consciousness: [x]Alert [x]Oriented []Disoriented []Lethargic  Anxiety Level: [x]Calm []Anxious []Depressed []Other  Skin: [x]Warm [x]Dry []Cool []Moist []Intact []Other  Cardiovascular: []Palpitations: [x]Never []Occasionally []Frequently  Chest Pain: [x]No []Yes  Respiratory:  [x]Unlabored []Labored []Cough ([] Productive []Unproductive)  HCG Required: [x]No []Yes   Results: []Negative []Positive  Knowledge Level:        [x]Patient/Other verbalized understanding of pre-procedure instructions. [x]Assessment of post-op care needs (transportation, responsible caregiver)        [x]Able to discuss health care problems and how to deal with it.   Factors that Affect Teaching:        Language Barrier: [x]No []Yes - why:        Hearing Loss:        []No [x]Yes            Corrective Device:  []Yes [x]No        Vision Loss:           []No [x]Yes            Corrective Device:  [x]Yes []No        Memory Loss:       [x]No []Yes            []Short Term []Long Term  Motivational Level:  [x]Asks Questions                  []Extremely Anxious       [x]Seems Interested               []Seems Uninterested                  [x]Denies need for Education  Risk for Injury:  [x]Patient oriented to person, place and time  [x]History of frequent falls/loss of balance  Nutritional:  []Change in appetite   []Weight Gain   []Weight Loss  Functional:  []Requires assistance with ADL's

## 2020-06-26 NOTE — INTERVAL H&P NOTE
Update History & Physical    The patient's History and Physical  was reviewed with the patient and I examined the patient. There was NO CHANGE:[id number]}. The surgical site was confirmed by the patient and me. Plan: The risks, benefits, expected outcome, and alternative to the recommended procedure have been discussed with the patient. Patient understands and wants to proceed with the procedure.      Electronically signed by Alvaro Candelaria MD on 6/26/2020 at 11:21 AM

## 2020-06-30 RX ORDER — HYDROCODONE BITARTRATE AND ACETAMINOPHEN 7.5; 325 MG/1; MG/1
1 TABLET ORAL EVERY 6 HOURS PRN
Qty: 90 TABLET | Refills: 0 | Status: SHIPPED | OUTPATIENT
Start: 2020-06-30 | End: 2020-08-03 | Stop reason: SDUPTHER

## 2020-07-02 ENCOUNTER — TELEPHONE (OUTPATIENT)
Dept: PAIN MANAGEMENT | Age: 75
End: 2020-07-02

## 2020-07-20 RX ORDER — FLUOXETINE HYDROCHLORIDE 20 MG/1
CAPSULE ORAL
Qty: 90 CAPSULE | Refills: 0 | Status: SHIPPED | OUTPATIENT
Start: 2020-07-20 | End: 2020-07-29 | Stop reason: SDUPTHER

## 2020-07-29 ENCOUNTER — OFFICE VISIT (OUTPATIENT)
Dept: PRIMARY CARE CLINIC | Age: 75
End: 2020-07-29
Payer: MEDICARE

## 2020-07-29 VITALS
WEIGHT: 126 LBS | TEMPERATURE: 98.2 F | HEIGHT: 68 IN | BODY MASS INDEX: 19.1 KG/M2 | SYSTOLIC BLOOD PRESSURE: 118 MMHG | OXYGEN SATURATION: 97 % | HEART RATE: 66 BPM | RESPIRATION RATE: 18 BRPM | DIASTOLIC BLOOD PRESSURE: 70 MMHG

## 2020-07-29 LAB
ALBUMIN SERPL-MCNC: 3.8 G/DL (ref 3.5–5.2)
ALP BLD-CCNC: 77 U/L (ref 35–104)
ALT SERPL-CCNC: 9 U/L (ref 5–33)
AMPHETAMINE SCREEN, URINE: NORMAL
ANION GAP SERPL CALCULATED.3IONS-SCNC: 14 MMOL/L (ref 7–19)
AST SERPL-CCNC: 20 U/L (ref 5–32)
BARBITURATE SCREEN, URINE: NORMAL
BASOPHILS ABSOLUTE: 0.1 K/UL (ref 0–0.2)
BASOPHILS RELATIVE PERCENT: 0.8 % (ref 0–1)
BENZODIAZEPINE SCREEN, URINE: NORMAL
BILIRUB SERPL-MCNC: <0.2 MG/DL (ref 0.2–1.2)
BILIRUBIN, POC: ABNORMAL
BLOOD URINE, POC: ABNORMAL
BUN BLDV-MCNC: 16 MG/DL (ref 8–23)
BUPRENORPHINE URINE: NORMAL
CALCIUM SERPL-MCNC: 10 MG/DL (ref 8.8–10.2)
CHLORIDE BLD-SCNC: 104 MMOL/L (ref 98–111)
CHOLESTEROL, TOTAL: 236 MG/DL (ref 160–199)
CLARITY, POC: CLEAR
CO2: 23 MMOL/L (ref 22–29)
COCAINE METABOLITE SCREEN URINE: NORMAL
COLOR, POC: YELLOW
CREAT SERPL-MCNC: 1 MG/DL (ref 0.5–0.9)
EOSINOPHILS ABSOLUTE: 0.2 K/UL (ref 0–0.6)
EOSINOPHILS RELATIVE PERCENT: 2.4 % (ref 0–5)
FERRITIN: 33.1 NG/ML (ref 13–150)
GABAPENTIN SCREEN, URINE: NORMAL
GFR AFRICAN AMERICAN: >59
GFR NON-AFRICAN AMERICAN: 54
GLUCOSE BLD-MCNC: 77 MG/DL (ref 74–109)
GLUCOSE URINE, POC: ABNORMAL
HCT VFR BLD CALC: 36.7 % (ref 37–47)
HDLC SERPL-MCNC: 75 MG/DL (ref 65–121)
HEMOGLOBIN: 11.4 G/DL (ref 12–16)
IMMATURE GRANULOCYTES #: 0 K/UL
KETONES, POC: ABNORMAL
LDL CHOLESTEROL CALCULATED: 134 MG/DL
LEUKOCYTE EST, POC: ABNORMAL
LYMPHOCYTES ABSOLUTE: 1.6 K/UL (ref 1.1–4.5)
LYMPHOCYTES RELATIVE PERCENT: 25.4 % (ref 20–40)
MCH RBC QN AUTO: 31.6 PG (ref 27–31)
MCHC RBC AUTO-ENTMCNC: 31.1 G/DL (ref 33–37)
MCV RBC AUTO: 101.7 FL (ref 81–99)
MDMA URINE: NORMAL
METHADONE SCREEN, URINE: NORMAL
METHAMPHETAMINE, URINE: NORMAL
MONOCYTES ABSOLUTE: 0.7 K/UL (ref 0–0.9)
MONOCYTES RELATIVE PERCENT: 11.1 % (ref 0–10)
NEUTROPHILS ABSOLUTE: 3.8 K/UL (ref 1.5–7.5)
NEUTROPHILS RELATIVE PERCENT: 60.1 % (ref 50–65)
NITRITE, POC: ABNORMAL
OPIATE SCREEN URINE: NORMAL
OXYCODONE SCREEN URINE: NORMAL
PDW BLD-RTO: 13.8 % (ref 11.5–14.5)
PH, POC: 6
PHENCYCLIDINE SCREEN URINE: NORMAL
PLATELET # BLD: 297 K/UL (ref 130–400)
PMV BLD AUTO: 10 FL (ref 9.4–12.3)
POTASSIUM SERPL-SCNC: 4.2 MMOL/L (ref 3.5–5)
PROPOXYPHENE SCREEN, URINE: NORMAL
PROTEIN, POC: ABNORMAL
RBC # BLD: 3.61 M/UL (ref 4.2–5.4)
SODIUM BLD-SCNC: 141 MMOL/L (ref 136–145)
SPECIFIC GRAVITY, POC: 1.03
THC SCREEN, URINE: NORMAL
TOTAL PROTEIN: 6.4 G/DL (ref 6.6–8.7)
TRICYCLIC ANTIDEPRESSANTS, UR: NORMAL
TRIGL SERPL-MCNC: 137 MG/DL (ref 0–149)
UROBILINOGEN, POC: ABNORMAL
WBC # BLD: 6.3 K/UL (ref 4.8–10.8)

## 2020-07-29 PROCEDURE — 1090F PRES/ABSN URINE INCON ASSESS: CPT | Performed by: FAMILY MEDICINE

## 2020-07-29 PROCEDURE — 80305 DRUG TEST PRSMV DIR OPT OBS: CPT | Performed by: FAMILY MEDICINE

## 2020-07-29 PROCEDURE — 81002 URINALYSIS NONAUTO W/O SCOPE: CPT | Performed by: FAMILY MEDICINE

## 2020-07-29 PROCEDURE — 1123F ACP DISCUSS/DSCN MKR DOCD: CPT | Performed by: FAMILY MEDICINE

## 2020-07-29 PROCEDURE — 4040F PNEUMOC VAC/ADMIN/RCVD: CPT | Performed by: FAMILY MEDICINE

## 2020-07-29 PROCEDURE — G8427 DOCREV CUR MEDS BY ELIG CLIN: HCPCS | Performed by: FAMILY MEDICINE

## 2020-07-29 PROCEDURE — G8399 PT W/DXA RESULTS DOCUMENT: HCPCS | Performed by: FAMILY MEDICINE

## 2020-07-29 PROCEDURE — 1036F TOBACCO NON-USER: CPT | Performed by: FAMILY MEDICINE

## 2020-07-29 PROCEDURE — 36415 COLL VENOUS BLD VENIPUNCTURE: CPT | Performed by: FAMILY MEDICINE

## 2020-07-29 PROCEDURE — 99214 OFFICE O/P EST MOD 30 MIN: CPT | Performed by: FAMILY MEDICINE

## 2020-07-29 PROCEDURE — G8420 CALC BMI NORM PARAMETERS: HCPCS | Performed by: FAMILY MEDICINE

## 2020-07-29 PROCEDURE — 0509F URINE INCON PLAN DOCD: CPT | Performed by: FAMILY MEDICINE

## 2020-07-29 RX ORDER — AMITRIPTYLINE HYDROCHLORIDE 75 MG/1
TABLET, FILM COATED ORAL
Qty: 90 TABLET | Refills: 3 | Status: SHIPPED | OUTPATIENT
Start: 2020-07-29 | End: 2021-01-01

## 2020-07-29 RX ORDER — DONEPEZIL HYDROCHLORIDE 23 MG/1
23 TABLET, FILM COATED ORAL NIGHTLY
Qty: 90 TABLET | Refills: 3 | Status: SHIPPED | OUTPATIENT
Start: 2020-07-29 | End: 2021-01-01

## 2020-07-29 RX ORDER — FLUOXETINE HYDROCHLORIDE 20 MG/1
CAPSULE ORAL
Qty: 90 CAPSULE | Refills: 3 | Status: SHIPPED | OUTPATIENT
Start: 2020-07-29 | End: 2021-01-01

## 2020-07-29 NOTE — PROGRESS NOTES
colonic polyps 07/06/2017    Chronic insomnia 06/10/2017    Ectatic abdominal aorta (HCC) 07/13/2016    Peripheral vascular disease ([facility] [address] 01/12/2016    Venous (peripheral) insufficiency 01/12/2016    Essential hypertension 12/07/2015    Descending thoracic aortic aneurysm (HCC) 11/04/2013    Osteoarthritis     Chronic back pain     Memory loss     Neck pain     Deafness congenital      Current Outpatient Medications   Medication Sig Dispense Refill    FLUoxetine (PROZAC) 20 MG capsule TAKE 1 CAPSULE BY MOUTH ONCE DAILY FOR ANXIETY AND FOR DEPRESSION 90 capsule 3    Donepezil HCl (ARICEPT) 23 MG TABS tablet Take 1 tablet by mouth nightly For dementia 90 tablet 3    amitriptyline (ELAVIL) 75 MG tablet 1 tablet by mouth at bedtime for sleep 90 tablet 3    Ferrous Sulfate (IRON) 325 (65 Fe) MG TABS Take 1 tablet by mouth once daily with breakfast 60 tablet 0    hydrOXYzine (VISTARIL) 25 MG capsule Take 25 mg by mouth 3 times daily as needed for Itching      amLODIPine-benazepril (LOTREL) 5-10 MG per capsule TAKE 1 CAPSULE BY MOUTH ONCE DAILY FOR BLOOD PRESSURE 90 capsule 5    albuterol sulfate  (90 Base) MCG/ACT inhaler Inhale 2 puffs into the lungs every 4 hours as needed for Wheezing 1 Inhaler 0    nitroGLYCERIN (NITROSTAT) 0.4 MG SL tablet Place 1 tablet under the tongue every 5 minutes as needed for Chest pain up to max of 3 total doses. If no relief after 1 dose, call 911. 25 tablet 3    Misc. Devices (ROLLER WALKER) MISC 1 each by Does not apply route daily DX Code: R26.81, G70.00, H81.313, G60.9 1 each 0    nitrofurantoin, macrocrystal-monohydrate, (MACROBID) 100 MG capsule Take 1 capsule by mouth 2 times daily for 10 days 20 capsule 0     No current facility-administered medications for this visit.       Past Medical History:   Diagnosis Date    Anemia     Anxiety     Chronic back pain     severe muscle spasms    Deafness congenital     Depression     Headache     Hypertension  Memory loss     early    Myasthenia ([facility] [address]     Neck pain     Osteoarthritis     Ptosis of eyelid     PVD (peripheral vascular disease) (HCC)     Weakness of face muscles     Wound of right ankle 2016    open wound to lateral side of right ankle      Past Surgical History:   Procedure Laterality Date    APPENDECTOMY      CARPAL TUNNEL RELEASE      CATARACT REMOVAL      COLONOSCOPY  7-2010    COLONOSCOPY  2000 or before    [place]-normal per patient    COLONOSCOPY N/A 5/29/2020    Dr Lary Reynolds, suboptimal prep, internal hemorrhoids-Grade 1, prn (age)   Devorah Diaz EYE SURGERY Bilateral     cataract extraction    HERNIA REPAIR      on back of neck    HYSTERECTOMY      VT COLONOSCOPY FLX DX W/COLLJ SPEC WHEN PFRMD N/A 7/6/2017    Dr Lisa Mathew diverticulosis, 5 yr recall    VT EGD TRANSORAL BIOPSY SINGLE/MULTIPLE N/A 7/6/2017    Dr Shubham Hilton, [place] (-)    TERRI AND BSO      UPPER GASTROINTESTINAL ENDOSCOPY N/A 5/29/2020    Dr Aston Carter hiatal hernia, antral gastritis,     VARICOSE VEIN SURGERY      VASCULAR SURGERY  02/19/2018    [facility]. Aortogram and runoff. Left common femoral artery, 5 Sami sheath. Family History   Problem Relation Age of Onset    High Blood Pressure Mother     Stroke Mother     Cancer Father     Lung Cancer Father     Hearing Loss Brother         deaf   Devorah Diaz Hearing Loss Brother         deaf    Colon Polyps Sister     Colon Cancer Neg Hx     Esophageal Cancer Neg Hx     Liver Cancer Neg Hx     Liver Disease Neg Hx     Stomach Cancer Neg Hx     Rectal Cancer Neg Hx      Social History     Tobacco Use    Smoking status: Never Smoker    Smokeless tobacco: Never Used   Substance Use Topics    Alcohol use: No       Allergies: Patient has no known allergies. ROS:  Feeling well. No dyspnea or chest pain on exertion. No abdominal pain, change in bowel habits, black or bloody stools. No urinary tract symptoms.   She does have urge incontinence and occasional dysuria. GYN ROS: None   No neurological complaints. All other systems negative. OBJECTIVE:   The patient appears well, alert, oriented x 3, in no distress. She speaks through the . /70 (Site: Left Upper Arm, Position: Sitting, Cuff Size: Medium Adult)   Pulse 66   Temp 98.2 °F (36.8 °C) (Temporal)   Resp 18   Ht 5' 8\" (1.727 m)   Wt 126 lb (57.2 kg)   SpO2 97%   BMI 19.16 kg/m²   Skin normal, no suspicious skin lesions. ENT normal.  Neck supple. No adenopathy or thyromegaly. MAXI. Lungs are clear, good air entry, no wheezes, rhonchi or rales. S1 and S2 normal, no murmurs, regular rate and rhythm. Abdomen soft without tenderness, guarding, mass or organomegaly. Extremities show no edema, normal peripheral pulses. Neurological is normal, no focal findings. Psychiatric exam, no signs of depression. Affect is flattened but I think some of that is due to her hearing. She is more unkempt than usual.  Short-term memory seems to be a little worse. BREAST EXAM: Breasts symmetrical. No skin lesions. Nipples appear normal without discharge. No masses or axillary lymphadenopathy. No tenderness. PELVIC EXAM: Not examined in light of her hysterectomy. ASSESSMENT:   1. Essential hypertension    2. Acute hypokalemia    3. Vaginal vault prolapse after hysterectomy    4. Anemia, unspecified type    5. Memory loss    6. Mixed stress and urge urinary incontinence    7. Chronic low back pain with bilateral sciatica, unspecified back pain laterality    8. Myasthenia gravis without acute exacerbation ([address]    9. Recurrent major depressive disorder, in partial remission ([address]    10. Hypercholesterolemia    11. Idiopathic peripheral neuropathy    12. Medication management    13.  Abnormal urine        PLAN:   Lifestyle advice: discussed diet and exercise  MEDICATIONS:  Orders Placed This Encounter   Medications    FLUoxetine (PROZAC) 20 MG capsule     Sig: TAKE 1 her.  Follow-up:  Return in about 6 months (around 1/29/2021) for Medication monitoring. PATIENT INSTRUCTIONS:  Patient Instructions   We are committed to providing you with the best care possible. In order to help us achieve these goals please remember to bring all medications, herbal products, and over the counter supplements with you to each visit. If your provider has ordered testing for you, please be sure to follow up with our office if you have not received results within 7 days after the testing took place. *If you receive a survey after visiting one of our offices, please take time to share your experience concerning your physician office visit. These surveys are confidential and no health information about you is shared. We are eager to improve for you and we are counting on your feedback to help make that happen. EMR Dragon/transcription disclaimer:  Much of this encounter note is electronic transcription/translation of spoken language to printed texts. The electronic translation of spoken language may be erroneous, or at times, nonsensical words or phrases may be inadvertently transcribed.   Although I have reviewed the note for such errors, some may still exist.

## 2020-07-31 LAB
ORGANISM: ABNORMAL
URINE CULTURE, ROUTINE: ABNORMAL
URINE CULTURE, ROUTINE: ABNORMAL

## 2020-08-01 RX ORDER — NITROFURANTOIN 25; 75 MG/1; MG/1
100 CAPSULE ORAL 2 TIMES DAILY
Qty: 20 CAPSULE | Refills: 0 | Status: SHIPPED | OUTPATIENT
Start: 2020-08-01 | End: 2020-08-11

## 2020-08-03 ENCOUNTER — TELEPHONE (OUTPATIENT)
Dept: PRIMARY CARE CLINIC | Age: 75
End: 2020-08-03

## 2020-08-03 RX ORDER — HYDROCODONE BITARTRATE AND ACETAMINOPHEN 7.5; 325 MG/1; MG/1
1 TABLET ORAL EVERY 6 HOURS PRN
Qty: 90 TABLET | Refills: 0 | Status: SHIPPED | OUTPATIENT
Start: 2020-08-03 | End: 2020-09-02 | Stop reason: SDUPTHER

## 2020-08-03 NOTE — TELEPHONE ENCOUNTER
----- Message from Robson Brand MD sent at 8/1/2020  2:21 PM CDT -----  Please make sure they did  the antibiotic for her UTI at [facility].   Thank you

## 2020-08-24 RX ORDER — PNV NO.95/FERROUS FUM/FOLIC AC 28MG-0.8MG
TABLET ORAL
Qty: 60 TABLET | Refills: 0 | Status: SHIPPED | OUTPATIENT
Start: 2020-08-24 | End: 2020-10-26

## 2020-08-26 ENCOUNTER — HOSPITAL ENCOUNTER (OUTPATIENT)
Dept: PAIN MANAGEMENT | Age: 75
Discharge: HOME | End: 2020-08-26
Payer: MEDICARE

## 2020-08-26 ENCOUNTER — TELEPHONE (OUTPATIENT)
Dept: PAIN MANAGEMENT | Age: 75
End: 2020-08-26

## 2020-08-26 VITALS
HEIGHT: 68 IN | TEMPERATURE: 98.1 F | BODY MASS INDEX: 19 KG/M2 | OXYGEN SATURATION: 96 % | HEART RATE: 88 BPM | SYSTOLIC BLOOD PRESSURE: 114 MMHG | DIASTOLIC BLOOD PRESSURE: 72 MMHG | WEIGHT: 125.38 LBS

## 2020-08-26 PROCEDURE — 99213 OFFICE O/P EST LOW 20 MIN: CPT | Performed by: NURSE PRACTITIONER

## 2020-08-26 PROCEDURE — 99213 OFFICE O/P EST LOW 20 MIN: CPT

## 2020-08-26 ASSESSMENT — PAIN SCALES - GENERAL: PAINLEVEL_OUTOF10: 10

## 2020-08-26 ASSESSMENT — PAIN DESCRIPTION - PAIN TYPE: TYPE: CHRONIC PAIN

## 2020-08-26 ASSESSMENT — PAIN DESCRIPTION - LOCATION: LOCATION: BACK;NECK;SHOULDER

## 2020-08-26 ASSESSMENT — PAIN DESCRIPTION - ORIENTATION: ORIENTATION: LOWER;UPPER;LEFT;RIGHT

## 2020-08-26 NOTE — PROGRESS NOTES
[facility] Physical and Pain Medicine    Office Progress Note    Patient Name: Ruel Marcum    MR #: [id number]    Account #: [de-identified]    : 1945    Age: 67 y.o. Sex: female    Date: 2020    PCP: Jose L Montaño MD         Referring Provider:    Chief Complaint:   Chief Complaint   Patient presents with    Back Pain    Neck Pain    Shoulder Pain       History of Present Illness:    Ruel Marcum is a 67 y.o. female who presents to the office for follow-up of bilateral shoulder injections and BHUPINDER level C3-C4. She says through  in the room, that she obtained 60% relief for 6 weeks with the injections and she is wanting them repeated. She also complains of low back pain. Daughter says that she fell a few weeks ago and has back pain, but over the past few days it has improved. She did not see her PCP when the fall took place. Last pain management HPI note read    Employment: Retired []   Disabled  []   Works []    Does Not Work [x]     Previous Injury:  Yes  [x]   No [] fall  Previous Surgery: Yes []   No [x]     Previous Physical Therapy In the last 6 months? Yes  []     No [x]   Did Physical Therapy make thepain better or worse? Better []   Worse []  Unchanged []    MRI in the last two years? Yes [x]  No []   Results reviewed with patient? Yes []   No []    CT Scan in the last two years? Yes  []   No [x]   Results reviewed with patient? Yes []   No []    X-ray in the last two years? Yes []   No  []  Results reviewed with patient? Yes  []  No []    Injections in the past?  Yes []   No []   Did the injections help relieve the pain? Yes []   No []     Do you have Depression? Yes  []    No [x]   Thinking of harming yourself or others?   Yes  []   No [x]     Past Medical Histoy  Past Medical History:   Diagnosis Date    Anemia     Anxiety     Chronic back pain     severe muscle spasms    Deafness congenital     Depression  Headache     Hypertension     Memory loss     early    Myasthenia ([facility] [address]     Neck pain     Osteoarthritis     Ptosis of eyelid     [facility] ([facility])     Weakness of face muscles     Wound of right ankle 2016    open wound to lateral side of right ankle        Surgery History  Past Surgical History:   Procedure Laterality Date    APPENDECTOMY      CARPAL TUNNEL RELEASE      CATARACT REMOVAL      COLONOSCOPY  7-2010    COLONOSCOPY  2000 or before    [place]-normal per patient    COLONOSCOPY N/A 5/29/2020    Dr Alden Meraz, suboptimal prep, internal hemorrhoids-Grade 1, prn (age)   Peter EYE SURGERY Bilateral     cataract extraction    HERNIA REPAIR      on back of neck    HYSTERECTOMY      VA COLONOSCOPY FLX DX W/COLLJ SPEC WHEN PFRMD N/A 7/6/2017    Dr Baron Maria diverticulosis, 5 yr recall    VA EGD TRANSORAL BIOPSY SINGLE/MULTIPLE N/A 7/6/2017    Dr Justin Loza, [place] (-)    TERRI AND BSO      UPPER GASTROINTESTINAL ENDOSCOPY N/A 5/29/2020    Dr Hays Bright hiatal hernia, antral gastritis,     VARICOSE VEIN SURGERY      VASCULAR SURGERY  02/19/2018    [facility]. Aortogram and runoff. Left common femoral artery, 5 french sheath. Allergies  Patient has no known allergies. Current Medications  Current Outpatient Medications   Medication Sig Dispense Refill    Ferrous Sulfate (IRON) 325 (65 Fe) MG TABS Take 1 tablet by mouth once daily with breakfast 60 tablet 0    HYDROcodone-acetaminophen (NORCO) 7.5-325 MG per tablet Take 1 tablet by mouth every 6 hours as needed for Pain for up to 30 days.  90 tablet 0    FLUoxetine (PROZAC) 20 MG capsule TAKE 1 CAPSULE BY MOUTH ONCE DAILY FOR ANXIETY AND FOR DEPRESSION 90 capsule 3    Donepezil HCl (ARICEPT) 23 MG TABS tablet Take 1 tablet by mouth nightly For dementia 90 tablet 3    amitriptyline (ELAVIL) 75 MG tablet 1 tablet by mouth at bedtime for sleep 90 tablet 3    hydrOXYzine (VISTARIL) 25 MG capsule Take 25 mg by mouth 3 times daily as needed for Itching      amLODIPine-benazepril (LOTREL) 5-10 MG per capsule TAKE 1 CAPSULE BY MOUTH ONCE DAILY FOR BLOOD PRESSURE 90 capsule 5    albuterol sulfate  (90 Base) MCG/ACT inhaler Inhale 2 puffs into the lungs every 4 hours as needed for Wheezing 1 Inhaler 0    nitroGLYCERIN (NITROSTAT) 0.4 MG SL tablet Place 1 tablet under the tongue every 5 minutes as needed for Chest pain up to max of 3 total doses. If no relief after 1 dose, call 911. 25 tablet 3    Misc. Devices (ROLLER WALKER) MISC 1 each by Does not apply route daily DX Code: R26.81, G70.00, H81.313, G60.9 1 each 0     No current facility-administered medications for this encounter. Social History    Social History     Tobacco Use    Smoking status: Never Smoker    Smokeless tobacco: Never Used   Substance Use Topics    Alcohol use: No         Family History  family history includes Cancer in her father; Colon Polyps in her sister; Hearing Loss in her brother and brother; High Blood Pressure in her mother; Calix Kobus in her father; Stroke in her mother. Review of Systems:  Constitutional: denies fever, chills, fatigue, change in appetite, weight gain or weight loss  Head: Normocephalic  Skin: denies easy bruising, skin redness, skin rash, hives, sensitivity to sun exposure, tightness, nodules or bumps, hair loss, color changes in the hands or feet with cold. Eyes: denies pain, redness, loss of vision, double or blurred vision, eye drainage, or dryness. ENT and Mouth: denies ringing in the ears, loss of hearing, nasal congestion, nasal discharge, no hoarseness, sore throat, or difficulty swallowing   Respiratory: denies chronic dry cough, coughing up blood, coughing up mucus, waking at night coughing or choking, or wheezing.    Cardiovascular: denies chest pain, irregular heartbeats, palpitations, shortness of breath, or edema in legs  Gastrointestinal: denies, nausea, vomiting, heartburn, diarrhea, or constipation  Genitourinary: denies difficult urination, pain or burning with urination, blood in the urine, or cloudy urine  Musculoskeletal: denies arm, buttock, thigh or calf cramps. Has pain in bilateral shoulders and neck, muscle and tenderness in bilateral shoulders and neck. No muscle weakness. No joint swelling. Neurologic: headache, dizziness, fainting, loss of consciousness, no memory loss. No sensitivity. Endocrine: denies intolerance to hot or cold temperature, night sweats, flushing, fingernail changes, increased thirst, or hairloss   Hematologic/ Lymphatic: denies anemia, bleeding tendency or clotting tendency, bruising easily. Allergic/ Immunologic: denies rhinitis, asthma, skin sensitivity, or allergy to Latex   Psychiatric: denies depression or thoughts of suicide, or voices in head. Current Pain Assessment:   Pain Assessment  Pain Assessment: 0-10  Pain Level: 10  Pain Type: Chronic pain  Pain Location: Back, Neck, Shoulder  Pain Orientation: Lower, Upper, Left, Right  POSS Score (Patient Ctrl Analgesia): 1    Clinical Progression: gradually improving  Effect of Pain on Daily Activities: limits activity  Patient's Stated Pain Goal: No pain  Pain Intervention(s): Medication (see eMar), Repositioning, Rest, Ice    Current PEG: 10    Past PEG: 10    Annual Screens:    ORT Score: 1    PHQ-9 Score: 8    Physical Exam:    Vitals:    08/26/20 1548   BP: 114/72   Pulse: 88   Temp: 98.1 °F (36.7 °C)   SpO2: 96%   Weight: 125 lb 6 oz (56.9 kg)   Height: 5' 8\" (1.727 m)       Body mass index is 19.06 kg/m². General Appearance: no acute distress. Appears to be well dressed  Skin Exam: Warm and dry, no jaundice, rashes or lesions  Head Exam: NCAT, PERRLA, EOMI, moist mucus membranes, scalp normal  Neck Exam: Supple, no masses palpated, trachea midline. Pain with rotation  Lung: Clear to ausculation in all lobes anterior and posterior.    Heart[de-identified] Regular rate and

## 2020-08-26 NOTE — TELEPHONE ENCOUNTER
OFFICE RECEIVED ON NEW PATIENT PHONE  LINE TO REPORT NO  AVAILABLE  FOR [facility] APPT. TODAY. OFFICE CALLED DAUGHTER-JAMI TO CLARIFY ABOUT THE APPT LEFT VOICE MESS TO CALL OFFICE REGARDING HER APPT . DAUGHTER ASK ABOUT DOING A  THROUGH [facility]. INSTRUCTED PATIENT DAUGHTER WE WOULD HAVE TO SPEAK WITH . WENT AHEAD AND SCHEDULED [facility] NEXT APPT IN NOV. SPOKE WITH  WE ARE UNABLE TO ACCESS [facility] AT [address] [place] [address] TO SEE PATIENT WITHOUT  INTERPRETOR PRESENT. KEPT PREVIOUS APPT. SCHEDULED FOR NOV. DAUGHTER ASK IF OFFICE WOULD ARRANGE INTERPRETOR SERVICES FOR SCHEDULE APPT. INTERPRETOR SERVICE WAS CALLED INTERPRETOR FATUMA STATED \"NO NO NO WE ARE NOT GOING TO RESCHEDULE HER APPT SHE IS IN TO MUCH PAIN. \" OFFICE STATED PER DAUGHTER TO INFORM  SERVICE THE NEW APPT SCHEDULED. FATUMA STATED\" NO WE WILL NOT R.S HER APPT I'M GOING DO FINE SOMEONE FOR HER APPT. \" OFFICE ASK IF SHE WOULD INFORM DAUGHTER OF THIS. PHIL STATED \"NO WE USUALLY DO NOT INFORM OUR CLIENTS FAMILY BUT I WILL.  \"    [facility] [place] NOTIFIED

## 2020-08-26 NOTE — PROGRESS NOTES
Nursing Admission Record    Current Issues / Falls / ER Visits: Follow up procedures    Was Percentage of Pain Relief after Bilateral Shoulder Injections on 6/11/2020:  60 %    How long lasted:  6 weeks Yes  Were you able to decrease pain medication after treatment? Yes  Were you able to increase activity after treatment? Yes  Did you have any adverse reactions to treatment? No    Was Percentage of Pain Relief after on Cervical Epidural C3-4 6/26/2020:  60 %    How long lasted:  6 weeks Yes  Were you able to decrease pain medication after treatment? Yes  Were you able to increase activity after treatment? Yes  Did you have any adverse reactions to treatment? No    Opioids Prescribed: Norco 7.5mg Q6HRS PRN #90- prescribed by Dr. Sonido Philip  Was Medication Brought to Appt: N/A     Hx of any Neck/Back Surgeries? None     Is Patient currently taking a blood thinner? None     MRI exams received in the past 2 years:  Yes MRI Lumbar & Cervical Spine       When 2019                                              Where [facility]       Imaging on chart: Yes         Imaging records requested: No     CT exam received during the last 12 months: No       When na                                            Where na       Imaging on chart: No       Imaging records requested: No     X-ray exam received during the last 12 months: Yes XR Lumbar Spine & XR Left Shoulder       When 2019                                              Where [facility]       Imaging on chart: Yes         Imaging records requested: No     Nerve Conduction Study/EMG:  No       When na                                              Where na       Imaging on chart: No         Imaging records requested:  No     Physical therapy: Yes       When: 2019                                                      Where  [address] [place] [address] No       When: na                                               Where na    Labs  Yes       When: 7/2020 Where  [facility]    PEG Score: 10    Last PEG Score: 10    Annual ORT Score: 1    Annual PHQ Score: 8    Last UDS Results: 11/20/2019 compliant    Education Provided:  [x] Review of Aniceto  [] Agreement Review  [x] PEG Score Calculated [] PHQ Score Calculated [] ORT Score Calculated    [] Compliance Issues Discussed [] Cognitive Behavior Needs [x] Exercise [] Review of Test [] Financial Issues  [x] Tobacco/Alcohol Use Reviewed [x] Teaching [] New Patient [] Picture Obtained    Physician Plan:  [] Outgoing Referral  [] Pharmacy Consult  [] Test Ordered [] Prescription Ordered/Changed [] Blood Thinner Request Form  [] Obtained Test Results / Consult Notes  [] UDS due at next visit, verified per EPIC      [] Suspected Physical Abuse or Suicide Risk assessed - IF YES COMPLETE QUESTIONS BELOW    If any of the following questions are answered yes - contact attending physician for referral:    Has been considering harming self to escape stress, pain problems? [] YES  [] NO  Has a suicide plan? [] YES  [] NO  Has attempted suicide in the past?   [] YES  [] NO  Has a close friend or family member who committed suicide?   [] YES  [] NO    Assessment Completed by:  Electronically signed by Lincoln Back RN on 8/26/2020 at 3:44 PM

## 2020-09-02 RX ORDER — HYDROCODONE BITARTRATE AND ACETAMINOPHEN 7.5; 325 MG/1; MG/1
1 TABLET ORAL EVERY 6 HOURS PRN
Qty: 90 TABLET | Refills: 0 | Status: SHIPPED | OUTPATIENT
Start: 2020-09-02 | End: 2020-10-19 | Stop reason: SDUPTHER

## 2020-09-17 ENCOUNTER — HOSPITAL ENCOUNTER (OUTPATIENT)
Dept: PAIN MANAGEMENT | Age: 75
Discharge: HOME | End: 2020-09-17
Payer: MEDICARE

## 2020-09-17 VITALS
HEART RATE: 63 BPM | OXYGEN SATURATION: 97 % | DIASTOLIC BLOOD PRESSURE: 68 MMHG | RESPIRATION RATE: 16 BRPM | SYSTOLIC BLOOD PRESSURE: 118 MMHG | TEMPERATURE: 96.5 F

## 2020-09-17 PROCEDURE — 20610 DRAIN/INJ JOINT/BURSA W/O US: CPT | Performed by: NURSE PRACTITIONER

## 2020-09-17 PROCEDURE — 2500000003 HC RX 250 WO HCPCS

## 2020-09-17 PROCEDURE — 20610 DRAIN/INJ JOINT/BURSA W/O US: CPT

## 2020-09-17 PROCEDURE — 6360000002 HC RX W HCPCS

## 2020-09-17 RX ORDER — LIDOCAINE HYDROCHLORIDE 10 MG/ML
2 INJECTION, SOLUTION EPIDURAL; INFILTRATION; INTRACAUDAL; PERINEURAL ONCE
Status: DISCONTINUED | OUTPATIENT
Start: 2020-09-17 | End: 2020-09-19 | Stop reason: HOSPADM

## 2020-09-17 RX ORDER — BUPIVACAINE HYDROCHLORIDE 5 MG/ML
2 INJECTION, SOLUTION EPIDURAL; INTRACAUDAL ONCE
Status: DISCONTINUED | OUTPATIENT
Start: 2020-09-17 | End: 2020-09-19 | Stop reason: HOSPADM

## 2020-09-17 RX ORDER — TRIAMCINOLONE ACETONIDE 40 MG/ML
80 INJECTION, SUSPENSION INTRA-ARTICULAR; INTRAMUSCULAR ONCE
Status: DISCONTINUED | OUTPATIENT
Start: 2020-09-17 | End: 2020-09-19 | Stop reason: HOSPADM

## 2020-09-18 NOTE — PROCEDURES
[facility]        Patient Name: Hermilo Orlando    : 1945    Age: 67 y.o. Sex: female    Date: 2020    Preop Diagnosis: Osteoarthritis of  [] Right  []  Left  [x]  Bilateral Shoulder(s)    Postop Diagnosis: Osteoarthritis of [] Right  []  Left  [x]  Bilateral Shoulder(s)    Procedure: Steroid Injection of  [] Right  []  Left   [x]  Bilateral Shoulder(s)    Performing Procedure:  Arta Mcburney, MSN, APRN, FNP-C    Previously Had Procedure:   [x] Yes   []  No    Patient Vitals for the past 24 hrs:   BP Temp Temp src Pulse Resp SpO2   20 1412 118/68 96.5 °F (35.8 °C) Temporal 63 16 97 %       Description of Procedure:    After a brief physical assessment and failure to improve with conservative measures the patient presented for an injection of the [] Right  [] Left   [x] Bilateral Shoulder(s). The indications, limitations and possible complications were discussed with the patient and the patient elected to proceed with the procedure. [x] Right Shoulder    After voluntary, informed and signed consent obtained the patient was placed in a sitting position with the patients right arm placed to the side and elbow flexed at 90 degrees. Appropriate time out was obtained per policy. The proposed injection site was palpated at the point of maximal tenderness [] Anterior  [x] Posterior [] Lateral and the skin over the proposed injection entry point was marked. The skin was then sprayed with Gebauer's Solution while protecting patients eyes and prepped in a sterile fashion with Prevantics swab. Under sterile technique a 22 gauge 1 1/2 inch needle was introduced and after a negative aspiration a solution of 1 ml of 0.5% Marcaine Plain, 1 ml of 1% Lidocaine Plain and       [x] 1 ml of Kenalog (40mg/ml)   [] Toradol 30 mg/ml was injected. The needle was withdrawn and a sterile dressing applied.     [x] Left Shoulder     After voluntary, informed and signed consent obtained the patient was placed in a sitting position with the patients left arm placed to the side and elbow flexed at 90 degrees. Appropriate time out was obtained per policy. The proposed injection site was palpated at the point of maximal tenderness  [] Anterior  [x] Posterior [] Lateral and the skin over the proposed injection entry point was marked. The skin was then sprayed with Gebauer's Solution while protecting patients eyes and prepped in a sterile fashion with Prevantics swab. Under sterile technique a 22 gauge 1 1/2 inch needle was introduced and after a negative aspiration a solution of 1 ml of 0.5% Marcaine Plain, 1 ml of 1% Lidocaine Plain and     [x] 1 ml of Kenalog (40mg/ml)   [] Toradol 30 mg/ml was injected. The needle was withdrawn and a sterile dressing applied. Discharge: The patient tolerated the procedure well. There were no complications during the procedure and the patient was discharged home with discharge instructions. The patient has been instructed to contact the office should there be any complications or questions to arise between today and their next appointment.     Plan:  [] Will return to the office in   [] 1 month  [x] 4 - 6 weeks   [] 2 months   []  3 months for:  [] Planned Procedure  [] Procedure Follow-up   [x] Office Visit      1 [facility], 9/5/2019 at 2:37 PM

## 2020-10-05 ENCOUNTER — OFFICE VISIT (OUTPATIENT)
Dept: PRIMARY CARE CLINIC | Age: 75
End: 2020-10-05
Payer: MEDICARE

## 2020-10-05 VITALS
WEIGHT: 124.2 LBS | TEMPERATURE: 99.7 F | HEART RATE: 73 BPM | DIASTOLIC BLOOD PRESSURE: 78 MMHG | BODY MASS INDEX: 18.82 KG/M2 | HEIGHT: 68 IN | OXYGEN SATURATION: 98 % | SYSTOLIC BLOOD PRESSURE: 116 MMHG

## 2020-10-05 PROCEDURE — 99213 OFFICE O/P EST LOW 20 MIN: CPT | Performed by: NURSE PRACTITIONER

## 2020-10-05 RX ORDER — BENZONATATE 100 MG/1
100 CAPSULE ORAL 3 TIMES DAILY PRN
Qty: 30 CAPSULE | Refills: 0 | Status: SHIPPED | OUTPATIENT
Start: 2020-10-05 | End: 2020-10-12

## 2020-10-05 RX ORDER — PREDNISONE 10 MG/1
TABLET ORAL
Qty: 18 TABLET | Refills: 0 | Status: SHIPPED | OUTPATIENT
Start: 2020-10-05 | End: 2020-10-09

## 2020-10-05 RX ORDER — DEXTROMETHORPHAN HYDROBROMIDE AND PROMETHAZINE HYDROCHLORIDE 15; 6.25 MG/5ML; MG/5ML
SYRUP ORAL
Qty: 120 ML | Refills: 0 | Status: SHIPPED | OUTPATIENT
Start: 2020-10-05 | End: 2020-10-12

## 2020-10-05 RX ORDER — FLUTICASONE PROPIONATE 50 MCG
2 SPRAY, SUSPENSION (ML) NASAL DAILY
Qty: 1 BOTTLE | Refills: 5 | Status: SHIPPED | OUTPATIENT
Start: 2020-10-05 | End: 2021-01-25 | Stop reason: SDUPTHER

## 2020-10-05 RX ORDER — LORATADINE 10 MG/1
10 TABLET ORAL DAILY
Qty: 10 TABLET | Refills: 0 | Status: SHIPPED | OUTPATIENT
Start: 2020-10-05 | End: 2021-01-25

## 2020-10-05 ASSESSMENT — ENCOUNTER SYMPTOMS
RHINORRHEA: 1
EYE PAIN: 0
DIARRHEA: 0
CONSTIPATION: 0
EYE ITCHING: 0
WHEEZING: 0
EYE DISCHARGE: 0
SINUS PAIN: 1
SHORTNESS OF BREATH: 0
EYE REDNESS: 0
COUGH: 1

## 2020-10-05 NOTE — PATIENT INSTRUCTIONS
Viral syndrome   Many illnesses are caused by viruses. These conditions usually run their course in 7-14 days but may take up to 21 days to resolve. Antibiotics do not help fight viral infections and are not needed at this time. Viral syndromes are treated with symptomatic support. You may take tylenol or ibuprofen for fever or aches and pains. Stay hydrated by taking sips of water or non caffeinated, noncarbonated, and nonalcoholic beverages throughout the day. Call if you have a fever greater than 102 F or if symptoms improve and then get worse again.

## 2020-10-05 NOTE — PROGRESS NOTES
[facility] PHYSICIAN SERVICES  [facility]  [address] [place] [address]  [address] [place] [address]  Dept: [phone]  Dept Fax: [phone]  Loc: [phone]    Sabrina Edwards is a 67 y.o. female who presents today for her medical conditions/complaints as noted below. Sabrina Edwards is c/o of Fatigue (Symptoms started last week); Congestion; Dizziness; Shaking; Cough; and Foot Problem (feels like there is something sticking in her leg when she gets up like an electrical shock)        HPI:     HPI     Pt presents with cough, congestion runny nose and sneezing for several days. She denies fever, headaches, or loss of sense of smell or taste.     Chief Complaint   Patient presents with    Fatigue     Symptoms started last week    Congestion    Dizziness    Shaking    Cough    Foot Problem     feels like there is something sticking in her leg when she gets up like an electrical shock     Past Medical History:   Diagnosis Date    Anemia     Anxiety     Chronic back pain     severe muscle spasms    Deafness congenital     Depression     Headache     Hypertension     Memory loss     early    Myasthenia ([address]     Neck pain     Osteoarthritis     Ptosis of eyelid     PVD [facility]peripheral vascular disease) ([facility])     Weakness of face muscles     Wound of right ankle 2016    open wound to lateral side of right ankle       Past Surgical History:   Procedure Laterality Date    APPENDECTOMY      CARPAL TUNNEL RELEASE      CATARACT REMOVAL      COLONOSCOPY  7-2010    COLONOSCOPY  2000 or before    [facility]kins-normal per patient    COLONOSCOPY N/A 5/29/2020    Dr Niraj Briceño, suboptimal prep, internal hemorrhoids-Grade 1, prn (age)   [facility] EYE SURGERY Bilateral     cataract extraction    HERNIA REPAIR      on back of neck    HYSTERECTOMY      DE COLONOSCOPY FLX DX W/COLLJ SPEC WHEN PFRMD N/A 7/6/2017    Dr Guerrero Lino diverticulosis, 5 yr recall    DE EGD TRANSORAL BIOPSY SINGLE/MULTIPLE N/A 7/6/2017    Dr Ziggy Peace, [place] (-)    TERRI AND BSO      UPPER GASTROINTESTINAL ENDOSCOPY N/A 5/29/2020    Dr Flores Roles hiatal hernia, antral gastritis,     VARICOSE VEIN SURGERY      VASCULAR SURGERY  02/19/2018    [facility]. Aortogram and runoff. Left common femoral artery, 5 french sheath. Vitals 10/5/2020 9/17/2020 8/26/2020 7/29/2020 6/26/2020 8/55/2737   SYSTOLIC 258 593 313 313 019 692   DIASTOLIC 78 68 72 70 64 79   Site - - - Left Upper Arm - -   Position - - - Sitting - -   Cuff Size - - - Medium Adult - -   Pulse 73 63 88 66 62 60   Temp 99.7 96.5 98.1 98.2 98.1 -   Resp - 16 - 18 20 18   SpO2 98 97 96 97 94 100   Weight 124 lb 3.2 oz - 125 lb 6 oz 126 lb - -   Height 5' 8\" - 5' 8\" 5' 8\" - -   BMI (wt*703/ht~2) 18.88 kg/m2 - 19.06 kg/m2 19.16 kg/m2 - -   Pain Level - - 10 - - 0   Some recent data might be hidden       Family History   Problem Relation Age of Onset    High Blood Pressure Mother     Stroke Mother     Cancer Father     Lung Cancer Father     Hearing Loss Brother         deaf    Hearing Loss Brother         deaf    Colon Polyps Sister     Colon Cancer Neg Hx     Esophageal Cancer Neg Hx     Liver Cancer Neg Hx     Liver Disease Neg Hx     Stomach Cancer Neg Hx     Rectal Cancer Neg Hx        Social History     Tobacco Use    Smoking status: Never Smoker    Smokeless tobacco: Never Used   Substance Use Topics    Alcohol use: No      Current Outpatient Medications   Medication Sig Dispense Refill    loratadine (CLARITIN) 10 MG tablet Take 1 tablet by mouth daily 10 tablet 0    fluticasone (FLONASE) 50 MCG/ACT nasal spray 2 sprays by Each Nostril route daily 1 Bottle 5    benzonatate (TESSALON) 100 MG capsule Take 1 capsule by mouth 3 times daily as needed for Cough 30 capsule 0    promethazine-dextromethorphan (PROMETHAZINE-DM) 6.25-15 MG/5ML syrup Take one teaspoon at night for cough.  120 mL 0    predniSONE (DELTASONE) 10 MG tablet Days 1-3 take 1 PO TID, days 4-6 take 1 PO BID, days 7-9 take 1 PO daily. 18 tablet 0    Ferrous Sulfate (IRON) 325 (65 Fe) MG TABS Take 1 tablet by mouth once daily with breakfast 60 tablet 0    FLUoxetine (PROZAC) 20 MG capsule TAKE 1 CAPSULE BY MOUTH ONCE DAILY FOR ANXIETY AND FOR DEPRESSION 90 capsule 3    Donepezil HCl (ARICEPT) 23 MG TABS tablet Take 1 tablet by mouth nightly For dementia 90 tablet 3    amitriptyline (ELAVIL) 75 MG tablet 1 tablet by mouth at bedtime for sleep 90 tablet 3    hydrOXYzine (VISTARIL) 25 MG capsule Take 25 mg by mouth 3 times daily as needed for Itching      amLODIPine-benazepril (LOTREL) 5-10 MG per capsule TAKE 1 CAPSULE BY MOUTH ONCE DAILY FOR BLOOD PRESSURE 90 capsule 5    albuterol sulfate  (90 Base) MCG/ACT inhaler Inhale 2 puffs into the lungs every 4 hours as needed for Wheezing 1 Inhaler 0    nitroGLYCERIN (NITROSTAT) 0.4 MG SL tablet Place 1 tablet under the tongue every 5 minutes as needed for Chest pain up to max of 3 total doses. If no relief after 1 dose, call 911. 91 tablet 3    Misc. Devices (ROLLER WALKER) MISC 1 each by Does not apply route daily DX Code: R26.81, G70.00, H81.313, G60.9 1 each 0     No current facility-administered medications for this visit. No Known Allergies    Health Maintenance   Topic Date Due    Statin Therapy  1945    DTaP/Tdap/Td vaccine (1 - Tdap) 06/29/1963    Shingles Vaccine (1 of 2) 06/29/1994    Flu vaccine (1) 09/01/2020    Potassium monitoring  07/29/2021    Creatinine monitoring  07/29/2021    DEXA (modify frequency per FRAX score)  Completed    Pneumococcal 65+ years Vaccine  Completed    Hepatitis A vaccine  Aged Out    Hepatitis B vaccine  Aged Out    Hib vaccine  Aged Out    Meningococcal (ACWY) vaccine  Aged Out       Subjective:      Review of Systems   Constitutional: Negative for chills and fever. HENT: Positive for postnasal drip, rhinorrhea and sinus pain.     Eyes: Negative for pain, discharge, redness and itching. Respiratory: Positive for cough. Negative for shortness of breath and wheezing. Cardiovascular: Negative for chest pain. Gastrointestinal: Negative for constipation and diarrhea. Endocrine: Negative. Genitourinary: Negative. Musculoskeletal: Negative. Skin: Negative for rash. Allergic/Immunologic: Positive for environmental allergies. Neurological: Negative for dizziness and headaches. Hematological: Negative. Psychiatric/Behavioral: Negative. Objective:     Physical Exam  Vitals signs and nursing note reviewed. Constitutional:       General: She is not in acute distress. Appearance: Normal appearance. She is not ill-appearing, toxic-appearing or diaphoretic. HENT:      Head: Normocephalic and atraumatic. Right Ear: External ear normal. Decreased hearing noted. There is no impacted cerumen. Left Ear: External ear normal. Decreased hearing noted. Tenderness present. There is no impacted cerumen. Tympanic membrane is erythematous. Left ear retracted TM: pt is deaf. Nose: Rhinorrhea present. Mouth/Throat:      Lips: Pink. No lesions. Mouth: Mucous membranes are moist.      Pharynx: Oropharynx is clear. Posterior oropharyngeal erythema present. No oropharyngeal exudate. Eyes:      General:         Right eye: No discharge. Extraocular Movements: Extraocular movements intact. Conjunctiva/sclera: Conjunctivae normal.      Pupils: Pupils are equal, round, and reactive to light. Neck:      Musculoskeletal: Normal range of motion and neck supple. No muscular tenderness. Cardiovascular:      Rate and Rhythm: Normal rate and regular rhythm. Pulses: Normal pulses. Heart sounds: Normal heart sounds. No murmur. No friction rub. No gallop. Pulmonary:      Effort: Pulmonary effort is normal. No respiratory distress. Breath sounds: Normal breath sounds. No stridor. No wheezing, rhonchi or rales. Chest:      Chest wall: No tenderness. Abdominal:      General: Bowel sounds are normal.      Palpations: Abdomen is soft. Musculoskeletal: Normal range of motion. Skin:     General: Skin is warm and dry. Coloration: Skin is not jaundiced or pale. Findings: No rash. Neurological:      Mental Status: She is alert and oriented to person, place, and time. Psychiatric:         Mood and Affect: Mood normal.         Behavior: Behavior normal.         Thought Content: Thought content normal.         Judgment: Judgment normal.       /78   Pulse 73   Temp 99.7 °F (37.6 °C)   Ht 5' 8\" (1.727 m)   Wt 124 lb 3.2 oz (56.3 kg)   SpO2 98%   BMI 18.88 kg/m²     Assessment:       Diagnosis Orders   1. Cough  Respiratory Viral Panel - [employer]    COVID-19   2. Sneezing     3. Fatigue, unspecified type           Plan:   More than 50% of the time was spent counseling and coordinating care for a total time of 15  min face to face. 1. Start the claritin, flonase daily. Take cough medication as needed. Viral respiratory and covid test today. Self quarantine until results are back. Patient given educational materials -see patient instructions. Discussed use, benefit, and side effects of prescribed medications. All patient questions answered. Pt voiced understanding. Reviewed health maintenance. Instructed to continue currentmedications, diet and exercise. Patient agreed with treatment plan. Follow up as directed.    MEDICATIONS:  Orders Placed This Encounter   Medications    loratadine (CLARITIN) 10 MG tablet     Sig: Take 1 tablet by mouth daily     Dispense:  10 tablet     Refill:  0    fluticasone (FLONASE) 50 MCG/ACT nasal spray     Si sprays by Each Nostril route daily     Dispense:  1 Bottle     Refill:  5    benzonatate (TESSALON) 100 MG capsule     Sig: Take 1 capsule by mouth 3 times daily as needed for Cough     Dispense:  30 capsule     Refill:  0   

## 2020-10-09 ENCOUNTER — HOSPITAL ENCOUNTER (OUTPATIENT)
Dept: PAIN MANAGEMENT | Age: 75
Discharge: HOME | End: 2020-10-09
Payer: MEDICARE

## 2020-10-09 VITALS
TEMPERATURE: 97.1 F | OXYGEN SATURATION: 99 % | HEART RATE: 61 BPM | DIASTOLIC BLOOD PRESSURE: 81 MMHG | SYSTOLIC BLOOD PRESSURE: 140 MMHG | RESPIRATION RATE: 18 BRPM

## 2020-10-09 PROCEDURE — 6360000002 HC RX W HCPCS

## 2020-10-09 PROCEDURE — 62321 NJX INTERLAMINAR CRV/THRC: CPT

## 2020-10-09 PROCEDURE — 3209999900 FLUORO FOR SURGICAL PROCEDURES

## 2020-10-09 PROCEDURE — 2500000003 HC RX 250 WO HCPCS

## 2020-10-09 RX ORDER — SODIUM CHLORIDE 9 MG/ML
INJECTION INTRAVENOUS
Status: COMPLETED | OUTPATIENT
Start: 2020-10-09 | End: 2020-10-09

## 2020-10-09 RX ORDER — LIDOCAINE HYDROCHLORIDE 10 MG/ML
INJECTION, SOLUTION EPIDURAL; INFILTRATION; INTRACAUDAL; PERINEURAL
Status: COMPLETED | OUTPATIENT
Start: 2020-10-09 | End: 2020-10-09

## 2020-10-09 RX ORDER — METHYLPREDNISOLONE ACETATE 80 MG/ML
INJECTION, SUSPENSION INTRA-ARTICULAR; INTRALESIONAL; INTRAMUSCULAR; SOFT TISSUE
Status: COMPLETED | OUTPATIENT
Start: 2020-10-09 | End: 2020-10-09

## 2020-10-09 RX ADMIN — METHYLPREDNISOLONE ACETATE 80 MG: 80 INJECTION, SUSPENSION INTRA-ARTICULAR; INTRALESIONAL; INTRAMUSCULAR; SOFT TISSUE at 10:10

## 2020-10-09 RX ADMIN — LIDOCAINE HYDROCHLORIDE 5 ML: 10 INJECTION, SOLUTION EPIDURAL; INFILTRATION; INTRACAUDAL; PERINEURAL at 10:10

## 2020-10-09 RX ADMIN — SODIUM CHLORIDE 5 ML: 9 INJECTION INTRAVENOUS at 10:10

## 2020-10-09 ASSESSMENT — PAIN - FUNCTIONAL ASSESSMENT
PAIN_FUNCTIONAL_ASSESSMENT: 0-10
PAIN_FUNCTIONAL_ASSESSMENT: 0-10

## 2020-10-09 ASSESSMENT — PAIN DESCRIPTION - DESCRIPTORS: DESCRIPTORS: TIGHTNESS

## 2020-10-09 NOTE — PROGRESS NOTES
Procedure:  Level of Consciousness: [x]Alert [x]Oriented []Disoriented []Lethargic  Anxiety Level: [x]Calm []Anxious []Depressed []Other  Skin: [x]Warm [x]Dry []Cool []Moist []Intact []Other  Cardiovascular: []Palpitations: [x]Never []Occasionally []Frequently  Chest Pain: [x]No []Yes  Respiratory:  [x]Unlabored []Labored []Cough ([] Productive []Unproductive)  HCG Required: [x]No []Yes   Results: []Negative []Positive  Knowledge Level:        [x]Patient/Other verbalized understanding of pre-procedure instructions. [x]Assessment of post-op care needs (transportation, responsible caregiver)        [x]Able to discuss health care problems and how to deal with it.   Factors that Affect Teaching:        Language Barrier: [x]No []Yes - why:        Hearing Loss:        []No [x]Yes            Corrective Device:  [x]Yes []No        Vision Loss:           []No [x]Yes            Corrective Device:  [x]Yes []No        Memory Loss:       [x]No []Yes            []Short Term []Long Term  Motivational Level:  [x]Asks Questions                  []Extremely Anxious       [x]Seems Interested               []Seems Uninterested                  []Denies need for Education  Risk for Injury:  [x]Patient oriented to person, place and time  []History of frequent falls/loss of balance  Nutritional:  []Change in appetite   []Weight Gain   []Weight Loss  Functional:  []Requires assistance with ADL's

## 2020-10-09 NOTE — INTERVAL H&P NOTE
Update History & Physical    The patient's History and Physical was reviewed with the patient and I examined the patient. There was  NO CHANGE:[id number]}. The surgical site was confirmed by the patient and me. Plan: The risks, benefits, expected outcome, and alternative to the recommended procedure have been discussed with the patient. Patient understands and wants to proceed with the procedure.      Electronically signed by Linus Hawley MD on 10/9/2020 at 10:06 AM

## 2020-10-14 ENCOUNTER — TELEPHONE (OUTPATIENT)
Dept: PAIN MANAGEMENT | Age: 75
End: 2020-10-14

## 2020-10-19 RX ORDER — HYDROCODONE BITARTRATE AND ACETAMINOPHEN 7.5; 325 MG/1; MG/1
1 TABLET ORAL EVERY 6 HOURS PRN
Qty: 90 TABLET | Refills: 0 | Status: SHIPPED | OUTPATIENT
Start: 2020-10-19 | End: 2020-12-17 | Stop reason: SDUPTHER

## 2020-10-26 RX ORDER — PNV NO.95/FERROUS FUM/FOLIC AC 28MG-0.8MG
TABLET ORAL
Qty: 60 TABLET | Refills: 0 | Status: SHIPPED | OUTPATIENT
Start: 2020-10-26 | End: 2020-12-21

## 2020-11-24 ENCOUNTER — TELEPHONE (OUTPATIENT)
Dept: PAIN MANAGEMENT | Age: 75
End: 2020-11-24

## 2020-11-24 NOTE — TELEPHONE ENCOUNTER
OFFICE CALLED TO CONFIRM APPT.([id number]) FOR [facility] . Marla Burroughs SPOKE WITH DAUGHTER-DAMEON. OFFICE ALSO CALLED ([id number])SPOKE WITH FATUMA. SHE STATED ERIN OLIVAS WILL ACCOMPANY [facility] APPT.  OFFICE INFORM PROVIDER

## 2020-11-25 ENCOUNTER — HOSPITAL ENCOUNTER (OUTPATIENT)
Dept: PAIN MANAGEMENT | Age: 75
Discharge: HOME | End: 2020-11-25
Payer: MEDICARE

## 2020-11-25 VITALS
HEIGHT: 68 IN | DIASTOLIC BLOOD PRESSURE: 74 MMHG | SYSTOLIC BLOOD PRESSURE: 136 MMHG | HEART RATE: 61 BPM | BODY MASS INDEX: 2.43 KG/M2 | TEMPERATURE: 99 F | WEIGHT: 16 LBS | OXYGEN SATURATION: 92 %

## 2020-11-25 PROBLEM — M53.3 SACROILIAC JOINT DYSFUNCTION OF BOTH SIDES: Status: ACTIVE | Noted: 2020-11-25

## 2020-11-25 PROCEDURE — 99213 OFFICE O/P EST LOW 20 MIN: CPT

## 2020-11-25 PROCEDURE — 99213 OFFICE O/P EST LOW 20 MIN: CPT | Performed by: NURSE PRACTITIONER

## 2020-11-25 ASSESSMENT — PAIN DESCRIPTION - LOCATION: LOCATION: BACK;NECK;SHOULDER

## 2020-11-25 ASSESSMENT — PAIN DESCRIPTION - PAIN TYPE: TYPE: CHRONIC PAIN

## 2020-11-25 ASSESSMENT — PAIN SCALES - GENERAL: PAINLEVEL_OUTOF10: 8

## 2020-11-25 NOTE — PROGRESS NOTES
[facility]    Office Progress Note    Patient Name: Saeid Valente    MR #: [id number]    Account #: [de-identified]    : 1945    Age: 67 y.o. Sex: female    Date: 2020    PCP: Chandni Patel MD         Referring Provider:    Chief Complaint:   Chief Complaint   Patient presents with    Back Pain    Neck Pain    Shoulder Pain     Bilateral       History of Present Illness:    Saeid Valente is a 67 y.o. female who presents to the office for follow-up of bilateral shoulder injections. She has daughter at bedside and  as well. Patient says that she obtained 80% relief for 6 weeks with the shoulder injections. She is wanting them repeated. She does says that she is having low back pain and is wanting to see about getting injections to her low back. She has never had injections in that area. Last pain management HPI note read    Employment: Retired []   Disabled  []   Works []    Does Not Work [x]     Previous Injury:  Yes  []   No [x]     Previous Surgery: Yes []   No [x]     Previous Physical Therapy In the last 6 months? Yes  []     No [x]   Did Physical Therapy make thepain better or worse? Better []   Worse []  Unchanged []    MRI in the last two years? Yes [x]  No []   Results reviewed with patient? Yes []   No []    CT Scan in the last two years? Yes  []   No [x]   Results reviewed with patient? Yes []   No []    X-ray in the last two years? Yes [x]   No  []  Results reviewed with patient? Yes  []  No []    Injections in the past?  Yes [x]   No []   Did the injections help relieve the pain? Yes [x]   No []     Do you have Depression? Yes  []    No [x]   Thinking of harming yourself or others?   Yes  []   No [x]     Past Medical Histoy  Past Medical History:   Diagnosis Date    Anemia     Anxiety     Chronic back pain     severe muscle spasms    Deafness congenital     Depression     Headache     Hypertension     Memory loss     early    Myasthenia ([facility] [address]     Neck pain     Osteoarthritis     Ptosis of eyelid     [facility]     Weakness of face muscles     Wound of right ankle 2016    open wound to lateral side of right ankle        Surgery History  Past Surgical History:   Procedure Laterality Date    APPENDECTOMY      CARPAL TUNNEL RELEASE      CATARACT REMOVAL      COLONOSCOPY  7-2010    COLONOSCOPY  2000 or before    [place]-normal per patient    COLONOSCOPY N/A 5/29/2020    Dr Juventino Nelson, suboptimal prep, internal hemorrhoids-Grade 1, prn (age)   [facility] SURGERY Bilateral     cataract extraction    HERNIA REPAIR      on back of neck    HYSTERECTOMY      MS COLONOSCOPY FLX DX W/COLLJ SPEC WHEN PFRMD N/A 7/6/2017    Dr Bambi Cunha diverticulosis, 5 yr recall    MS EGD TRANSORAL BIOPSY SINGLE/MULTIPLE N/A 7/6/2017    Dr Jean Harrell, [place] (-)    TERRI AND BSO      UPPER GASTROINTESTINAL ENDOSCOPY N/A 5/29/2020    Dr Yfn Sexton hiatal hernia, antral gastritis,     VARICOSE VEIN SURGERY      VASCULAR SURGERY  02/19/2018    [facility]. Aortogram and runoff. Left common femoral artery, 5 British sheath. Allergies  Patient has no known allergies.      Current Medications  Current Outpatient Medications   Medication Sig Dispense Refill    Ferrous Sulfate (IRON) 325 (65 Fe) MG TABS Take 1 tablet by mouth once daily with breakfast 60 tablet 0    loratadine (CLARITIN) 10 MG tablet Take 1 tablet by mouth daily 10 tablet 0    fluticasone (FLONASE) 50 MCG/ACT nasal spray 2 sprays by Each Nostril route daily 1 Bottle 5    FLUoxetine (PROZAC) 20 MG capsule TAKE 1 CAPSULE BY MOUTH ONCE DAILY FOR ANXIETY AND FOR DEPRESSION 90 capsule 3    Donepezil HCl (ARICEPT) 23 MG TABS tablet Take 1 tablet by mouth nightly For dementia 90 tablet 3    amitriptyline (ELAVIL) 75 MG tablet 1 tablet by mouth at bedtime for sleep 90 tablet 3    hydrOXYzine (VISTARIL) 25 MG capsule Take 25 mg by mouth 3 times daily as needed for Itching      amLODIPine-benazepril (LOTREL) 5-10 MG per capsule TAKE 1 CAPSULE BY MOUTH ONCE DAILY FOR BLOOD PRESSURE 90 capsule 5    nitroGLYCERIN (NITROSTAT) 0.4 MG SL tablet Place 1 tablet under the tongue every 5 minutes as needed for Chest pain up to max of 3 total doses. If no relief after 1 dose, call 911. 25 tablet 3    Misc. Devices (ROLLER WALKER) MISC 1 each by Does not apply route daily DX Code: R26.81, G70.00, H81.313, G60.9 1 each 0    albuterol sulfate  (90 Base) MCG/ACT inhaler Inhale 2 puffs into the lungs every 4 hours as needed for Wheezing 1 Inhaler 0     No current facility-administered medications for this encounter. Social History    Social History     Tobacco Use    Smoking status: Never Smoker    Smokeless tobacco: Never Used   Substance Use Topics    Alcohol use: No         Family History  family history includes Cancer in her father; Colon Polyps in her sister; Hearing Loss in her brother and brother; High Blood Pressure in her mother; Sara Members in her father; Stroke in her mother. Review of Systems:  Constitutional: denies fever, chills, fatigue, change in appetite, weight gain or weight loss  Head: Normocephalic  Skin: denies easy bruising, skin redness, skin rash, hives, sensitivity to sun exposure, tightness, nodules or bumps, hair loss, color changes in the hands or feet with cold. Eyes: denies pain, redness, loss of vision, double or blurred vision, eye drainage, or dryness. ENT and Mouth: denies ringing in the ears, loss of hearing, nasal congestion, nasal discharge, no hoarseness, sore throat, or difficulty swallowing   Respiratory: denies chronic dry cough, coughing up blood, coughing up mucus, waking at night coughing or choking, or wheezing.    Cardiovascular: denies chest pain, irregular heartbeats, palpitations, shortness of breath, or edema in legs  Gastrointestinal: denies, palpation, no guarding  Extremities: No rash, cyanosis or bruising  Musculoskeletal: No joint swelling or deformity   Back Exam: Positive Nick's bilateral  Hip Exam: Full rotation bilateral   Knee Exam: Full flexion and extension bilateral, no crepitus  Shoulder Exam: Pain with rotation of bilateral shoulders  Neurologic Exam: Gait and coordination normal, speech normal  Reflexes: Normal brachialis, Negative Sanches's bilateral. Normal Patellar bilateral,   CN EXAM: II-XII intact, face symmetrical, tongue symmetrical, the trapezius and sternocleidomastoid muscle appearance and strength symmetrical, normal achilles bilateral, ankle clonus negative bilateral  Strength: 5/5 RUE Bi's/Tri's, 5/5 LUE Bi's/Tri's, 5/5 RLE knee flex/ext, 5/5 RLE DF/PF, 5/5 LLE knee flex/ext, 5/5 LLE DF/PF  Sensation: Equal and intact to fine touch in all extremities  Mood and affect: Normal   Nurses note reviewed along with current vital signs    Active Problem(s)  Active Problems:    Chronic back pain    Deafness congenital    Chronic pain of both shoulders    Sacroiliac joint dysfunction of both sides  Resolved Problems:    * No resolved hospital problems. *                                                                                                                            PLAN:  1. Patient is to call the office with any questions or concerns that may arise prior to next appointment. 2. Schedule patient for bilateral SI joint injections  3. 3 weeks after the above schedule patient for bilateral shoulder injections    Urine Drug Screen Current/Today:  Yes  [x]  No [] [place]    Discussion:  Discussed exam findings and plan of care with patient. Patient agreed with the current plan of care at this time. All questions from the patient were answered by the provider. Activity:   Discussed exercise as beneficial to pain reduction, encouraged stretching exercise with a focus on torso strengthening.     Education Provided:  Review of [place] Showers [x] Agreement Review [x]  Reviewed PHQ-9  [x]    Review of Test [] Compliance Issues Discussed []   Cognitive Behavior Needs [] Exercise [x]  Financial Issues []   Tobacco/Alcohol Use [] Teaching  [x]     Goal:  Pain Management Goals of Therapy:   []        Resolution in pain  [x]        Decrease in pain level  [x]        Improvement in ADL's  [x]        Increase in activities with less pain  [x]        Decrease in medication      [] Benzodiazapine's and Narcotics:  Patient educated on the possible effects of combining Benzodiazapine's and Opioids. Explained \"Black Box Warnings\" such as; possible suppressed breathing, hypoxia, anoxia, depressed cognition, heart arrhythmia, coma and possible death. Patient verbalized understanding concerning possible effects. Controlled Substance Monitoring:  Attestation: The [employer] report for this patient was reviewed today. Discussed with patient possible medication side effects, risk of tolerance, dependence and alternative treatments. Discussed the growing epidemic in the [place] with the overprescribing and at times the abuse of narcotics. Discussed the detrimental effects of long term narcotic use. Patient encouraged to set daily goals of exercising and decreasing daily narcotic intake. Discussed with the patient about the development of hyperalgesia with long term narcotic intake. EMR dragon/transcription disclaimer: Much of this encounter note is electronic transcription/translation of spoken language to printed tach. Electronic translation of spoken language may be erroneous, or at times, nonsensical words or phrases may be inadvertently transcribed.  Although, I have reviewed the note for such errors, some may still exist.     CC:  MD Monet Kerr APRN, 11/25/2020 at 12:24 PM

## 2020-11-25 NOTE — PROGRESS NOTES
Clinic Documentation      Education Provided:  [x] Review of Arnita Councilman  [] Agreement Review  [x] PEG Score Calculated [] PHQ Score Calculated [] ORT Score Calculated    [] Compliance Issues Discussed [] Cognitive Behavior Needs [x] Exercise [] Review of Test [] Financial Issues  [x] Tobacco/Alcohol Use Reviewed [x] Teaching [] New Patient [] Picture Obtained    Physician Plan:  [] Outgoing Referral  [] Pharmacy Consult  [] Test Ordered [] Prescription Ordered/Changed   [] Obtained Test Results / Consult Notes        Complete if patient is withholding blood thinner for procedure     Blood Thinner Patient is currently taking:      [] Plavix (Hold for 7 days)  [] Aspirin (Hold for 5 days)     [] Pletal (Hold for 2 days)  [] Pradaxa (Hold for 3 days)    [] Effient (Hold for 7 days)  [] Xarelto (Hold for 2 days)    [] Eliquis (Hold for 2 days)  [] Brilinta (Hold for 7 days)    [] Coumadin (Hold for 5 days) - (INR needs to be drawn the day prior to procedure- INR < 2.0)    [] Aggrenox (Hold for 7 days)        [] Patient will stop medication on their own.    [] Blood Thinner Form Faxed for approval to hold.    Provider form faxed to:     Assessment Completed by:  Electronically signed by [place] on 11/25/2020 at 10:32 AM

## 2020-12-10 ENCOUNTER — HOSPITAL ENCOUNTER (OUTPATIENT)
Dept: PAIN MANAGEMENT | Age: 75
Discharge: HOME | End: 2020-12-10
Payer: MEDICARE

## 2020-12-10 VITALS
HEART RATE: 68 BPM | DIASTOLIC BLOOD PRESSURE: 71 MMHG | OXYGEN SATURATION: 98 % | SYSTOLIC BLOOD PRESSURE: 116 MMHG | TEMPERATURE: 97.1 F | RESPIRATION RATE: 18 BRPM

## 2020-12-10 PROCEDURE — 2500000003 HC RX 250 WO HCPCS

## 2020-12-10 PROCEDURE — 20552 NJX 1/MLT TRIGGER POINT 1/2: CPT | Performed by: NURSE PRACTITIONER

## 2020-12-10 PROCEDURE — 76942 ECHO GUIDE FOR BIOPSY: CPT | Performed by: NURSE PRACTITIONER

## 2020-12-10 PROCEDURE — 6360000002 HC RX W HCPCS

## 2020-12-10 PROCEDURE — 20611 DRAIN/INJ JOINT/BURSA W/US: CPT

## 2020-12-10 RX ORDER — LIDOCAINE HYDROCHLORIDE 10 MG/ML
2 INJECTION, SOLUTION EPIDURAL; INFILTRATION; INTRACAUDAL; PERINEURAL ONCE
Status: DISCONTINUED | OUTPATIENT
Start: 2020-12-10 | End: 2020-12-12 | Stop reason: HOSPADM

## 2020-12-10 RX ORDER — TRIAMCINOLONE ACETONIDE 40 MG/ML
80 INJECTION, SUSPENSION INTRA-ARTICULAR; INTRAMUSCULAR ONCE
Status: DISCONTINUED | OUTPATIENT
Start: 2020-12-10 | End: 2020-12-12 | Stop reason: HOSPADM

## 2020-12-10 RX ORDER — BUPIVACAINE HYDROCHLORIDE 5 MG/ML
2 INJECTION, SOLUTION EPIDURAL; INTRACAUDAL ONCE
Status: DISCONTINUED | OUTPATIENT
Start: 2020-12-10 | End: 2020-12-12 | Stop reason: HOSPADM

## 2020-12-10 NOTE — PROGRESS NOTES
Procedure:  Level of Consciousness: [x]Alert [x]Oriented []Disoriented []Lethargic  Anxiety Level: [x]Calm []Anxious []Depressed []Other  Skin: [x]Warm [x]Dry []Cool []Moist []Intact []Other  Cardiovascular: [x]Palpitations: [x]Never []Occasionally []Frequently  Chest Pain: [x]No []Yes  Respiratory:  [x]Unlabored []Labored []Cough ([] Productive []Unproductive)  HCG Required: [x]No []Yes   Results: []Negative []Positive  Knowledge Level:        [x]Patient/Other verbalized understanding of pre-procedure instructions. [x]Assessment of post-op care needs (transportation, responsible caregiver)        [x]Able to discuss health care problems and how to deal with it.   Factors that Affect Teaching:        Language Barrier: [x]No []Yes - why:        Hearing Loss:        []No [x]Yes            Corrective Device:  []Yes [x]No        Vision Loss:           [x]No []Yes            Corrective Device:  []Yes []No        Memory Loss:       [x]No []Yes            []Short Term []Long Term  Motivational Level:  [x]Asks Questions                  []Extremely Anxious       [x]Seems Interested               []Seems Uninterested                  [x]Denies need for Education  Risk for Injury:  [x]Patient oriented to person, place and time  []History of frequent falls/loss of balance  Nutritional:  []Change in appetite   []Weight Gain   []Weight Loss  Functional:  []Requires assistance with ADL's

## 2020-12-11 NOTE — PROCEDURES
[facility] Physical and Pain Medicine      Patient Name: Gaby Foss    : 1945    Age: 67 y.o. Sex: female    Date: 2020    Pre-op Diagnosis: []  Right  [] Left  [x] Bilateral  Sacroiliac Joint(s) Dysfunction/ Sacroiliitis    Post-op Diagnosis: [] Right   [] Left  [x] Bilateral Sacroiliac Joint(s) Dysfunction/ Sacroliliits    Procedure: Ultrasound Guided Injection of  [] Right  [] Left  [x] Bilateral Sacroiliac Joint(s)     Performing Procedure:  Raymond Arboleda, MSN, APRN, FNP-C    Previously Had Procedure:  [] Yes   [x] No    Patient Vitals for the past 24 hrs:   BP Temp Temp src Pulse Resp SpO2   12/10/20 0854 116/71 97.1 °F (36.2 °C) Temporal 68 18 98 %       Description of Procedure:    After a brief physical assessment and failure to improve with conservative measures the patient presented for an injection of the  [] Right  [] Left   [x] Bilateral Sacroiliac Joint(s). The indications, limitations and possible complications were discussed with the patient and the patient elected to proceed with the procedure. [x] Right Sacroiliac Joint    After voluntary, informed and signed consent obtained the patient was placed in the prone position. Appropriate time out was obtained per policy. The area of maximal tenderness was palpated over the Right Sacroiliac Joint and the skin overlying this area marked with a skin marker. The skin was then sprayed with Gebauer's Solution and prepped in a sterile fashion with Prevantics swab. The ultrasound transducer was brought in and the Right Sacroiliac Joint was identified with ultrasound. Under sterile technique and direct ultrasound visualization a 22 gauge 3 inch spinal needle was introduced into the Right Sacroiliac Joint.  After a negative aspiration a solution of    [x] Kenalog 1 ml (40mg/ml), 1 ml of 0.5% Marcaine Plain and 1 ml of 1% Lidocaine Plain     [] Toradol 1 ml (30 mg/ml), 1 ml of 0.5% Marcaine

## 2020-12-14 ENCOUNTER — TELEPHONE (OUTPATIENT)
Dept: PAIN MANAGEMENT | Age: 75
End: 2020-12-14

## 2020-12-17 RX ORDER — HYDROCODONE BITARTRATE AND ACETAMINOPHEN 7.5; 325 MG/1; MG/1
1 TABLET ORAL EVERY 6 HOURS PRN
Qty: 90 TABLET | Refills: 0 | Status: SHIPPED | OUTPATIENT
Start: 2020-12-17 | End: 2021-01-28 | Stop reason: SDUPTHER

## 2020-12-21 RX ORDER — AMLODIPINE BESYLATE AND BENAZEPRIL HYDROCHLORIDE 5; 10 MG/1; MG/1
CAPSULE ORAL
Qty: 90 CAPSULE | Refills: 0 | Status: SHIPPED | OUTPATIENT
Start: 2020-12-21 | End: 2021-01-01

## 2020-12-21 RX ORDER — PNV NO.95/FERROUS FUM/FOLIC AC 28MG-0.8MG
TABLET ORAL
Qty: 60 TABLET | Refills: 0 | Status: SHIPPED | OUTPATIENT
Start: 2020-12-21 | End: 2021-02-22 | Stop reason: SDUPTHER

## 2020-12-28 ENCOUNTER — TELEPHONE (OUTPATIENT)
Dept: PAIN MANAGEMENT | Age: 75
End: 2020-12-28

## 2020-12-28 NOTE — TELEPHONE ENCOUNTER
MRS VEAR DAUGHTER(JAMI)CALLED OUR TO REQUEST FOR RESCHEDULING PATIENT APPT. AND ASK IF OUR OFFICE TO CALL THE  OFFICE TO INFORM OF NEW APPT DATE AND TIME. OFFICE CALLED(11:27)  LINE SPOKE WITH FATUMA OF CHANGE APPT DATE AND TIME.

## 2021-01-01 ENCOUNTER — HOSPITAL ENCOUNTER (OUTPATIENT)
Dept: PAIN MANAGEMENT | Age: 76
Discharge: HOME | End: 2021-12-02
Payer: MEDICARE

## 2021-01-01 ENCOUNTER — HOSPITAL ENCOUNTER (OUTPATIENT)
Dept: PAIN MANAGEMENT | Age: 76
Discharge: HOME | End: 2021-04-27
Payer: MEDICARE

## 2021-01-01 ENCOUNTER — HOSPITAL ENCOUNTER (OUTPATIENT)
Dept: PAIN MANAGEMENT | Age: 76
Discharge: HOME | End: 2021-10-19
Payer: MEDICARE

## 2021-01-01 ENCOUNTER — CARE COORDINATION (OUTPATIENT)
Dept: CARE COORDINATION | Age: 76
End: 2021-01-01

## 2021-01-01 ENCOUNTER — TELEPHONE (OUTPATIENT)
Dept: PAIN MANAGEMENT | Age: 76
End: 2021-01-01

## 2021-01-01 ENCOUNTER — TELEPHONE (OUTPATIENT)
Dept: VASCULAR SURGERY | Age: 76
End: 2021-01-01

## 2021-01-01 ENCOUNTER — HOSPITAL ENCOUNTER (OUTPATIENT)
Dept: PAIN MANAGEMENT | Age: 76
Discharge: HOME | End: 2021-10-11
Payer: MEDICARE

## 2021-01-01 ENCOUNTER — HOSPITAL ENCOUNTER (OUTPATIENT)
Dept: PAIN MANAGEMENT | Age: 76
Discharge: HOME | End: 2021-04-15
Payer: MEDICARE

## 2021-01-01 ENCOUNTER — HOSPITAL ENCOUNTER (OUTPATIENT)
Dept: PAIN MANAGEMENT | Age: 76
Discharge: HOME | End: 2021-06-23
Payer: MEDICARE

## 2021-01-01 ENCOUNTER — HOSPITAL ENCOUNTER (INPATIENT)
Age: 76
LOS: 4 days | Discharge: NURSING, REHAB OR LONG-TERM CARE | DRG: 481 | End: 2021-12-16
Attending: EMERGENCY MEDICINE | Admitting: INTERNAL MEDICINE
Payer: MEDICARE

## 2021-01-01 ENCOUNTER — APPOINTMENT (OUTPATIENT)
Dept: GENERAL RADIOLOGY | Age: 76
DRG: 481 | End: 2021-01-01
Payer: MEDICARE

## 2021-01-01 ENCOUNTER — HOSPITAL ENCOUNTER (OUTPATIENT)
Dept: PAIN MANAGEMENT | Age: 76
Discharge: HOME | End: 2021-07-22
Payer: MEDICARE

## 2021-01-01 ENCOUNTER — HOSPITAL ENCOUNTER (OUTPATIENT)
Dept: VASCULAR LAB | Age: 76
Discharge: HOME | End: 2021-04-07
Payer: MEDICARE

## 2021-01-01 ENCOUNTER — OFFICE VISIT (OUTPATIENT)
Dept: PRIMARY CARE CLINIC | Age: 76
End: 2021-01-01
Payer: MEDICARE

## 2021-01-01 ENCOUNTER — TELEPHONE (OUTPATIENT)
Dept: PRIMARY CARE CLINIC | Age: 76
End: 2021-01-01

## 2021-01-01 ENCOUNTER — VIRTUAL VISIT (OUTPATIENT)
Dept: VASCULAR SURGERY | Age: 76
End: 2021-01-01
Payer: MEDICARE

## 2021-01-01 ENCOUNTER — HOSPITAL ENCOUNTER (OUTPATIENT)
Dept: PAIN MANAGEMENT | Age: 76
Discharge: HOME | End: 2021-07-06
Payer: MEDICARE

## 2021-01-01 ENCOUNTER — ANESTHESIA (OUTPATIENT)
Dept: OPERATING ROOM | Age: 76
DRG: 481 | End: 2021-01-01
Payer: MEDICARE

## 2021-01-01 ENCOUNTER — HOSPITAL ENCOUNTER (EMERGENCY)
Age: 76
Discharge: HOME | End: 2021-10-02
Attending: EMERGENCY MEDICINE
Payer: MEDICARE

## 2021-01-01 ENCOUNTER — HOSPITAL ENCOUNTER (OUTPATIENT)
Dept: PAIN MANAGEMENT | Age: 76
Discharge: HOME | End: 2021-04-05
Payer: MEDICARE

## 2021-01-01 ENCOUNTER — HOSPITAL ENCOUNTER (OUTPATIENT)
Dept: PAIN MANAGEMENT | Age: 76
Discharge: HOME | End: 2021-05-13
Payer: MEDICARE

## 2021-01-01 ENCOUNTER — HOSPITAL ENCOUNTER (OUTPATIENT)
Dept: PAIN MANAGEMENT | Age: 76
Discharge: HOME | End: 2021-08-12
Payer: MEDICARE

## 2021-01-01 ENCOUNTER — ANESTHESIA EVENT (OUTPATIENT)
Dept: OPERATING ROOM | Age: 76
DRG: 481 | End: 2021-01-01
Payer: MEDICARE

## 2021-01-01 ENCOUNTER — PATIENT MESSAGE (OUTPATIENT)
Dept: VASCULAR SURGERY | Age: 76
End: 2021-01-01

## 2021-01-01 VITALS
OXYGEN SATURATION: 94 % | DIASTOLIC BLOOD PRESSURE: 77 MMHG | SYSTOLIC BLOOD PRESSURE: 150 MMHG | TEMPERATURE: 98.5 F | RESPIRATION RATE: 18 BRPM | BODY MASS INDEX: 17.88 KG/M2 | HEIGHT: 68 IN | WEIGHT: 118 LBS | HEART RATE: 68 BPM

## 2021-01-01 VITALS
HEIGHT: 68 IN | HEART RATE: 82 BPM | DIASTOLIC BLOOD PRESSURE: 54 MMHG | WEIGHT: 130 LBS | TEMPERATURE: 97 F | BODY MASS INDEX: 19.7 KG/M2 | OXYGEN SATURATION: 94 % | SYSTOLIC BLOOD PRESSURE: 92 MMHG

## 2021-01-01 VITALS
TEMPERATURE: 97.5 F | RESPIRATION RATE: 18 BRPM | DIASTOLIC BLOOD PRESSURE: 74 MMHG | HEART RATE: 66 BPM | OXYGEN SATURATION: 97 % | SYSTOLIC BLOOD PRESSURE: 136 MMHG

## 2021-01-01 VITALS
HEIGHT: 68 IN | OXYGEN SATURATION: 95 % | HEART RATE: 70 BPM | DIASTOLIC BLOOD PRESSURE: 78 MMHG | RESPIRATION RATE: 18 BRPM | WEIGHT: 127 LBS | TEMPERATURE: 96.7 F | SYSTOLIC BLOOD PRESSURE: 122 MMHG | BODY MASS INDEX: 19.25 KG/M2

## 2021-01-01 VITALS
SYSTOLIC BLOOD PRESSURE: 113 MMHG | OXYGEN SATURATION: 92 % | RESPIRATION RATE: 18 BRPM | TEMPERATURE: 96.9 F | HEART RATE: 68 BPM | DIASTOLIC BLOOD PRESSURE: 69 MMHG

## 2021-01-01 VITALS
BODY MASS INDEX: 18.79 KG/M2 | DIASTOLIC BLOOD PRESSURE: 79 MMHG | HEIGHT: 68 IN | HEART RATE: 67 BPM | TEMPERATURE: 97 F | WEIGHT: 124 LBS | SYSTOLIC BLOOD PRESSURE: 123 MMHG | OXYGEN SATURATION: 97 %

## 2021-01-01 VITALS
HEART RATE: 60 BPM | DIASTOLIC BLOOD PRESSURE: 54 MMHG | TEMPERATURE: 97 F | SYSTOLIC BLOOD PRESSURE: 108 MMHG | OXYGEN SATURATION: 95 % | RESPIRATION RATE: 18 BRPM

## 2021-01-01 VITALS
DIASTOLIC BLOOD PRESSURE: 70 MMHG | OXYGEN SATURATION: 93 % | RESPIRATION RATE: 16 BRPM | HEART RATE: 58 BPM | SYSTOLIC BLOOD PRESSURE: 136 MMHG

## 2021-01-01 VITALS
HEART RATE: 70 BPM | TEMPERATURE: 97 F | WEIGHT: 121 LBS | DIASTOLIC BLOOD PRESSURE: 84 MMHG | HEIGHT: 68 IN | BODY MASS INDEX: 18.34 KG/M2 | SYSTOLIC BLOOD PRESSURE: 144 MMHG | OXYGEN SATURATION: 94 %

## 2021-01-01 VITALS
DIASTOLIC BLOOD PRESSURE: 80 MMHG | OXYGEN SATURATION: 93 % | HEART RATE: 65 BPM | RESPIRATION RATE: 18 BRPM | SYSTOLIC BLOOD PRESSURE: 138 MMHG

## 2021-01-01 VITALS
OXYGEN SATURATION: 95 % | RESPIRATION RATE: 18 BRPM | TEMPERATURE: 97.3 F | DIASTOLIC BLOOD PRESSURE: 70 MMHG | SYSTOLIC BLOOD PRESSURE: 119 MMHG | HEART RATE: 70 BPM

## 2021-01-01 VITALS
HEART RATE: 73 BPM | BODY MASS INDEX: 18.46 KG/M2 | HEIGHT: 68 IN | TEMPERATURE: 97.6 F | SYSTOLIC BLOOD PRESSURE: 118 MMHG | RESPIRATION RATE: 18 BRPM | WEIGHT: 121.8 LBS | DIASTOLIC BLOOD PRESSURE: 74 MMHG | OXYGEN SATURATION: 92 %

## 2021-01-01 VITALS
OXYGEN SATURATION: 100 % | HEART RATE: 69 BPM | SYSTOLIC BLOOD PRESSURE: 150 MMHG | TEMPERATURE: 96.9 F | DIASTOLIC BLOOD PRESSURE: 85 MMHG | RESPIRATION RATE: 18 BRPM

## 2021-01-01 VITALS
HEIGHT: 68 IN | OXYGEN SATURATION: 97 % | TEMPERATURE: 98.2 F | DIASTOLIC BLOOD PRESSURE: 68 MMHG | HEART RATE: 76 BPM | RESPIRATION RATE: 18 BRPM | WEIGHT: 122.8 LBS | SYSTOLIC BLOOD PRESSURE: 103 MMHG | BODY MASS INDEX: 18.61 KG/M2

## 2021-01-01 VITALS
HEART RATE: 70 BPM | RESPIRATION RATE: 18 BRPM | OXYGEN SATURATION: 94 % | SYSTOLIC BLOOD PRESSURE: 136 MMHG | TEMPERATURE: 96.1 F | DIASTOLIC BLOOD PRESSURE: 76 MMHG

## 2021-01-01 VITALS
OXYGEN SATURATION: 93 % | DIASTOLIC BLOOD PRESSURE: 69 MMHG | TEMPERATURE: 96.4 F | RESPIRATION RATE: 18 BRPM | HEART RATE: 6 BPM | SYSTOLIC BLOOD PRESSURE: 126 MMHG

## 2021-01-01 VITALS
TEMPERATURE: 97.1 F | SYSTOLIC BLOOD PRESSURE: 136 MMHG | DIASTOLIC BLOOD PRESSURE: 77 MMHG | OXYGEN SATURATION: 94 % | RESPIRATION RATE: 18 BRPM | HEART RATE: 72 BPM

## 2021-01-01 VITALS — OXYGEN SATURATION: 99 % | SYSTOLIC BLOOD PRESSURE: 175 MMHG | TEMPERATURE: 97.9 F | DIASTOLIC BLOOD PRESSURE: 111 MMHG

## 2021-01-01 VITALS
SYSTOLIC BLOOD PRESSURE: 150 MMHG | HEIGHT: 68 IN | BODY MASS INDEX: 19.25 KG/M2 | WEIGHT: 127 LBS | RESPIRATION RATE: 18 BRPM | DIASTOLIC BLOOD PRESSURE: 81 MMHG | TEMPERATURE: 96.8 F | OXYGEN SATURATION: 98 % | HEART RATE: 77 BPM

## 2021-01-01 DIAGNOSIS — F41.9 ANXIETY: ICD-10-CM

## 2021-01-01 DIAGNOSIS — I73.9 PERIPHERAL VASCULAR DISEASE (HCC): ICD-10-CM

## 2021-01-01 DIAGNOSIS — R42 DIZZINESS: ICD-10-CM

## 2021-01-01 DIAGNOSIS — G89.29 CHRONIC LOW BACK PAIN: ICD-10-CM

## 2021-01-01 DIAGNOSIS — M51.36 LUMBAR DEGENERATIVE DISC DISEASE: Primary | ICD-10-CM

## 2021-01-01 DIAGNOSIS — S72.142A CLOSED COMMINUTED INTERTROCHANTERIC FRACTURE OF LEFT FEMUR, INITIAL ENCOUNTER (HCC): Primary | ICD-10-CM

## 2021-01-01 DIAGNOSIS — I65.23 BILATERAL CAROTID ARTERY STENOSIS: ICD-10-CM
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
## 2021-01-01 DIAGNOSIS — Z91.81 AT HIGH RISK FOR FALLS: Primary | ICD-10-CM

## 2021-01-01 DIAGNOSIS — M79.675 TOE PAIN, LEFT: ICD-10-CM

## 2021-01-01 DIAGNOSIS — G89.29 CHRONIC BILATERAL LOW BACK PAIN WITHOUT SCIATICA: Primary | ICD-10-CM

## 2021-01-01 DIAGNOSIS — M54.50 CHRONIC BILATERAL LOW BACK PAIN WITHOUT SCIATICA: Primary | ICD-10-CM

## 2021-01-01 DIAGNOSIS — Z79.899 MEDICATION MANAGEMENT: ICD-10-CM

## 2021-01-01 DIAGNOSIS — M54.16 LUMBAR RADICULOPATHY: ICD-10-CM

## 2021-01-01 DIAGNOSIS — E87.6 LOW BLOOD POTASSIUM: Primary | ICD-10-CM

## 2021-01-01 DIAGNOSIS — M54.50 CHRONIC LOW BACK PAIN: ICD-10-CM

## 2021-01-01 DIAGNOSIS — H91.90 DEAFNESS, UNSPECIFIED LATERALITY: ICD-10-CM

## 2021-01-01 DIAGNOSIS — M54.12 CERVICAL RADICULOPATHY: ICD-10-CM
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
## 2021-01-01 DIAGNOSIS — S72.142A CLOSED INTERTROCHANTERIC FRACTURE OF HIP, LEFT, INITIAL ENCOUNTER (HCC): ICD-10-CM

## 2021-01-01 DIAGNOSIS — F51.01 PRIMARY INSOMNIA: ICD-10-CM

## 2021-01-01 DIAGNOSIS — R52 PAIN MANAGEMENT: ICD-10-CM

## 2021-01-01 DIAGNOSIS — M15.8 OTHER OSTEOARTHRITIS INVOLVING MULTIPLE JOINTS: ICD-10-CM

## 2021-01-01 DIAGNOSIS — I10 ESSENTIAL HYPERTENSION: ICD-10-CM

## 2021-01-01 DIAGNOSIS — H90.5 CONGENITAL DEAFNESS: ICD-10-CM

## 2021-01-01 DIAGNOSIS — I65.23 BILATERAL CAROTID ARTERY STENOSIS: Primary | ICD-10-CM

## 2021-01-01 DIAGNOSIS — M54.50 CHRONIC BILATERAL LOW BACK PAIN, UNSPECIFIED WHETHER SCIATICA PRESENT: ICD-10-CM

## 2021-01-01 DIAGNOSIS — M51.36 LUMBAR DEGENERATIVE DISC DISEASE: ICD-10-CM

## 2021-01-01 DIAGNOSIS — G89.29 OTHER CHRONIC PAIN: ICD-10-CM

## 2021-01-01 DIAGNOSIS — F33.41 RECURRENT MAJOR DEPRESSIVE DISORDER, IN PARTIAL REMISSION (HCC): ICD-10-CM

## 2021-01-01 DIAGNOSIS — M54.2 NECK PAIN: Primary | ICD-10-CM

## 2021-01-01 DIAGNOSIS — M54.12 CERVICAL NEURALGIA: ICD-10-CM

## 2021-01-01 DIAGNOSIS — I10 ESSENTIAL HYPERTENSION: Primary | ICD-10-CM

## 2021-01-01 DIAGNOSIS — R26.81 UNSTEADY GAIT: ICD-10-CM

## 2021-01-01 DIAGNOSIS — R51.9 ACUTE NONINTRACTABLE HEADACHE, UNSPECIFIED HEADACHE TYPE: ICD-10-CM

## 2021-01-01 DIAGNOSIS — G60.9 IDIOPATHIC PERIPHERAL NEUROPATHY: ICD-10-CM

## 2021-01-01 DIAGNOSIS — G47.9 SLEEP DIFFICULTIES: ICD-10-CM

## 2021-01-01 DIAGNOSIS — R41.3 MEMORY LOSS: ICD-10-CM

## 2021-01-01 DIAGNOSIS — D50.8 OTHER IRON DEFICIENCY ANEMIA: ICD-10-CM

## 2021-01-01 DIAGNOSIS — G89.29 CHRONIC BILATERAL LOW BACK PAIN, UNSPECIFIED WHETHER SCIATICA PRESENT: ICD-10-CM

## 2021-01-01 DIAGNOSIS — I73.9 PERIPHERAL VASCULAR DISEASE (HCC): Chronic | ICD-10-CM

## 2021-01-01 DIAGNOSIS — G70.00 MYASTHENIA GRAVIS (HCC): ICD-10-CM

## 2021-01-01 LAB
ABO/RH: NORMAL
ADENOVIRUS BY PCR: NOT DETECTED
ALBUMIN SERPL-MCNC: 3.4 G/DL (ref 3.5–5.2)
ALBUMIN SERPL-MCNC: 3.7 G/DL (ref 3.5–5.2)
ALBUMIN SERPL-MCNC: 4 G/DL (ref 3.5–5.2)
ALBUMIN SERPL-MCNC: 4.2 G/DL (ref 3.5–5.2)
ALCOHOL URINE: NORMAL
ALP BLD-CCNC: 100 U/L (ref 35–104)
ALP BLD-CCNC: 73 U/L (ref 35–104)
ALP BLD-CCNC: 78 U/L (ref 35–104)
ALP BLD-CCNC: 98 U/L (ref 35–104)
ALT SERPL-CCNC: 10 U/L (ref 5–33)
ALT SERPL-CCNC: 11 U/L (ref 5–33)
ALT SERPL-CCNC: 11 U/L (ref 5–33)
ALT SERPL-CCNC: 12 U/L (ref 5–33)
AMPHETAMINE SCREEN, URINE: NORMAL
ANION GAP SERPL CALCULATED.3IONS-SCNC: 10 MMOL/L (ref 7–19)
ANION GAP SERPL CALCULATED.3IONS-SCNC: 10 MMOL/L (ref 7–19)
ANION GAP SERPL CALCULATED.3IONS-SCNC: 11 MMOL/L (ref 7–19)
ANION GAP SERPL CALCULATED.3IONS-SCNC: 12 MMOL/L (ref 7–19)
ANION GAP SERPL CALCULATED.3IONS-SCNC: 7 MMOL/L (ref 7–19)
ANION GAP SERPL CALCULATED.3IONS-SCNC: 9 MMOL/L (ref 7–19)
ANTIBODY SCREEN: NORMAL
AST SERPL-CCNC: 14 U/L (ref 5–32)
AST SERPL-CCNC: 16 U/L (ref 5–32)
AST SERPL-CCNC: 17 U/L (ref 5–32)
AST SERPL-CCNC: 18 U/L (ref 5–32)
BARBITURATE SCREEN, URINE: NORMAL
BASOPHILS ABSOLUTE: 0 K/UL (ref 0–0.2)
BASOPHILS ABSOLUTE: 0.1 K/UL (ref 0–0.2)
BASOPHILS ABSOLUTE: 0.1 K/UL (ref 0–0.2)
BASOPHILS RELATIVE PERCENT: 0.2 % (ref 0–1)
BASOPHILS RELATIVE PERCENT: 0.5 % (ref 0–1)
BASOPHILS RELATIVE PERCENT: 1.2 % (ref 0–1)
BENZODIAZEPINE SCREEN, URINE: NORMAL
BILIRUB SERPL-MCNC: 0.3 MG/DL (ref 0.2–1.2)
BILIRUB SERPL-MCNC: <0.2 MG/DL (ref 0.2–1.2)
BORDETELLA PARAPERTUSSIS BY PCR: NOT DETECTED
BORDETELLA PERTUSSIS BY PCR: NOT DETECTED
BUN BLDV-MCNC: 11 MG/DL (ref 8–23)
BUN BLDV-MCNC: 13 MG/DL (ref 8–23)
BUN BLDV-MCNC: 16 MG/DL (ref 8–23)
BUN BLDV-MCNC: 17 MG/DL (ref 8–23)
BUPRENORPHINE URINE: NORMAL
CALCIUM SERPL-MCNC: 10.1 MG/DL (ref 8.8–10.2)
CALCIUM SERPL-MCNC: 10.2 MG/DL (ref 8.8–10.2)
CALCIUM SERPL-MCNC: 10.4 MG/DL (ref 8.8–10.2)
CALCIUM SERPL-MCNC: 9.5 MG/DL (ref 8.8–10.2)
CALCIUM SERPL-MCNC: 9.5 MG/DL (ref 8.8–10.2)
CALCIUM SERPL-MCNC: 9.6 MG/DL (ref 8.8–10.2)
CALCIUM SERPL-MCNC: 9.8 MG/DL (ref 8.8–10.2)
CALCIUM SERPL-MCNC: 9.9 MG/DL (ref 8.8–10.2)
CHLAMYDOPHILIA PNEUMONIAE BY PCR: NOT DETECTED
CHLORIDE BLD-SCNC: 100 MMOL/L (ref 98–111)
CHLORIDE BLD-SCNC: 101 MMOL/L (ref 98–111)
CHLORIDE BLD-SCNC: 101 MMOL/L (ref 98–111)
CHLORIDE BLD-SCNC: 103 MMOL/L (ref 98–111)
CHLORIDE BLD-SCNC: 104 MMOL/L (ref 98–111)
CHLORIDE BLD-SCNC: 105 MMOL/L (ref 98–111)
CHLORIDE BLD-SCNC: 106 MMOL/L (ref 98–111)
CHLORIDE BLD-SCNC: 98 MMOL/L (ref 98–111)
CO2: 23 MMOL/L (ref 22–29)
CO2: 23 MMOL/L (ref 22–29)
CO2: 24 MMOL/L (ref 22–29)
CO2: 25 MMOL/L (ref 22–29)
CO2: 26 MMOL/L (ref 22–29)
CO2: 26 MMOL/L (ref 22–29)
CO2: 27 MMOL/L (ref 22–29)
CO2: 30 MMOL/L (ref 22–29)
COCAINE METABOLITE SCREEN URINE: NORMAL
CORONAVIRUS 229E BY PCR: NOT DETECTED
CORONAVIRUS HKU1 BY PCR: NOT DETECTED
CORONAVIRUS NL63 BY PCR: NOT DETECTED
CORONAVIRUS OC43 BY PCR: NOT DETECTED
CREAT SERPL-MCNC: 0.7 MG/DL (ref 0.5–0.9)
CREAT SERPL-MCNC: 0.8 MG/DL (ref 0.5–0.9)
CREAT SERPL-MCNC: 0.8 MG/DL (ref 0.5–0.9)
CREAT SERPL-MCNC: 0.9 MG/DL (ref 0.5–0.9)
CREAT SERPL-MCNC: 1 MG/DL (ref 0.5–0.9)
EKG P AXIS: 60 DEGREES
EKG P AXIS: 60 DEGREES
EKG P-R INTERVAL: 174 MS
EKG P-R INTERVAL: 192 MS
EKG Q-T INTERVAL: 420 MS
EKG Q-T INTERVAL: 432 MS
EKG QRS DURATION: 86 MS
EKG QRS DURATION: 86 MS
EKG QTC CALCULATION (BAZETT): 425 MS
EKG QTC CALCULATION (BAZETT): 446 MS
EKG T AXIS: -179 DEGREES
EKG T AXIS: 108 DEGREES
EOSINOPHILS ABSOLUTE: 0 K/UL (ref 0–0.6)
EOSINOPHILS ABSOLUTE: 0.1 K/UL (ref 0–0.6)
EOSINOPHILS ABSOLUTE: 0.2 K/UL (ref 0–0.6)
EOSINOPHILS ABSOLUTE: 0.2 K/UL (ref 0–0.6)
EOSINOPHILS ABSOLUTE: 0.3 K/UL (ref 0–0.6)
EOSINOPHILS RELATIVE PERCENT: 0 % (ref 0–5)
EOSINOPHILS RELATIVE PERCENT: 0.3 % (ref 0–5)
EOSINOPHILS RELATIVE PERCENT: 1.3 % (ref 0–5)
EOSINOPHILS RELATIVE PERCENT: 2 % (ref 0–5)
EOSINOPHILS RELATIVE PERCENT: 3.2 % (ref 0–5)
FENTANYL SCREEN, URINE: NORMAL
GABAPENTIN SCREEN, URINE: NORMAL
GFR AFRICAN AMERICAN: >59
GFR NON-AFRICAN AMERICAN: 54
GFR NON-AFRICAN AMERICAN: >60
GLUCOSE BLD-MCNC: 102 MG/DL (ref 74–109)
GLUCOSE BLD-MCNC: 104 MG/DL (ref 74–109)
GLUCOSE BLD-MCNC: 119 MG/DL (ref 74–109)
GLUCOSE BLD-MCNC: 145 MG/DL (ref 74–109)
GLUCOSE BLD-MCNC: 87 MG/DL (ref 74–109)
GLUCOSE BLD-MCNC: 95 MG/DL (ref 74–109)
GLUCOSE BLD-MCNC: 95 MG/DL (ref 74–109)
GLUCOSE BLD-MCNC: 96 MG/DL (ref 74–109)
HCT VFR BLD CALC: 30.4 % (ref 37–47)
HCT VFR BLD CALC: 32 % (ref 37–47)
HCT VFR BLD CALC: 35.6 % (ref 37–47)
HCT VFR BLD CALC: 37.4 % (ref 37–47)
HCT VFR BLD CALC: 38.3 % (ref 37–47)
HCT VFR BLD CALC: 38.9 % (ref 37–47)
HCT VFR BLD CALC: 39.1 % (ref 37–47)
HEMOGLOBIN: 10 G/DL (ref 12–16)
HEMOGLOBIN: 10.4 G/DL (ref 12–16)
HEMOGLOBIN: 11.4 G/DL (ref 12–16)
HEMOGLOBIN: 11.9 G/DL (ref 12–16)
HEMOGLOBIN: 12 G/DL (ref 12–16)
HEMOGLOBIN: 12.5 G/DL (ref 12–16)
HEMOGLOBIN: 12.8 G/DL (ref 12–16)
HUMAN METAPNEUMOVIRUS BY PCR: NOT DETECTED
HUMAN RHINOVIRUS/ENTEROVIRUS BY PCR: NOT DETECTED
IMMATURE GRANULOCYTES #: 0 K/UL
IMMATURE GRANULOCYTES #: 0.1 K/UL
IMMATURE GRANULOCYTES #: 0.1 K/UL
IMMATURE GRANULOCYTES #: 0.2 K/UL
IMMATURE GRANULOCYTES #: 0.2 K/UL
INFLUENZA A BY PCR: NOT DETECTED
INFLUENZA B BY PCR: NOT DETECTED
INR BLD: 0.95 (ref 0.88–1.18)
LYMPHOCYTES ABSOLUTE: 0.6 K/UL (ref 1.1–4.5)
LYMPHOCYTES ABSOLUTE: 1.2 K/UL (ref 1.1–4.5)
LYMPHOCYTES ABSOLUTE: 1.3 K/UL (ref 1.1–4.5)
LYMPHOCYTES ABSOLUTE: 1.3 K/UL (ref 1.1–4.5)
LYMPHOCYTES ABSOLUTE: 1.8 K/UL (ref 1.1–4.5)
LYMPHOCYTES RELATIVE PERCENT: 27.3 % (ref 20–40)
LYMPHOCYTES RELATIVE PERCENT: 3.2 % (ref 20–40)
LYMPHOCYTES RELATIVE PERCENT: 6.7 % (ref 20–40)
LYMPHOCYTES RELATIVE PERCENT: 7 % (ref 20–40)
LYMPHOCYTES RELATIVE PERCENT: 9 % (ref 20–40)
MAGNESIUM: 2 MG/DL (ref 1.6–2.4)
MAGNESIUM: 2.2 MG/DL (ref 1.6–2.4)
MCH RBC QN AUTO: 31.2 PG (ref 27–31)
MCH RBC QN AUTO: 31.3 PG (ref 27–31)
MCH RBC QN AUTO: 31.4 PG (ref 27–31)
MCH RBC QN AUTO: 31.8 PG (ref 27–31)
MCH RBC QN AUTO: 32.4 PG (ref 27–31)
MCH RBC QN AUTO: 32.5 PG (ref 27–31)
MCH RBC QN AUTO: 33.2 PG (ref 27–31)
MCHC RBC AUTO-ENTMCNC: 31.1 G/DL (ref 33–37)
MCHC RBC AUTO-ENTMCNC: 32 G/DL (ref 33–37)
MCHC RBC AUTO-ENTMCNC: 32.1 G/DL (ref 33–37)
MCHC RBC AUTO-ENTMCNC: 32.1 G/DL (ref 33–37)
MCHC RBC AUTO-ENTMCNC: 32.5 G/DL (ref 33–37)
MCHC RBC AUTO-ENTMCNC: 32.7 G/DL (ref 33–37)
MCHC RBC AUTO-ENTMCNC: 32.9 G/DL (ref 33–37)
MCV RBC AUTO: 100.3 FL (ref 81–99)
MCV RBC AUTO: 101.4 FL (ref 81–99)
MCV RBC AUTO: 101.6 FL (ref 81–99)
MCV RBC AUTO: 96.7 FL (ref 81–99)
MCV RBC AUTO: 97.3 FL (ref 81–99)
MCV RBC AUTO: 98.4 FL (ref 81–99)
MCV RBC AUTO: 99.2 FL (ref 81–99)
MDMA URINE: NORMAL
METHADONE SCREEN, URINE: NORMAL
METHAMPHETAMINE, URINE: NORMAL
MONOCYTES ABSOLUTE: 0.7 K/UL (ref 0–0.9)
MONOCYTES ABSOLUTE: 1.1 K/UL (ref 0–0.9)
MONOCYTES ABSOLUTE: 1.3 K/UL (ref 0–0.9)
MONOCYTES ABSOLUTE: 1.7 K/UL (ref 0–0.9)
MONOCYTES ABSOLUTE: 1.8 K/UL (ref 0–0.9)
MONOCYTES RELATIVE PERCENT: 10.2 % (ref 0–10)
MONOCYTES RELATIVE PERCENT: 10.9 % (ref 0–10)
MONOCYTES RELATIVE PERCENT: 6 % (ref 0–10)
MONOCYTES RELATIVE PERCENT: 9.6 % (ref 0–10)
MONOCYTES RELATIVE PERCENT: 9.6 % (ref 0–10)
MYCOPLASMA PNEUMONIAE BY PCR: NOT DETECTED
NEUTROPHILS ABSOLUTE: 14.6 K/UL (ref 1.5–7.5)
NEUTROPHILS ABSOLUTE: 15.4 K/UL (ref 1.5–7.5)
NEUTROPHILS ABSOLUTE: 17 K/UL (ref 1.5–7.5)
NEUTROPHILS ABSOLUTE: 3.7 K/UL (ref 1.5–7.5)
NEUTROPHILS ABSOLUTE: 9.9 K/UL (ref 1.5–7.5)
NEUTROPHILS RELATIVE PERCENT: 57.1 % (ref 50–65)
NEUTROPHILS RELATIVE PERCENT: 77.4 % (ref 50–65)
NEUTROPHILS RELATIVE PERCENT: 80.7 % (ref 50–65)
NEUTROPHILS RELATIVE PERCENT: 82.1 % (ref 50–65)
NEUTROPHILS RELATIVE PERCENT: 89.9 % (ref 50–65)
OPIATE SCREEN URINE: NORMAL
OXYCODONE SCREEN URINE: NORMAL
PARAINFLUENZA VIRUS 1 BY PCR: NOT DETECTED
PARAINFLUENZA VIRUS 2 BY PCR: NOT DETECTED
PARAINFLUENZA VIRUS 3 BY PCR: NOT DETECTED
PARAINFLUENZA VIRUS 4 BY PCR: NOT DETECTED
PDW BLD-RTO: 12.1 % (ref 11.5–14.5)
PDW BLD-RTO: 12.8 % (ref 11.5–14.5)
PDW BLD-RTO: 12.9 % (ref 11.5–14.5)
PDW BLD-RTO: 12.9 % (ref 11.5–14.5)
PDW BLD-RTO: 13 % (ref 11.5–14.5)
PDW BLD-RTO: 13.2 % (ref 11.5–14.5)
PDW BLD-RTO: 13.4 % (ref 11.5–14.5)
PHENCYCLIDINE SCREEN URINE: NORMAL
PHOSPHORUS: 2.8 MG/DL (ref 2.5–4.5)
PLATELET # BLD: 226 K/UL (ref 130–400)
PLATELET # BLD: 239 K/UL (ref 130–400)
PLATELET # BLD: 246 K/UL (ref 130–400)
PLATELET # BLD: 254 K/UL (ref 130–400)
PLATELET # BLD: 263 K/UL (ref 130–400)
PLATELET # BLD: 272 K/UL (ref 130–400)
PLATELET # BLD: 316 K/UL (ref 130–400)
PMV BLD AUTO: 10.1 FL (ref 9.4–12.3)
PMV BLD AUTO: 9 FL (ref 9.4–12.3)
PMV BLD AUTO: 9.1 FL (ref 9.4–12.3)
PMV BLD AUTO: 9.5 FL (ref 9.4–12.3)
PMV BLD AUTO: 9.6 FL (ref 9.4–12.3)
PMV BLD AUTO: 9.7 FL (ref 9.4–12.3)
PMV BLD AUTO: 9.9 FL (ref 9.4–12.3)
POTASSIUM REFLEX MAGNESIUM: 3.2 MMOL/L (ref 3.5–5)
POTASSIUM REFLEX MAGNESIUM: 3.9 MMOL/L (ref 3.5–5)
POTASSIUM SERPL-SCNC: 3.3 MMOL/L (ref 3.5–5)
POTASSIUM SERPL-SCNC: 3.3 MMOL/L (ref 3.5–5)
POTASSIUM SERPL-SCNC: 3.8 MMOL/L (ref 3.5–5)
POTASSIUM SERPL-SCNC: 4 MMOL/L (ref 3.5–5)
POTASSIUM SERPL-SCNC: 4.1 MMOL/L (ref 3.5–5)
POTASSIUM SERPL-SCNC: 4.5 MMOL/L (ref 3.5–5)
PROPOXYPHENE SCREEN, URINE: NORMAL
PROTHROMBIN TIME: 12.9 SEC (ref 12–14.6)
RBC # BLD: 3.09 M/UL (ref 4.2–5.4)
RBC # BLD: 3.31 M/UL (ref 4.2–5.4)
RBC # BLD: 3.59 M/UL (ref 4.2–5.4)
RBC # BLD: 3.69 M/UL (ref 4.2–5.4)
RBC # BLD: 3.82 M/UL (ref 4.2–5.4)
RBC # BLD: 3.85 M/UL (ref 4.2–5.4)
RBC # BLD: 4 M/UL (ref 4.2–5.4)
RESPIRATORY SYNCYTIAL VIRUS BY PCR: NOT DETECTED
SARS-COV-2, NAAT: NOT DETECTED
SARS-COV-2, NAAT: NOT DETECTED
SARS-COV-2, PCR: NOT DETECTED
SODIUM BLD-SCNC: 131 MMOL/L (ref 136–145)
SODIUM BLD-SCNC: 133 MMOL/L (ref 136–145)
SODIUM BLD-SCNC: 136 MMOL/L (ref 136–145)
SODIUM BLD-SCNC: 138 MMOL/L (ref 136–145)
SODIUM BLD-SCNC: 140 MMOL/L (ref 136–145)
SODIUM BLD-SCNC: 141 MMOL/L (ref 136–145)
SYNTHETIC CANNABINOIDS(K2) SCREEN, URINE: NORMAL
THC SCREEN, URINE: NORMAL
TOTAL CK: 41 U/L (ref 26–192)
TOTAL PROTEIN: 5.6 G/DL (ref 6.6–8.7)
TOTAL PROTEIN: 5.7 G/DL (ref 6.6–8.7)
TOTAL PROTEIN: 6.3 G/DL (ref 6.6–8.7)
TOTAL PROTEIN: 6.9 G/DL (ref 6.6–8.7)
TRAMADOL SCREEN URINE: NORMAL
TRICYCLIC ANTIDEPRESSANTS, UR: NORMAL
VITAMIN D 25-HYDROXY: 5.2 NG/ML
WBC # BLD: 11 K/UL (ref 4.8–10.8)
WBC # BLD: 12.8 K/UL (ref 4.8–10.8)
WBC # BLD: 14.2 K/UL (ref 4.8–10.8)
WBC # BLD: 18.1 K/UL (ref 4.8–10.8)
WBC # BLD: 18.8 K/UL (ref 4.8–10.8)
WBC # BLD: 19 K/UL (ref 4.8–10.8)
WBC # BLD: 6.5 K/UL (ref 4.8–10.8)

## 2021-01-01 PROCEDURE — 2700000000 HC OXYGEN THERAPY PER DAY

## 2021-01-01 PROCEDURE — 99442 PR PHYS/QHP TELEPHONE EVALUATION 11-20 MIN: CPT | Performed by: NURSE PRACTITIONER

## 2021-01-01 PROCEDURE — 73552 X-RAY EXAM OF FEMUR 2/>: CPT

## 2021-01-01 PROCEDURE — 7100000001 HC PACU RECOVERY - ADDTL 15 MIN: Performed by: ORTHOPAEDIC SURGERY

## 2021-01-01 PROCEDURE — 97535 SELF CARE MNGMENT TRAINING: CPT

## 2021-01-01 PROCEDURE — 6370000000 HC RX 637 (ALT 250 FOR IP): Performed by: ORTHOPAEDIC SURGERY

## 2021-01-01 PROCEDURE — G8420 CALC BMI NORM PARAMETERS: HCPCS | Performed by: FAMILY MEDICINE

## 2021-01-01 PROCEDURE — 1210000000 HC MED SURG R&B

## 2021-01-01 PROCEDURE — 6360000002 HC RX W HCPCS

## 2021-01-01 PROCEDURE — 80305 DRUG TEST PRSMV DIR OPT OBS: CPT | Performed by: FAMILY MEDICINE

## 2021-01-01 PROCEDURE — 1036F TOBACCO NON-USER: CPT | Performed by: FAMILY MEDICINE

## 2021-01-01 PROCEDURE — 85025 COMPLETE CBC W/AUTO DIFF WBC: CPT

## 2021-01-01 PROCEDURE — 3209999900 FLUORO FOR SURGICAL PROCEDURES

## 2021-01-01 PROCEDURE — 20605 DRAIN/INJ JOINT/BURSA W/O US: CPT | Performed by: NURSE PRACTITIONER

## 2021-01-01 PROCEDURE — 20552 NJX 1/MLT TRIGGER POINT 1/2: CPT | Performed by: NURSE PRACTITIONER

## 2021-01-01 PROCEDURE — 84100 ASSAY OF PHOSPHORUS: CPT

## 2021-01-01 PROCEDURE — G8399 PT W/DXA RESULTS DOCUMENT: HCPCS | Performed by: FAMILY MEDICINE

## 2021-01-01 PROCEDURE — 85027 COMPLETE CBC AUTOMATED: CPT

## 2021-01-01 PROCEDURE — 87635 SARS-COV-2 COVID-19 AMP PRB: CPT

## 2021-01-01 PROCEDURE — 99214 OFFICE O/P EST MOD 30 MIN: CPT | Performed by: FAMILY MEDICINE

## 2021-01-01 PROCEDURE — 62321 NJX INTERLAMINAR CRV/THRC: CPT

## 2021-01-01 PROCEDURE — 96365 THER/PROPH/DIAG IV INF INIT: CPT

## 2021-01-01 PROCEDURE — C1769 GUIDE WIRE: HCPCS | Performed by: ORTHOPAEDIC SURGERY

## 2021-01-01 PROCEDURE — G8484 FLU IMMUNIZE NO ADMIN: HCPCS | Performed by: FAMILY MEDICINE

## 2021-01-01 PROCEDURE — 2580000003 HC RX 258: Performed by: NURSE ANESTHETIST, CERTIFIED REGISTERED

## 2021-01-01 PROCEDURE — 4040F PNEUMOC VAC/ADMIN/RCVD: CPT | Performed by: FAMILY MEDICINE

## 2021-01-01 PROCEDURE — 2500000003 HC RX 250 WO HCPCS: Performed by: INTERNAL MEDICINE

## 2021-01-01 PROCEDURE — 1123F ACP DISCUSS/DSCN MKR DOCD: CPT | Performed by: FAMILY MEDICINE

## 2021-01-01 PROCEDURE — 2500000003 HC RX 250 WO HCPCS

## 2021-01-01 PROCEDURE — 20611 DRAIN/INJ JOINT/BURSA W/US: CPT

## 2021-01-01 PROCEDURE — 97165 OT EVAL LOW COMPLEX 30 MIN: CPT

## 2021-01-01 PROCEDURE — 80053 COMPREHEN METABOLIC PANEL: CPT

## 2021-01-01 PROCEDURE — 97162 PT EVAL MOD COMPLEX 30 MIN: CPT

## 2021-01-01 PROCEDURE — 6370000000 HC RX 637 (ALT 250 FOR IP): Performed by: EMERGENCY MEDICINE

## 2021-01-01 PROCEDURE — 36415 COLL VENOUS BLD VENIPUNCTURE: CPT

## 2021-01-01 PROCEDURE — 6360000002 HC RX W HCPCS: Performed by: INTERNAL MEDICINE

## 2021-01-01 PROCEDURE — 93010 ELECTROCARDIOGRAM REPORT: CPT | Performed by: INTERNAL MEDICINE

## 2021-01-01 PROCEDURE — 99213 OFFICE O/P EST LOW 20 MIN: CPT

## 2021-01-01 PROCEDURE — 2720000010 HC SURG SUPPLY STERILE: Performed by: ORTHOPAEDIC SURGERY

## 2021-01-01 PROCEDURE — 99285 EMERGENCY DEPT VISIT HI MDM: CPT

## 2021-01-01 PROCEDURE — 2580000003 HC RX 258: Performed by: INTERNAL MEDICINE

## 2021-01-01 PROCEDURE — 99283 EMERGENCY DEPT VISIT LOW MDM: CPT

## 2021-01-01 PROCEDURE — 6360000002 HC RX W HCPCS: Performed by: ANESTHESIOLOGY

## 2021-01-01 PROCEDURE — 76942 ECHO GUIDE FOR BIOPSY: CPT | Performed by: NURSE PRACTITIONER

## 2021-01-01 PROCEDURE — 83735 ASSAY OF MAGNESIUM: CPT

## 2021-01-01 PROCEDURE — 2580000003 HC RX 258: Performed by: ORTHOPAEDIC SURGERY

## 2021-01-01 PROCEDURE — 82306 VITAMIN D 25 HYDROXY: CPT

## 2021-01-01 PROCEDURE — 86850 RBC ANTIBODY SCREEN: CPT

## 2021-01-01 PROCEDURE — 3600000014 HC SURGERY LEVEL 4 ADDTL 15MIN: Performed by: ORTHOPAEDIC SURGERY

## 2021-01-01 PROCEDURE — 99213 OFFICE O/P EST LOW 20 MIN: CPT | Performed by: NURSE PRACTITIONER

## 2021-01-01 PROCEDURE — 97530 THERAPEUTIC ACTIVITIES: CPT

## 2021-01-01 PROCEDURE — 96361 HYDRATE IV INFUSION ADD-ON: CPT

## 2021-01-01 PROCEDURE — G8427 DOCREV CUR MEDS BY ELIG CLIN: HCPCS | Performed by: FAMILY MEDICINE

## 2021-01-01 PROCEDURE — 96375 TX/PRO/DX INJ NEW DRUG ADDON: CPT

## 2021-01-01 PROCEDURE — 73502 X-RAY EXAM HIP UNI 2-3 VIEWS: CPT

## 2021-01-01 PROCEDURE — 2709999900 HC NON-CHARGEABLE SUPPLY: Performed by: ORTHOPAEDIC SURGERY

## 2021-01-01 PROCEDURE — 99214 OFFICE O/P EST MOD 30 MIN: CPT

## 2021-01-01 PROCEDURE — 82550 ASSAY OF CK (CPK): CPT

## 2021-01-01 PROCEDURE — 6360000002 HC RX W HCPCS: Performed by: NURSE ANESTHETIST, CERTIFIED REGISTERED

## 2021-01-01 PROCEDURE — 80048 BASIC METABOLIC PNL TOTAL CA: CPT

## 2021-01-01 PROCEDURE — 0202U NFCT DS 22 TRGT SARS-COV-2: CPT

## 2021-01-01 PROCEDURE — 86901 BLOOD TYPING SEROLOGIC RH(D): CPT

## 2021-01-01 PROCEDURE — 3600000004 HC SURGERY LEVEL 4 BASE: Performed by: ORTHOPAEDIC SURGERY

## 2021-01-01 PROCEDURE — 1090F PRES/ABSN URINE INCON ASSESS: CPT | Performed by: FAMILY MEDICINE

## 2021-01-01 PROCEDURE — 96374 THER/PROPH/DIAG INJ IV PUSH: CPT

## 2021-01-01 PROCEDURE — 2500000003 HC RX 250 WO HCPCS: Performed by: NURSE ANESTHETIST, CERTIFIED REGISTERED

## 2021-01-01 PROCEDURE — 93880 EXTRACRANIAL BILAT STUDY: CPT

## 2021-01-01 PROCEDURE — 2580000003 HC RX 258

## 2021-01-01 PROCEDURE — 6360000002 HC RX W HCPCS: Performed by: EMERGENCY MEDICINE

## 2021-01-01 PROCEDURE — 93005 ELECTROCARDIOGRAM TRACING: CPT | Performed by: EMERGENCY MEDICINE

## 2021-01-01 PROCEDURE — 2500000003 HC RX 250 WO HCPCS: Performed by: ANESTHESIOLOGY

## 2021-01-01 PROCEDURE — 86900 BLOOD TYPING SEROLOGIC ABO: CPT

## 2021-01-01 PROCEDURE — 85610 PROTHROMBIN TIME: CPT

## 2021-01-01 PROCEDURE — 3700000001 HC ADD 15 MINUTES (ANESTHESIA): Performed by: ORTHOPAEDIC SURGERY

## 2021-01-01 PROCEDURE — 6360000002 HC RX W HCPCS: Performed by: ORTHOPAEDIC SURGERY

## 2021-01-01 PROCEDURE — 71045 X-RAY EXAM CHEST 1 VIEW: CPT

## 2021-01-01 PROCEDURE — 3700000000 HC ANESTHESIA ATTENDED CARE: Performed by: ORTHOPAEDIC SURGERY

## 2021-01-01 PROCEDURE — 2580000003 HC RX 258: Performed by: EMERGENCY MEDICINE

## 2021-01-01 PROCEDURE — C1713 ANCHOR/SCREW BN/BN,TIS/BN: HCPCS | Performed by: ORTHOPAEDIC SURGERY

## 2021-01-01 PROCEDURE — 6370000000 HC RX 637 (ALT 250 FOR IP): Performed by: INTERNAL MEDICINE

## 2021-01-01 PROCEDURE — 7100000000 HC PACU RECOVERY - FIRST 15 MIN: Performed by: ORTHOPAEDIC SURGERY

## 2021-01-01 PROCEDURE — 20610 DRAIN/INJ JOINT/BURSA W/O US: CPT

## 2021-01-01 PROCEDURE — 0QS736Z REPOSITION LEFT UPPER FEMUR WITH INTRAMEDULLARY INTERNAL FIXATION DEVICE, PERCUTANEOUS APPROACH: ICD-10-PCS | Performed by: ORTHOPAEDIC SURGERY

## 2021-01-01 PROCEDURE — 20610 DRAIN/INJ JOINT/BURSA W/O US: CPT | Performed by: NURSE PRACTITIONER

## 2021-01-01 DEVICE — SCREW BNE L38MM DIA5MM TIB LT GRN TI ST CANN LOK FULL THRD: Type: IMPLANTABLE DEVICE | Site: HIP | Status: FUNCTIONAL

## 2021-01-01 DEVICE — IMPLANTABLE DEVICE: Type: IMPLANTABLE DEVICE | Site: HIP | Status: FUNCTIONAL

## 2021-01-01 RX ORDER — TRIAMCINOLONE ACETONIDE 40 MG/ML
80 INJECTION, SUSPENSION INTRA-ARTICULAR; INTRAMUSCULAR ONCE
Status: DISCONTINUED | OUTPATIENT
Start: 2021-01-01 | End: 2021-01-01 | Stop reason: HOSPADM

## 2021-01-01 RX ORDER — LIDOCAINE HYDROCHLORIDE 10 MG/ML
2 INJECTION, SOLUTION EPIDURAL; INFILTRATION; INTRACAUDAL; PERINEURAL ONCE
Status: DISCONTINUED | OUTPATIENT
Start: 2021-01-01 | End: 2021-01-01 | Stop reason: HOSPADM

## 2021-01-01 RX ORDER — ONDANSETRON 2 MG/ML
4 INJECTION INTRAMUSCULAR; INTRAVENOUS EVERY 6 HOURS PRN
Status: DISCONTINUED | OUTPATIENT
Start: 2021-01-01 | End: 2021-01-01 | Stop reason: HOSPADM

## 2021-01-01 RX ORDER — HYDROCODONE BITARTRATE AND ACETAMINOPHEN 7.5; 325 MG/1; MG/1
1 TABLET ORAL EVERY 6 HOURS PRN
Qty: 90 TABLET | Refills: 0 | Status: SHIPPED | OUTPATIENT
Start: 2021-01-01 | End: 2021-01-01 | Stop reason: SDUPTHER

## 2021-01-01 RX ORDER — HYDROMORPHONE HYDROCHLORIDE 1 MG/ML
0.25 INJECTION, SOLUTION INTRAMUSCULAR; INTRAVENOUS; SUBCUTANEOUS EVERY 5 MIN PRN
Status: DISCONTINUED | OUTPATIENT
Start: 2021-01-01 | End: 2021-01-01 | Stop reason: HOSPADM

## 2021-01-01 RX ORDER — MORPHINE SULFATE 4 MG/ML
4 INJECTION, SOLUTION INTRAMUSCULAR; INTRAVENOUS
Status: DISCONTINUED | OUTPATIENT
Start: 2021-01-01 | End: 2021-01-01 | Stop reason: HOSPADM

## 2021-01-01 RX ORDER — LIDOCAINE HYDROCHLORIDE 10 MG/ML
5 INJECTION, SOLUTION EPIDURAL; INFILTRATION; INTRACAUDAL; PERINEURAL ONCE
Status: DISCONTINUED | OUTPATIENT
Start: 2021-01-01 | End: 2021-01-01 | Stop reason: HOSPADM

## 2021-01-01 RX ORDER — PNV NO.95/FERROUS FUM/FOLIC AC 28MG-0.8MG
325 TABLET ORAL DAILY
Qty: 30 TABLET | Refills: 5 | Status: SHIPPED | OUTPATIENT
Start: 2021-01-01

## 2021-01-01 RX ORDER — BUPIVACAINE HYDROCHLORIDE 5 MG/ML
2 INJECTION, SOLUTION EPIDURAL; INTRACAUDAL ONCE
Status: DISCONTINUED | OUTPATIENT
Start: 2021-01-01 | End: 2021-01-01 | Stop reason: HOSPADM

## 2021-01-01 RX ORDER — AMLODIPINE BESYLATE AND BENAZEPRIL HYDROCHLORIDE 5; 10 MG/1; MG/1
CAPSULE ORAL
Qty: 90 CAPSULE | Refills: 0 | Status: SHIPPED | OUTPATIENT
Start: 2021-01-01 | End: 2021-01-01 | Stop reason: ALTCHOICE

## 2021-01-01 RX ORDER — OXYCODONE HYDROCHLORIDE 5 MG/1
5 TABLET ORAL EVERY 4 HOURS PRN
Status: DISCONTINUED | OUTPATIENT
Start: 2021-01-01 | End: 2021-01-01 | Stop reason: HOSPADM

## 2021-01-01 RX ORDER — SODIUM CHLORIDE 0.9 % (FLUSH) 0.9 %
5-40 SYRINGE (ML) INJECTION PRN
Status: DISCONTINUED | OUTPATIENT
Start: 2021-01-01 | End: 2021-01-01 | Stop reason: HOSPADM

## 2021-01-01 RX ORDER — SODIUM CHLORIDE 9 MG/ML
25 INJECTION, SOLUTION INTRAVENOUS PRN
Status: DISCONTINUED | OUTPATIENT
Start: 2021-01-01 | End: 2021-01-01 | Stop reason: HOSPADM

## 2021-01-01 RX ORDER — SODIUM CHLORIDE, SODIUM LACTATE, POTASSIUM CHLORIDE, CALCIUM CHLORIDE 600; 310; 30; 20 MG/100ML; MG/100ML; MG/100ML; MG/100ML
1000 INJECTION, SOLUTION INTRAVENOUS ONCE
Status: COMPLETED | OUTPATIENT
Start: 2021-01-01 | End: 2021-01-01

## 2021-01-01 RX ORDER — ACETAMINOPHEN 325 MG/1
650 TABLET ORAL EVERY 6 HOURS PRN
Status: DISCONTINUED | OUTPATIENT
Start: 2021-01-01 | End: 2021-01-01 | Stop reason: HOSPADM

## 2021-01-01 RX ORDER — LANOLIN ALCOHOL/MO/W.PET/CERES
3 CREAM (GRAM) TOPICAL DAILY
Qty: 30 TABLET | Refills: 0 | Status: SHIPPED | OUTPATIENT
Start: 2021-01-01 | End: 2021-01-01

## 2021-01-01 RX ORDER — DEXAMETHASONE SODIUM PHOSPHATE 4 MG/ML
4 INJECTION, SOLUTION INTRA-ARTICULAR; INTRALESIONAL; INTRAMUSCULAR; INTRAVENOUS; SOFT TISSUE ONCE
Status: COMPLETED | OUTPATIENT
Start: 2021-01-01 | End: 2021-01-01

## 2021-01-01 RX ORDER — SODIUM CHLORIDE 0.9 % (FLUSH) 0.9 %
5-40 SYRINGE (ML) INJECTION EVERY 12 HOURS SCHEDULED
Status: DISCONTINUED | OUTPATIENT
Start: 2021-01-01 | End: 2021-01-01 | Stop reason: SDUPTHER

## 2021-01-01 RX ORDER — SODIUM CHLORIDE 0.9 % (FLUSH) 0.9 %
5-40 SYRINGE (ML) INJECTION EVERY 12 HOURS SCHEDULED
Status: DISCONTINUED | OUTPATIENT
Start: 2021-01-01 | End: 2021-01-01 | Stop reason: HOSPADM

## 2021-01-01 RX ORDER — MORPHINE SULFATE 4 MG/ML
4 INJECTION, SOLUTION INTRAMUSCULAR; INTRAVENOUS ONCE
Status: COMPLETED | OUTPATIENT
Start: 2021-01-01 | End: 2021-01-01

## 2021-01-01 RX ORDER — OXYCODONE AND ACETAMINOPHEN 7.5; 325 MG/1; MG/1
1 TABLET ORAL EVERY 6 HOURS PRN
Qty: 12 TABLET | Refills: 0 | Status: SHIPPED | OUTPATIENT
Start: 2021-01-01 | End: 2021-01-01 | Stop reason: SDUPTHER

## 2021-01-01 RX ORDER — MIRTAZAPINE 15 MG/1
15 TABLET, FILM COATED ORAL NIGHTLY
COMMUNITY
End: 2021-01-01

## 2021-01-01 RX ORDER — CEFAZOLIN SODIUM 2 G/100ML
2000 INJECTION, SOLUTION INTRAVENOUS EVERY 8 HOURS
Status: COMPLETED | OUTPATIENT
Start: 2021-01-01 | End: 2021-01-01

## 2021-01-01 RX ORDER — METHYLPREDNISOLONE ACETATE 80 MG/ML
80 INJECTION, SUSPENSION INTRA-ARTICULAR; INTRALESIONAL; INTRAMUSCULAR; SOFT TISSUE ONCE
Status: DISCONTINUED | OUTPATIENT
Start: 2021-01-01 | End: 2021-01-01 | Stop reason: HOSPADM

## 2021-01-01 RX ORDER — KETOROLAC TROMETHAMINE 30 MG/ML
15 INJECTION, SOLUTION INTRAMUSCULAR; INTRAVENOUS ONCE
Status: COMPLETED | OUTPATIENT
Start: 2021-01-01 | End: 2021-01-01

## 2021-01-01 RX ORDER — POTASSIUM CHLORIDE 7.45 MG/ML
10 INJECTION INTRAVENOUS ONCE
Status: COMPLETED | OUTPATIENT
Start: 2021-01-01 | End: 2021-01-01

## 2021-01-01 RX ORDER — LIDOCAINE HYDROCHLORIDE 10 MG/ML
INJECTION, SOLUTION EPIDURAL; INFILTRATION; INTRACAUDAL; PERINEURAL PRN
Status: DISCONTINUED | OUTPATIENT
Start: 2021-01-01 | End: 2021-01-01 | Stop reason: SDUPTHER

## 2021-01-01 RX ORDER — AMITRIPTYLINE HYDROCHLORIDE 100 MG/1
100 TABLET, FILM COATED ORAL NIGHTLY
Qty: 30 TABLET | Refills: 3 | Status: SHIPPED | OUTPATIENT
Start: 2021-01-01

## 2021-01-01 RX ORDER — SODIUM CHLORIDE 9 MG/ML
25 INJECTION, SOLUTION INTRAVENOUS PRN
Status: DISCONTINUED | OUTPATIENT
Start: 2021-01-01 | End: 2021-01-01 | Stop reason: SDUPTHER

## 2021-01-01 RX ORDER — CEFAZOLIN SODIUM 1 G/3ML
INJECTION, POWDER, FOR SOLUTION INTRAMUSCULAR; INTRAVENOUS PRN
Status: DISCONTINUED | OUTPATIENT
Start: 2021-01-01 | End: 2021-01-01 | Stop reason: SDUPTHER

## 2021-01-01 RX ORDER — HYDROXYZINE HYDROCHLORIDE 10 MG/1
TABLET, FILM COATED ORAL
Qty: 60 TABLET | Refills: 0 | Status: SHIPPED | OUTPATIENT
Start: 2021-01-01 | End: 2021-01-01

## 2021-01-01 RX ORDER — ROCURONIUM BROMIDE 10 MG/ML
INJECTION, SOLUTION INTRAVENOUS PRN
Status: DISCONTINUED | OUTPATIENT
Start: 2021-01-01 | End: 2021-01-01 | Stop reason: SDUPTHER

## 2021-01-01 RX ORDER — SENNA AND DOCUSATE SODIUM 50; 8.6 MG/1; MG/1
1 TABLET, FILM COATED ORAL 2 TIMES DAILY
Status: DISCONTINUED | OUTPATIENT
Start: 2021-01-01 | End: 2021-01-01 | Stop reason: HOSPADM

## 2021-01-01 RX ORDER — FLUOXETINE HYDROCHLORIDE 20 MG/1
CAPSULE ORAL
Qty: 90 CAPSULE | Refills: 0 | Status: SHIPPED | OUTPATIENT
Start: 2021-01-01

## 2021-01-01 RX ORDER — POLYETHYLENE GLYCOL 3350 17 G/17G
17 POWDER, FOR SOLUTION ORAL DAILY PRN
Status: DISCONTINUED | OUTPATIENT
Start: 2021-01-01 | End: 2021-01-01 | Stop reason: HOSPADM

## 2021-01-01 RX ORDER — CEFAZOLIN SODIUM 2 G/100ML
2000 INJECTION, SOLUTION INTRAVENOUS
Status: DISCONTINUED | OUTPATIENT
Start: 2021-01-01 | End: 2021-01-01

## 2021-01-01 RX ORDER — LIDOCAINE HYDROCHLORIDE 10 MG/ML
INJECTION, SOLUTION EPIDURAL; INFILTRATION; INTRACAUDAL; PERINEURAL
Status: COMPLETED | OUTPATIENT
Start: 2021-01-01 | End: 2021-01-01

## 2021-01-01 RX ORDER — PROPOFOL 10 MG/ML
INJECTION, EMULSION INTRAVENOUS PRN
Status: DISCONTINUED | OUTPATIENT
Start: 2021-01-01 | End: 2021-01-01 | Stop reason: SDUPTHER

## 2021-01-01 RX ORDER — ERGOCALCIFEROL 1.25 MG/1
50000 CAPSULE ORAL WEEKLY
Status: DISCONTINUED | OUTPATIENT
Start: 2021-01-01 | End: 2021-01-01 | Stop reason: HOSPADM

## 2021-01-01 RX ORDER — DONEPEZIL HYDROCHLORIDE 23 MG/1
23 TABLET, FILM COATED ORAL NIGHTLY
Qty: 90 TABLET | Refills: 3 | Status: SHIPPED | OUTPATIENT
Start: 2021-01-01

## 2021-01-01 RX ORDER — OXYCODONE AND ACETAMINOPHEN 7.5; 325 MG/1; MG/1
1 TABLET ORAL EVERY 4 HOURS PRN
Qty: 50 TABLET | Refills: 0 | Status: SHIPPED | OUTPATIENT
Start: 2021-01-01 | End: 2021-01-01 | Stop reason: SDUPTHER

## 2021-01-01 RX ORDER — FENTANYL CITRATE 50 UG/ML
INJECTION, SOLUTION INTRAMUSCULAR; INTRAVENOUS PRN
Status: DISCONTINUED | OUTPATIENT
Start: 2021-01-01 | End: 2021-01-01 | Stop reason: SDUPTHER

## 2021-01-01 RX ORDER — OXYCODONE AND ACETAMINOPHEN 7.5; 325 MG/1; MG/1
1 TABLET ORAL EVERY 8 HOURS PRN
Qty: 9 TABLET | Refills: 0 | Status: SHIPPED | OUTPATIENT
Start: 2021-01-01 | End: 2021-01-01

## 2021-01-01 RX ORDER — ONDANSETRON 2 MG/ML
4 INJECTION INTRAMUSCULAR; INTRAVENOUS
Status: DISCONTINUED | OUTPATIENT
Start: 2021-01-01 | End: 2021-01-01 | Stop reason: HOSPADM

## 2021-01-01 RX ORDER — PRAZOSIN HYDROCHLORIDE 1 MG/1
1 CAPSULE ORAL NIGHTLY
COMMUNITY
Start: 2021-01-01

## 2021-01-01 RX ORDER — DIAZEPAM 5 MG/1
2.5 TABLET ORAL ONCE
Status: COMPLETED | OUTPATIENT
Start: 2021-01-01 | End: 2021-01-01

## 2021-01-01 RX ORDER — FLUOXETINE HYDROCHLORIDE 20 MG/1
20 CAPSULE ORAL DAILY
Status: DISCONTINUED | OUTPATIENT
Start: 2021-01-01 | End: 2021-01-01 | Stop reason: HOSPADM

## 2021-01-01 RX ORDER — ONDANSETRON 2 MG/ML
INJECTION INTRAMUSCULAR; INTRAVENOUS PRN
Status: DISCONTINUED | OUTPATIENT
Start: 2021-01-01 | End: 2021-01-01 | Stop reason: SDUPTHER

## 2021-01-01 RX ORDER — METHYLPREDNISOLONE ACETATE 80 MG/ML
INJECTION, SUSPENSION INTRA-ARTICULAR; INTRALESIONAL; INTRAMUSCULAR; SOFT TISSUE
Status: COMPLETED | OUTPATIENT
Start: 2021-01-01 | End: 2021-01-01

## 2021-01-01 RX ORDER — MORPHINE SULFATE 2 MG/ML
2 INJECTION, SOLUTION INTRAMUSCULAR; INTRAVENOUS
Status: DISCONTINUED | OUTPATIENT
Start: 2021-01-01 | End: 2021-01-01 | Stop reason: HOSPADM

## 2021-01-01 RX ORDER — ONDANSETRON 4 MG/1
4 TABLET, ORALLY DISINTEGRATING ORAL EVERY 8 HOURS PRN
Status: DISCONTINUED | OUTPATIENT
Start: 2021-01-01 | End: 2021-01-01 | Stop reason: HOSPADM

## 2021-01-01 RX ORDER — DONEPEZIL HYDROCHLORIDE 23 MG/1
23 TABLET, FILM COATED ORAL NIGHTLY
Status: DISCONTINUED | OUTPATIENT
Start: 2021-01-01 | End: 2021-01-01 | Stop reason: HOSPADM

## 2021-01-01 RX ORDER — SODIUM CHLORIDE 0.9 % (FLUSH) 0.9 %
10 SYRINGE (ML) INJECTION PRN
Status: DISCONTINUED | OUTPATIENT
Start: 2021-01-01 | End: 2021-01-01 | Stop reason: HOSPADM

## 2021-01-01 RX ORDER — OXYCODONE HYDROCHLORIDE 5 MG/1
10 TABLET ORAL EVERY 4 HOURS PRN
Status: DISCONTINUED | OUTPATIENT
Start: 2021-01-01 | End: 2021-01-01 | Stop reason: HOSPADM

## 2021-01-01 RX ORDER — SODIUM CHLORIDE 9 MG/ML
INJECTION INTRAVENOUS
Status: COMPLETED | OUTPATIENT
Start: 2021-01-01 | End: 2021-01-01

## 2021-01-01 RX ORDER — SODIUM CHLORIDE, SODIUM LACTATE, POTASSIUM CHLORIDE, CALCIUM CHLORIDE 600; 310; 30; 20 MG/100ML; MG/100ML; MG/100ML; MG/100ML
INJECTION, SOLUTION INTRAVENOUS CONTINUOUS PRN
Status: DISCONTINUED | OUTPATIENT
Start: 2021-01-01 | End: 2021-01-01 | Stop reason: SDUPTHER

## 2021-01-01 RX ORDER — HYDROXYZINE PAMOATE 25 MG/1
CAPSULE ORAL
Qty: 30 CAPSULE | Refills: 0 | Status: SHIPPED | OUTPATIENT
Start: 2021-01-01

## 2021-01-01 RX ORDER — ACETAMINOPHEN 650 MG/1
650 SUPPOSITORY RECTAL EVERY 6 HOURS PRN
Status: DISCONTINUED | OUTPATIENT
Start: 2021-01-01 | End: 2021-01-01 | Stop reason: HOSPADM

## 2021-01-01 RX ORDER — DOCUSATE SODIUM 100 MG/1
100 CAPSULE, LIQUID FILLED ORAL 2 TIMES DAILY
Qty: 60 CAPSULE | Refills: 1 | Status: SHIPPED | OUTPATIENT
Start: 2021-01-01

## 2021-01-01 RX ORDER — FENTANYL CITRATE 50 UG/ML
50 INJECTION, SOLUTION INTRAMUSCULAR; INTRAVENOUS EVERY 5 MIN PRN
Status: DISCONTINUED | OUTPATIENT
Start: 2021-01-01 | End: 2021-01-01 | Stop reason: HOSPADM

## 2021-01-01 RX ORDER — POTASSIUM CHLORIDE 7.45 MG/ML
10 INJECTION INTRAVENOUS
Status: COMPLETED | OUTPATIENT
Start: 2021-01-01 | End: 2021-01-01

## 2021-01-01 RX ORDER — SODIUM CHLORIDE 0.9 % (FLUSH) 0.9 %
5-40 SYRINGE (ML) INJECTION PRN
Status: DISCONTINUED | OUTPATIENT
Start: 2021-01-01 | End: 2021-01-01 | Stop reason: SDUPTHER

## 2021-01-01 RX ORDER — HYDROCODONE BITARTRATE AND ACETAMINOPHEN 7.5; 325 MG/1; MG/1
1 TABLET ORAL EVERY 6 HOURS PRN
Qty: 90 TABLET | Refills: 0 | Status: ON HOLD | OUTPATIENT
Start: 2021-01-01 | End: 2021-01-01 | Stop reason: HOSPADM

## 2021-01-01 RX ORDER — MAGNESIUM SULFATE 1 G/100ML
1000 INJECTION INTRAVENOUS ONCE
Status: COMPLETED | OUTPATIENT
Start: 2021-01-01 | End: 2021-01-01

## 2021-01-01 RX ORDER — SODIUM CHLORIDE 9 MG/ML
INJECTION, SOLUTION INTRAVENOUS CONTINUOUS
Status: DISCONTINUED | OUTPATIENT
Start: 2021-01-01 | End: 2021-01-01

## 2021-01-01 RX ORDER — SODIUM CHLORIDE 9 MG/ML
5 INJECTION INTRAVENOUS ONCE
Status: DISCONTINUED | OUTPATIENT
Start: 2021-01-01 | End: 2021-01-01 | Stop reason: HOSPADM

## 2021-01-01 RX ORDER — SODIUM CHLORIDE 0.9 % (FLUSH) 0.9 %
10 SYRINGE (ML) INJECTION EVERY 12 HOURS SCHEDULED
Status: DISCONTINUED | OUTPATIENT
Start: 2021-01-01 | End: 2021-01-01 | Stop reason: HOSPADM

## 2021-01-01 RX ORDER — SODIUM CHLORIDE 9 MG/ML
INJECTION, SOLUTION INTRAVENOUS CONTINUOUS
Status: DISCONTINUED | OUTPATIENT
Start: 2021-01-01 | End: 2021-01-01 | Stop reason: HOSPADM

## 2021-01-01 RX ORDER — HYDROMORPHONE HYDROCHLORIDE 1 MG/ML
0.5 INJECTION, SOLUTION INTRAMUSCULAR; INTRAVENOUS; SUBCUTANEOUS EVERY 5 MIN PRN
Status: DISCONTINUED | OUTPATIENT
Start: 2021-01-01 | End: 2021-01-01 | Stop reason: HOSPADM

## 2021-01-01 RX ORDER — ERGOCALCIFEROL 1.25 MG/1
50000 CAPSULE ORAL WEEKLY
Status: DISCONTINUED | OUTPATIENT
Start: 2021-01-01 | End: 2021-01-01

## 2021-01-01 RX ORDER — PNV NO.95/FERROUS FUM/FOLIC AC 28MG-0.8MG
325 TABLET ORAL DAILY
Qty: 30 TABLET | Refills: 3 | Status: SHIPPED | OUTPATIENT
Start: 2021-01-01 | End: 2021-01-01 | Stop reason: SDUPTHER

## 2021-01-01 RX ORDER — MINERAL OIL AND WHITE PETROLATUM 150; 830 MG/G; MG/G
OINTMENT OPHTHALMIC PRN
Status: DISCONTINUED | OUTPATIENT
Start: 2021-01-01 | End: 2021-01-01 | Stop reason: HOSPADM

## 2021-01-01 RX ORDER — MORPHINE SULFATE 2 MG/ML
1 INJECTION, SOLUTION INTRAMUSCULAR; INTRAVENOUS EVERY 4 HOURS PRN
Status: DISCONTINUED | OUTPATIENT
Start: 2021-01-01 | End: 2021-01-01 | Stop reason: HOSPADM

## 2021-01-01 RX ORDER — AMLODIPINE BESYLATE 5 MG/1
TABLET ORAL
Qty: 90 TABLET | Refills: 0 | Status: SHIPPED | OUTPATIENT
Start: 2021-01-01

## 2021-01-01 RX ORDER — SODIUM CHLORIDE, SODIUM LACTATE, POTASSIUM CHLORIDE, CALCIUM CHLORIDE 600; 310; 30; 20 MG/100ML; MG/100ML; MG/100ML; MG/100ML
INJECTION, SOLUTION INTRAVENOUS CONTINUOUS
Status: DISCONTINUED | OUTPATIENT
Start: 2021-01-01 | End: 2021-01-01

## 2021-01-01 RX ORDER — ONDANSETRON 4 MG/1
4 TABLET, FILM COATED ORAL EVERY 8 HOURS PRN
Qty: 20 TABLET | Refills: 1 | Status: SHIPPED | OUTPATIENT
Start: 2021-01-01

## 2021-01-01 RX ORDER — AMLODIPINE BESYLATE 5 MG/1
5 TABLET ORAL DAILY
Qty: 30 TABLET | Refills: 5 | Status: SHIPPED | OUTPATIENT
Start: 2021-01-01 | End: 2021-01-01

## 2021-01-01 RX ORDER — PREDNISOLONE ACETATE 10 MG/ML
SUSPENSION/ DROPS OPHTHALMIC
COMMUNITY
Start: 2021-01-01

## 2021-01-01 RX ORDER — POTASSIUM CHLORIDE 20 MEQ/1
40 TABLET, EXTENDED RELEASE ORAL ONCE
Status: DISCONTINUED | OUTPATIENT
Start: 2021-01-01 | End: 2021-01-01

## 2021-01-01 RX ADMIN — HYDROMORPHONE HYDROCHLORIDE 0.25 MG: 1 INJECTION, SOLUTION INTRAMUSCULAR; INTRAVENOUS; SUBCUTANEOUS at 19:42

## 2021-01-01 RX ADMIN — OXYCODONE 10 MG: 5 TABLET ORAL at 10:12

## 2021-01-01 RX ADMIN — MORPHINE SULFATE 4 MG: 4 INJECTION INTRAVENOUS at 20:09

## 2021-01-01 RX ADMIN — ONDANSETRON 4 MG: 4 TABLET, ORALLY DISINTEGRATING ORAL at 11:49

## 2021-01-01 RX ADMIN — MORPHINE SULFATE 4 MG: 4 INJECTION INTRAVENOUS at 14:17

## 2021-01-01 RX ADMIN — FENTANYL CITRATE 25 MCG: 50 INJECTION, SOLUTION INTRAMUSCULAR; INTRAVENOUS at 18:33

## 2021-01-01 RX ADMIN — SUGAMMADEX 300 MG: 100 INJECTION, SOLUTION INTRAVENOUS at 19:04

## 2021-01-01 RX ADMIN — FLUOXETINE HYDROCHLORIDE 20 MG: 20 CAPSULE ORAL at 11:49

## 2021-01-01 RX ADMIN — CEFAZOLIN SODIUM 2000 MG: 2 INJECTION, SOLUTION INTRAVENOUS at 09:49

## 2021-01-01 RX ADMIN — PROPOFOL 80 MG: 10 INJECTION, EMULSION INTRAVENOUS at 17:54

## 2021-01-01 RX ADMIN — POTASSIUM CHLORIDE 10 MEQ: 10 INJECTION, SOLUTION INTRAVENOUS at 05:43

## 2021-01-01 RX ADMIN — APIXABAN 2.5 MG: 2.5 TABLET, FILM COATED ORAL at 06:00

## 2021-01-01 RX ADMIN — ONDANSETRON 4 MG: 4 TABLET, ORALLY DISINTEGRATING ORAL at 15:48

## 2021-01-01 RX ADMIN — ROCURONIUM BROMIDE 10 MG: 10 INJECTION, SOLUTION INTRAVENOUS at 18:30

## 2021-01-01 RX ADMIN — SODIUM CHLORIDE, SODIUM LACTATE, POTASSIUM CHLORIDE, AND CALCIUM CHLORIDE: 600; 310; 30; 20 INJECTION, SOLUTION INTRAVENOUS at 17:47

## 2021-01-01 RX ADMIN — SODIUM CHLORIDE, POTASSIUM CHLORIDE, SODIUM LACTATE AND CALCIUM CHLORIDE 1000 ML: 600; 310; 30; 20 INJECTION, SOLUTION INTRAVENOUS at 04:53

## 2021-01-01 RX ADMIN — POTASSIUM CHLORIDE 10 MEQ: 10 INJECTION, SOLUTION INTRAVENOUS at 09:44

## 2021-01-01 RX ADMIN — FENTANYL CITRATE 25 MCG: 50 INJECTION, SOLUTION INTRAMUSCULAR; INTRAVENOUS at 18:30

## 2021-01-01 RX ADMIN — FAMOTIDINE 20 MG: 10 INJECTION, SOLUTION INTRAVENOUS at 01:21

## 2021-01-01 RX ADMIN — METHYLPREDNISOLONE ACETATE 80 MG: 80 INJECTION, SUSPENSION INTRA-ARTICULAR; INTRALESIONAL; INTRAMUSCULAR; SOFT TISSUE at 13:46

## 2021-01-01 RX ADMIN — MAGNESIUM SULFATE HEPTAHYDRATE 1000 MG: 1 INJECTION, SOLUTION INTRAVENOUS at 04:39

## 2021-01-01 RX ADMIN — DEXAMETHASONE SODIUM PHOSPHATE 4 MG: 4 INJECTION, SOLUTION INTRAMUSCULAR; INTRAVENOUS at 17:05

## 2021-01-01 RX ADMIN — ROCURONIUM BROMIDE 50 MG: 10 INJECTION, SOLUTION INTRAVENOUS at 17:54

## 2021-01-01 RX ADMIN — FLUOXETINE HYDROCHLORIDE 20 MG: 20 CAPSULE ORAL at 08:35

## 2021-01-01 RX ADMIN — DONEPEZIL HYDROCHLORIDE 23 MG: 23 TABLET, FILM COATED ORAL at 20:39

## 2021-01-01 RX ADMIN — LIDOCAINE HYDROCHLORIDE 50 MG: 10 INJECTION, SOLUTION EPIDURAL; INFILTRATION; INTRACAUDAL; PERINEURAL at 17:54

## 2021-01-01 RX ADMIN — FLUOXETINE HYDROCHLORIDE 20 MG: 20 CAPSULE ORAL at 09:47

## 2021-01-01 RX ADMIN — SODIUM CHLORIDE: 9 INJECTION, SOLUTION INTRAVENOUS at 21:47

## 2021-01-01 RX ADMIN — FENTANYL CITRATE 25 MCG: 50 INJECTION, SOLUTION INTRAMUSCULAR; INTRAVENOUS at 17:54

## 2021-01-01 RX ADMIN — OXYCODONE 10 MG: 5 TABLET ORAL at 15:48

## 2021-01-01 RX ADMIN — DOCUSATE SODIUM 50 MG AND SENNOSIDES 8.6 MG 1 TABLET: 8.6; 5 TABLET, FILM COATED ORAL at 10:07

## 2021-01-01 RX ADMIN — OXYCODONE 5 MG: 5 TABLET ORAL at 22:15

## 2021-01-01 RX ADMIN — SODIUM CHLORIDE 5 ML: 9 INJECTION INTRAVENOUS at 13:46

## 2021-01-01 RX ADMIN — CEFAZOLIN SODIUM 2000 MG: 2 INJECTION, SOLUTION INTRAVENOUS at 02:10

## 2021-01-01 RX ADMIN — DONEPEZIL HYDROCHLORIDE 23 MG: 23 TABLET, FILM COATED ORAL at 01:21

## 2021-01-01 RX ADMIN — Medication 10 ML: at 21:46

## 2021-01-01 RX ADMIN — MORPHINE SULFATE 2 MG: 2 INJECTION, SOLUTION INTRAMUSCULAR; INTRAVENOUS at 10:03

## 2021-01-01 RX ADMIN — OXYCODONE 10 MG: 5 TABLET ORAL at 11:49

## 2021-01-01 RX ADMIN — CEFAZOLIN SODIUM 1000 MG: 1 INJECTION, POWDER, FOR SOLUTION INTRAMUSCULAR; INTRAVENOUS at 18:02

## 2021-01-01 RX ADMIN — ONDANSETRON 4 MG: 2 INJECTION INTRAMUSCULAR; INTRAVENOUS at 18:05

## 2021-01-01 RX ADMIN — DOCUSATE SODIUM 50 MG AND SENNOSIDES 8.6 MG 1 TABLET: 8.6; 5 TABLET, FILM COATED ORAL at 09:47

## 2021-01-01 RX ADMIN — MORPHINE SULFATE 1 MG: 2 INJECTION, SOLUTION INTRAMUSCULAR; INTRAVENOUS at 11:38

## 2021-01-01 RX ADMIN — DOCUSATE SODIUM 50 MG AND SENNOSIDES 8.6 MG 1 TABLET: 8.6; 5 TABLET, FILM COATED ORAL at 22:01

## 2021-01-01 RX ADMIN — SODIUM CHLORIDE: 9 INJECTION, SOLUTION INTRAVENOUS at 01:21

## 2021-01-01 RX ADMIN — HYDROMORPHONE HYDROCHLORIDE 0.25 MG: 1 INJECTION, SOLUTION INTRAMUSCULAR; INTRAVENOUS; SUBCUTANEOUS at 19:24

## 2021-01-01 RX ADMIN — DIAZEPAM 2.5 MG: 5 TABLET ORAL at 05:48

## 2021-01-01 RX ADMIN — MORPHINE SULFATE 1 MG: 2 INJECTION, SOLUTION INTRAMUSCULAR; INTRAVENOUS at 15:50

## 2021-01-01 RX ADMIN — DOCUSATE SODIUM 50 MG AND SENNOSIDES 8.6 MG 1 TABLET: 8.6; 5 TABLET, FILM COATED ORAL at 11:49

## 2021-01-01 RX ADMIN — MINERAL OIL, PETROLATUM: 425; 573 OINTMENT OPHTHALMIC at 21:46

## 2021-01-01 RX ADMIN — APIXABAN 2.5 MG: 2.5 TABLET, FILM COATED ORAL at 06:16

## 2021-01-01 RX ADMIN — APIXABAN 2.5 MG: 2.5 TABLET, FILM COATED ORAL at 20:39

## 2021-01-01 RX ADMIN — MORPHINE SULFATE 2 MG: 2 INJECTION, SOLUTION INTRAMUSCULAR; INTRAVENOUS at 21:35

## 2021-01-01 RX ADMIN — FAMOTIDINE 20 MG: 10 INJECTION, SOLUTION INTRAVENOUS at 17:07

## 2021-01-01 RX ADMIN — DOCUSATE SODIUM 50 MG AND SENNOSIDES 8.6 MG 1 TABLET: 8.6; 5 TABLET, FILM COATED ORAL at 20:39

## 2021-01-01 RX ADMIN — LIDOCAINE HYDROCHLORIDE 5 ML: 10 INJECTION, SOLUTION EPIDURAL; INFILTRATION; INTRACAUDAL; PERINEURAL at 13:46

## 2021-01-01 RX ADMIN — OXYCODONE 10 MG: 5 TABLET ORAL at 03:15

## 2021-01-01 RX ADMIN — KETOROLAC TROMETHAMINE 15 MG: 30 INJECTION, SOLUTION INTRAMUSCULAR; INTRAVENOUS at 05:48

## 2021-01-01 RX ADMIN — DONEPEZIL HYDROCHLORIDE 23 MG: 23 TABLET, FILM COATED ORAL at 22:01

## 2021-01-01 RX ADMIN — POTASSIUM CHLORIDE 10 MEQ: 7.46 INJECTION, SOLUTION INTRAVENOUS at 05:35

## 2021-01-01 RX ADMIN — FENTANYL CITRATE 25 MCG: 50 INJECTION, SOLUTION INTRAMUSCULAR; INTRAVENOUS at 17:47

## 2021-01-01 RX ADMIN — FENTANYL CITRATE 25 MCG: 50 INJECTION, SOLUTION INTRAMUSCULAR; INTRAVENOUS at 18:39

## 2021-01-01 RX ADMIN — FLUOXETINE HYDROCHLORIDE 20 MG: 20 CAPSULE ORAL at 10:13

## 2021-01-01 RX ADMIN — ONDANSETRON 4 MG: 4 TABLET, ORALLY DISINTEGRATING ORAL at 10:14

## 2021-01-01 RX ADMIN — MORPHINE SULFATE 4 MG: 4 INJECTION INTRAVENOUS at 02:19

## 2021-01-01 ASSESSMENT — PAIN SCALES - GENERAL
PAINLEVEL_OUTOF10: 5
PAINLEVEL_OUTOF10: 1
PAINLEVEL_OUTOF10: 8
PAINLEVEL_OUTOF10: 10
PAINLEVEL_OUTOF10: 6
PAINLEVEL_OUTOF10: 8
PAINLEVEL_OUTOF10: 8
PAINLEVEL_OUTOF10: 10
PAINLEVEL_OUTOF10: 9
PAINLEVEL_OUTOF10: 8
PAINLEVEL_OUTOF10: 8
PAINLEVEL_OUTOF10: 5
PAINLEVEL_OUTOF10: 10
PAINLEVEL_OUTOF10: 5
PAINLEVEL_OUTOF10: 8
PAINLEVEL_OUTOF10: 10
PAINLEVEL_OUTOF10: 8
PAINLEVEL_OUTOF10: 10
PAINLEVEL_OUTOF10: 9
PAINLEVEL_OUTOF10: 3
PAINLEVEL_OUTOF10: 10
PAINLEVEL_OUTOF10: 10
PAINLEVEL_OUTOF10: 5
PAINLEVEL_OUTOF10: 10
PAINLEVEL_OUTOF10: 3
PAINLEVEL_OUTOF10: 10

## 2021-01-01 ASSESSMENT — PAIN DESCRIPTION - FREQUENCY
FREQUENCY: INTERMITTENT

## 2021-01-01 ASSESSMENT — PAIN SCALES - WONG BAKER
WONGBAKER_NUMERICALRESPONSE: 6
WONGBAKER_NUMERICALRESPONSE: 2
WONGBAKER_NUMERICALRESPONSE: 6
WONGBAKER_NUMERICALRESPONSE: 6;8

## 2021-01-01 ASSESSMENT — PAIN DESCRIPTION - DESCRIPTORS
DESCRIPTORS: ACHING;DISCOMFORT;NAGGING
DESCRIPTORS: PATIENT UNABLE TO DESCRIBE
DESCRIPTORS: BURNING
DESCRIPTORS: SORE
DESCRIPTORS: PATIENT UNABLE TO DESCRIBE
DESCRIPTORS: SORE
DESCRIPTORS: BURNING
DESCRIPTORS: PATIENT UNABLE TO DESCRIBE
DESCRIPTORS: PATIENT UNABLE TO DESCRIBE

## 2021-01-01 ASSESSMENT — PAIN DESCRIPTION - ORIENTATION
ORIENTATION: LEFT

## 2021-01-01 ASSESSMENT — PAIN - FUNCTIONAL ASSESSMENT
PAIN_FUNCTIONAL_ASSESSMENT: 0-10
PAIN_FUNCTIONAL_ASSESSMENT: PREVENTS OR INTERFERES SOME ACTIVE ACTIVITIES AND ADLS
PAIN_FUNCTIONAL_ASSESSMENT: PREVENTS OR INTERFERES WITH ALL ACTIVE AND SOME PASSIVE ACTIVITIES
PAIN_FUNCTIONAL_ASSESSMENT: PREVENTS OR INTERFERES SOME ACTIVE ACTIVITIES AND ADLS
PAIN_FUNCTIONAL_ASSESSMENT: 0-10

## 2021-01-01 ASSESSMENT — PAIN DESCRIPTION - PAIN TYPE
TYPE: ACUTE PAIN
TYPE: ACUTE PAIN;SURGICAL PAIN
TYPE: CHRONIC PAIN
TYPE: SURGICAL PAIN
TYPE: SURGICAL PAIN
TYPE: CHRONIC PAIN
TYPE: CHRONIC PAIN
TYPE: ACUTE PAIN
TYPE: SURGICAL PAIN
TYPE: CHRONIC PAIN
TYPE: CHRONIC PAIN
TYPE: SURGICAL PAIN
TYPE: SURGICAL PAIN
TYPE: SURGICAL PAIN;ACUTE PAIN
TYPE: CHRONIC PAIN
TYPE: SURGICAL PAIN
TYPE: ACUTE PAIN
TYPE: SURGICAL PAIN

## 2021-01-01 ASSESSMENT — ENCOUNTER SYMPTOMS
RHINORRHEA: 0
SHORTNESS OF BREATH: 0
BACK PAIN: 1
BACK PAIN: 1
ALLERGIC/IMMUNOLOGIC NEGATIVE: 1
NAUSEA: 1
RESPIRATORY NEGATIVE: 1
DIARRHEA: 0
RESPIRATORY NEGATIVE: 1
RESPIRATORY NEGATIVE: 1
COUGH: 1
SHORTNESS OF BREATH: 1
EYE REDNESS: 0
SHORTNESS OF BREATH: 0
EYES NEGATIVE: 1
BACK PAIN: 1
BACK PAIN: 0
VOMITING: 0
GASTROINTESTINAL NEGATIVE: 1
VOICE CHANGE: 0
COUGH: 0
DIARRHEA: 0
BACK PAIN: 1
VOMITING: 0
ABDOMINAL PAIN: 0
ABDOMINAL PAIN: 0
EYE PAIN: 0

## 2021-01-01 ASSESSMENT — PAIN DESCRIPTION - LOCATION
LOCATION: HIP
LOCATION: NECK
LOCATION: HIP
LOCATION: BACK;NECK
LOCATION: HIP
LOCATION: SHOULDER
LOCATION: HIP
LOCATION: LEG
LOCATION: NECK
LOCATION: LEG
LOCATION: SHOULDER
LOCATION: HIP
LOCATION: LEG
LOCATION: HEAD;NECK;THROAT
LOCATION: BACK

## 2021-01-01 ASSESSMENT — PAIN DESCRIPTION - PROGRESSION
CLINICAL_PROGRESSION: NOT CHANGED
CLINICAL_PROGRESSION: GRADUALLY WORSENING
CLINICAL_PROGRESSION: GRADUALLY IMPROVING
CLINICAL_PROGRESSION: NOT CHANGED
CLINICAL_PROGRESSION: GRADUALLY IMPROVING
CLINICAL_PROGRESSION: GRADUALLY IMPROVING

## 2021-01-01 ASSESSMENT — LIFESTYLE VARIABLES: SMOKING_STATUS: 0

## 2021-01-01 ASSESSMENT — PAIN DESCRIPTION - ONSET
ONSET: ON-GOING

## 2021-01-07 ENCOUNTER — HOSPITAL ENCOUNTER (OUTPATIENT)
Dept: PAIN MANAGEMENT | Age: 76
Discharge: HOME | End: 2021-01-07
Payer: MEDICARE

## 2021-01-07 VITALS
RESPIRATION RATE: 18 BRPM | DIASTOLIC BLOOD PRESSURE: 79 MMHG | OXYGEN SATURATION: 97 % | SYSTOLIC BLOOD PRESSURE: 203 MMHG | HEART RATE: 64 BPM | TEMPERATURE: 97.3 F

## 2021-01-07 PROCEDURE — 2500000003 HC RX 250 WO HCPCS

## 2021-01-07 PROCEDURE — 20611 DRAIN/INJ JOINT/BURSA W/US: CPT | Performed by: NURSE PRACTITIONER

## 2021-01-07 PROCEDURE — 20610 DRAIN/INJ JOINT/BURSA W/O US: CPT

## 2021-01-07 PROCEDURE — 6360000002 HC RX W HCPCS

## 2021-01-07 RX ORDER — LIDOCAINE HYDROCHLORIDE 10 MG/ML
2 INJECTION, SOLUTION EPIDURAL; INFILTRATION; INTRACAUDAL; PERINEURAL ONCE
Status: DISCONTINUED | OUTPATIENT
Start: 2021-01-07 | End: 2021-01-09 | Stop reason: HOSPADM

## 2021-01-07 RX ORDER — TRIAMCINOLONE ACETONIDE 40 MG/ML
80 INJECTION, SUSPENSION INTRA-ARTICULAR; INTRAMUSCULAR ONCE
Status: DISCONTINUED | OUTPATIENT
Start: 2021-01-07 | End: 2021-01-09 | Stop reason: HOSPADM

## 2021-01-07 RX ORDER — BUPIVACAINE HYDROCHLORIDE 5 MG/ML
2 INJECTION, SOLUTION EPIDURAL; INTRACAUDAL ONCE
Status: DISCONTINUED | OUTPATIENT
Start: 2021-01-07 | End: 2021-01-09 | Stop reason: HOSPADM

## 2021-01-07 NOTE — PROGRESS NOTES
Procedure:  Level of Consciousness: [x]Alert [x]Oriented []Disoriented []Lethargic  Anxiety Level: [x]Calm []Anxious []Depressed []Other  Skin: [x]Warm [x]Dry []Cool []Moist []Intact []Other  Cardiovascular: [x]Palpitations: [x]Never []Occasionally []Frequently  Chest Pain: [x]No []Yes  Respiratory:  [x]Unlabored []Labored []Cough ([] Productive []Unproductive)  HCG Required: [x]No []Yes   Results: []Negative []Positive  Knowledge Level:        [x]Patient/Other verbalized understanding of pre-procedure instructions. [x]Assessment of post-op care needs (transportation, responsible caregiver)        [x]Able to discuss health care problems and how to deal with it.   Factors that Affect Teaching:        Language Barrier: [x]No []Yes - why:        Hearing Loss:        []No [x]Yes            Corrective Device:  [x]Yes []No        Vision Loss:           []No [x]Yes            Corrective Device:  [x]Yes []No        Memory Loss:       [x]No []Yes            []Short Term []Long Term  Motivational Level:  [x]Asks Questions                  []Extremely Anxious       [x]Seems Interested               []Seems Uninterested                  [x]Denies need for Education  Risk for Injury:  [x]Patient oriented to person, place and time  []History of frequent falls/loss of balance  Nutritional:  []Change in appetite   []Weight Gain   []Weight Loss  Functional:  []Requires assistance with ADL's

## 2021-01-08 NOTE — PROCEDURES
[facility] Physical and Pain Medicine        Patient Name: Silas Holliday    : 1945    Age: 67 y.o. Sex: female    Date: 2021    Preop Diagnosis: Osteoarthritis of  [] Right  []  Left  [x]  Bilateral Shoulder(s)    Postop Diagnosis: Osteoarthritis of [] Right  []  Left  [x]  Bilateral Shoulder(s)    Procedure: Ultrasound Guided Steroid Injection of  [] Right  []  Left   [x]  Bilateral Shoulder(s)    Performing Procedure:  Connor Roman, MSN, APRN, FNP-C    Previously Had Procedure:   [x] Yes   []  No    No data found. Description of Procedure:    Description of Procedure:    After a brief physical assessment and failure to improve with conservative measures the patient presented for an injection of the [] Right  [] Left   [x] Bilateral Shoulder(s). The indications, limitations and possible complications were discussed with the patient and the patient elected to proceed with the procedure. [x] Right Shoulder    After voluntary, informed and signed consent obtained the patient was placed in a sitting position with the patients right arm placed to the side and elbow flexed at 90 degrees. Appropriate time out was obtained per policy. The proposed injection site was palpated at the point of maximal tenderness [] Anterior  [x] Posterior and the skin over the proposed injection entry point was marked. The skin was then sprayed with Gebauer's Solution while protecting patients eyes and prepped in a sterile fashion with Prevantics swab. Under sterile technique and direct ultrasound visualizationa 22 gauge 1 1/2 inch needle was introduced and after a negative aspiration a solution of 1 ml of 0.5% Marcaine Plain, 1 ml of 1% Lidocaine Plain and     [] 1 ml of Kenalog (40mg/ml)   [] Toradol 30 mg/ml was injected. The needle was withdrawn and a sterile dressing applied. was injected.      [x] Left Shoulder     After voluntary, informed and signed consent obtained the patient was placed in a sitting position with the patients left arm placed to the side and elbow flexed at 90 degrees. Appropriate time out was obtained per policy. The proposed injection site was palpated at the point of maximal tenderness  [] Anterior    [x] Posterior and the skin over the injection entry point was marked. The skin was then sprayed with Gebauer's Solution while protecting patients eyes and prepped in a sterile fashion with Prevantics swab. Under sterile technique and direct ultrasound visualization a 22 gauage 1 1/2 inch needle was introduced and after a negative aspiration a solution of 1 ml of 0.5% Marcaine Plain, 1 ml of 1% Lidocaine Plain and [x] 1 ml of Kenalog (40mg/ml)   [] Toradol 30 mg/ml was injected. The needle was withdrawn and a sterile dressing applied. Discharge: The patient tolerated the procedure well. There were no complications during the procedure and the patient was discharged home with discharge instructions. The patient has been instructed to contact the office should there be any complications or questions to arise between today and their next appointment.     Plan:  [x] Will return to the office in   [] 1 month  [x] 4 - 6 weeks   [] 2 months   []  3 months for:  [] Planned Procedure  [x] Procedure Follow-up   [] Office Visit      1 [facility], [facility], 9/5/2019 at 2:37 PM

## 2021-01-11 ENCOUNTER — TELEPHONE (OUTPATIENT)
Dept: PAIN MANAGEMENT | Age: 76
End: 2021-01-11

## 2021-01-25 ENCOUNTER — OFFICE VISIT (OUTPATIENT)
Dept: PRIMARY CARE CLINIC | Age: 76
End: 2021-01-25
Payer: COMMERCIAL

## 2021-01-25 VITALS
HEIGHT: 68 IN | SYSTOLIC BLOOD PRESSURE: 124 MMHG | RESPIRATION RATE: 16 BRPM | WEIGHT: 130.4 LBS | TEMPERATURE: 97.4 F | HEART RATE: 77 BPM | OXYGEN SATURATION: 96 % | DIASTOLIC BLOOD PRESSURE: 86 MMHG | BODY MASS INDEX: 19.76 KG/M2

## 2021-01-25 DIAGNOSIS — R41.3 MEMORY LOSS: ICD-10-CM

## 2021-01-25 DIAGNOSIS — G89.29 CHRONIC LOW BACK PAIN WITH BILATERAL SCIATICA, UNSPECIFIED BACK PAIN LATERALITY: ICD-10-CM

## 2021-01-25 DIAGNOSIS — I10 ESSENTIAL HYPERTENSION: Primary | ICD-10-CM

## 2021-01-25 DIAGNOSIS — H91.90 DEAFNESS, UNSPECIFIED LATERALITY: ICD-10-CM

## 2021-01-25 DIAGNOSIS — M54.42 CHRONIC LOW BACK PAIN WITH BILATERAL SCIATICA, UNSPECIFIED BACK PAIN LATERALITY: ICD-10-CM

## 2021-01-25 DIAGNOSIS — G70.00 MYASTHENIA GRAVIS WITHOUT ACUTE EXACERBATION (HCC): ICD-10-CM

## 2021-01-25 DIAGNOSIS — F33.41 RECURRENT MAJOR DEPRESSIVE DISORDER, IN PARTIAL REMISSION (HCC): ICD-10-CM

## 2021-01-25 DIAGNOSIS — M54.41 CHRONIC LOW BACK PAIN WITH BILATERAL SCIATICA, UNSPECIFIED BACK PAIN LATERALITY: ICD-10-CM

## 2021-01-25 DIAGNOSIS — I77.9 CAROTID ARTERY DISEASE, UNSPECIFIED LATERALITY, UNSPECIFIED TYPE (HCC): ICD-10-CM

## 2021-01-25 LAB
ANION GAP SERPL CALCULATED.3IONS-SCNC: 11 MMOL/L (ref 7–19)
BUN BLDV-MCNC: 11 MG/DL (ref 8–23)
CALCIUM SERPL-MCNC: 9.6 MG/DL (ref 8.8–10.2)
CHLORIDE BLD-SCNC: 105 MMOL/L (ref 98–111)
CO2: 24 MMOL/L (ref 22–29)
CREAT SERPL-MCNC: 0.9 MG/DL (ref 0.5–0.9)
GFR AFRICAN AMERICAN: >59
GFR NON-AFRICAN AMERICAN: >60
GLUCOSE BLD-MCNC: 88 MG/DL (ref 74–109)
POTASSIUM SERPL-SCNC: 3.5 MMOL/L (ref 3.5–5)
SODIUM BLD-SCNC: 140 MMOL/L (ref 136–145)

## 2021-01-25 PROCEDURE — G8427 DOCREV CUR MEDS BY ELIG CLIN: HCPCS | Performed by: FAMILY MEDICINE

## 2021-01-25 PROCEDURE — 1036F TOBACCO NON-USER: CPT | Performed by: FAMILY MEDICINE

## 2021-01-25 PROCEDURE — 1123F ACP DISCUSS/DSCN MKR DOCD: CPT | Performed by: FAMILY MEDICINE

## 2021-01-25 PROCEDURE — 99214 OFFICE O/P EST MOD 30 MIN: CPT | Performed by: FAMILY MEDICINE

## 2021-01-25 PROCEDURE — G8399 PT W/DXA RESULTS DOCUMENT: HCPCS | Performed by: FAMILY MEDICINE

## 2021-01-25 PROCEDURE — G8420 CALC BMI NORM PARAMETERS: HCPCS | Performed by: FAMILY MEDICINE

## 2021-01-25 PROCEDURE — G8484 FLU IMMUNIZE NO ADMIN: HCPCS | Performed by: FAMILY MEDICINE

## 2021-01-25 PROCEDURE — 4040F PNEUMOC VAC/ADMIN/RCVD: CPT | Performed by: FAMILY MEDICINE

## 2021-01-25 PROCEDURE — 1090F PRES/ABSN URINE INCON ASSESS: CPT | Performed by: FAMILY MEDICINE

## 2021-01-25 RX ORDER — HYDROXYZINE HYDROCHLORIDE 10 MG/1
10 TABLET, FILM COATED ORAL 3 TIMES DAILY PRN
Qty: 30 TABLET | Refills: 1 | Status: SHIPPED | OUTPATIENT
Start: 2021-01-25 | End: 2021-01-01

## 2021-01-25 RX ORDER — FLUTICASONE PROPIONATE 50 MCG
2 SPRAY, SUSPENSION (ML) NASAL DAILY
Qty: 1 BOTTLE | Refills: 5 | Status: SHIPPED | OUTPATIENT
Start: 2021-01-25 | End: 2021-01-01

## 2021-01-25 NOTE — PATIENT INSTRUCTIONS
Patient Education        Dry Skin: Care Instructions  Your Care Instructions  Dry skin is a common problem, especially in areas where the air is very dry. Dry skin can also become a problem as you get older and lose natural oils that keep your skin moist.  A tendency toward dry, itchy skin may run in families. Some problems with the body's defenses (immune system), allergies, or an infection with a fungus may also cause patches of dry skin. An over-the-counter cream may help your dry skin. If your skin problem does not get better with home treatment, your doctor may prescribe ointment. You may need antibiotics if you have a skin infection. Follow-up care is a key part of your treatment and safety. Be sure to make and go to all appointments, and call your doctor if you are having problems. It's also a good idea to know your test results and keep a list of the medicines you take. How can you care for yourself at home? Showers and baths  · Keep showers and baths short, and use warm or lukewarm water. Don't use hot water. It takes off more of your skin's natural oils. · Use as little soap as you can. Choose a mild soap, such as Dove, Cetaphil, or Neutrogena. Or use a skin cleanser like Aquanil or Cetaphil. · If you are taking a bath, use soap only at the very end. Then rinse off all traces of soap with fresh water. Gently pat your skin dry with a towel. Skin creams and moisturizers  · Apply moisturizer or skin cream right away (within 3 minutes) after a bath or shower. Use a moisturizer at other times too, as often as you need it. · Moisturizing creams are better than lotions. Try brands like CeraVe cream, Cetaphil cream, or Eucerin cream.  Other tips  · When washing clothes, use a small amount of detergent. Don't use fabric softeners or dryer sheets.   · For small areas of itchy skin, try an over-the-counter 1% hydrocortisone cream.

## 2021-01-28 DIAGNOSIS — G89.29 CHRONIC LOW BACK PAIN: ICD-10-CM

## 2021-01-28 DIAGNOSIS — M54.50 CHRONIC LOW BACK PAIN: ICD-10-CM

## 2021-01-28 RX ORDER — HYDROCODONE BITARTRATE AND ACETAMINOPHEN 7.5; 325 MG/1; MG/1
1 TABLET ORAL EVERY 6 HOURS PRN
Qty: 90 TABLET | Refills: 0 | Status: SHIPPED | OUTPATIENT
Start: 2021-01-28 | End: 2021-03-15 | Stop reason: SDUPTHER

## 2021-01-30 ASSESSMENT — ENCOUNTER SYMPTOMS
RESPIRATORY NEGATIVE: 1
BACK PAIN: 1
GASTROINTESTINAL NEGATIVE: 1

## 2021-01-30 NOTE — PROGRESS NOTES
SUBJECTIVE:    Jorge Calderon is 67 y. o.female who comes in complaining of 6 Month Follow-Up (Pt is hre for 6 month follow up on medications. ), Back Pain (Pt has been dealing with pain in her lumbar aea. She is getting shots by pain management. Pt states her painmanagement advised an increase in dosage.), and Dizziness (Pt is having more frequent dizzy spells and has to lay down toget her bearing. Pt fell on the 15th but had no injury. )   . HPI: Mara Negrete comes in today with her daughter and a  because she is congenitally deaf. She is still complaining of her chronic back pain. She goes to pain management. She gets shots and they have her on narcotics. It does not really seem to help. Her daughter is concerned because she is very forgetful and her short-term memory is getting a lot worse. She is more irritable than usual.  She forgets things that are told her and that makes her care more difficult. She does have a history of depression and is on Prozac. Her symptoms are fairly well controlled on the Prozac. She does have a history of carotid artery stenosis and is followed by vascular yearly. She complains of dizziness but this is nothing new. She has had a long history of positional vertigo. Blood pressure is fairly well controlled. She denies chest pain or shortness of breath. She is also complaining that she is itching all over at times. She has a history of dry skin and eczema. There is a spot on her arm where she has scratched and the skin is torn. It is slightly reddened. Her dementia is very early but we have not been able to stop her antidepressant because she becomes more depressed. She lost a son a few years ago and that also made her depression worse.       No Known Allergies    Social History     Socioeconomic History    Marital status:      Spouse name: None    Number of children: None    Years of education: None  Highest education level: None   Occupational History    Occupation: unemployed   Social Needs    Financial resource strain: None    Food insecurity     Worry: None     Inability: None    Transportation needs     Medical: None     Non-medical: None   Tobacco Use    Smoking status: Never Smoker    Smokeless tobacco: Never Used   Substance and Sexual Activity    Alcohol use: No    Drug use: No    Sexual activity: Not Currently   Lifestyle    Physical activity     Days per week: None     Minutes per session: None    Stress: None   Relationships    Social connections     Talks on phone: None     Gets together: None     Attends [employer] service: None     Active member of club or organization: None     Attends meetings of clubs or organizations: None     Relationship status: None    Intimate partner violence     Fear of current or ex partner: None     Emotionally abused: None     Physically abused: None     Forced sexual activity: None   Other Topics Concern    None   Social History Narrative    None       Review of Systems   Constitutional: Positive for activity change and fatigue. HENT: Positive for hearing loss. Respiratory: Negative. Cardiovascular: Negative. Gastrointestinal: Negative. Genitourinary: Negative. Musculoskeletal: Positive for arthralgias, back pain and myalgias. Neurological: Positive for dizziness and light-headedness. Hematological: Negative. Psychiatric/Behavioral: Positive for decreased concentration and dysphoric mood. Negative for sleep disturbance. The patient is nervous/anxious (at times).           OBJECTIVE:    Wt Readings from Last 3 Encounters:   01/25/21 130 lb 6.4 oz (59.1 kg)   11/25/20 16 lb (7.258 kg)   10/05/20 124 lb 3.2 oz (56.3 kg) /86 (Site: Right Upper Arm, Position: Sitting, Cuff Size: Medium Adult)   Pulse 77   Temp 97.4 °F (36.3 °C) (Temporal)   Resp 16   Ht 5' 8\" (1.727 m)   Wt 130 lb 6.4 oz (59.1 kg)   SpO2 96%   BMI 19.83 kg/m²     Physical Exam  Vitals signs and nursing note reviewed. Constitutional:       General: She is not in acute distress. Appearance: Normal appearance. She is well-developed and normal weight. She is not ill-appearing. Comments: SLightly unkempt female in no acute distress   HENT:      Head: Normocephalic. Right Ear: Tympanic membrane, ear canal and external ear normal.      Left Ear: Tympanic membrane, ear canal and external ear normal.      Nose: Nose normal. No rhinorrhea. Mouth/Throat:      Mouth: Mucous membranes are moist.      Pharynx: No posterior oropharyngeal erythema. Eyes:      Extraocular Movements: Extraocular movements intact. Conjunctiva/sclera: Conjunctivae normal.      Pupils: Pupils are equal, round, and reactive to light. Neck:      Musculoskeletal: Normal range of motion and neck supple. Vascular: No carotid bruit. Cardiovascular:      Rate and Rhythm: Normal rate and regular rhythm. Heart sounds: Normal heart sounds. No murmur. Pulmonary:      Effort: Pulmonary effort is normal. No respiratory distress. Breath sounds: Normal breath sounds. Musculoskeletal: Normal range of motion. Lymphadenopathy:      Cervical: No cervical adenopathy. Skin:     General: Skin is warm and dry. Comments: On her right forearm there is a patch of skin that is secondarily excoriated. She is wearing a Band-Aid over it. It is slightly reddened   Neurological:      Mental Status: She is alert and oriented to person, place, and time.    Psychiatric:         Mood and Affect: Mood normal.         Speech: Speech normal.         Behavior: Behavior normal. Dry skin is a common problem, especially in areas where the air is very dry. Dry skin can also become a problem as you get older and lose natural oils that keep your skin moist.  A tendency toward dry, itchy skin may run in families. Some problems with the body's defenses (immune system), allergies, or an infection with a fungus may also cause patches of dry skin. An over-the-counter cream may help your dry skin. If your skin problem does not get better with home treatment, your doctor may prescribe ointment. You may need antibiotics if you have a skin infection. Follow-up care is a key part of your treatment and safety. Be sure to make and go to all appointments, and call your doctor if you are having problems. It's also a good idea to know your test results and keep a list of the medicines you take. How can you care for yourself at home? Showers and baths  · Keep showers and baths short, and use warm or lukewarm water. Don't use hot water. It takes off more of your skin's natural oils. · Use as little soap as you can. Choose a mild soap, such as Dove, Cetaphil, or Neutrogena. Or use a skin cleanser like Aquanil or Cetaphil. · If you are taking a bath, use soap only at the very end. Then rinse off all traces of soap with fresh water. Gently pat your skin dry with a towel. Skin creams and moisturizers  · Apply moisturizer or skin cream right away (within 3 minutes) after a bath or shower. Use a moisturizer at other times too, as often as you need it. · Moisturizing creams are better than lotions. Try brands like CeraVe cream, Cetaphil cream, or Eucerin cream.  Other tips  · When washing clothes, use a small amount of detergent. Don't use fabric softeners or dryer sheets.   · For small areas of itchy skin, try an over-the-counter 1% hydrocortisone cream. · If you have very dry hands, spread petroleum jelly (such as Vaseline) on your hands before bed. Wear thin cotton gloves while you sleep. If your feet are dry, spread Vaseline on them and wear socks while you sleep. When should you call for help? Call your doctor now or seek immediate medical care if:    · You have signs of infection, such as:  ? Pain, warmth, or swelling in the skin. ? Red streaks near a wound in the skin. ? Pus coming from a wound in your skin. ? A fever. Watch closely for changes in your health, and be sure to contact your doctor if:    · You do not get better as expected. Where can you learn more? Go to [email or web address] and sign in to your "[employer]" account. Enter I224 in the [employer] box to learn more about \"Dry Skin: Care Instructions. \"     If you do not have an account, please click on the \"Sign Up Now\" link. Current as of: July 2, 2020               Content Version: 12.6  © 9448-9737 [employer]. Care instructions adapted under license by [facility] ([facility]). If you have questions about a medical condition or this instruction, always ask your healthcare professional. [address] any warranty or liability for your use of this information. EMR Dragon/transcription disclaimer:  Much of this encounter note is electronic transcription/translation of spoken language to printed texts. The electronic translation of spoken language may be erroneous, or at times, nonsensical words or phrases may beinadvertently transcribed.   Although I have reviewed the note for such errors, some may still exist.

## 2021-02-22 RX ORDER — PNV NO.95/FERROUS FUM/FOLIC AC 28MG-0.8MG
325 TABLET ORAL DAILY
Qty: 30 TABLET | Refills: 3 | Status: SHIPPED | OUTPATIENT
Start: 2021-02-22 | End: 2021-01-01 | Stop reason: SDUPTHER

## 2021-03-15 DIAGNOSIS — G89.29 CHRONIC LOW BACK PAIN: ICD-10-CM

## 2021-03-15 DIAGNOSIS — M54.50 CHRONIC LOW BACK PAIN: ICD-10-CM

## 2021-03-15 RX ORDER — HYDROCODONE BITARTRATE AND ACETAMINOPHEN 7.5; 325 MG/1; MG/1
1 TABLET ORAL EVERY 6 HOURS PRN
Qty: 90 TABLET | Refills: 0 | Status: SHIPPED | OUTPATIENT
Start: 2021-03-15 | End: 2021-01-01 | Stop reason: SDUPTHER

## 2021-04-05 NOTE — TELEPHONE ENCOUNTER
From: Xiang Saba  To: DENIZ Muñiz  Sent: 4/3/2021 2:14 PM CDT  Subject: Test Results Question    My daughter wont be home until 46 for the april 16 appt. Schedule at that time.

## 2021-04-05 NOTE — PROGRESS NOTES
Clinic Documentation      Education Provided:  [x] Review of Tomasa Robles  [] Agreement Review  [x] PEG Score Calculated [x] PHQ Score Calculated [x] ORT Score Calculated    [] Compliance Issues Discussed [] Cognitive Behavior Needs [x] Exercise [] Review of Test [] Financial Issues  [x] Tobacco/Alcohol Use Reviewed [x] Teaching [] New Patient [] Picture Obtained    Physician Plan:  [] Outgoing Referral  [] Pharmacy Consult  [] Test Ordered [] Prescription Ordered/Changed   [] Obtained Test Results / Consult Notes        Complete if patient is withholding blood thinner for procedure     Blood Thinner Patient is currently taking:      [] Plavix (Hold for 7 days)  [] Aspirin (Hold for 5 days)     [] Pletal (Hold for 2 days)  [] Pradaxa (Hold for 3 days)    [] Effient (Hold for 7 days)  [] Xarelto (Hold for 2 days)    [] Eliquis (Hold for 2 days)  [] Brilinta (Hold for 7 days)    [] Coumadin (Hold for 5 days) - (INR needs to be drawn the day prior to procedure- INR < 2.0)    [] Aggrenox (Hold for 7 days)        [] Patient will stop medication on their own.    [] Blood Thinner Form Faxed for approval to hold.    Provider form faxed to:     Assessment Completed by:  Electronically signed by Zack Molina on 4/5/2021 at 10:36 AM

## 2021-04-05 NOTE — PROGRESS NOTES
severe muscle spasms    Deafness congenital     Depression     Hypertension     Memory loss     early    Myasthenia ([address]     Neck pain     Osteoarthritis     Ptosis of eyelid     PVD (peripheral vascular disease) ([address]     Weakness of face muscles        Surgery History  Past Surgical History:   Procedure Laterality Date    APPENDECTOMY      CARPAL TUNNEL RELEASE      CATARACT REMOVAL      COLONOSCOPY  7-2010    COLONOSCOPY  2000 or before    [place]-normal per patient    COLONOSCOPY N/A 5/29/2020    Dr Korina Jaramillo, suboptimal prep, internal hemorrhoids-Grade 1, prn (age)   [facility] EYE SURGERY Bilateral     cataract extraction    HERNIA REPAIR      on back of neck    HYSTERECTOMY      MD COLONOSCOPY FLX DX W/COLLJ SPEC WHEN PFRMD N/A 7/6/2017    Dr Samira Son diverticulosis, 5 yr recall    MD EGD TRANSORAL BIOPSY SINGLE/MULTIPLE N/A 7/6/2017    Dr Avila Brown, [place] (-)    TERRI AND BSO      UPPER GASTROINTESTINAL ENDOSCOPY N/A 5/29/2020    Dr Winnie Kenyon hiatal hernia, antral gastritis,     VARICOSE VEIN SURGERY      VASCULAR SURGERY  02/19/2018    [facility]. Aortogram and runoff. Left common femoral artery, 5 french sheath. Allergies  Patient has no known allergies. Current Medications  Current Outpatient Medications   Medication Sig Dispense Refill    amLODIPine-benazepril (LOTREL) 5-10 MG per capsule TAKE 1 CAPSULE BY MOUTH ONCE DAILY FOR BLOOD PRESSURE 90 capsule 0    HYDROcodone-acetaminophen (NORCO) 7.5-325 MG per tablet Take 1 tablet by mouth every 6 hours as needed for Pain for up to 30 days.  90 tablet 0    Ferrous Sulfate (IRON) 325 (65 Fe) MG TABS Take 325 mg by mouth daily 30 tablet 3    fluticasone (FLONASE) 50 MCG/ACT nasal spray 2 sprays by Each Nostril route daily 1 Bottle 5    FLUoxetine (PROZAC) 20 MG capsule TAKE 1 CAPSULE BY MOUTH ONCE DAILY FOR ANXIETY AND FOR DEPRESSION 90 capsule 3    Donepezil HCl (ARICEPT) 23 MG TABS tablet Take 1 tablet by mouth nightly For dementia 90 tablet 3    amitriptyline (ELAVIL) 75 MG tablet 1 tablet by mouth at bedtime for sleep 90 tablet 3    hydrOXYzine (VISTARIL) 25 MG capsule Take 25 mg by mouth 3 times daily as needed for Itching      nitroGLYCERIN (NITROSTAT) 0.4 MG SL tablet Place 1 tablet under the tongue every 5 minutes as needed for Chest pain up to max of 3 total doses. If no relief after 1 dose, call 911. 25 tablet 3    Misc. Devices (ROLLER WALKER) MISC 1 each by Does not apply route daily DX Code: R26.81, G70.00, H81.313, G60.9 1 each 0     No current facility-administered medications for this encounter. Social History    Social History     Tobacco Use    Smoking status: Never Smoker    Smokeless tobacco: Never Used   Substance Use Topics    Alcohol use: No         Family History  family history includes Cancer in her father; Colon Polyps in her sister; Hearing Loss in her brother and brother; High Blood Pressure in her mother; Eljohn Shazia in her father; Stroke in her mother. Review of Systems:  Constitutional: denies fever, chills, fatigue, change in appetite, weight gain or weight loss  Head: Normocephalic  Skin: denies easy bruising, skin redness, skin rash, hives, sensitivity to sun exposure, tightness, nodules or bumps, hair loss, color changes in the hands or feet with cold. Eyes: denies pain, redness, loss of vision, double or blurred vision, eye drainage, or dryness. ENT and Mouth: denies ringing in the ears, loss of hearing, nasal congestion, nasal discharge, no hoarseness, sore throat, or difficulty swallowing   Respiratory: denies chronic dry cough, coughing up blood, coughing up mucus, waking at night coughing or choking, or wheezing.    Cardiovascular: denies chest pain, irregular heartbeats, palpitations, shortness of breath, or edema in legs  Gastrointestinal: denies, nausea, vomiting, heartburn, diarrhea, or constipation  Genitourinary: denies difficult urination, pain or burning with urination, blood in the urine, or cloudy urine  Musculoskeletal: denies arm, buttock, thigh or calf cramps. Has pain in neck, bilateral shoulders and low back, muscle spasms in neck and tenderness in neck, bilateral shoulders and low back. No muscle weakness. No joint swelling. Neurologic: headache, dizziness, fainting, loss of consciousness, no memory loss. No sensitivity. Endocrine: denies intolerance to hot or cold temperature, night sweats, flushing, fingernail changes, increased thirst, or hairloss   Hematologic/ Lymphatic: denies anemia, bleeding tendency or clotting tendency, bruising easily. Allergic/ Immunologic: denies rhinitis, asthma, skin sensitivity, or allergy to Latex   Psychiatric: denies depression or thoughts of suicide, or voices in head. Current Pain Assessment:   Pain Assessment  Pain Assessment: 0-10  Pain Level: 6  Patient's Stated Pain Goal: 3  Pain Type: Chronic pain  Pain Location: Shoulder    Clinical Progression: gradually improving  Effect of Pain on Daily Activities: limits activity  Patient's Stated Pain Goal: No pain  Pain Intervention(s): Medication (see eMar), Repositioning, Rest, Ice    Current PEG: 10    ORT Score: 4    PHQ-9 Score: 13    Physical Exam:    Vitals:    04/05/21 1053   BP: (!) 92/54   Pulse: 82   Temp: 97 °F (36.1 °C)   TempSrc: Temporal   SpO2: 94%   Weight: 130 lb (59 kg)   Height: 5' 8\" (1.727 m)       Body mass index is 19.77 kg/m². General Appearance: no acute distress. Appears to be well dressed  Skin Exam: Warm and dry, no jaundice, rashes or lesions  Head Exam: NCAT, PERRLA, EOMI, moist mucus membranes, scalp normal  Neck Exam: Supple, no masses palpated, trachea midline. Pain in neck with flexion and extension of head  Lung: Clear to ausculation in all lobes anterior and posterior. Heart: Regular rate and rhythm, no gallops, rubs or murmurs, no edema  Abdomen:  Bowel sounds in all quadrants, soft, non-distended, non-tender with palpation, no guarding  Extremities: No rash, cyanosis or bruising  Musculoskeletal: No joint swelling or deformity   Back Exam: Positive Nick's bilateral  Hip Exam: Full rotation bilateral   Knee Exam: Full flexion and extension bilateral, no crepitus  Shoulder Exam: limited ROM of bilateral shoulders due to pain. Neurologic Exam: Gait and coordination normal, speech normal  Reflexes: Normal brachialis, Negative Sanches's bilateral. Normal Patellar bilateral,   CN EXAM: II-XII intact, face symmetrical, tongue symmetrical, the trapezius and sternocleidomastoid muscle appearance and strength symmetrical, normal achilles bilateral, ankle clonus negative bilateral  Strength: 5/5 RUE Bi's/Tri's, 5/5 LUE Bi's/Tri's, 5/5 RLE knee flex/ext, 5/5 RLE DF/PF, 5/5 LLE knee flex/ext, 5/5 LLE DF/PF  Sensation: Equal and intact to fine touch in all extremities  Mood and affect: Normal   Nurses note reviewed along with current vital signs    Active Problem(s)  Active Problems:    Neck pain    Cervical radiculopathy    Chronic pain of both shoulders    Sacroiliac joint dysfunction of both sides  Resolved Problems:    * No resolved hospital problems. *                                                                                                                            PLAN:  1. Patient is to call the office with any questions or concerns that may arise prior to next appointment. 2. Schedule patient for bilateral shoulder injections  3. 1 week after the above schedule patient for BHUPINDER level C3-C4  4. 2 weeks after the above schedule patient for bilateral SI joint injections    Patient does not take ASA products and is not on any blood thinners    Urine Drug Screen Current/Today:  Yes  []  No [x]     Discussion:  Discussed exam findings and plan of care with patient. Patient agreed with the current plan of care at this time. All questions from the patient were answered by the provider.     Activity:   Discussed exercise as beneficial to pain reduction, encouraged stretching exercise with a focus on torso strengthening. Education Provided:  Review of Stephanie Daniella [x] Agreement Review [x]  Reviewed PHQ-9  [x]    Review of Test [] Compliance Issues Discussed []   Cognitive Behavior Needs [] Exercise [x]  Financial Issues []   Tobacco/Alcohol Use [] Teaching  [x]     Goal:  Pain Management Goals of Therapy:   []        Resolution in pain  [x]        Decrease in pain level  [x]        Improvement in ADL's  [x]        Increase in activities with less pain  [x]        Decrease in medication      [] Benzodiazapine's and Narcotics:  Patient educated on the possible effects of combining Benzodiazapine's and Opioids. Explained \"Black Box Warnings\" such as; possible suppressed breathing, hypoxia, anoxia, depressed cognition, heart arrhythmia, coma and possible death. Patient verbalized understanding concerning possible effects. Controlled Substance Monitoring:  Attestation: The OLENA report for this patient was reviewed today. Discussed with patient possible medication side effects, risk of tolerance, dependence and alternative treatments. Discussed the growing epidemic in the [place] with the overprescribing and at times the abuse of narcotics. Discussed the detrimental effects of long term narcotic use. Patient encouraged to set daily goals of exercising and decreasing daily narcotic intake. Discussed with the patient about the development of hyperalgesia with long term narcotic intake. EMR dragon/transcription disclaimer: Much of this encounter note is electronic transcription/translation of spoken language to printed tach. Electronic translation of spoken language may be erroneous, or at times, nonsensical words or phrases may be inadvertently transcribed.  Although, I have reviewed the note for such errors, some may still exist.     CC:  MD Janine Read, DENIZ, 4/5/2021 at 11:25 AM

## 2021-04-15 NOTE — PROGRESS NOTES
Procedure:  Level of Consciousness: [x]Alert [x]Oriented []Disoriented []Lethargic  Anxiety Level: [x]Calm []Anxious []Depressed []Other  Skin: [x]Warm [x]Dry []Cool []Moist []Intact []Other  Cardiovascular: [x]Palpitations: [x]Never []Occasionally []Frequently  Chest Pain: [x]No []Yes  Respiratory:  [x]Unlabored []Labored []Cough ([] Productive []Unproductive)  HCG Required: [x]No []Yes   Results: []Negative []Positive  Knowledge Level:        [x]Patient/Other verbalized understanding of pre-procedure instructions. [x]Assessment of post-op care needs (transportation, responsible caregiver)        [x]Able to discuss health care problems and how to deal with it. Factors that Affect Teaching:        Language Barrier: []No [x]Yes - why: Pt. Is deaf and comminicates with sign language.         Hearing Loss:        []No [x]Yes            Corrective Device:  []Yes [x]No        Vision Loss:           []No [x]Yes            Corrective Device:  [x]Yes []No        Memory Loss:       [x]No []Yes            []Short Term []Long Term  Motivational Level:  [x]Asks Questions                  []Extremely Anxious       [x]Seems Interested               []Seems Uninterested                  [x]Denies need for Education  Risk for Injury:  [x]Patient oriented to person, place and time  []History of frequent falls/loss of balance  Nutritional:  []Change in appetite   []Weight Gain   []Weight Loss  Functional:  []Requires assistance with ADL's

## 2021-04-15 NOTE — PROCEDURES
[facility] Physical and Pain Medicine        Patient Name: Angela Garcia    : 1945    Age: 67 y.o. Sex: female    Date: April 15, 2021    Preop Diagnosis: Osteoarthritis of  [] Right  []  Left  [x]  Bilateral Shoulder(s)    Postop Diagnosis: Osteoarthritis of [] Right  []  Left  [x]  Bilateral Shoulder(s)    Procedure: Steroid Injection of  [] Right  []  Left   [x]  Bilateral Shoulder(s)    Performing Procedure:  Elly Frost, MSN, APRN, FNP-C    Previously Had Procedure:   [x] Yes   []  No    Patient Vitals for the past 24 hrs:   BP Temp Temp src Pulse Resp SpO2   04/15/21 0804 136/74 97.5 °F (36.4 °C) Temporal 66 18 97 %       Description of Procedure:    After a brief physical assessment and failure to improve with conservative measures the patient presented for an injection of the [] Right  [] Left   [x] Bilateral Shoulder(s). The indications, limitations and possible complications were discussed with the patient and the patient elected to proceed with the procedure. [x] Right Shoulder    After voluntary, informed and signed consent obtained the patient was placed in a sitting position with the patients right arm placed to the side and elbow flexed at 90 degrees. Appropriate time out was obtained per policy. The proposed injection site was palpated at the point of maximal tenderness [] Anterior  [x] Posterior [] Lateral and the skin over the proposed injection entry point was marked. The skin was then sprayed with Gebauer's Solution while protecting patients eyes and prepped in a sterile fashion with Prevantics swab. Under sterile technique a 22 gauge 1 1/2 inch needle was introduced and after a negative aspiration a solution of 1 ml of 0.5% Marcaine Plain, 1 ml of 1% Lidocaine Plain and       [x] 1 ml of Kenalog (40mg/ml)   [] Toradol 30 mg/ml was injected. The needle was withdrawn and a sterile dressing applied.     [x] Left Shoulder     After voluntary, informed and signed consent obtained the patient was placed in a sitting position with the patients left arm placed to the side and elbow flexed at 90 degrees. Appropriate time out was obtained per policy. The proposed injection site was palpated at the point of maximal tenderness  [] Anterior  [x] Posterior [] Lateral and the skin over the proposed injection entry point was marked. The skin was then sprayed with Gebauer's Solution while protecting patients eyes and prepped in a sterile fashion with Prevantics swab. Under sterile technique a 22 gauge 1 1/2 inch needle was introduced and after a negative aspiration a solution of 1 ml of 0.5% Marcaine Plain, 1 ml of 1% Lidocaine Plain and     [x] 1 ml of Kenalog (40mg/ml)   [] Toradol 30 mg/ml was injected. The needle was withdrawn and a sterile dressing applied. Discharge: The patient tolerated the procedure well. There were no complications during the procedure and the patient was discharged home with discharge instructions. The patient has been instructed to contact the office should there be any complications or questions to arise between today and their next appointment.     Plan:  [x] Will return to the office in   [] 1 month  [] 4 - 6 weeks   [x] 2 months   []  3 months for:  [] Planned Procedure  [x] Procedure Follow-up   [] Office Visit      DENIZ Fitzgerald, 9/5/2019 at 2:37 PM

## 2021-04-16 NOTE — PROGRESS NOTES
Yrn Fabian is a 67 y.o. female evaluated via telephone on 2021. Jayashree Jackson (:  1945) is a 67 y.o. female,Established patient, here for evaluation of the following chief complaint(s):     Consent:  She and/or health care decision maker is aware that that she may receive a bill for this telephone service, depending on her insurance coverage, and has provided verbal consent to proceed: Yes    Patient is located at home  Provider is located at [facility]   Also present during call is interpretor    She presents for follow up of carotid artery stenosis. She has a known history of carotid artery stenosis for 1 - 5 years. Her current treatment includes none. She denies a history of CVA. She reports no TIA's, episodes of lateralizing weakness and episodes of amaurosis fugax.         Yrn Fabian is a 67 y.o. female with the following history reviewed and recorded in [facility]:  Patient Active Problem List    Diagnosis Date Noted    Sacroiliac joint dysfunction of both sides 2020    Chronic pain of both shoulders 2020    Anemia 2020    Chronic nausea 2020    Deafness 2020    Lumbar radiculopathy 2019    Cervical radiculopathy 2019    Facet hypertrophy 10/07/2019    Recurrent major depressive disorder, in full remission ([facility] 2019    Bilateral carotid artery stenosis 2019    Tear film insufficiency 2018    Monocular exotropia 2018    Posterior capsular opacification, not obscuring vision 2018    Pseudophakia 2018    Unsteady gait 08/15/2017    Myasthenia gravis without acute exacerbation ([facility] 2017    Vertigo, aural 2017    Chronic tension-type headache, intractable 2017    Idiopathic peripheral neuropathy 2017    Epigastric pain 2017    Alternating constipation and diarrhea 2017    Family history of colonic polyps 2017    Chronic insomnia 06/10/2017    Ectatic abdominal aorta ([facility] 07/13/2016    Peripheral vascular disease ([facility] 01/12/2016    Venous (peripheral) insufficiency 01/12/2016     Updating Deprecated Diagnoses      Essential hypertension 12/07/2015    Descending thoracic aortic aneurysm (HCC) 11/04/2013    Osteoarthritis     Memory loss     Neck pain     Deafness congenital      Current Outpatient Medications   Medication Sig Dispense Refill    HYDROcodone-acetaminophen (NORCO) 7.5-325 MG per tablet Take 1 tablet by mouth every 6 hours as needed for Pain for up to 30 days. 90 tablet 0    amLODIPine-benazepril (LOTREL) 5-10 MG per capsule TAKE 1 CAPSULE BY MOUTH ONCE DAILY FOR BLOOD PRESSURE 90 capsule 0    Ferrous Sulfate (IRON) 325 (65 Fe) MG TABS Take 325 mg by mouth daily 30 tablet 3    fluticasone (FLONASE) 50 MCG/ACT nasal spray 2 sprays by Each Nostril route daily 1 Bottle 5    FLUoxetine (PROZAC) 20 MG capsule TAKE 1 CAPSULE BY MOUTH ONCE DAILY FOR ANXIETY AND FOR DEPRESSION 90 capsule 3    Donepezil HCl (ARICEPT) 23 MG TABS tablet Take 1 tablet by mouth nightly For dementia 90 tablet 3    amitriptyline (ELAVIL) 75 MG tablet 1 tablet by mouth at bedtime for sleep 90 tablet 3    hydrOXYzine (VISTARIL) 25 MG capsule Take 25 mg by mouth 3 times daily as needed for Itching      nitroGLYCERIN (NITROSTAT) 0.4 MG SL tablet Place 1 tablet under the tongue every 5 minutes as needed for Chest pain up to max of 3 total doses. If no relief after 1 dose, call 911. 25 tablet 3    Misc. Devices (ROLLER WALKER) MISC 1 each by Does not apply route daily DX Code: R26.81, G70.00, H81.313, G60.9 1 each 0     No current facility-administered medications for this visit.       Facility-Administered Medications Ordered in Other Visits   Medication Dose Route Frequency Provider Last Rate Last Admin    bupivacaine (PF) (MARCAINE) 0.5 % injection 10 mg  2 mL Intradermal Once Laura G DENIZ Chapman        lidocaine PF 1 % injection 2 mL  2 mL Intradermal Once Laura Maryellen Schooling, APRN        triamcinolone acetonide (KENALOG-40) injection 80 mg  80 mg Intra-articular Once Laura Maryellen Schooling, APRN         Allergies: Patient has no known allergies. Past Medical History:   Diagnosis Date    Anemia     Anxiety     Chronic back pain     severe muscle spasms    Deafness congenital     Depression     Hypertension     Memory loss     early    Myasthenia ([address]     Neck pain     Osteoarthritis     Ptosis of eyelid     PVD (peripheral vascular disease) (HCC)     Weakness of face muscles      Past Surgical History:   Procedure Laterality Date    APPENDECTOMY      CARPAL TUNNEL RELEASE      CATARACT REMOVAL      COLONOSCOPY  7-2010    COLONOSCOPY  2000 or before    [place]-normal per patient    COLONOSCOPY N/A 5/29/2020    Dr Judah Martins, suboptimal prep, internal hemorrhoids-Grade 1, prn (age)   [facility] EYE SURGERY Bilateral     cataract extraction    HERNIA REPAIR      on back of neck    HYSTERECTOMY      WI COLONOSCOPY FLX DX W/COLLJ SPEC WHEN PFRMD N/A 7/6/2017    Dr Maranda Domínguez diverticulosis, 5 yr recall    [place] EGD TRANSORAL BIOPSY SINGLE/MULTIPLE N/A 7/6/2017    Dr Deepthi Blackburn, [place] (-)    TERRI AND BSO      UPPER GASTROINTESTINAL ENDOSCOPY N/A 5/29/2020    Dr Callie Castellanos hiatal hernia, antral gastritis,     VARICOSE VEIN SURGERY      VASCULAR SURGERY  02/19/2018    [facility]. Aortogram and runoff. Left common femoral artery, 5 [place] sheath.      Family History   Problem Relation Age of Onset    High Blood Pressure Mother     Stroke Mother     Cancer Father     Lung Cancer Father     Hearing Loss Brother         deaf   [facility] Hearing Loss Brother         deaf    Colon Polyps Sister     Colon Cancer Neg Hx     Esophageal Cancer Neg Hx     Liver Cancer Neg Hx     Liver Disease Neg Hx     Stomach Cancer Neg Hx     Rectal Cancer Neg Hx      Social History     Tobacco Use    Smoking status: Never Smoker    Patient who has not had a related appointment within my department in the past 7 days or scheduled within the next 24 hours. Patient identification was verified at the start of the visit: Yes    Total Time: minutes: 11-20 minutes    The visit was conducted pursuant to the emergency declaration under the [facility], [address] [place] [address] authority and the [employer] Act. Patient identification was verified, and a caregiver was present when appropriate. The patient was located in a state where the provider was credentialed to provide care.     Note: not billable if this call serves to triage the patient into an appointment for the relevant concern      [facility]

## 2021-04-27 NOTE — INTERVAL H&P NOTE
Update History & Physical    The patient's History and Physical  was reviewed with the patient and I examined the patient. There was NO CHANGE:[id number]}. The surgical site was confirmed by the patient and me. Plan: The risks, benefits, expected outcome, and alternative to the recommended procedure have been discussed with the patient. Patient understands and wants to proceed with the procedure.      Electronically signed by John Sewell MD on 4/27/2021 at 2:00 PM

## 2021-04-27 NOTE — PROGRESS NOTES
Procedure:  Level of Consciousness: [x]Alert [x]Oriented []Disoriented []Lethargic  Anxiety Level: [x]Calm []Anxious []Depressed []Other  Skin: [x]Warm [x]Dry []Cool []Moist []Intact []Other  Cardiovascular: [x]Palpitations: [x]Never []Occasionally []Frequently  Chest Pain: [x]No []Yes  Respiratory:  [x]Unlabored []Labored []Cough ([] Productive []Unproductive)  HCG Required: [x]No []Yes   Results: []Negative []Positive  Knowledge Level:        [x]Patient/Other verbalized understanding of pre-procedure instructions. [x]Assessment of post-op care needs (transportation, responsible caregiver)        [x]Able to discuss health care problems and how to deal with it. Factors that Affect Teaching:        Language Barrier: []No [x]Yes - why: Pt. Is deaf, Daughter is with her.         Hearing Loss:        []No [x]Yes            Corrective Device:  []Yes [x]No        Vision Loss:           []No [x]Yes            Corrective Device:  [x]Yes []No        Memory Loss:       [x]No []Yes            []Short Term []Long Term  Motivational Level:  [x]Asks Questions                  []Extremely Anxious       [x]Seems Interested               []Seems Uninterested                  []Denies need for Education  Risk for Injury:  [x]Patient oriented to person, place and time  []History of frequent falls/loss of balance  Nutritional:  []Change in appetite   []Weight Gain   []Weight Loss  Functional:  []Requires assistance with ADL's

## 2021-05-13 NOTE — TELEPHONE ENCOUNTER
I CALLED THE PATIENT ON 5/5/2021 AT 1050 AS A FOLLOW UP FROM HER CERVICAL EPIDURAL STEROID INJECTION THAT SHE HAD ON 4/27/2021. SHE REPORTED THAT IT FELT GOOD AND THE NECK AND FRAME PAIN HAS BEEN HELPED. HER PAIN SCORE IS A 6/10 AND SHE RECEIVED 50% PAIN RELIEF.
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
## 2021-05-17 NOTE — PROCEDURES
dressing applied. [x] Left Sacroiliac Joint    After voluntary, informed and signed consent obtained the patient was placed in the prone position. Appropriate time out was obtained per policy. The area of maximal tenderness was palpated over the Left Sacroiliac Joint and the skin overlying this area marked with a skin marker. The skin was then sprayed with Gebauer's Solution and prepped in a sterile fashion with Prevantics swab. The ultrasound transducer was brought in and the Left Sacroiliac Joint was identified with ultrasound. Under sterile technique and direct ultrasound visualization a 22 gauge 3 inch spinal needle was introduced into the Left Sacroiliac Joint. After a negative aspiration a solution of    [x] Kenalog 1 ml (40mg/ml), 1 ml of 0.5% Marcaine Plain and 1 ml of 1% Lidocaine Plain     [] Toradol 1 ml (30 mg/ml), 1 ml of 0.5% Marcaine Plain and 1 ml of 1% Lidocaine Plain      was injected into the Left Sacroiliac Joint. The needle was withdrawn and a sterile dressing applied. Discharge: The patient tolerated the procedure well. There were no complications during the procedure and the patient was discharged home with discharge instructions. The patient has been instructed to contact the office should there be any complications or questions to arise between today and their next appointment.     Plan:  [x] Will return to the office in   [] 1 month  [x] 4 - 6 weeks  [] 2 months  [] 3 months for:  [] Planned Procedure   [x] Procedure Follow-up    [] Office Visit      1 [facility], 5/16/2021 at 7:52 PM

## 2021-05-20 NOTE — TELEPHONE ENCOUNTER
I CALLED THE PATIENT ON 5/19/2021 AT 1351 AS A FOLLOW UP FROM THE BILATERAL SACROILIAC JOINT INJECTION THAT SHE HAD ON 5/13/2021. THE PATIENT DID NOT ANSWER SO I LEFT A MESSAGE.
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
## 2021-06-23 NOTE — PROGRESS NOTES
[facility]    Office Progress Note    Patient Name: Luly Lewis    MR #: [id number]    Account #: [de-identified]    : 1945    Age: 67 y.o. Sex: female    Date: 2021    PCP: Danielle Ambrocio MD         Referring Provider:    Chief Complaint:   Chief Complaint   Patient presents with    Shoulder Pain     bilateral       History of Present Illness:    Luly Lewis is a 67 y.o. female who presents to the office for follow-up of BHUPINDER level C3-C4, bilateral shoulder injections and bilateral SI joint injections. She has daughter and  with her. Says that she obtained 80% relief for 6 weeks from the injections and is wanting each of them repeated. She continues to take an occasional Norco that is prescribed to her by her PCP and does get some relief. Last pain management HPI note read    Employment: Retired []   Disabled  []   Works []    Does Not Work [x]     Previous Injury:  Yes  []   No [x]     Previous Surgery: Yes []   No [x]     Previous Physical Therapy In the last 6 months? Yes  []     No [x]   Did Physical Therapy make thepain better or worse? Better []   Worse []  Unchanged []    MRI in the last two years? Yes [x]  No []   Results reviewed with patient? Yes []   No []    CT Scan in the last two years? Yes  []   No [x]   Results reviewed with patient? Yes []   No []    X-ray in the last two years? Yes [x]   No  []  Results reviewed with patient? Yes  []  No []    Injections in the past?  Yes [x]   No []   Did the injections help relieve the pain? Yes []   No []     Do you have Depression? Yes  []    No [x]   Thinking of harming yourself or others?   Yes  []   No [x]     Past Medical Histoy  Past Medical History:   Diagnosis Date    Anemia     Anxiety     Chronic back pain     severe muscle spasms    Deafness congenital     Depression     Hypertension     Memory loss     early    Myasthenia ([facility] [address]     Neck pain     Osteoarthritis     Ptosis of eyelid     PVD (peripheral vascular disease) (HCC)     Weakness of face muscles        Surgery History  Past Surgical History:   Procedure Laterality Date    APPENDECTOMY      CARPAL TUNNEL RELEASE      CATARACT REMOVAL      COLONOSCOPY  7-2010    COLONOSCOPY  2000 or before    [place]-normal per patient    COLONOSCOPY N/A 5/29/2020    Dr Martin Gill, suboptimal prep, internal hemorrhoids-Grade 1, prn (age)   [place] [facility] EYE SURGERY Bilateral     cataract extraction    HERNIA REPAIR      on back of neck    HYSTERECTOMY      MO COLONOSCOPY FLX DX W/COLLJ SPEC WHEN PFRMD N/A 7/6/2017    Dr Navin Chacon diverticulosis, 5 yr recall    MO EGD TRANSORAL BIOPSY SINGLE/MULTIPLE N/A 7/6/2017    Dr Blaire Hollingsworth, [place] (-)    TERRI AND BSO      UPPER GASTROINTESTINAL ENDOSCOPY N/A 5/29/2020    Dr Annia Orta hiatal hernia, antral gastritis,     VARICOSE VEIN SURGERY      VASCULAR SURGERY  02/19/2018    [facility]. Aortogram and runoff. Left common femoral artery, 5 french sheath. Allergies  Patient has no known allergies. Current Medications  Current Outpatient Medications   Medication Sig Dispense Refill    amLODIPine (NORVASC) 5 MG tablet Take 1 tablet by mouth daily At bedtime for high blood pressure 30 tablet 5    hydrOXYzine (ATARAX) 10 MG tablet TAKE 1 TABLET BY MOUTH THREE TIMES DAILY AS NEEDED FOR ITCHING 60 tablet 0    HYDROcodone-acetaminophen (NORCO) 7.5-325 MG per tablet Take 1 tablet by mouth every 6 hours as needed for Pain for up to 30 days.  90 tablet 0    Ferrous Sulfate (IRON) 325 (65 Fe) MG TABS Take 325 mg by mouth daily 30 tablet 3    fluticasone (FLONASE) 50 MCG/ACT nasal spray 2 sprays by Each Nostril route daily 1 Bottle 5    FLUoxetine (PROZAC) 20 MG capsule TAKE 1 CAPSULE BY MOUTH ONCE DAILY FOR ANXIETY AND FOR DEPRESSION 90 capsule 3    Donepezil HCl (ARICEPT) 23 MG TABS tablet Take 1 tablet by mouth nightly For dementia 90 tablet 3    amitriptyline (ELAVIL) 75 MG tablet 1 tablet by mouth at bedtime for sleep 90 tablet 3    hydrOXYzine (VISTARIL) 25 MG capsule Take 25 mg by mouth 3 times daily as needed for Itching      nitroGLYCERIN (NITROSTAT) 0.4 MG SL tablet Place 1 tablet under the tongue every 5 minutes as needed for Chest pain up to max of 3 total doses. If no relief after 1 dose, call 911. 25 tablet 3    Misc. Devices (ROLLER WALKER) MISC 1 each by Does not apply route daily DX Code: R26.81, G70.00, H81.313, G60.9 1 each 0     No current facility-administered medications for this encounter. Social History    Social History     Tobacco Use    Smoking status: Never Smoker    Smokeless tobacco: Never Used   Substance Use Topics    Alcohol use: No         Family History  family history includes Cancer in her father; Colon Polyps in her sister; Hearing Loss in her brother and brother; High Blood Pressure in her mother; Retia Ganser in her father; Stroke in her mother. Review of Systems:  Constitutional: denies fever, chills, fatigue, change in appetite, weight gain or weight loss  Head: Normocephalic  Skin: denies easy bruising, skin redness, skin rash, hives, sensitivity to sun exposure, tightness, nodules or bumps, hair loss, color changes in the hands or feet with cold. Eyes: denies pain, redness, loss of vision, double or blurred vision, eye drainage, or dryness. ENT and Mouth: denies ringing in the ears, loss of hearing, nasal congestion, nasal discharge, no hoarseness, sore throat, or difficulty swallowing   Respiratory: denies chronic dry cough, coughing up blood, coughing up mucus, waking at night coughing or choking, or wheezing.    Cardiovascular: denies chest pain, irregular heartbeats, palpitations, shortness of breath, or edema in legs  Gastrointestinal: denies, nausea, vomiting, heartburn, diarrhea, or constipation  Genitourinary: denies difficult urination, pain or burning with urination, blood in the urine, or cloudy urine  Musculoskeletal: denies arm, buttock, thigh or calf cramps. Has pain in neck, bilateral shoulders and low back, muscle spasms in neck and tenderness in neck, bilateral shoulders and low back. No muscle weakness. No joint swelling. Neurologic: headache, dizziness, fainting, loss of consciousness, no memory loss. No sensitivity. Endocrine: denies intolerance to hot or cold temperature, night sweats, flushing, fingernail changes, increased thirst, or hairloss   Hematologic/ Lymphatic: denies anemia, bleeding tendency or clotting tendency, bruising easily. Allergic/ Immunologic: denies rhinitis, asthma, skin sensitivity, or allergy to Latex   Psychiatric: denies depression or thoughts of suicide, or voices in head. Current Pain Assessment:   Pain Assessment  Pain Assessment: 0-10  Pain Level: 8  Patient's Stated Pain Goal: 4  Pain Type: Chronic pain  Pain Location: Shoulder    Clinical Progression: gradually improving  Effect of Pain on Daily Activities: limits activity  Patient's Stated Pain Goal: No pain  Pain Intervention(s): Medication (see eMar), Repositioning, Rest, Ice    Current PE    ORT Score: 4    PHQ-9 Score: 13    Physical Exam:    Vitals:    21 0813   BP: (!) 144/84   Pulse: 70   Temp: 97 °F (36.1 °C)   TempSrc: Temporal   SpO2: 94%   Weight: 121 lb (54.9 kg)   Height: 5' 8\" (1.727 m)       Body mass index is 18.4 kg/m². General Appearance: no acute distress. Appears to be well dressed  Skin Exam: Warm and dry, no jaundice, rashes or lesions  Head Exam: NCAT, PERRLA, EOMI, moist mucus membranes, scalp normal  Neck Exam: Supple, no masses palpated, trachea midline. Pain in neck with flexion and extension of head  Lung: Clear to ausculation in all lobes anterior and posterior. Heart: Regular rate and rhythm, no gallops, rubs or murmurs, no edema  Abdomen:  Bowel sounds in all quadrants, soft, non-distended, non-tender with palpation, no guarding  Extremities: No rash, cyanosis or bruising  Musculoskeletal: No joint swelling or deformity   Back Exam: Positive Nick's bilateral  Hip Exam: Full rotation bilateral   Knee Exam: Full flexion and extension bilateral, no crepitus  Shoulder Exam: limited ROM of bilateral shoulders due to pain. Neurologic Exam: Gait and coordination normal, speech normal  Reflexes: Normal brachialis, Negative Sanches's bilateral. Normal Patellar bilateral,   CN EXAM: II-XII intact, face symmetrical, tongue symmetrical, the trapezius and sternocleidomastoid muscle appearance and strength symmetrical, normal achilles bilateral, ankle clonus negative bilateral  Strength: 5/5 RUE Bi's/Tri's, 5/5 LUE Bi's/Tri's, 5/5 RLE knee flex/ext, 5/5 RLE DF/PF, 5/5 LLE knee flex/ext, 5/5 LLE DF/PF  Sensation: Equal and intact to fine touch in all extremities  Mood and affect: Normal   Nurses note reviewed along with current vital signs    Active Problem(s)  Active Problems:    Cervical radiculopathy    Chronic pain of both shoulders    Sacroiliac joint dysfunction of both sides  Resolved Problems:    * No resolved hospital problems. *                                                                                                                            PLAN:  1. Patient is to call the office with any questions or concerns that may arise prior to next appointment. 2. Schedule patient for bilateral shoulder injections  3. Schedule patient for BHUPINDER level C3-C4  4. Schedule patient for bilateral SI joint injections    Patient does not take ASA products and is not on any blood thinners    Urine Drug Screen Current/Today:  Yes  []  No [x]     Discussion:  Discussed exam findings and plan of care with patient. Patient agreed with the current plan of care at this time. All questions from the patient were answered by the provider.     Activity:   Discussed exercise as beneficial to pain reduction, encouraged stretching exercise with a focus on torso strengthening. Education Provided:  Review of Patrecia  [x] Agreement Review [x]  Reviewed PHQ-9  [x]    Review of Test [] Compliance Issues Discussed []   Cognitive Behavior Needs [] Exercise [x]  Financial Issues []   Tobacco/Alcohol Use [] Teaching  [x]     Goal:  Pain Management Goals of Therapy:   []        Resolution in pain  [x]        Decrease in pain level  [x]        Improvement in ADL's  [x]        Increase in activities with less pain  [x]        Decrease in medication      [] Benzodiazapine's and Narcotics:  Patient educated on the possible effects of combining Benzodiazapine's and Opioids. Explained \"Black Box Warnings\" such as; possible suppressed breathing, hypoxia, anoxia, depressed cognition, heart arrhythmia, coma and possible death. Patient verbalized understanding concerning possible effects. Controlled Substance Monitoring:  Attestation: The OLENA report for this patient was reviewed today. Discussed with patient possible medication side effects, risk of tolerance, dependence and alternative treatments. Discussed the growing epidemic in the [place] with the overprescribing and at times the abuse of narcotics. Discussed the detrimental effects of long term narcotic use. Patient encouraged to set daily goals of exercising and decreasing daily narcotic intake. Discussed with the patient about the development of hyperalgesia with long term narcotic intake. EMR dragon/transcription disclaimer: Much of this encounter note is electronic transcription/translation of spoken language to printed tach. Electronic translation of spoken language may be erroneous, or at times, nonsensical words or phrases may be inadvertently transcribed.  Although, I have reviewed the note for such errors, some may still exist.     CC:  Guera Warner MD    [address] [place] [address], 6/23/2021 at 8:22 AM

## 2021-06-23 NOTE — PROGRESS NOTES
Clinic Documentation      Education Provided:  [x] Review of Shauna Barbosa  [] Agreement Review  [x] PEG Score Calculated [] PHQ Score Calculated [] ORT Score Calculated    [] Compliance Issues Discussed [] Cognitive Behavior Needs [x] Exercise [] Review of Test [] Financial Issues  [x] Tobacco/Alcohol Use Reviewed [x] Teaching [] New Patient [] Picture Obtained    Physician Plan:  [] Outgoing Referral  [] Pharmacy Consult  [] Test Ordered [] Prescription Ordered/Changed   [] Obtained Test Results / Consult Notes        Complete if patient is withholding blood thinner for procedure     Blood Thinner Patient is currently taking:      [] Plavix (Hold for 7 days)  [] Aspirin (Hold for 5 days)     [] Pletal (Hold for 2 days)  [] Pradaxa (Hold for 3 days)    [] Effient (Hold for 7 days)  [] Xarelto (Hold for 2 days)    [] Eliquis (Hold for 2 days)  [] Brilinta (Hold for 7 days)    [] Coumadin (Hold for 5 days) - (INR needs to be drawn the day prior to procedure- INR < 2.0)    [] Aggrenox (Hold for 7 days)        [] Patient will stop medication on their own.    [] Blood Thinner Form Faxed for approval to hold.    Provider form faxed to:   Assessment Completed by:  Electronically signed by Vladimir Wesley on 6/23/2021 at 8:02 AM

## 2021-07-06 NOTE — PROGRESS NOTES
Procedure:  Level of Consciousness: [x]Alert [x]Oriented []Disoriented []Lethargic  Anxiety Level: [x]Calm []Anxious []Depressed []Other  Skin: [x]Warm [x]Dry []Cool []Moist []Intact []Other  Cardiovascular: [x]Palpitations: [x]Never []Occasionally []Frequently  Chest Pain: [x]No []Yes  Respiratory:  [x]Unlabored []Labored []Cough ([] Productive []Unproductive)  HCG Required: [x]No []Yes   Results: []Negative []Positive  Knowledge Level:        [x]Patient/Other verbalized understanding of pre-procedure instructions. [x]Assessment of post-op care needs (transportation, responsible caregiver)        [x]Able to discuss health care problems and how to deal with it. Factors that Affect Teaching:        Language Barrier: []No [x]Yes - why: Pt. Is deaf inturpreter at bedside.         Hearing Loss:        []No [x]Yes            Corrective Device:  [x]Yes []No        Vision Loss:           []No [x]Yes            Corrective Device:  [x]Yes []No        Memory Loss:       [x]No []Yes            []Short Term []Long Term  Motivational Level:  [x]Asks Questions                  []Extremely Anxious       [x]Seems Interested               []Seems Uninterested                  []Denies need for Education  Risk for Injury:  [x]Patient oriented to person, place and time  []History of frequent falls/loss of balance  Nutritional:  []Change in appetite   []Weight Gain   []Weight Loss  Functional:  []Requires assistance with ADL's

## 2021-07-06 NOTE — INTERVAL H&P NOTE
Update History & Physical    The patient's History and Physical  was reviewed with the patient and I examined the patient. There was  NO [id number]}. The surgical site was confirmed by the patient and me. Plan: The risks, benefits, expected outcome, and alternative to the recommended procedure have been discussed with the patient. Patient understands and wants to proceed with the procedure.      Electronically signed by Alcira Castellon MD on 7/6/2021 at 12:33 PM

## 2021-07-22 NOTE — PROCEDURES
[facility]        Patient Name: Gemini Burris    : 1945    Age: 67 y.o. Sex: female    Date: 2021    Preop Diagnosis: Osteoarthritis of  [] Right  []  Left  [x]  Bilateral Shoulder(s)    Postop Diagnosis: Osteoarthritis of [] Right  []  Left  [x]  Bilateral Shoulder(s)    Procedure: Steroid Injection of  [] Right  []  Left   [x]  Bilateral Shoulder(s)    Performing Procedure:  Boom Zaldivar, MSN, APRN, FNP-C    Previously Had Procedure:   [x] Yes   []  No    Patient Vitals for the past 24 hrs:   BP Temp Temp src Pulse Resp SpO2   21 0900 126/69 96.4 °F (35.8 °C) Temporal (!) 6 18 93 %       Description of Procedure:    After a brief physical assessment and failure to improve with conservative measures the patient presented for an injection of the [] Right  [] Left   [x] Bilateral Shoulder(s). The indications, limitations and possible complications were discussed with the patient and the patient elected to proceed with the procedure. [] Right Shoulder    After voluntary, informed and signed consent obtained the patient was placed in a sitting position with the patients right arm placed to the side and elbow flexed at 90 degrees. Appropriate time out was obtained per policy. The proposed injection site was palpated at the point of maximal tenderness [] Anterior  [x] Posterior [] Lateral and the skin over the proposed injection entry point was marked. The skin was then sprayed with Gebauer's Solution while protecting patients eyes and prepped in a sterile fashion with Prevantics swab. Under sterile technique a 22 gauge 1 1/2 inch needle was introduced and after a negative aspiration a solution of 1 ml of 0.5% Marcaine Plain, 1 ml of 1% Lidocaine Plain and       [x] 1 ml of Kenalog (40mg/ml)   [] Toradol 30 mg/ml was injected. The needle was withdrawn and a sterile dressing applied.     [x] Left Shoulder     After voluntary, informed and signed consent obtained the patient was placed in a sitting position with the patients left arm placed to the side and elbow flexed at 90 degrees. Appropriate time out was obtained per policy. The proposed injection site was palpated at the point of maximal tenderness  [] Anterior  [x] Posterior [] Lateral and the skin over the proposed injection entry point was marked. The skin was then sprayed with Gebauer's Solution while protecting patients eyes and prepped in a sterile fashion with Prevantics swab. Under sterile technique a 22 gauge 1 1/2 inch needle was introduced and after a negative aspiration a solution of 1 ml of 0.5% Marcaine Plain, 1 ml of 1% Lidocaine Plain and     [x] 1 ml of Kenalog (40mg/ml)   [] Toradol 30 mg/ml was injected. The needle was withdrawn and a sterile dressing applied. Discharge: The patient tolerated the procedure well. There were no complications during the procedure and the patient was discharged home with discharge instructions. The patient has been instructed to contact the office should there be any complications or questions to arise between today and their next appointment.     Plan:  [x] Will return to the office in   [] 1 month  [x] 4 - 6 weeks   [] 2 months   []  3 months for:  [] Planned Procedure  [x] Procedure Follow-up   [] Office Visit      [facility], 9/5/2019 at 2:37 PM

## 2021-07-29 PROBLEM — H18.599 OTHER HEREDITARY CORNEAL DYSTROPHIES: Status: ACTIVE | Noted: 2019-04-04

## 2021-07-29 NOTE — PROGRESS NOTES
Lack of Transportation (Non-Medical):    Physical Activity:     Days of Exercise per Week:     Minutes of Exercise per Session:    Stress:     Feeling of Stress :    Social Connections:     Frequency of Communication with Friends and Family:     Frequency of Social Gatherings with Friends and Family:     Attends Buddhist Services:     Active Member of Clubs or Organizations:     Attends Club or Organization Meetings:     Marital Status:    Intimate Partner Violence:     Fear of Current or Ex-Partner:     Emotionally Abused:     Physically Abused:     Sexually Abused:        Review of Systems   Constitutional: Positive for activity change. HENT: Positive for hearing loss. Eyes: Positive for visual disturbance. Respiratory: Negative. Cardiovascular: Negative. Endocrine: Negative for polydipsia, polyphagia and polyuria. Genitourinary: Negative. Musculoskeletal: Positive for arthralgias, back pain and myalgias. Neurological: Negative for numbness. Hematological: Bruises/bleeds easily. Psychiatric/Behavioral: Positive for confusion, decreased concentration and sleep disturbance. The patient is nervous/anxious (occasionally). Current Outpatient Medications on File Prior to Visit   Medication Sig Dispense Refill    HYDROcodone-acetaminophen (NORCO) 7.5-325 MG per tablet Take 1 tablet by mouth every 6 hours as needed for Pain for up to 30 days. 90 tablet 0    prednisoLONE acetate (PRED FORTE) 1 % ophthalmic suspension       Ferrous Sulfate (IRON) 325 (65 Fe) MG TABS Take 325 mg by mouth daily 30 tablet 3    amLODIPine (NORVASC) 5 MG tablet Take 1 tablet by mouth daily At bedtime for high blood pressure 30 tablet 5    FLUoxetine (PROZAC) 20 MG capsule TAKE 1 CAPSULE BY MOUTH ONCE DAILY FOR ANXIETY AND FOR DEPRESSION 90 capsule 3    Donepezil HCl (ARICEPT) 23 MG TABS tablet Take 1 tablet by mouth nightly For dementia 90 tablet 3    Misc.  Devices (ROLLER [place]) MISC 1 each dry.   Neurological:      Mental Status: She is alert and oriented to person, place, and time. Psychiatric:         Mood and Affect: Mood normal.         Speech: Speech normal.         Thought Content: Thought content normal.      Comments: Affect slightly flat         ASSESSMENT:    1. Essential hypertension    2. Medication management    3. Toe pain, left    4. Fall, subsequent encounter    5. Deafness, unspecified laterality    6. Memory loss    7. Lumbar radiculopathy    8. Primary insomnia          PLAN:    MEDICATIONS:  Orders Placed This Encounter   Medications    amitriptyline (ELAVIL) 100 MG tablet     Sig: Take 1 tablet by mouth nightly For chronic insomnia     Dispense:  30 tablet     Refill:  3       ORDERS:  Orders Placed This Encounter   Procedures    XR FOOT LEFT (MIN 3 VIEWS)    Comprehensive Metabolic Panel    CBC    POCT Rapid Drug Screen     Results for POC orders placed in visit on 07/29/21   POCT Rapid Drug Screen   Result Value Ref Range    Alcohol, Urine N     Amphetamine Screen, Urine n     Barbiturate Screen, Urine n     Benzodiazepine Screen, Urine n     Buprenorphine Urine n     Cocaine Metabolite Screen, Urine n     FENTANYL SCREEN, URINE n     Gabapentin Screen, Urine n     MDMA, Urine n     Methadone Screen, Urine n     Methamphetamine, Urine n     Opiate Scrn, Ur POS     Oxycodone Screen, Ur n     PCP Screen, Urine n     Propoxyphene Screen, Urine n     Synthetic Cannabinoids (K2) Screen, Urine n     THC Screen, Urine n     Tramadol Scrn, Ur n     Tricyclic Antidepressants, Urine n      We will contact her when we get results of her blood work. I am also going to get an x-ray of her left foot. This was done and showed no evidence of fracture but chronic changes of osteoarthritis. I am going to have [facility] call and talk to radiology because I want to make sure that they did get a good look at the toe because it did look so deformed.     I do not feel that she is abusing her Norco.    Unfortunately Her memory is worsening. I think that her inability to sleep at night and her sleeping during the day as part of her dementia. Problem List Items Addressed This Visit     Memory loss      Slowly worsening. Her deafness probably makes this worse also. Continue Aricept. Essential hypertension - Primary      Well-controlled, Continue amlodipine 5 mg 1 daily. Relevant Orders    Comprehensive Metabolic Panel (Completed)    CBC (Completed)    Lumbar radiculopathy      Borderline controlled, Continue Norco 7.5 mg 1 twice daily. She is not abusing this medication. Relevant Medications    amitriptyline (ELAVIL) 100 MG tablet    Deafness      Chronic, since childhood. Uses sign language         Primary insomnia      Borderline controlled, We will increase amitriptyline to 100 mg but I do not want to go above this. Other Visit Diagnoses     Medication management        Relevant Orders    POCT Rapid Drug Screen (Completed)    Toe pain, left        Relevant Orders    XR FOOT LEFT (MIN 3 VIEWS) (Completed)    Fall, subsequent encounter        Relevant Orders    XR FOOT LEFT (MIN 3 VIEWS) (Completed)              Follow-up:  Return in about 6 months (around 1/29/2022) for Medicare annual wellness and fasting blood work. PATIENT INSTRUCTIONS:  Patient Instructions   I have increased your amitriptyline at night to 100 mg at bedtime. Hopefully this will help your sleeping problem. Please do not usually phone or laptop or tablet within 1 hour of when you want to go to bed. We will call you when we get the results of the x-ray of your toe. Patient Education        Learning About Sleeping Well  What does sleeping well mean? Sleeping well means getting enough sleep. How much sleep is enough varies among people. The number of hours you sleep is not as important as how you feel when you wake up.  If you do not feel refreshed, you probably need more sleep. Another sign of not getting enough sleep is feeling tired during the day. The average total nightly sleep time is 7½ to 8 hours. Healthy adults may need a little more or a little less than this. Why is getting enough sleep important? Getting enough quality sleep is a basic part of good health. When your sleep suffers, your mood and your thoughts can suffer too. You may find yourself feeling more grumpy or stressed. Not getting enough sleep also can lead to serious problems, including injury, accidents, anxiety, and depression. What might cause poor sleeping? Many things can cause sleep problems, including:  · Stress. Stress can be caused by fear about a single event, such as giving a speech. Or you may have ongoing stress, such as worry about work or school. · Depression, anxiety, and other mental or emotional conditions. · Changes in your sleep habits or surroundings. This includes changes that happen where you sleep, such as noise, light, or sleeping in a different bed. It also includes changes in your sleep pattern, such as having jet lag or working a late shift. · Health problems, such as pain, breathing problems, and restless legs syndrome. · Lack of regular exercise. How can you help yourself? Here are some tips that may help you sleep more soundly and wake up feeling more refreshed. Your sleeping area   · Use your bedroom only for sleeping and sex. A bit of light reading may help you fall asleep. But if it doesn't, do your reading elsewhere in the house. Don't watch TV in bed. · Be sure your bed is big enough to stretch out comfortably, especially if you have a sleep partner. · Keep your bedroom quiet, dark, and cool. Use curtains, blinds, or a sleep mask to block out light. To block out noise, use earplugs, soothing music, or a \"white noise\" machine. Your evening and bedtime routine   · Create a relaxing bedtime routine.  You might want to take a warm shower or bath, listen to soothing music, or drink a cup of noncaffeinated tea. · Go to bed at the same time every night. And get up at the same time every morning, even if you feel tired. What to avoid   · Limit caffeine (coffee, tea, caffeinated sodas) during the day, and don't have any for at least 4 to 6 hours before bedtime. · Don't drink alcohol before bedtime. Alcohol can cause you to wake up more often during the night. · Don't smoke or use tobacco, especially in the evening. Nicotine can keep you awake. · Don't take naps during the day, especially close to bedtime. · Don't lie in bed awake for too long. If you can't fall asleep, or if you wake up in the middle of the night and can't get back to sleep within 15 minutes or so, get out of bed and go to another room until you feel sleepy. · Don't take medicine right before bed that may keep you awake or make you feel hyper or energized. Your doctor can tell you if your medicine may do this and if you can take it earlier in the day. If you can't sleep   · Imagine yourself in a peaceful, pleasant scene. Focus on the details and feelings of being in a place that is relaxing. · Get up and do a quiet or boring activity until you feel sleepy. · Don't drink any liquids after 6 p.m. if you wake up often because you have to go to the bathroom. Where can you learn more? Go to https://RaySat.[employer]. org and sign in to your [email or web address] account. Enter Y007 in the [facility] box to learn more about \"Learning About Sleeping Well. \"     If you do not have an account, please click on the \"Sign Up Now\" link. Current as of: September 23, 2020               Content Version: 12.9  © 2006-2021 Healthwise, Incorporated. Care instructions adapted under license by [facility] ([facility]).  If you have questions about a medical condition or this instruction, always ask your healthcare professional. [place] [address] any warranty or liability for your use of this information. Patient Education        Preventing Falls: Care Instructions  Your Care Instructions     Getting around your home safely can be a challenge if you have injuries or health problems that make it easy for you to fall. Loose rugs and furniture in walkways are among the dangers for many older people who have problems walking or who have poor eyesight. People who have conditions such as arthritis, osteoporosis, or dementia also have to be careful not to fall. You can make your home safer with a few simple measures. Follow-up care is a key part of your treatment and safety. Be sure to make and go to all appointments, and call your doctor if you are having problems. It's also a good idea to know your test results and keep a list of the medicines you take. How can you care for yourself at home? Taking care of yourself  · You may get dizzy if you do not drink enough water. To prevent dehydration, drink plenty of fluids. Choose water and other caffeine-free clear liquids. If you have kidney, heart, or liver disease and have to limit fluids, talk with your doctor before you increase the amount of fluids you drink. · Exercise regularly to improve your strength, muscle tone, and balance. Walk if you can. Swimming may be a good choice if you cannot walk easily. · Have your vision and hearing checked each year or any time you notice a change. If you have trouble seeing and hearing, you might not be able to avoid objects and could lose your balance. · Know the side effects of the medicines you take. Ask your doctor or pharmacist whether the medicines you take can affect your balance. Sleeping pills or sedatives can affect your balance. · Limit the amount of alcohol you drink. Alcohol can impair your balance and other senses. · Ask your doctor whether calluses or corns on your feet need to be removed.  If you wear loose-fitting shoes because of calluses or corns, you can lose your balance and fall.  · Talk to your doctor if you have numbness in your feet. Preventing falls at home  · Remove raised doorway thresholds, throw rugs, and clutter. Repair loose carpet or raised areas in the floor. · Move furniture and electrical cords to keep them out of walking paths. · Use nonskid floor wax, and wipe up spills right away, especially on ceramic tile floors. · If you use a walker or cane, put rubber tips on it. If you use crutches, clean the bottoms of them regularly with an abrasive pad, such as steel wool. · Keep your house well lit, especially [place], and outside walkways. Use night-lights in areas such as hallways and bathrooms. Add extra light switches or use remote switches (such as switches that go on or off when you clap your hands) to make it easier to turn lights on if you have to get up during the night. · Install sturdy handrails on stairways. · Move items in your cabinets so that the things you use a lot are on the lower shelves (about waist level). · Keep a cordless phone and a flashlight with new batteries by your bed. If possible, put a phone in each of the main rooms of your house, or carry a cell phone in case you fall and cannot reach a phone. Or, you can wear a device around your neck or wrist. You push a button that sends a signal for help. · Wear low-heeled shoes that fit well and give your feet good support. Use footwear with nonskid soles. Check the heels and soles of your shoes for wear. Repair or replace worn heels or soles. · Do not wear socks without shoes on wood floors. · Walk on the grass when the sidewalks are slippery. If you live in an area that gets snow and ice in the winter, sprinkle salt on slippery steps and sidewalks. Preventing falls in the bath  · Install grab bars and nonskid mats inside and outside your shower or tub and near the toilet and sinks. · Use shower chairs and bath benches.   · Use a hand-held shower head that will allow you to sit while showering. · Get into a tub or shower by putting the weaker leg in first. Get out of a tub or shower with your strong side first.  · Repair loose toilet seats and consider installing a raised toilet seat to make getting on and off the toilet easier. · Keep your bathroom door unlocked while you are in the shower. Where can you learn more? Go to https://GlycosanpepicewinMarket.[email or web address] org and sign in to your [employer] account. Enter [id number] in the [employer] box to learn more about \"Preventing Falls: Care Instructions. \"     If you do not have an account, please click on the \"Sign Up Now\" link. Current as of: December 7, 2020               Content Version: 12.9  © 6686-2292 Healthwise, [employer]. Care instructions adapted under license by [facility] ([facility]). If you have questions about a medical condition or this instruction, always ask your healthcare professional. [place] [address] any warranty or liability for your use of this information. EMR Dragon/transcription disclaimer:  Much of this encounter note is electronic transcription/translation of spoken language to printed texts. The electronic translation of spoken language may be erroneous, or at times, nonsensical words or phrases may beinadvertently transcribed.   Although I have reviewed the note for such errors, some may still exist.

## 2021-07-29 NOTE — PATIENT INSTRUCTIONS
I have increased your amitriptyline at night to 100 mg at bedtime. Hopefully this will help your sleeping problem. Please do not usually phone or laptop or tablet within 1 hour of when you want to go to bed. We will call you when we get the results of the x-ray of your toe. Patient Education        Learning About Sleeping Well  What does sleeping well mean? Sleeping well means getting enough sleep. How much sleep is enough varies among people. The number of hours you sleep is not as important as how you feel when you wake up. If you do not feel refreshed, you probably need more sleep. Another sign of not getting enough sleep is feeling tired during the day. The average total nightly sleep time is 7½ to 8 hours. Healthy adults may need a little more or a little less than this. Why is getting enough sleep important? Getting enough quality sleep is a basic part of good health. When your sleep suffers, your mood and your thoughts can suffer too. You may find yourself feeling more grumpy or stressed. Not getting enough sleep also can lead to serious problems, including injury, accidents, anxiety, and depression. What might cause poor sleeping? Many things can cause sleep problems, including:  · Stress. Stress can be caused by fear about a single event, such as giving a speech. Or you may have ongoing stress, such as worry about work or school. · Depression, anxiety, and other mental or emotional conditions. · Changes in your sleep habits or surroundings. This includes changes that happen where you sleep, such as noise, light, or sleeping in a different bed. It also includes changes in your sleep pattern, such as having jet lag or working a late shift. · Health problems, such as pain, breathing problems, and restless legs syndrome. · Lack of regular exercise. How can you help yourself? Here are some tips that may help you sleep more soundly and wake up feeling more refreshed.   Your sleeping area   · Use your bedroom only for sleeping and sex. A bit of light reading may help you fall asleep. But if it doesn't, do your reading elsewhere in the house. Don't watch TV in bed. · Be sure your bed is big enough to stretch out comfortably, especially if you have a sleep partner. · Keep your bedroom quiet, dark, and cool. Use curtains, blinds, or a sleep mask to block out light. To block out noise, use earplugs, soothing music, or a \"white noise\" machine. Your evening and bedtime routine   · Create a relaxing bedtime routine. You might want to take a warm shower or bath, listen to soothing music, or drink a cup of noncaffeinated tea. · Go to bed at the same time every night. And get up at the same time every morning, even if you feel tired. What to avoid   · Limit caffeine (coffee, tea, caffeinated sodas) during the day, and don't have any for at least 4 to 6 hours before bedtime. · Don't drink alcohol before bedtime. Alcohol can cause you to wake up more often during the night. · Don't smoke or use tobacco, especially in the evening. Nicotine can keep you awake. · Don't take naps during the day, especially close to bedtime. · Don't lie in bed awake for too long. If you can't fall asleep, or if you wake up in the middle of the night and can't get back to sleep within 15 minutes or so, get out of bed and go to another room until you feel sleepy. · Don't take medicine right before bed that may keep you awake or make you feel hyper or energized. Your doctor can tell you if your medicine may do this and if you can take it earlier in the day. If you can't sleep   · Imagine yourself in a peaceful, pleasant scene. Focus on the details and feelings of being in a place that is relaxing. · Get up and do a quiet or boring activity until you feel sleepy. · Don't drink any liquids after 6 p.m. if you wake up often because you have to go to the bathroom. Where can you learn more?   Go to https://[email or web address].[email or web address] org and sign in to your [employer] account. Enter Q731 in the [facility] box to learn more about \"Learning About Sleeping Well. \"     If you do not have an account, please click on the \"Sign Up Now\" link. Current as of: September 23, 2020               Content Version: 12.9  © 2006-2021 [employer]. Care instructions adapted under license by [facility] ([facility]). If you have questions about a medical condition or this instruction, always ask your healthcare professional. [address] any warranty or liability for your use of this information. Patient Education        Preventing Falls: Care Instructions  Your Care Instructions     Getting around your home safely can be a challenge if you have injuries or health problems that make it easy for you to fall. Loose rugs and furniture in walkways are among the dangers for many older people who have problems walking or who have poor eyesight. People who have conditions such as arthritis, osteoporosis, or dementia also have to be careful not to fall. You can make your home safer with a few simple measures. Follow-up care is a key part of your treatment and safety. Be sure to make and go to all appointments, and call your doctor if you are having problems. It's also a good idea to know your test results and keep a list of the medicines you take. How can you care for yourself at home? Taking care of yourself  · You may get dizzy if you do not drink enough water. To prevent dehydration, drink plenty of fluids. Choose water and other caffeine-free clear liquids. If you have kidney, heart, or liver disease and have to limit fluids, talk with your doctor before you increase the amount of fluids you drink. · Exercise regularly to improve your strength, muscle tone, and balance. Walk if you can. Swimming may be a good choice if you cannot walk easily.   · Have your vision and hearing checked and give your feet good support. Use footwear with nonskid soles. Check the heels and soles of your shoes for wear. Repair or replace worn heels or soles. · Do not wear socks without shoes on wood floors. · Walk on the grass when the sidewalks are slippery. If you live in an area that gets snow and ice in the winter, sprinkle salt on slippery steps and sidewalks. Preventing falls in the bath  · Install grab bars and nonskid mats inside and outside your shower or tub and near the toilet and sinks. · Use shower chairs and bath benches. · Use a hand-held shower head that will allow you to sit while showering. · Get into a tub or shower by putting the weaker leg in first. Get out of a tub or shower with your strong side first.  · Repair loose toilet seats and consider installing a raised toilet seat to make getting on and off the toilet easier. · Keep your bathroom door unlocked while you are in the shower. Where can you learn more? Go to [email or web address] and sign in to your [employer] account. Enter [id number] in the [facility] box to learn more about \"Preventing Falls: Care Instructions. \"     If you do not have an account, please click on the \"Sign Up Now\" link. Current as of: December 7, 2020               Content Version: 12.9  © 2006-2021 [employer], [facility]. Care instructions adapted under license by [facility] ([facility]). If you have questions about a medical condition or this instruction, always ask your healthcare professional. [place] [address] any warranty or liability for your use of this information.

## 2021-07-30 NOTE — TELEPHONE ENCOUNTER
----- Message from Colton Cameron MD sent at 7/29/2021  5:47 PM CDT -----  Tomorrow could you call radiology and ask them if the radiologist could look at this again and see if her second toe is not fractured? Maybe it was just severely jammed but it sure looks broken to me.

## 2021-07-30 NOTE — TELEPHONE ENCOUNTER
I called radiology and talked to Dr Diallo Turk the radiologist that read it. He looked at it again and said no fracture. Its just a subluxation and jammed really back.

## 2021-08-01 PROBLEM — F51.01 PRIMARY INSOMNIA: Status: ACTIVE | Noted: 2021-01-01

## 2021-08-12 NOTE — PROCEDURES
[facility] Physical and Pain Medicine      Patient Name: Delicia Mujica    : 1945    Age: 67 y.o. Sex: female    Date: 2021    Pre-op Diagnosis: []  Right  [] Left  [x] Bilateral  Sacroiliac Joint(s) Dysfunction/ Sacroiliitis    Post-op Diagnosis: [] Right   [] Left  [x] Bilateral Sacroiliac Joint(s) Dysfunction/ Sacroliliits    Procedure: Ultrasound Guided Injection of  [] Right  [] Left  [x] Bilateral Sacroiliac Joint(s)     Performing Procedure:  Hemanth Somers, MSN, APRN, FNP-C    Previously Had Procedure:  [x] Yes   [] No    Patient Vitals for the past 24 hrs:   BP Temp Temp src Pulse Resp SpO2   21 0936 136/76 96.1 °F (35.6 °C) Temporal 70 18 94 %       Description of Procedure:    After a brief physical assessment and failure to improve with conservative measures the patient presented for an injection of the  [] Right  [] Left   [x] Bilateral Sacroiliac Joint(s). The indications, limitations and possible complications were discussed with the patient and the patient elected to proceed with the procedure. [x] Right Sacroiliac Joint    After voluntary, informed and signed consent obtained the patient was placed in the prone position. Appropriate time out was obtained per policy. The area of maximal tenderness was palpated over the Right Sacroiliac Joint and the skin overlying this area marked with a skin marker. The skin was then sprayed with Gebauer's Solution and prepped in a sterile fashion with Prevantics swab. The ultrasound transducer was brought in and the Right Sacroiliac Joint was identified with ultrasound. Under sterile technique and direct ultrasound visualization a 22 gauge 3 inch spinal needle was introduced into the Right Sacroiliac Joint.  After a negative aspiration a solution of    [x] Kenalog 1 ml (40mg/ml), 1 ml of 0.5% Marcaine Plain and 1 ml of 1% Lidocaine Plain     [] Toradol 1 ml (30 mg/ml), 1 ml of 0.5% Marcaine Plain and 1 ml of 1% Lidocaine Plain      was injected into the Right Sacroiliac Joint. The needle was withdrawn and a sterile dressing applied. [x] Left Sacroiliac Joint    After voluntary, informed and signed consent obtained the patient was placed in the prone position. Appropriate time out was obtained per policy. The area of maximal tenderness was palpated over the Left Sacroiliac Joint and the skin overlying this area marked with a skin marker. The skin was then sprayed with Gebauer's Solution and prepped in a sterile fashion with Prevantics swab. The ultrasound transducer was brought in and the Left Sacroiliac Joint was identified with ultrasound. Under sterile technique and direct ultrasound visualization a 22 gauge 3 inch spinal needle was introduced into the Left Sacroiliac Joint. After a negative aspiration a solution of    [x] Kenalog 1 ml (40mg/ml), 1 ml of 0.5% Marcaine Plain and 1 ml of 1% Lidocaine Plain     [] Toradol 1 ml (30 mg/ml), 1 ml of 0.5% Marcaine Plain and 1 ml of 1% Lidocaine Plain      was injected into the Left Sacroiliac Joint. The needle was withdrawn and a sterile dressing applied. Discharge: The patient tolerated the procedure well. There were no complications during the procedure and the patient was discharged home with discharge instructions. The patient has been instructed to contact the office should there be any complications or questions to arise between today and their next appointment.     Plan:  [x] Will return to the office in   [] 1 month  [x] 4 - 6 weeks  [] 2 months  [] 3 months for:  [] Planned Procedure   [x] Procedure Follow-up    [] Office Visit      DENIZ Oliveira, 8/12/2021 at 9:57 AM

## 2021-08-12 NOTE — PROGRESS NOTES
Procedure:  Level of Consciousness: [x]Alert [x]Oriented []Disoriented []Lethargic  Anxiety Level: [x]Calm []Anxious []Depressed []Other  Skin: [x]Warm []Dry []Cool []Moist []Intact []Other  Cardiovascular: [x]Palpitations: [x]Never []Occasionally []Frequently  Chest Pain: [x]No []Yes  Respiratory:  [x]Unlabored []Labored []Cough ([] Productive []Unproductive)  HCG Required: [x]No []Yes   Results: []Negative []Positive  Knowledge Level:        [x]Patient/Other verbalized understanding of pre-procedure instructions. [x]Assessment of post-op care needs (transportation, responsible caregiver)        [x]Able to discuss health care problems and how to deal with it.   Factors that Affect Teaching:        Language Barrier: []No [x]Yes - why: deaf/         Hearing Loss:        []No [x]Yes            Corrective Device:  []Yes []No        Vision Loss:           []No [x]Yes            Corrective Device:  [x]Yes []No        Memory Loss:       [x]No []Yes            []Short Term []Long Term  Motivational Level:  [x]Asks Questions                  []Extremely Anxious       [x]Seems Interested               []Seems Uninterested                  [x]Denies need for Education  Risk for Injury:  [x]Patient oriented to person, place and time  []History of frequent falls/loss of balance  Nutritional:  []Change in appetite   []Weight Gain   []Weight Loss  Functional:  []Requires assistance with ADL's

## 2021-10-02 NOTE — ED PROVIDER NOTES
[facility] EMERGENCY DEPT  EMERGENCY DEPARTMENT ENCOUNTER      Pt Name: Pato Coe  MRN: [id number]  [place] 1945  Date of evaluation: 10/2/2021  Provider: Radha Ley MD    CHIEF COMPLAINT       Chief Complaint   Patient presents with    Generalized Body Aches         HISTORY OF PRESENT ILLNESS   (Location/Symptom, Timing/Onset,Context/Setting, Quality, Duration, Modifying Factors, Severity)  Note limiting factors. Pato Coe is a 67 y.o. female who presents to the emergency department with complaint of neck pain that extends into the back of the head. Pain worsened tonight but has been present for several days. Patient states she has had difficulty sleeping recently which she thinks is related to the pain has had some nausea but no vomiting, fever, vision changes. Patient has a history of aortic aneurysm and myasthenia gravis according to medical record. Has not had any associated chest pain, abdominal pain. Patient describes pain and points to cervical paraspinal muscles extending into the occipital region. Patient states she has had difficulty sleeping for several weeks. She believes these symptoms are all related to having started Remeron a few weeks ago. Patient has congenital deafness so history is obtained using patient's daughter as . They declined video  service. HPI    NursingNotes were reviewed. REVIEW OF SYSTEMS    (2-9 systems for level 4, 10 or more for level 5)     Review of Systems   Constitutional: Negative for fatigue and fever. HENT: Negative for congestion, rhinorrhea and voice change. Eyes: Negative for pain and redness. Respiratory: Negative for cough and shortness of breath. Cardiovascular: Negative for chest pain. Gastrointestinal: Positive for nausea. Negative for abdominal pain, diarrhea and vomiting. Endocrine: Negative. Genitourinary: Negative. Musculoskeletal: Positive for myalgias and neck pain.  Negative for arthralgias and gait problem. Skin: Negative for rash and wound. Neurological: Positive for headaches. Negative for weakness. Hematological: Negative. Psychiatric/Behavioral: Negative. All other systems reviewed and are negative. A complete review of systems was performed and is negative except as noted above in the HPI. PAST MEDICAL HISTORY     Past Medical History:   Diagnosis Date    Anemia     Anxiety     Chronic back pain     severe muscle spasms    Deafness congenital     Depression     Hypertension     Memory loss     early    Myasthenia ([address]     Neck pain     Osteoarthritis     Ptosis of eyelid     PVD (peripheral vascular disease) ([facility] [address]     Weakness of face muscles          SURGICAL HISTORY       Past Surgical History:   Procedure Laterality Date    APPENDECTOMY      CARPAL TUNNEL RELEASE      CATARACT REMOVAL      COLONOSCOPY  7-2010    COLONOSCOPY  2000 or before    S[place]ney-normal per patient    COLONOSCOPY N/A 5/29/2020    Dr Nina Wesley, suboptimal prep, internal hemorrhoids-Grade 1, prn (age)   [place] EYE SURGERY Bilateral     cataract extraction    HERNIA REPAIR      on back of neck    HYSTERECTOMY      MI COLONOSCOPY FLX DX W/COLLJ SPEC WHEN PFRMD N/A 7/6/2017    Dr Neeta Atkinson diverticulosis, 5 yr recall    MI EGD TRANSORAL BIOPSY SINGLE/MULTIPLE N/A 7/6/2017    Dr Kayy Shah, [place] (-)    TERRI AND BSO      UPPER GASTROINTESTINAL ENDOSCOPY N/A 5/29/2020    Dr Gallagher More hiatal hernia, antral gastritis,     VARICOSE VEIN SURGERY      VASCULAR SURGERY  02/19/2018    [facility]. Aortogram and runoff. Left common femoral artery, 5 [place] sheath.          CURRENT MEDICATIONS       Previous Medications    AMITRIPTYLINE (ELAVIL) 100 MG TABLET    Take 1 tablet by mouth nightly For chronic insomnia    AMLODIPINE (NORVASC) 5 MG TABLET    Take 1 tablet by mouth daily At bedtime for high blood pressure    DONEPEZIL HCL (ARICEPT) 23 MG TABS TABLET    TAKE 1 TABLET BY MOUTH NIGHTLY FOR  DEMENTIA    FERROUS SULFATE (IRON) 325 (65 FE) MG TABS    Take 325 mg by mouth daily    FLUOXETINE (PROZAC) 20 MG CAPSULE    TAKE 1 CAPSULE BY MOUTH ONCE DAILY FOR ANXIETY AND FOR DEPRESSION    MIRTAZAPINE (REMERON) 15 MG TABLET    Take 15 mg by mouth nightly    MISC. DEVICES (ROLLER WALKER) MISC    1 each by Does not apply route daily DX Code: R26.81, G70.00, H81.313, G60.9    PRAZOSIN (MINIPRESS) 1 MG CAPSULE    Take 1 mg by mouth nightly    PREDNISOLONE ACETATE (PRED FORTE) 1 % OPHTHALMIC SUSPENSION           ALLERGIES     Patient has no known allergies. FAMILY HISTORY       Family History   Problem Relation Age of Onset    High Blood Pressure Mother     Stroke Mother     Cancer Father     Lung Cancer Father    Jealphonse Northern Hearing Loss Brother         deaf   Rosy Northern Hearing Loss Brother         deaf    Colon Polyps Sister     Colon Cancer Neg Hx     Esophageal Cancer Neg Hx     Liver Cancer Neg Hx     Liver Disease Neg Hx     Stomach Cancer Neg Hx     Rectal Cancer Neg Hx           SOCIAL HISTORY       Social History     Socioeconomic History    Marital status:      Spouse name: None    Number of children: None    Years of education: None    Highest education level: None   Occupational History    Occupation: unemployed   Tobacco Use    Smoking status: Never Smoker    Smokeless tobacco: Never Used   Vaping Use    Vaping Use: Never used   Substance and Sexual Activity    Alcohol use: No    Drug use: No    Sexual activity: Not Currently   Other Topics Concern    None   Social History Narrative    None     Social Determinants of Health     Financial Resource Strain:     Difficulty of Paying Living Expenses:    Food Insecurity:     Worried About Running Out of Food in the Last Year:     Ran Out of Food in the Last Year:    Transportation Needs:     Lack of Transportation (Medical):      Lack of Transportation (Non-Medical):    Physical Activity:  Days of Exercise per Week:     Minutes of Exercise per Session:    Stress:     Feeling of Stress :    Social Connections:     Frequency of Communication with Friends and Family:     Frequency of Social Gatherings with Friends and Family:     Attends Orthodox Services:     Active Member of Clubs or Organizations:     Attends Club or Organization Meetings:     Marital Status:    Intimate Partner Violence:     Fear of Current or Ex-Partner:     Emotionally Abused:     Physically Abused:     Sexually Abused:        SCREENINGS             PHYSICAL EXAM    (up to 7 for level 4, 8 or more for level 5)     ED Triage Vitals [10/02/21 0409]   BP Temp Temp Source Pulse Resp SpO2 Height Weight   (!) 151/74 98.5 °F (36.9 °C) Oral 70 18 93 % 5' 8\" (1.727 m) 118 lb (53.5 kg)       Physical Exam  Vitals and nursing note reviewed. Constitutional:       General: She is not in acute distress. Appearance: She is well-developed. She is not toxic-appearing or diaphoretic. HENT:      Head: Normocephalic and atraumatic. Eyes:      General: No scleral icterus. Right eye: No discharge. Left eye: No discharge. Pupils: Pupils are equal, round, and reactive to light. Cardiovascular:      Rate and Rhythm: Normal rate and regular rhythm. Pulmonary:      Effort: Pulmonary effort is normal. No respiratory distress. Breath sounds: No stridor. Abdominal:      General: There is no distension. Musculoskeletal:         General: No deformity. Normal range of motion. Cervical back: Normal range of motion. Comments: Has full range of motion of neck but with soreness in the sternocleidomastoid and cervical paraspinal muscle area. No meningismus   Skin:     General: Skin is warm and dry. Neurological:      General: No focal deficit present. Mental Status: She is alert and oriented to person, place, and time. GCS: GCS eye subscore is 4. GCS verbal subscore is 5.  GCS motor subscore is 6. Cranial Nerves: No cranial nerve deficit. Motor: No abnormal muscle tone. Psychiatric:         Behavior: Behavior normal.         Thought Content: Thought content normal.         Judgment: Judgment normal.         DIAGNOSTIC RESULTS     EKG: All EKG's are interpreted by the Emergency Department Physician who either signs or Co-signs this chart in the absence of a cardiologist.        RADIOLOGY:   Non-plain film images such as CT, Ultrasound and MRI are read by the radiologist. Axel Keens images are visualized and preliminarily interpreted by the emergency physician with the below findings:        Interpretation per the Radiologist below, if available at the time of this note:    No orders to display         ED BEDSIDE ULTRASOUND:   Performed by ED Physician - none    LABS:  Labs Reviewed   CBC WITH AUTO DIFFERENTIAL - Abnormal; Notable for the following components:       Result Value    RBC 3.69 (*)     .4 (*)     MCH 32.5 (*)     MCHC 32.1 (*)     MPV 9.1 (*)     Monocytes % 10.9 (*)     Basophils % 1.2 (*)     All other components within normal limits   COMPREHENSIVE METABOLIC PANEL W/ REFLEX TO MG FOR LOW K - Abnormal; Notable for the following components:    Potassium reflex Magnesium 3.2 (*)     Total Protein 6.3 (*)     All other components within normal limits   RESPIRATORY PANEL, MOLECULAR, WITH COVID-19   CK   MAGNESIUM   URINE RT REFLEX TO CULTURE       All other labs were within normal range or not returned as of this dictation.     Medications   lactated ringers infusion 1,000 mL (1,000 mLs IntraVENous New Bag 10/2/21 0862)   potassium chloride 10 mEq/100 mL IVPB (Peripheral Line) (10 mEq IntraVENous New Bag 10/2/21 9935)   ketorolac (TORADOL) injection 15 mg (15 mg IntraVENous Given 10/2/21 2748)   diazePAM (VALIUM) tablet 2.5 mg (2.5 mg Oral Given 10/2/21 9048)       EMERGENCY DEPARTMENT COURSE and DIFFERENTIALDIAGNOSIS/MDM:   Vitals:    Vitals:    10/02/21 7178 BP: (!) 151/74   Pulse: 70   Resp: 18   Temp: 98.5 °F (36.9 °C)   TempSrc: Oral   SpO2: 93%   Weight: 118 lb (53.5 kg)   Height: 5' 8\" (1.727 m)       [facility]    ED Course as of Oct 02 0647   Sat Oct 02, 2021   0539 WBC: 6.5 [BERNARDO]   0539 Potassium(!): 3.2 [BERNARDO]   7139 Patient has had improvement in symptoms while here in the emergency department. Aside from mild hypokalemia, work-up is reassuring. No evidence of meningismus. Patient concerned symptoms may be related to Remeron. Advised her to contact her primary care provider to discuss medication changes. She also requests something to help her sleep because she feels like that has been a large issue recently. [BERNARDO]      ED Course User Index  [BERNARDO] Barb Moore MD     Evaluation and work-up here revealed no signs of emergent or life-threatening pathology that would necessitate admission for further work-up or management at this time. Patient is felt to be stable for discharge home with return precautions for worsening of the condition or development of new concerning symptoms. Patient was encouraged to follow-up with their primary care doctor in the appropriate timeframe. Necessary prescriptions and information have been provided for treatment at home. Patient voices understanding and agreement with the plan. CONSULTS:  None    PROCEDURES:  Unless otherwise notedbelow, none     Procedures      FINAL IMPRESSION     1. Neck pain    2. Acute nonintractable headache, unspecified headache type    3.  Sleep difficulties          DISPOSITION/PLAN   DISPOSITION Decision To Discharge 10/02/2021 06:25:44 AM      PATIENT REFERRED TO:  [facility] EMERGENCY DEPT  [facility]  [phone]    If symptoms worsen    Darrick Sandifer, MD  [facility]  [phone]    Call in 2 days        DISCHARGE MEDICATIONS:  New Prescriptions    DOXYLAMINE SUCCINATE (UNISOM SLEEPTABS) 25 MG TABLET    Take 1 tablet by mouth nightly as needed for Sleep    MELATONIN (RA MELATONIN) 3 MG TABS TABLET    Take 1 tablet by mouth daily          (Please note that portions of this note were completed with a voice recognition program.  Efforts were made to edit the dictations butoccasionally words are mis-transcribed.)    Boris Casas MD (electronically signed)  AttendingEmergency Physician          Boris Casas., MD  10/02/21 0472

## 2021-10-08 NOTE — TELEPHONE ENCOUNTER
I am forwarding this to Dr. Palacio Sake I do not know her that well and I do not want to give her anything sedating. She could use over-the-counter Benadryl or melatonin until then.

## 2021-10-08 NOTE — TELEPHONE ENCOUNTER
Please tell daughter that we really cannot. I think this is part of her dementia. She is already on too many sedating drugs. If she could get off of the gabapentin and the pain pills we could give her something else but we cannot add a sleeping pill to these. The risk of her not waking up is too great. One of the big reasons that people end up in the nursing home with dementia is because they start to get their days and nights mixed up.

## 2021-10-11 NOTE — PROGRESS NOTES
[facility] Physical and Pain Medicine    Office Progress Note    Patient Name: Tarah Alegre    MR #: [id number]    Account #: [de-identified]    : 1945    Age: 67 y.o. Sex: female    Date: 2021    PCP: Kyree Jones MD         Referring Provider:    Chief Complaint:   Chief Complaint   Patient presents with    Back Pain    Neck Pain       History of Present Illness:    Tarah Alegre is a 67 y.o. female who presents to the office for follow-up of BHUPINDER level C3-C4, bilateral shoulder injections and bilateral SI joint injections. She has daughter and  with her today. Says that she obtained 80% relief for 6 weeks from the injections and is wanting each of them repeated. She continues to take an occasional Norco that is prescribed to her by her PCP and does get some relief. She did have episode where her pain was increased and went to the ER where they gave her a Valium and a prescription for Melatonin. She was asking for something to help her sleep. They also gave her a Toradol injection. Daughter says that she did go home and sleep which seemed to help her pain. Last pain management HPI note read    Employment: Retired []   Disabled  []   Works []    Does Not Work [x]     Previous Injury:  Yes  []   No [x]     Previous Surgery: Yes []   No [x]     Previous Physical Therapy In the last 6 months? Yes  []     No [x]   Did Physical Therapy make thepain better or worse? Better []   Worse []  Unchanged []    MRI in the last two years? Yes [x]  No []   Results reviewed with patient? Yes []   No []    CT Scan in the last two years? Yes  []   No [x]   Results reviewed with patient? Yes []   No []    X-ray in the last two years? Yes [x]   No  []  Results reviewed with patient? Yes  []  No []    Injections in the past?  Yes [x]   No []   Did the injections help relieve the pain? Yes []   No []     Do you have Depression?   Yes  []    No [x] Thinking of harming yourself or others? Yes  []   No [x]     Past Medical Histoy  Past Medical History:   Diagnosis Date    Anemia     Anxiety     Chronic back pain     severe muscle spasms    Deafness congenital     Depression     Hypertension     Memory loss     early    [facility]ast[facility]ia ([facility] [address]     Neck pain     Osteoarthritis     Ptosis of eyelid     PVD (peripheral vascular disease) ([facility] [address]     Weakness of face muscles        Surgery History  Past Surgical History:   Procedure Laterality Date    APPENDECTOMY      CARPAL TUNNEL RELEASE      CATARACT REMOVAL      COLONOSCOPY  7-2010    COLONOSCOPY  2000 or before    [place]-normal per patient    COLONOSCOPY N/A 5/29/2020    Dr Raphael Pritchett, suboptimal prep, internal hemorrhoids-Grade 1, prn (age)   Hao Melvin EYE SURGERY Bilateral     cataract extraction    HERNIA REPAIR      on back of neck    HYSTERECTOMY      DC COLONOSCOPY FLX DX W/COLLJ SPEC WHEN PFRMD N/A 7/6/2017    Dr Friedman Left diverticulosis, 5 yr recall    DC EGD TRANSORAL BIOPSY SINGLE/MULTIPLE N/A 7/6/2017    Dr Rios Royalty, [place] (-)    TERRI AND BSO      UPPER GASTROINTESTINAL ENDOSCOPY N/A 5/29/2020    Dr Vlad Landa hiatal hernia, antral gastritis,     VARICOSE VEIN SURGERY      VASCULAR SURGERY  02/19/2018    [facility]. Aortogram and runoff. Left common femoral artery, 5 Yi sheath. Allergies  Patient has no known allergies.      Current Medications  Current Outpatient Medications   Medication Sig Dispense Refill    prazosin (MINIPRESS) 1 MG capsule Take 1 mg by mouth nightly      Donepezil HCl (ARICEPT) 23 MG TABS tablet TAKE 1 TABLET BY MOUTH NIGHTLY FOR  DEMENTIA 90 tablet 3    prednisoLONE acetate (PRED FORTE) 1 % ophthalmic suspension       amitriptyline (ELAVIL) 100 MG tablet Take 1 tablet by mouth nightly For chronic insomnia 30 tablet 3    Ferrous Sulfate (IRON) 325 (65 Fe) MG TABS Take 325 mg by mouth daily 30 tablet 3    amLODIPine (NORVASC) 5 MG tablet Take 1 tablet by mouth daily At bedtime for high blood pressure 30 tablet 5    FLUoxetine (PROZAC) 20 MG capsule TAKE 1 CAPSULE BY MOUTH ONCE DAILY FOR ANXIETY AND FOR DEPRESSION 90 capsule 3    melatonin (RA MELATONIN) 3 MG TABS tablet Take 1 tablet by mouth daily 30 tablet 0    Misc. Devices (ROLLER WALKER) MISC 1 each by Does not apply route daily DX Code: R26.81, G70.00, H81.313, G60.9 1 each 0     No current facility-administered medications for this encounter. Social History    Social History     Tobacco Use    Smoking status: Never Smoker    Smokeless tobacco: Never Used   Substance Use Topics    Alcohol use: No         Family History  family history includes Cancer in her father; Colon Polyps in her sister; Hearing Loss in her brother and brother; High Blood Pressure in her mother; Dene Ganser in her father; Stroke in her mother. Review of Systems:  Constitutional: denies fever, chills, fatigue, change in appetite, weight gain or weight loss  Head: Normocephalic  Skin: denies easy bruising, skin redness, skin rash, hives, sensitivity to sun exposure, tightness, nodules or bumps, hair loss, color changes in the hands or feet with cold. Eyes: denies pain, redness, loss of vision, double or blurred vision, eye drainage, or dryness. ENT and Mouth: denies ringing in the ears, loss of hearing, nasal congestion, nasal discharge, no hoarseness, sore throat, or difficulty swallowing   Respiratory: denies chronic dry cough, coughing up blood, coughing up mucus, waking at night coughing or choking, or wheezing. Cardiovascular: denies chest pain, irregular heartbeats, palpitations, shortness of breath, or edema in legs  Gastrointestinal: denies, nausea, vomiting, heartburn, diarrhea, or constipation  Genitourinary: denies difficult urination, pain or burning with urination, blood in the urine, or cloudy urine  Musculoskeletal: denies arm, buttock, thigh or calf cramps.  Has pain in neck, bilateral shoulders and low back, muscle spasms in neck and tenderness in neck, bilateral shoulders and low back. No muscle weakness. No joint swelling. Neurologic: headache, dizziness, fainting, loss of consciousness, no memory loss. No sensitivity. Endocrine: denies intolerance to hot or cold temperature, night sweats, flushing, fingernail changes, increased thirst, or hairloss   Hematologic/ Lymphatic: denies anemia, bleeding tendency or clotting tendency, bruising easily. Allergic/ Immunologic: denies rhinitis, asthma, skin sensitivity, or allergy to Latex   Psychiatric: denies depression or thoughts of suicide, or voices in head. Current Pain Assessment:   Pain Assessment  Pain Assessment: 0-10  Pain Level: 9  Patient's Stated Pain Goal: 3  Pain Type: Chronic pain  Pain Location: Back, Neck    Clinical Progression: gradually improving  Effect of Pain on Daily Activities: limits activity  Patient's Stated Pain Goal: No pain  Pain Intervention(s): Medication (see eMar), Repositioning, Rest, Ice    Current PE    ORT Score: 4    PHQ-9 Score: 13    Physical Exam:    Vitals:    10/11/21 0925   BP: 123/79   Pulse: 67   Temp: 97 °F (36.1 °C)   TempSrc: Temporal   SpO2: 97%   Weight: 124 lb (56.2 kg)   Height: 5' 8\" (1.727 m)       Body mass index is 18.85 kg/m². General Appearance: no acute distress. Appears to be well dressed  Skin Exam: Warm and dry, no jaundice, rashes or lesions  Head Exam: NCAT, PERRLA, EOMI, moist mucus membranes, scalp normal  Neck Exam: Supple, no masses palpated, trachea midline. Pain in neck with flexion and extension of head  Lung: Clear to ausculation in all lobes anterior and posterior. Heart: Regular rate and rhythm, no gallops, rubs or murmurs, no edema  Abdomen:  Bowel sounds in all quadrants, soft, non-distended, non-tender with palpation, no guarding  Extremities: No rash, cyanosis or bruising  Musculoskeletal: No joint swelling or deformity   Back Exam: Positive Nick's bilateral with tenderness over bilateral SI joints with palpation  Hip Exam: Full rotation bilateral   Knee Exam: Full flexion and extension bilateral, no crepitus  Shoulder Exam: limited ROM of bilateral shoulders due to pain. Neurologic Exam: Gait and coordination normal, speech normal  Reflexes: Normal brachialis, Negative Sanches's bilateral. Normal Patellar bilateral,   CN EXAM: II-XII intact, face symmetrical, tongue symmetrical, the trapezius and sternocleidomastoid muscle appearance and strength symmetrical, normal achilles bilateral, ankle clonus negative bilateral  Strength: 5/5 RUE Bi's/Tri's, 5/5 LUE Bi's/Tri's, 5/5 RLE knee flex/ext, 5/5 RLE DF/PF, 5/5 LLE knee flex/ext, 5/5 LLE DF/PF  Sensation: Equal and intact to fine touch in all extremities  Mood and affect: Normal   Nurses note reviewed along with current vital signs    Active Problem(s)  Active Problems:    Cervical radiculopathy    Chronic pain of both shoulders    Sacroiliac joint dysfunction of both sides  Resolved Problems:    * No resolved hospital problems. *                                                                                                                            PLAN:  1. Patient is to call the office with any questions or concerns that may arise prior to next appointment. 2. Schedule patient for bilateral shoulder injections  3. Schedule patient for BHUPINDER level C3-C4  4. Schedule patient for bilateral SI joint injections    Patient does not take ASA products and is not on any blood thinners    Urine Drug Screen Current/Today:  Yes  []  No [x]     Discussion:  Discussed exam findings and plan of care with patient. Patient agreed with the current plan of care at this time. All questions from the patient were answered by the provider. Activity:   Discussed exercise as beneficial to pain reduction, encouraged stretching exercise with a focus on torso strengthening.     Education Provided:  Review of

## 2021-10-19 NOTE — PROGRESS NOTES
Procedure:  Level of Consciousness: [x]Alert [x]Oriented []Disoriented []Lethargic  Anxiety Level: [x]Calm []Anxious []Depressed []Other  Skin: [x]Warm [x]Dry []Cool []Moist []Intact []Other  Cardiovascular: [x]Palpitations: [x]Never []Occasionally []Frequently  Chest Pain: [x]No []Yes  Respiratory:  [x]Unlabored []Labored []Cough ([] Productive []Unproductive)  HCG Required: [x]No []Yes   Results: []Negative []Positive  Knowledge Level:        [x]Patient/Other verbalized understanding of pre-procedure instructions. [x]Assessment of post-op care needs (transportation, responsible caregiver)        [x]Able to discuss health care problems and how to deal with it.   Factors that Affect Teaching:        Language Barrier: [x]No []Yes - why:        Hearing Loss:        []No [x]Yes   DEAF         Corrective Device:  []Yes [x]No        Vision Loss:           []No [x]Yes            Corrective Device:  [x]Yes []No        Memory Loss:       []No []Yes            []Short Term []Long Term  Motivational Level:  [x]Asks Questions                  []Extremely Anxious       [x]Seems Interested               []Seems Uninterested                  [x]Denies need for Education  Risk for Injury:  [x]Patient oriented to person, place and time  []History of frequent falls/loss of balance  Nutritional:  []Change in appetite   []Weight Gain   []Weight Loss  Functional:  []Requires assistance with ADL's

## 2021-10-19 NOTE — INTERVAL H&P NOTE
Update History & Physical    The patient's History and Physical  was reviewed with the patient and I examined the patient. There was NO CHANGE:[id number]}. The surgical site was confirmed by the patient and me. Plan: The risks, benefits, expected outcome, and alternative to the recommended procedure have been discussed with the patient. Patient understands and wants to proceed with the procedure.      Electronically signed by Yuki Mays MD on 10/19/2021 at 12:58 PM

## 2021-10-19 NOTE — PROGRESS NOTES
Patient here for procedure. Patient is deaf. Electronic interpreting service used to do patient intake and accompany patient through procedure and recovery.

## 2021-10-21 NOTE — PROGRESS NOTES
SUBJECTIVE:    Rajwinder Booth is 67 y. o.female who comes in complaining of Follow-up (from the er remeron and the dehydration )   . HPI: Judi Morrison comes in today with her daughter and an  to follow-up from the emergency room. She went to the emergency room on 10/2/2021 evidently with a headache and neck pain but they said that she was dehydrated from the Remeron and they stopped it. She says she is feeling better. She still dizzy at times especially in the morning when she first gets up to walk to the bathroom. By the time she walks back to her bed or a chair it is getting better. She still occasionally will have migraines. They stopped the Remeron and gave her melatonin to help her sleep but she is not taking it regularly. They also gave her potassium through an IV. She says that when her great care and kids come over they make her nervous and then she will start to itch. She will scratch until she has sores. No Known Allergies    Social History     Socioeconomic History    Marital status:      Spouse name: None    Number of children: None    Years of education: None    Highest education level: None   Occupational History    Occupation: unemployed   Tobacco Use    Smoking status: Never Smoker    Smokeless tobacco: Never Used   Vaping Use    Vaping Use: Never used   Substance and Sexual Activity    Alcohol use: No    Drug use: No    Sexual activity: Not Currently   Other Topics Concern    None   Social History Narrative    None     Social Determinants of Health     Financial Resource Strain:     Difficulty of Paying Living Expenses:    Food Insecurity:     Worried About Running Out of Food in the Last Year:     Ran Out of Food in the Last Year:    Transportation Needs:     Lack of Transportation (Medical):      Lack of Transportation (Non-Medical):    Physical Activity:     Days of Exercise per Week:     Minutes of Exercise per Session:    Stress:  Feeling of Stress :    Social Connections:     Frequency of Communication with Friends and Family:     Frequency of Social Gatherings with Friends and Family:     Attends Sabianist Services:     Active Member of Clubs or Organizations:     Attends Club or Organization Meetings:     Marital Status:    Intimate Partner Violence:     Fear of Current or Ex-Partner:     Emotionally Abused:     Physically Abused:     Sexually Abused:        Review of Systems   Constitutional: Positive for activity change and fatigue. Negative for fever. Respiratory: Negative. Cardiovascular: Negative. Musculoskeletal: Positive for arthralgias, back pain and gait problem. Neurological: Positive for dizziness and light-headedness. Hematological: Bruises/bleeds easily. Psychiatric/Behavioral: Positive for dysphoric mood. Current Outpatient Medications on File Prior to Visit   Medication Sig Dispense Refill    prazosin (MINIPRESS) 1 MG capsule Take 1 mg by mouth nightly      Donepezil HCl (ARICEPT) 23 MG TABS tablet TAKE 1 TABLET BY MOUTH NIGHTLY FOR  DEMENTIA 90 tablet 3    prednisoLONE acetate (PRED FORTE) 1 % ophthalmic suspension       amitriptyline (ELAVIL) 100 MG tablet Take 1 tablet by mouth nightly For chronic insomnia 30 tablet 3    Ferrous Sulfate (IRON) 325 (65 Fe) MG TABS Take 325 mg by mouth daily 30 tablet 3    amLODIPine (NORVASC) 5 MG tablet Take 1 tablet by mouth daily At bedtime for high blood pressure 30 tablet 5    FLUoxetine (PROZAC) 20 MG capsule TAKE 1 CAPSULE BY MOUTH ONCE DAILY FOR ANXIETY AND FOR DEPRESSION 90 capsule 3    Misc. Devices (ROLLER WALKER) MISC 1 each by Does not apply route daily DX Code: R26.81, G70.00, H81.313, G60.9 1 each 0     No current facility-administered medications on file prior to visit.           OBJECTIVE:    Wt Readings from Last 3 Encounters:   10/21/21 127 lb (57.6 kg)   10/11/21 124 lb (56.2 kg)   10/02/21 118 lb (53.5 kg)       /78 Pulse 70   Temp 96.7 °F (35.9 °C)   Resp 18   Ht 5' 8\" (1.727 m)   Wt 127 lb (57.6 kg)   SpO2 95%   BMI 19.31 kg/m²     Physical Exam  Vitals reviewed. Constitutional:       General: She is not in acute distress. Appearance: Normal appearance. She is well-developed. Comments: Thin, a little frail   HENT:      Head: Normocephalic. Mouth/Throat:      Mouth: Mucous membranes are moist.      Pharynx: No posterior oropharyngeal erythema. Eyes:      Extraocular Movements: Extraocular movements intact. Conjunctiva/sclera: Conjunctivae normal.      Pupils: Pupils are equal, round, and reactive to light. Neck:      Vascular: No carotid bruit. Cardiovascular:      Rate and Rhythm: Normal rate and regular rhythm. Heart sounds: Normal heart sounds. No murmur heard. Pulmonary:      Effort: Pulmonary effort is normal. No respiratory distress. Breath sounds: Normal breath sounds. Musculoskeletal:         General: Normal range of motion. Cervical back: Normal range of motion and neck supple. Comments: Chronic changes of osteoarthritis of hands and knees but no increased warmth or redness. Lymphadenopathy:      Cervical: No cervical adenopathy. Skin:     General: Skin is warm and dry. Comments: Scattered secondarily excoriated sores on her arms and legs. None of them appear infected   Neurological:      Mental Status: She is alert and oriented to person, place, and time. Psychiatric:         Mood and Affect: Mood normal.         Speech: Speech normal.         Behavior: Behavior normal.         Thought Content: Thought content normal.         Judgment: Judgment normal.         ASSESSMENT:    1. Low blood potassium New Problem   2. Chronic low back pain Stable but not controlled   3. Anxiety Inadequately Controlled   4. Recurrent major depressive disorder, in partial remission (HCC) Stable but not controlled   5.  Essential hypertension Controlled Procedures    Basic Metabolic Panel     I reviewed her potassium on 10/2/2021 it was 3.2. We will repeat a BMP today to make sure that this has come up. I do feel that she has partially treated recurrent major depression but is very difficult because she is no longer able to drive and her daughter works long hours so she really is stuck in her house most of the day. She does not want to get out and go to an adult  and her deafness might make this difficult. Her daughter tries to take her places to meet other deaf people but there are not that many opportunities. The Remeron was partly to improve her appetite because of her weight loss and also to treat her depression but since the ER felt that it was contributing to her dizziness we will continue to hold it. She is on amitriptyline for sleep and Aricept 23 mg. Hopefully the Prozac 20 mg 1 daily will treat her depression. We will try hydroxyzine 25 mg 1 tablet 3 times a day when the great grandkids come over to see if this will calm her down and also help with itching. She is not abusing her pain medication. She has seen pain management but it is easier for her to come here. We will also try flipping her amlodipine 5 mg to the morning to see if not taking it at night decreases her morning dizziness. Her dizziness could be related partly to the Elavil and we may need to consider cutting it in half. Follow-up:  Return if symptoms worsen or fail to improve. PATIENT INSTRUCTIONS:  Patient Instructions   Use the generic Vistaril 25 mg when you are nervous or you feel like you need to scatch. Change the 5 mg amlodipine from nighttime to in the morning to see if it helps with her morning dizziness. EMR Dragon/transcription disclaimer:  Much of this encounter note is electronic transcription/translation of spoken language to printed texts.   The electronic translation of spoken language may be erroneous, or at times, nonsensical words or phrases may beinadvertently transcribed.   Although I have reviewed the note for such errors, some may still exist.

## 2021-10-21 NOTE — PATIENT INSTRUCTIONS
Use the generic Vistaril 25 mg when you are nervous or you feel like you need to scatch. Change the 5 mg amlodipine from nighttime to in the morning to see if it helps with her morning dizziness.

## 2021-10-22 PROBLEM — F33.41 RECURRENT MAJOR DEPRESSIVE DISORDER, IN PARTIAL REMISSION (HCC): Status: ACTIVE | Noted: 2021-01-01

## 2021-10-22 PROBLEM — F41.9 ANXIETY: Status: ACTIVE | Noted: 2021-01-01

## 2021-10-22 PROBLEM — F33.42 RECURRENT MAJOR DEPRESSIVE DISORDER, IN FULL REMISSION (HCC): Status: RESOLVED | Noted: 2019-02-09 | Resolved: 2021-01-01

## 2021-11-09 NOTE — CARE COORDINATION
Ambulatory Care Coordination Note  11/9/2021  CM Risk Score: 5  Charlson 10 Year Mortality Risk Score: 79%     ACC: Anamaria Beverly, RN    Summary Note: [facility] placed outreach call to patient's daughter today - pt lives with her daughter and patient has congenital hearing loss. Leo Stanford said she tried changing Amlodipine but that did not work well. Pt was very unhappy with change and she resumed taking medication at night. Pt has frequent near falls at home, will catch herself about twice weekly due to dizziness. Dorota Horn out of her bed a few weeks ago while going to [place]. PT has been using a walker inside more often to get around to try and reduce fall risk. She is drinking more water and eating bananas daily as recommended by her PCP. She is up to [place] more, increasing her fall risk. Leo Stanford works but is able to check patient's safety on video camera. She stated her mother does not like this but it helps keep patient safer. Plan:  Discussed that a bedside commode might reduce fall risk to Jayashree and Leo Stanford said she will talk to patient about it. We discussed whether PT would benefit patient in light of her recent fall out of her bed and her frequent near misses. Leo Stanford said she thinks patient has forgotten what she learned from PT in the past and is weaker now than before. Daughter is interested in trying PT again. Advised daughter that it would benefit to check patient's BP when she is changing position - in case her BP is dropping when she gets up. Leo Stanford stated patient's specialty provider has urged her to slow down and take her time when changing position to avoid falling due to hypotension. Will ask PCP about SN and PT to monitor vitals and provide support to improve balance, strength, and self-care with mobility at home.     Goals       Patient Stated: stated by daughter (pt-stated)       Improve patient's balance, strength    Barriers: impairment:  hearing and physical: general deconditioning and stress  Plan for overcoming my barriers:   Pt willing to consider physical therapy to improve stamina  Pt willing to continue exercises to maintain gains in physical strength  Pt willing to adjust to daily routine changes / additional DME to improve safety at home, reduce fall risk  Confidence: 5/10  Anticipated Goal Completion Date: 1/09/2022            General Assessment    Do you have any symptoms that are causing concern?: Yes  Progression since Onset: Unchanged, Intermittent - Waxing/Waning  Reported Symptoms: Other (Comment: dizziness)         Ambulatory Care Coordination Assessment    Care Coordination Protocol  Program Enrollment: Complex Care  Referral from Primary Care Provider: No  Week 1 - Initial Assessment     Do you have all of your prescriptions and are they filled?: Yes  Barriers to medication adherence: None  Are you able to afford your medications?: Yes  How often do you have trouble taking your medications the way you have been told to take them?: I always take them as prescribed. Do you have Home O2 Therapy?: No      Ability to seek help/take action for Emergent Urgent situations i.e. fire, crime, inclement weather or health crisis.: Needs Assistance  Ability to ambulate to restroom: Independent  Ability handle personal hygeine needs (bathing/dressing/grooming): Independent  Ability to manage Medications: Needs Assistance  Ability to prepare Food Preparation: Needs Assistance  Ability to maintain home (clean home, laundry): Needs Assistance  Ability to drive and/or has transportation: Dependent  Ability to do shopping: Needs Assistance  Ability to manage finances: Needs Assistance  Is patient able to live independently?: No     Current Housing: Private Residence        Per the Fall Risk Screening, did the patient have 2 or more falls or 1 fall with injury in the past year?: Yes  How often do you think you are about to fall and you do NOT fall?  For example, you grab something to stabilize yourself or hold onto a wall/furniture?: Frequently  Use of a Mobility Aid: Yes  Difficulty walking/impaired gait: No  Issues with feet or shoes like numbness, edema, shoes not fitting: No  Changes in vision, poor vision or poor lighting in environment: No  Dizziness: Yes  Other Fall Risk: Yes  What other fall risks does the patient have?: Impulsivity        Are you experiencing loss of meaning?: No  Are you experiencing loss of hope and peace?: No     Thinking about your patient's physical health needs, are there any symptoms or problems (risk indicators) you are unsure about that require further investigation?: Moderate to severe symptoms or problems that impact on daily life   Are the patients physical health problems impacting on their mental well-being?: Moderate to severe impact upon mental well-being and preventing enjoyment of usual activities   Are there any problems with your patients lifestyle behaviors (alcohol, drugs, diet, exercise) that are impacting on physical or mental well-being?: Some mild concern of potential negative impact on well-being   Do you have any other concerns about your patients mental well-being? How would you rate their severity and impact on the patient?: Mild problems - don't interfere with function   How would you rate their home environment in terms of safety and stability (including domestic violence, insecure housing, neighbor harassment)?: Consistently safe, supportive, stable, no identified problems   How do daily activities impact on the patient's well-being? (include current or anticipated unemployment, work, caregiving, access to transportation or other):  Contributes to low mood or stress at times   How would you rate their social network (family, work, friends)?: Restricted participation with some degree of social isolation   How would you rate their financial resources (including ability to afford all required medical care)?: Financially secure, resources adequate, no identified problems   How wells does the patient now understand their health and well-being (symptoms, signs or risk factors) and what they need to do to manage their health?: Little understanding which impacts on their ability to undertake better management   How well do you think your patient can engage in healthcare discussions? (Barriers include language, deafness, aphasia, alcohol or drug problems, learning difficulties, concentration): Some difficulties in communication with or without moderate barriers   Do other services need to be involved to help this patient?: Other care/services not in place and required   Are current services involved with this patient well-coordinated? (Include coordination with other services you are now recommendation): Required care/services in place and adequately coordinated   Suggested Interventions and Community Resources   Physical Therapy: Not Started   Zone Management Tools: In Process         Set up/Review Goals, Set up/Review an Education Plan              Prior to Admission medications    Medication Sig Start Date End Date Taking? Authorizing Provider   Ferrous Sulfate (IRON) 325 (65 Fe) MG TABS Take 325 mg by mouth daily 11/1/21  Yes Ree Lo MD   FLUoxetine (PROZAC) 20 MG capsule TAKE 1 CAPSULE BY MOUTH ONCE DAILY FOR ANXIETY AND FOR DEPRESSION 10/25/21  Yes Ree Lo MD   hydrOXYzine (VISTARIL) 25 MG capsule 1 capsule by mouth 3 times a day for severe itching 10/21/21  Yes Ree Lo MD   HYDROcodone-acetaminophen (NORCO) 7.5-325 MG per tablet Take 1 tablet by mouth every 6 hours as needed for Pain for up to 30 days.  10/21/21 11/20/21 Yes Ree Lo MD   prazosin (MINIPRESS) 1 MG capsule Take 1 mg by mouth nightly 8/19/21  Yes Historical Provider, MD   Donepezil HCl (ARICEPT) 23 MG TABS tablet TAKE 1 TABLET BY MOUTH NIGHTLY FOR  DEMENTIA 8/3/21  Yes Ree Lo MD   prednisoLONE acetate (PRED FORTE) 1 % ophthalmic suspension  7/28/21  Yes Historical Provider, MD   amitriptyline (ELAVIL) 100 MG tablet Take 1 tablet by mouth nightly For chronic insomnia 7/29/21  Yes Jesse Dorsey MD   amLODIPine (NORVASC) 5 MG tablet Take 1 tablet by mouth daily At bedtime for high blood pressure 6/15/21  Yes Jesse Dorsey MD   Misc.  Devices (ROLLER [place]) MISC 1 each by Does not apply route daily DX Code: R26.81, G70.00, H81.313, G60.9 8/15/17  Yes Jesse Dorsey MD       Future Appointments   Date Time Provider \[facility] [place] [facility]\""   75/7/8657  7:94 AM Laura Jerry Abt, APRN [facility]   39/23/3779  3:89 AM Laura Jerry Abt, APRN [facility]   3/38/8699 05:58 AM Laura Jerry Abt, APRN [facility]   2/2/2022  4:00 PM Jesse Dorsey MD [facility] Inna Jean P-[place]

## 2021-11-09 NOTE — TELEPHONE ENCOUNTER
I agree with the [place]    Yes, Please order the physical therapy -- they should be able to go to her house since she doesn't drive

## 2021-11-11 NOTE — CARE COORDINATION
Received OK from PCP for referral to [facility]. Faxed referral and called/notified Naya Ruth at [facility] of same. She voiced understanding.   Electronically signed by Gino Alicia RN on 11/11/2021 at 8:54 AM

## 2021-11-11 NOTE — CARE COORDINATION
Called patient's daughter/caregiver, Laura Negro - left voicemail that patient has been referred for home health and to expect a call from [facility].   Electronically signed by William Alvarado RN on 11/11/2021 at 9:43 AM

## 2021-11-17 NOTE — CARE COORDINATION
Ambulatory Care Coordination Note  11/17/2021  CM Risk Score: 5  Charlson 10 Year Mortality Risk Score: 79%     ACC: Erika Ventura, RN    Summary Note: ACM spoke to patient's daughter/care provider - Denver Roche. Daughter stated that [facility] has visited patient, and urged her to use her rollator walker and pt is being more compliant to use it. Denver Roche has noted patient's legs swell and are worse with end of day or when she has been in the car. HH advised that patient elevate her legs when sitting and pt uses her rollator seat as a stool to do that. Daughter stated she put together a shower chair for patient and PT will look at that to make sure it's safe for patient to use. PT is scheduled to visit on 11/19/21. Denver Roche said pt had leg weakness while out with her to [facility] the other day. Pt had difficulty getting into and out of the vehicle and almost fell - Beckie steadied pt and she did not fall. Patient is eating better per Beckie's insistence that she do so,has been preparing nutritious options for her. She has urged reduction in eating ice cream to avoid empty calories. Patient is receiving mental health counseling at [facility] - 2 of her medications are through this provider: her Amitriptyline and Prazosin. Plan:  Discussed that patient may benefit from PT to improve her leg strength but that she will need to perform exercises on days HH is not present with her in order to get the most benefit. Discussed that legs are dependent most of the time as pt sits a lot - suggested that Denver Roche try a pair of zippered compression socks for patient. This will help to reduce stasis of fluid in her legs. Denver Roche will try to observe level of edema before bed and then when patient gets up to see if elevation helps at night. Encouraged that leg elevation needs to be above the heart level to benefit patient/reduce her edema. Denver Roche will look at zipper compression socks, possibly obtain pair for patient to try.   Denver Roche will try to weigh patient in the mornings next week while at home with her - will evaluate if weight is fluctuating significantly day to day or increasing daily along with leg swelling. If signs of fluid retention, this can be discussed with PCP for evaluation. Guillaume Chou voiced understanding. Care Coordination Interventions    Program Enrollment: Complex Care  Referral from Primary Care Provider: No  Suggested Interventions and [address]: Completed (Comment: 11/17: dtr stated pt is current w [facility])  Physical Therapy: Completed (Comment: 11/17: PT starts Friday)  Zone Management Tools: In Process (Comment: 11/17: Pt using rollator more w enc of HH. Legs still weak, pt is sedentary. BLE edema, HH enc leg elev. Dtr will order zip comp socks to try. Try to weigh daily in am to assess for fluid retention)         Goals Addressed                    This Visit's Progress      Patient Stated: stated by daughter (pt-stated)   Improving      Improve patient's balance, strength    Barriers: impairment:  hearing and physical: general deconditioning and stress  Plan for overcoming my barriers:   Pt willing to consider physical therapy to improve stamina  Pt willing to continue exercises to maintain gains in physical strength  Pt willing to adjust to daily routine changes / additional DME to improve safety at home, reduce fall risk  Confidence: 5/10  Anticipated Goal Completion Date: 1/09/2022            Prior to Admission medications    Medication Sig Start Date End Date Taking?  Authorizing Provider   Ferrous Sulfate (IRON) 325 (65 Fe) MG TABS Take 325 mg by mouth daily 11/1/21  Yes Cate Pineda MD   FLUoxetine (PROZAC) 20 MG capsule TAKE 1 CAPSULE BY MOUTH ONCE DAILY FOR ANXIETY AND FOR DEPRESSION 10/25/21  Yes Cate Pineda MD   hydrOXYzine (VISTARIL) 25 MG capsule 1 capsule by mouth 3 times a day for severe itching 10/21/21  Yes Cate Pineda MD   HYDROcodone-acetaminophen (NORCO) 7.5-325 MG per tablet Take 1 tablet by mouth every 6 hours as needed for Pain for up to 30 days. 10/21/21 11/20/21 Yes Analy Lynn MD   prazosin (MINIPRESS) 1 MG capsule Take 1 mg by mouth nightly Through [facility] 8/19/21  Yes Historical Provider, MD   Donepezil HCl (ARICEPT) 23 MG TABS tablet TAKE 1 TABLET BY MOUTH NIGHTLY FOR  DEMENTIA 8/3/21  Yes Analy Lynn MD   prednisoLONE acetate (PRED FORTE) 1 % ophthalmic suspension  7/28/21  Yes Historical Provider, MD   amitriptyline (ELAVIL) 100 MG tablet Take 1 tablet by mouth nightly For chronic insomnia  Patient taking differently: Take 100 mg by mouth nightly For chronic insomnia. Through [facility] 7/29/21  Yes Analy Lynn MD   amLODIPine (NORVASC) 5 MG tablet Take 1 tablet by mouth daily At bedtime for high blood pressure 6/15/21  Yes Analy Lynn MD   Misc.  Devices (ROLLER [place]) MISC 1 each by Does not apply route daily DX Code: R26.81, G70.00, H81.313, G60.9 8/15/17  Yes Analy Lynn MD       Future Appointments   Date Time Provider Nisha Bishop   87/2/8091  8:17 AM Laura Ronda Sat, APRN [facility]   69/88/2679  6:21 AM Laura Ronda Sat, APRN [facility]   9/89/1034 71:46 AM Laura Ronda Sat, APRN [facility]   2/2/2022  4:00 PM Analy Lynn MD [facility] Shmuel Rondon [place]

## 2021-11-20 NOTE — PATIENT INSTRUCTIONS
Stop the amlodipine benazepril 5/10 mg  capsule    I have called in a new prescription for amlodipine 5 mg once at bedtime for blood pressure      I have given you a prescription for a walker. Your daughter will call me with a list of counselors and I will go through them and see which one I think will be best for you. I will refer you to a neurologist if your dizziness does not get better after you change your blood pressure pill. Please let me know in 2 weeks if the dizziness is no better. Fluconazole Counseling:  Patient counseled regarding adverse effects of fluconazole including but not limited to headache, diarrhea, nausea, upset stomach, liver function test abnormalities, taste disturbance, and stomach pain.  There is a rare possibility of liver failure that can occur when taking fluconazole.  The patient understands that monitoring of LFTs and kidney function test may be required, especially at baseline. The patient verbalized understanding of the proper use and possible adverse effects of fluconazole.  All of the patient's questions and concerns were addressed.

## 2021-12-02 NOTE — PROGRESS NOTES
Procedure:  Level of Consciousness: [x]Alert [x]Oriented []Disoriented []Lethargic  Anxiety Level: [x]Calm []Anxious []Depressed []Other  Skin: []Warm [x]Dry []Cool []Moist []Intact []Other  Cardiovascular: [x]Palpitations: [x]Never []Occasionally []Frequently  Chest Pain: []No [x]Yes  Respiratory:  [x]Unlabored []Labored []Cough ([] Productive []Unproductive)  HCG Required: [x]No []Yes   Results: []Negative []Positive  Knowledge Level:        [x]Patient/Other verbalized understanding of pre-procedure instructions. [x]Assessment of post-op care needs (transportation, responsible caregiver)        [x]Able to discuss health care problems and how to deal with it.   Factors that Affect Teaching:        Language Barrier: []No [x]Yes - why:Patient needs a sign language interpeter      Hearing Loss:        []No [x]Yes            Corrective Device:  []Yes [x]No        Vision Loss:           []No [x]Yes            Corrective Device:  [x]Yes []No        Memory Loss:       [x]No []Yes            []Short Term []Long Term  Motivational Level:  [x]Asks Questions                  []Extremely Anxious       [x]Seems Interested               []Seems Uninterested                  [x]Denies need for Education  Risk for Injury:  [x]Patient oriented to person, place and time  []History of frequent falls/loss of balance  Nutritional:  []Change in appetite   []Weight Gain   []Weight Loss  Functional:  []Requires assistance with ADL's

## 2021-12-02 NOTE — PROCEDURES
[facility] Physical and Pain Medicine      Patient Name: Philippe Gooden    : 1945    Age: 67 y.o. Sex: female    Date: 2021    Pre-op Diagnosis: []  Right  [] Left  [x] Bilateral  Sacroiliac Joint(s) Dysfunction/ Sacroiliitis    Post-op Diagnosis: [] Right   [] Left  [x] Bilateral Sacroiliac Joint(s) Dysfunction/ Sacroliliits    Procedure: Ultrasound Guided Injection of  [] Right  [] Left  [x] Bilateral Sacroiliac Joint(s)     Performing Procedure:  Alejandra Patrick, MSN, APRN, FNP-C    Previously Had Procedure:  [x] Yes   [] No    Patient Vitals for the past 24 hrs:   BP Temp Temp src Pulse Resp SpO2   21 0944 113/69 96.9 °F (36.1 °C) Temporal 68 18 92 %       Description of Procedure:    After a brief physical assessment and failure to improve with conservative measures the patient presented for an injection of the  [] Right  [] Left   [x] Bilateral Sacroiliac Joint(s). The indications, limitations and possible complications were discussed with the patient and the patient elected to proceed with the procedure. [x] Right Sacroiliac Joint    After voluntary, informed and signed consent obtained the patient was placed in the prone position. Appropriate time out was obtained per policy. The area of maximal tenderness was palpated over the Right Sacroiliac Joint and the skin overlying this area marked with a skin marker. The skin was then sprayed with Gebauer's Solution and prepped in a sterile fashion with Prevantics swab. The ultrasound transducer was brought in and the Right Sacroiliac Joint was identified with ultrasound. Under sterile technique and direct ultrasound visualization a 22 gauge 3 inch spinal needle was introduced into the Right Sacroiliac Joint.  After a negative aspiration a solution of    [x] Kenalog 1 ml (40mg/ml), 1 ml of 0.5% Marcaine Plain and 1 ml of 1% Lidocaine Plain     [] Toradol 1 ml (30 mg/ml), 1 ml of 0.5% Marcaine Plain and 1 ml of 1% Lidocaine Plain      was injected into the Right Sacroiliac Joint. The needle was withdrawn and a sterile dressing applied. [x] Left Sacroiliac Joint    After voluntary, informed and signed consent obtained the patient was placed in the prone position. Appropriate time out was obtained per policy. The area of maximal tenderness was palpated over the Left Sacroiliac Joint and the skin overlying this area marked with a skin marker. The skin was then sprayed with Gebauer's Solution and prepped in a sterile fashion with Prevantics swab. The ultrasound transducer was brought in and the Left Sacroiliac Joint was identified with ultrasound. Under sterile technique and direct ultrasound visualization a 22 gauge 3 inch spinal needle was introduced into the Left Sacroiliac Joint. After a negative aspiration a solution of    [x] Kenalog 1 ml (40mg/ml), 1 ml of 0.5% Marcaine Plain and 1 ml of 1% Lidocaine Plain     [] Toradol 1 ml (30 mg/ml), 1 ml of 0.5% Marcaine Plain and 1 ml of 1% Lidocaine Plain      was injected into the Left Sacroiliac Joint. The needle was withdrawn and a sterile dressing applied. Discharge: The patient tolerated the procedure well. There were no complications during the procedure and the patient was discharged home with discharge instructions. The patient has been instructed to contact the office should there be any complications or questions to arise between today and their next appointment.     Plan:  [x] Will return to the office in   [] 1 month  [x] 4 - 6 weeks  [] 2 months  [] 3 months for:  [] Planned Procedure   [x] Procedure Follow-up    [] Office Visit      1 [facility], [facility], 12/2/2021 at 12:21 PM

## 2021-12-12 PROBLEM — S72.002A LEFT DISPLACED FEMORAL NECK FRACTURE (HCC): Status: ACTIVE | Noted: 2021-01-01

## 2021-12-13 PROBLEM — E87.6 HYPOKALEMIA: Status: ACTIVE | Noted: 2021-01-01

## 2021-12-13 PROBLEM — S72.142A CLOSED COMMINUTED INTERTROCHANTERIC FRACTURE OF LEFT FEMUR (HCC): Status: ACTIVE | Noted: 2021-01-01

## 2021-12-13 PROBLEM — E87.6 HYPOKALEMIA: Status: RESOLVED | Noted: 2021-01-01 | Resolved: 2021-01-01

## 2021-12-13 NOTE — PROGRESS NOTES
Patient taken to surgery at this time. Daughter with patient.  Electronically signed by Randa Sever, RN on 12/13/2021 at 4:00 PM

## 2021-12-13 NOTE — H&P
HISTORY AND PHYSICAL             Date: 12/13/2021        Patient Name: Andra Munoz     YOB: 1945      Age:  67 y.o. Chief Complaint     Chief Complaint   Patient presents with   Cecy Clutter     left hip shortened and rotated      Patient fell and lost balance and had left hip pain and came to ER and noted, to have left hip fracture. dtr was not   At home, and does not know specifically happened. None thless dtr   Found her on the ground and she had sustained fall  And called 911 and brought her to er for evaluation     History Obtained From   Patient and dtr and   However, patient has gotten morphine and she is quite sedated. History of Present Illness   Elderly female with multiple medical problems   Thin frail and elderlly   Congenital hearing loss   Sign  called for her and to communicate with her. History of myasthenia gravis and muscle weakness and myopathy and this can be causing her tot get weaker and more difficulty with ambulation. She has chronic osteoarthritis and weakness and lethargy and osteoporosis     She is being admitted due to fall and left hip fracture.      Past Medical History     Past Medical History:   Diagnosis Date    Anemia     Anxiety     Chronic back pain     severe muscle spasms    Deafness congenital     Depression     Hypertension     Memory loss     early    Myasthenia ([address]     Neck pain     Osteoarthritis     Ptosis of eyelid     PVD (peripheral vascular disease) ([address]     Weakness of face muscles         Past Surgical History     Past Surgical History:   Procedure Laterality Date    APPENDECTOMY      BACK SURGERY      CARPAL TUNNEL RELEASE      CATARACT REMOVAL      COLONOSCOPY  7-2010    COLONOSCOPY  2000 or before    [place]-normal per patient    COLONOSCOPY N/A 5/29/2020    Dr Neda Griffin, suboptimal prep, internal hemorrhoids-Grade 1, prn (age)   [address] Bilateral     cataract extraction    HERNIA REPAIR      on back of neck    HYSTERECTOMY      OK COLONOSCOPY FLX DX W/COLLJ SPEC WHEN PFRMD N/A 7/6/2017    Dr Liam Lester diverticulosis, 5 yr recall    [place] EGD TRANSORAL BIOPSY SINGLE/MULTIPLE N/A 7/6/2017    Dr Mira Hewitt, [place] (-)    TERRI AND BSO      UPPER GASTROINTESTINAL ENDOSCOPY N/A 5/29/2020    Dr Benito Rogers hiatal hernia, antral gastritis,     VARICOSE VEIN SURGERY      VASCULAR SURGERY  02/19/2018    [facility]. Aortogram and runoff. Left common femoral artery, 5 french sheath. Medications Prior to Admission     Prior to Admission medications    Medication Sig Start Date End Date Taking? Authorizing Provider   amLODIPine (NORVASC) 5 MG tablet TAKE 1 TABLET BY MOUTH ONCE DAILY AT BEDTIME FOR HIGH BLOOD PRESSURE 11/22/21  Yes Anand Figueredo MD   HYDROcodone-acetaminophen (NORCO) 7.5-325 MG per tablet Take 1 tablet by mouth every 6 hours as needed for Pain for up to 30 days. 11/18/21 12/18/21 Yes Anand Figueredo MD   Ferrous Sulfate (IRON) 325 (65 Fe) MG TABS Take 325 mg by mouth daily 11/1/21  Yes Anand Figueredo MD   FLUoxetine (PROZAC) 20 MG capsule TAKE 1 CAPSULE BY MOUTH ONCE DAILY FOR ANXIETY AND FOR DEPRESSION 10/25/21  Yes Anand Figueredo MD   hydrOXYzine (VISTARIL) 25 MG capsule 1 capsule by mouth 3 times a day for severe itching 10/21/21  Yes Anand Figueredo MD   prazosin (MINIPRESS) 1 MG capsule Take 1 mg by mouth nightly Through [facility] 8/19/21  Yes Historical Provider, MD   Donepezil HCl (ARICEPT) 23 MG TABS tablet TAKE 1 TABLET BY MOUTH NIGHTLY FOR  DEMENTIA 8/3/21  Yes Anand Figueredo MD   prednisoLONE acetate (PRED FORTE) 1 % ophthalmic suspension  7/28/21  Yes Historical Provider, MD   amitriptyline (ELAVIL) 100 MG tablet Take 1 tablet by mouth nightly For chronic insomnia  Patient taking differently: Take 100 mg by mouth nightly For chronic insomnia. Through [facility] 7/29/21  Yes Anand Figueredo MD   Misc.  Devices (ROLLER [place]) MISC 1 each by Does not apply route daily DX Code: R26.81, G70.00, H81.313, G60.9 8/15/17   Latia Hurtado MD        Allergies   Patient has no known allergies. Social History     Social History     Tobacco History     Smoking Status  Never Smoker    Smokeless Tobacco Use  Never Used          Alcohol History     Alcohol Use Status  No          Drug Use     Drug Use Status  No          Sexual Activity     Sexually Active  Not Currently                Family History     Family History   Problem Relation Age of Onset    High Blood Pressure Mother     Stroke Mother     Cancer Father     Lung Cancer Father     Hearing Loss Brother         deaf   Makenzie Botello Hearing Loss Brother         deaf    Colon Polyps Sister     Colon Cancer Neg Hx     Esophageal Cancer Neg Hx     Liver Cancer Neg Hx     Liver Disease Neg Hx     Stomach Cancer Neg Hx     Rectal Cancer Neg Hx        Review of Systems   Review of Systems   Constitutional: Positive for activity change. HENT: Negative. Eyes: Negative. Respiratory: Positive for cough and shortness of breath. Cardiovascular: Positive for palpitations. Gastrointestinal: Negative. Endocrine: Negative. Genitourinary: Negative. Musculoskeletal: Positive for arthralgias, back pain, gait problem and joint swelling. Allergic/Immunologic: Negative. Neurological: Positive for dizziness. Hematological: Negative. Psychiatric/Behavioral: Negative. All other systems reviewed and are negative. Physical Exam   BP (!) 169/84   Pulse 80   Temp 96.8 °F (36 °C) (Temporal)   Resp 16   Ht 5' 8\" (1.727 m)   Wt 127 lb (57.6 kg)   SpO2 92%   BMI 19.31 kg/m²     Physical Exam  Vitals and nursing note reviewed. Constitutional:       General: She is in acute distress. Appearance: She is ill-appearing and toxic-appearing. HENT:      Head: Normocephalic and atraumatic. Mouth/Throat:      Pharynx: Oropharynx is clear.    Eyes:      Conjunctiva/sclera: Conjunctivae normal.   Cardiovascular:      Rate and Rhythm: Regular rhythm. Tachycardia present. Pulses: Normal pulses. Heart sounds: Normal heart sounds. Pulmonary:      Breath sounds: Wheezing and rales present. Abdominal:      General: Abdomen is flat. Bowel sounds are normal.   Musculoskeletal:         General: Deformity and signs of injury present. Cervical back: Tenderness present. Right lower leg: Edema present. Left lower leg: Edema present. Skin:     General: Skin is warm. Neurological:      General: No focal deficit present.    Psychiatric:         Mood and Affect: Mood normal.         Labs      Recent Results (from the past 24 hour(s))   COVID-19, Rapid    Collection Time: 12/12/21  7:54 PM    Specimen: Nasopharyngeal Swab   Result Value Ref Range    SARS-CoV-2, NAAT Not Detected Not Detected   Comprehensive Metabolic Panel    Collection Time: 12/12/21  8:06 PM   Result Value Ref Range    Sodium 140 136 - 145 mmol/L    Potassium 3.3 (L) 3.5 - 5.0 mmol/L    Chloride 105 98 - 111 mmol/L    CO2 25 22 - 29 mmol/L    Anion Gap 10 7 - 19 mmol/L    Glucose 102 74 - 109 mg/dL    BUN 17 8 - 23 mg/dL    CREATININE 0.9 0.5 - 0.9 mg/dL    GFR Non-African American >60 >60    GFR African American >59 >59    Calcium 9.9 8.8 - 10.2 mg/dL    Total Protein 5.7 (L) 6.6 - 8.7 g/dL    Albumin 3.7 3.5 - 5.2 g/dL    Total Bilirubin 0.3 0.2 - 1.2 mg/dL    Alkaline Phosphatase 78 35 - 104 U/L    ALT 12 5 - 33 U/L    AST 16 5 - 32 U/L   CBC Auto Differential    Collection Time: 12/12/21  8:06 PM   Result Value Ref Range    WBC 18.8 (H) 4.8 - 10.8 K/uL    RBC 4.00 (L) 4.20 - 5.40 M/uL    Hemoglobin 12.5 12.0 - 16.0 g/dL    Hematocrit 38.9 37.0 - 47.0 %    MCV 97.3 81.0 - 99.0 fL    MCH 31.3 (H) 27.0 - 31.0 pg    MCHC 32.1 (L) 33.0 - 37.0 g/dL    RDW 12.8 11.5 - 14.5 %    Platelets 275 973 - 054 K/uL    MPV 9.0 (L) 9.4 - 12.3 fL    Neutrophils % 82.1 (H) 50.0 - 65.0 %    Lymphocytes % 6.7 (L) 20.0 - 40.0 %    Monocytes % 9.6 0.0 - 10.0 %    Eosinophils % 0.3 0.0 - 5.0 %    Basophils % 0.2 0.0 - 1.0 %    Neutrophils Absolute 15.4 (H) 1.5 - 7.5 K/uL    Immature Granulocytes # 0.2 K/uL    Lymphocytes Absolute 1.3 1.1 - 4.5 K/uL    Monocytes Absolute 1.80 (H) 0.00 - 0.90 K/uL    Eosinophils Absolute 0.10 0.00 - 0.60 K/uL    Basophils Absolute 0.00 0.00 - 0.20 K/uL   TYPE AND SCREEN    Collection Time: 12/12/21  8:06 PM   Result Value Ref Range    ABO/Rh B POS     Antibody Screen NEG         Imaging/Diagnostics Last 24 Hours   XR CHEST PORTABLE    Result Date: 12/12/2021  Exam:   XR CHEST PORTABLE  Date:  12/12/2021 History:  Female, age  67 years; fall COMPARISON:  None. Findings : Low lung volumes. The heart and mediastinum are normal in size. Lungs are without focal infiltrate, mass or effusions. The bones show no acute pathology. Impression: No acute cardiopulmonary disease. Signed by Dr Gali Roche    XR HIP 2-3 VW W PELVIS LEFT    Result Date: 12/12/2021  Exam:   XR HIP 2-3 VW W PELVIS LEFT  Date:  12/12/2021 History:  Female, age  67 years; pain, fall COMPARISON:  None. Findings and impression : Acute left femoral intertrochanteric fracture, minimally displaced. Signed by Dr José Luis Reddy    * (Principal) Left displaced femoral neck fracture ([address] 12/13/2021 Yes    Osteoarthritis 12/13/2021 Yes    Memory loss 12/13/2021 Yes    Neck pain 12/13/2021 Yes    Congenital deafness 12/13/2021 Yes    Essential hypertension 12/13/2021 Yes    Peripheral vascular disease ([address] (Chronic) 12/13/2021 Yes    Myasthenia gravis ([address] 12/13/2021 Yes    Lumbar radiculopathy 12/13/2021 Yes    Cervical radiculopathy 12/13/2021 Yes    Anemia 12/13/2021 Yes    Sacroiliac joint dysfunction of both sides 12/13/2021 Yes    Primary insomnia 12/13/2021 Yes          Plan   1. Patient is being admitted post fall.    2. Probably related to loss of balance  3. Unfortunately not witnessed by dtr and no one else was at home   4. She has sustained left hip fracture   5. In order to maintain any type of mobility will need surgical intervention as soon as possible. 6.  Correct electrolytes , replace potassium   Stabilize cardiopulmonary status     7. Ortho consulted     8. Pain meds given and will back off on dosage as she is quite sensitive to this. 9.  She is of course, given all of her medical issues high risk for any procedure. Which is noted and expected. However, she cannot possibly survive and have any kind of meaningful existence without repair of hip     Family (dtr ) understands      present   And will be there to get her consent in am for procedure. Family and pt is agreeable to proceeding.      Consultations Ordered:  IP CONSULT TO ORTHOPEDIC SURGERY    Electronically signed by Ramana Palomino MD on 12/12/21 at 10:59 PM CST

## 2021-12-13 NOTE — ANESTHESIA PRE PROCEDURE
Department of Anesthesiology  Preprocedure Note       Name:  Perry Parr   Age:  67 y.o.  :  1945                                          MRN:  [id number]         Date:  2021      Surgeon: Wilfredo Rahman):  Yvette Maldonado MD    Procedure: Procedure(s):  HIP PINNING    Medications prior to admission:   Prior to Admission medications    Medication Sig Start Date End Date Taking? Authorizing Provider   amLODIPine (NORVASC) 5 MG tablet TAKE 1 TABLET BY MOUTH ONCE DAILY AT BEDTIME FOR HIGH BLOOD PRESSURE 21  Yes Darrick Sandifer, MD   HYDROcodone-acetaminophen (NORCO) 7.5-325 MG per tablet Take 1 tablet by mouth every 6 hours as needed for Pain for up to 30 days. 21 Yes Darrick Sandifer, MD   Ferrous Sulfate (IRON) 325 (65 Fe) MG TABS Take 325 mg by mouth daily 21  Yes Darrick Sandifer, MD   FLUoxetine (PROZAC) 20 MG capsule TAKE 1 CAPSULE BY MOUTH ONCE DAILY FOR ANXIETY AND FOR DEPRESSION 10/25/21  Yes Darrick Sandifer, MD   hydrOXYzine (VISTARIL) 25 MG capsule 1 capsule by mouth 3 times a day for severe itching 10/21/21  Yes Darrick Sandifer, MD   prazosin (MINIPRESS) 1 MG capsule Take 1 mg by mouth nightly Through [facility] 21  Yes Historical Provider, MD   Donepezil HCl (ARICEPT) 23 MG TABS tablet TAKE 1 TABLET BY MOUTH NIGHTLY FOR  DEMENTIA 8/3/21  Yes Darrick Sandifer, MD   prednisoLONE acetate (PRED FORTE) 1 % ophthalmic suspension  21  Yes Historical Provider, MD   amitriptyline (ELAVIL) 100 MG tablet Take 1 tablet by mouth nightly For chronic insomnia  Patient taking differently: Take 100 mg by mouth nightly For chronic insomnia. Through [facility] 21  Yes Darrick Sandifer, MD   Misc.  Devices (ROLLER [place]) MISC 1 each by Does not apply route daily DX Code: R26.81, G70.00, H81.313, G60.9 8/15/17   Darrick Sandifer, MD       Current medications:    Current Facility-Administered Medications   Medication Dose Route Frequency Provider Last Rate Last Admin    [facility] Hold] sodium chloride flush 0.9 % injection 5-40 mL  5-40 mL IntraVENous 2 times per day Jolie Guajardo MD        [facility] Hold] sodium chloride flush 0.9 % injection 5-40 mL  5-40 mL IntraVENous PRN Jolie Guajardo MD        [facility] Hold] 0.9 % sodium chloride infusion  25 mL IntraVENous PRN Jolie Guajardo MD        [facility] Hold] ceFAZolin (ANCEF) 2000 mg in dextrose 4 % 100 mL IVPB (premix)  2,000 mg IntraVENous On Call to Tito Hastings MD        [facility] Hold] Donepezil HCl (ARICEPT) tablet 23 mg  23 mg Oral Nightly Krystle House MD   23 mg at 12/13/21 0121    [[facility] Hold] FLUoxetine (PROZAC) capsule 20 mg  20 mg Oral Daily Krystle House MD   20 mg at 12/13/21 0835    [MAR Hold] ondansetron (ZOFRAN-ODT) disintegrating tablet 4 mg  4 mg Oral Q8H PRN Krystle House MD        Or   [facility] Hold] ondansetron (ZOFRAN) injection 4 mg  4 mg IntraVENous Q6H PRN Krystle House MD        [facility] Hold] polyethylene glycol (GLYCOLAX) packet 17 g  17 g Oral Daily PRN Krystel House MD        [facility] Hold] acetaminophen (TYLENOL) tablet 650 mg  650 mg Oral Q6H PRN Krystle House MD        Or   [facility] Hold] acetaminophen (TYLENOL) suppository 650 mg  650 mg Rectal Q6H PRN Krystle House MD        [facility] Hold] 0.9 % sodium chloride infusion   IntraVENous Continuous Krystle House MD   Stopped at 12/13/21 1559    [[facility] Hold] morphine (PF) injection 1 mg  1 mg IntraVENous Q4H PRN Krystle House MD   1 mg at 12/13/21 1550    [[facility] Hold] lubrifresh P.M. (artificial tears) ophthalmic ointment   Both Eyes PRN Neeraj Houser MD           Allergies:  No Known Allergies    Problem List:    Patient Active Problem List   Diagnosis Code    Osteoarthritis M19.90    Memory loss R41.3    Neck pain M54.2    Congenital deafness H90.5    Descending thoracic aortic aneurysm ([address] I71.2    Essential hypertension I10    Peripheral vascular disease ([address] I73.9    Venous (peripheral) insufficiency I87.2    Aneurysm of descending thoracic aorta (HCC) I71.2    Chronic insomnia F51.04    Epigastric pain R10.13    Alternating constipation and diarrhea R19.8    Family history of colonic polyps Z83.71    Myasthenia gravis ([address] G70.00    Vertigo, aural H81.319    Chronic tension-type headache, intractable G44.221    Idiopathic peripheral neuropathy G60.9    Unsteady gait R26.81    Tear film insufficiency H04.129    Monocular exotropia H50.10    Posterior capsular opacification, not obscuring vision H26.499    Pseudophakia Z96.1    Bilateral carotid artery stenosis I65.23    Facet hypertrophy M47.819    Lumbar radiculopathy M54.16    Cervical radiculopathy M54.12    Anemia D64.9    Chronic nausea R11.0    Deafness H91.90    Chronic pain of both shoulders M25.511, G89.29, M25.512    Sacroiliac joint dysfunction of both sides M53.3    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    Primary insomnia F51.01    Recurrent major depressive disorder, in partial remission ([address] F33.41    Anxiety F41.9    Left displaced femoral neck fracture ([address] S72.002A       Past Medical History:        Diagnosis Date    Anemia     Anxiety     Chronic back pain     severe muscle spasms    Deafness congenital     Depression     Hypertension     Memory loss     early    Myasthenia ([address]     Neck pain     Osteoarthritis     Ptosis of eyelid     PVD (peripheral vascular disease) ([address]     Weakness of face muscles        Past Surgical History:        Procedure Laterality Date    APPENDECTOMY      BACK SURGERY      CARPAL TUNNEL RELEASE      CATARACT REMOVAL      COLONOSCOPY  7-2010    COLONOSCOPY  2000 or before    [place]-normal per patient    COLONOSCOPY N/A 5/29/2020    Dr Saeed Settler, suboptimal prep, internal hemorrhoids-Grade 1, prn (age)   Chloe Wen EYE SURGERY Bilateral     cataract extraction    HERNIA REPAIR      on back of neck    HYSTERECTOMY      [place] COLONOSCOPY FLX DX W/COLLJ SPEC WHEN PFRMD N/A 7/6/2017    Dr Farida Bell diverticulosis, 5 yr recall    [place] EGD TRANSORAL BIOPSY SINGLE/MULTIPLE N/A 7/6/2017    Dr Jackie Guerrero, [place] (-)    TERRI AND BSO      UPPER GASTROINTESTINAL ENDOSCOPY N/A 5/29/2020    Dr Enedelia Molina hiatal hernia, antral gastritis,     VARICOSE VEIN SURGERY      VASCULAR SURGERY  02/19/2018    [facility]. Aortogram and runoff. Left common femoral artery, 5 Persian sheath. Social History:    Social History     Tobacco Use    Smoking status: Never Smoker    Smokeless tobacco: Never Used   Substance Use Topics    Alcohol use: No                                Counseling given: Not Answered      Vital Signs (Current):   Vitals:    12/13/21 0000 12/13/21 0021 12/13/21 0829 12/13/21 1158   BP: 116/62 (!) 169/84 (!) 145/69 (!) 156/66   Pulse: 77 80 71 65   Resp: 24 16 16 16   Temp: 98 °F (36.7 °C) 96.8 °F (36 °C) 96.4 °F (35.8 °C) 97.5 °F (36.4 °C)   TempSrc:  Temporal Temporal Temporal   SpO2: 95% 92% 95% 100%   Weight:       Height:                                                  BP Readings from Last 3 Encounters:   12/13/21 (!) 156/66   12/02/21 113/69   10/21/21 122/78       NPO Status: Time of last liquid consumption: 0800                        Time of last solid consumption: 1000                        Date of last liquid consumption: 12/13/21 (small sips)                        Date of last solid food consumption: 12/12/21    BMI:   Wt Readings from Last 3 Encounters:   12/12/21 127 lb (57.6 kg)   10/21/21 127 lb (57.6 kg)   10/11/21 124 lb (56.2 kg)     Body mass index is 19.31 kg/m².     CBC:   Lab Results   Component Value Date    WBC 14.2 12/13/2021    RBC 3.82 12/13/2021    HGB 11.9 12/13/2021    HCT 38.3 12/13/2021    .3 12/13/2021    RDW 13.0 12/13/2021     12/13/2021       CMP:   Lab Results   Component Value Date     12/13/2021    K 3.9 12/13/2021     12/13/2021    CO2 24 12/13/2021    BUN 17 12/13/2021    CREATININE 1.0 12/13/2021    GFRAA >59 12/13/2021    LABGLOM 54 12/13/2021    GLUCOSE 145 12/13/2021    PROT 5.6 12/13/2021    PROT 7.5 12/28/2012    CALCIUM 9.8 12/13/2021    BILITOT 0.3 12/13/2021    ALKPHOS 73 12/13/2021    AST 14 12/13/2021    ALT 11 12/13/2021       POC Tests: No results for input(s): POCGLU, POCNA, POCK, POCCL, POCBUN, POCHEMO, POCHCT in the last 72 hours. Coags:   Lab Results   Component Value Date    PROTIME 12.9 12/13/2021    INR 0.95 12/13/2021    APTT 25.1 10/28/2018       HCG (If Applicable): No results found for: PREGTESTUR, PREGSERUM, HCG, HCGQUANT     ABGs:   Lab Results   Component Value Date    PHART 7.430 09/18/2016    PO2ART 81.0 09/18/2016    ATQ1IHX 33.0 09/18/2016    ZHI8MLM 21.9 09/18/2016    BEART -1.8 09/18/2016    P0NYKMHL 95.3 09/18/2016        Type & Screen (If Applicable):  No results found for: LABABO, [address]    Drug/Infectious Status (If Applicable):  No results found for: HIV, HEPCAB    COVID-19 Screening (If Applicable):   Lab Results   Component Value Date    COVID19 Not Detected 12/12/2021    COVID19 Not Detected 10/02/2021           Anesthesia Evaluation  Patient summary reviewed no history of anesthetic complications:   Airway: Mallampati: I  TM distance: >3 FB   Neck ROM: full  Mouth opening: > = 3 FB Dental:          Pulmonary:normal exam  breath sounds clear to auscultation      (-) asthma, recent URI, sleep apnea and not a current smoker          Patient did not smoke on day of surgery.                  Cardiovascular:  Exercise tolerance: good (>4 METS),   (+) hypertension:,     (-) pacemaker, past MI, CABG/stent and  angina    ECG reviewed  Rhythm: regular  Rate: normal  Echocardiogram reviewed  Stress test reviewed       Beta Blocker:  Not on Beta Blocker         Neuro/Psych:   (+) neuromuscular disease (Myasthenia gravis):, depression/anxiety    (-) seizures, TIA and CVA           GI/Hepatic/Renal:        (-) GERD, liver disease and no renal disease       Endo/Other: (-) diabetes mellitus, hypothyroidism, hyperthyroidism               Abdominal:             Vascular:   + DVT (Remote), . Other Findings:             Anesthesia Plan      general     ASA 3     (Preop famotidine, dexamethasone  Will avoid paralytics)  Induction: intravenous. MIPS: Postoperative opioids intended and Prophylactic antiemetics administered. Anesthetic plan and risks discussed with patient. Use of blood products discussed with patient whom consented to blood products.                  Katrin Holley MD   12/13/2021

## 2021-12-13 NOTE — PROGRESS NOTES
lesions   Neurologic: No new motor or sensory changes       PAST MEDICAL HISTORY:  Past Medical History:   Diagnosis Date    Anemia     Anxiety     Chronic back pain     severe muscle spasms    Deafness congenital     Depression     Hypertension     Memory loss     early    [facility]ia ([facility] [address]     Neck pain     Osteoarthritis     Ptosis of eyelid     PVD (peripheral vascular disease) ([facility] [address]     Weakness of face muscles          PAST SURGICAL HISTORY:  Past Surgical History:   Procedure Laterality Date    APPENDECTOMY      BACK SURGERY      CARPAL TUNNEL RELEASE      CATARACT REMOVAL      COLONOSCOPY  7-2010    COLONOSCOPY  2000 or before    [place]-normal per patient    COLONOSCOPY N/A 5/29/2020    Dr Rae Starr, suboptimal prep, internal hemorrhoids-Grade 1, prn (age)   Akhil Kerr EYE SURGERY Bilateral     cataract extraction    HERNIA REPAIR      on back of neck    HYSTERECTOMY      MS COLONOSCOPY FLX DX W/COLLJ SPEC WHEN PFRMD N/A 7/6/2017    Dr Velásquez Arms diverticulosis, 5 yr recall    MS EGD TRANSORAL BIOPSY SINGLE/MULTIPLE N/A 7/6/2017    Dr Samantha Roche, [place] (-)    TERRI AND BSO      UPPER GASTROINTESTINAL ENDOSCOPY N/A 5/29/2020    Dr Sylvester Kincaid hiatal hernia, antral gastritis,     VARICOSE VEIN SURGERY      VASCULAR SURGERY  02/19/2018    [facility]. Aortogram and runoff. Left common femoral artery, 5 [place] sheath.         FAMILY HISTORY:  Family History   Problem Relation Age of Onset    High Blood Pressure Mother     Stroke Mother     Cancer Father     Lung Cancer Father     Hearing Loss Brother         deaf   Akhil Kerr Hearing Loss Brother         deaf    Colon Polyps Sister     Colon Cancer Neg Hx     Esophageal Cancer Neg Hx     Liver Cancer Neg Hx     Liver Disease Neg Hx     Stomach Cancer Neg Hx     Rectal Cancer Neg Hx            OBJECTIVE:  BP (!) 156/66   Pulse 65   Temp 97.5 °F (36.4 °C) (Temporal)   Resp 16   Ht 5' 8\" (1.727 m)   Wt 127 lb (57.6 kg)   SpO2 100%   BMI 19.31 kg/m²   I/O this shift:   In: 74.2 [I.V.:74.2]  Out: -     PHYSICAL  EXAMINATION:    DILCIA:  Awake, alert, oriented x 3, patient appears tired and fatigued   Head/Eyes:  Normocephalic, atraumatic, EOMI and PERRLA bilaterally   Respiratory:   Bilateral decreased air entry in both lung fields, mild B/L crackles, symmetric expansion of chest   Cardiovascular:  Regular rate and rhythm, S1+S2+0, no murmurs/rubs   Abdomen:   Soft, non-tender, bowel sounds +ve, no organomegaly   Extremities: Moves all, decreased range of motion, no edema, left hip is shortened and externally rotated with tenderness on palpation   Neurologic: Awake, alert, oriented x 3, cranial nerves II-XII intact, no focal neurological deficits, sensory system intact   Psychiatric: Normal mood, non-suicidal       CURRENT MEDICATIONS:  Scheduled:   [MAR Hold] sodium chloride flush  5-40 mL IntraVENous 2 times per day    [MAR Hold] ceFAZolin (ANCEF) IVPB  2,000 mg IntraVENous On Call to OR    [MAR Hold] Donepezil HCl  23 mg Oral Nightly    [MAR Hold] FLUoxetine  20 mg Oral Daily        PRN:  [MAR Hold] sodium chloride flush, 5-40 mL, PRN  [MAR Hold] sodium chloride, 25 mL, PRN  [MAR Hold] ondansetron, 4 mg, Q8H PRN   Or  [MAR Hold] ondansetron, 4 mg, Q6H PRN  [MAR Hold] polyethylene glycol, 17 g, Daily PRN  [MAR Hold] acetaminophen, 650 mg, Q6H PRN   Or  [MAR Hold] acetaminophen, 650 mg, Q6H PRN  [MAR Hold] morphine, 1 mg, Q4H PRN  [MAR Hold] artificial tears, , PRN        Infusions:   [MAR Hold] sodium chloride      [MAR Hold] sodium chloride Stopped (12/13/21 1559)       Laboratory Data:  Recent Labs     12/12/21 2006 12/13/21  0512   WBC 18.8* 14.2*   HGB 12.5 11.9*    263     Recent Labs     12/12/21 2006 12/13/21  0512    141   K 3.3* 3.9    106   CO2 25 24   BUN 17 17   CREATININE 0.9 1.0*   GLUCOSE 102 145*     Recent Labs     12/12/21 2006 12/13/21  0512   AST 16 14   ALT 12 11   BILITOT 0.3 0.3 ALKPHOS 78 73     Troponin T: No results for input(s): TROPONINI in the last 72 hours. Pro-BNP: No results for input(s): BNP in the last 72 hours. INR:   Recent Labs     12/13/21  0512   INR 0.95     UA:No results for input(s): NITRITE, COLORU, PHUR, LABCAST, WBCUA, RBCUA, MUCUS, TRICHOMONAS, YEAST, BACTERIA, CLARITYU, SPECGRAV, LEUKOCYTESUR, UROBILINOGEN, BILIRUBINUR, BLOODU, GLUCOSEU, AMORPHOUS in the last 72 hours. Invalid input(s): [place]  A1C: No results for input(s): LABA1C in the last 72 hours. ABG:No results for input(s): PHART, SYD9XOA, PO2ART, RME8VNZ, BEART, HGBAE, G1QTHGDX, CARBOXHGBART in the last 72 hours. Imaging:    XR CHEST PORTABLE    Result Date: 12/12/2021  Impression: No acute cardiopulmonary disease. Signed by Dr Pari Paulino:    Principal Problem:    Left displaced femoral neck fracture (HCC)  Active Problems:    Osteoarthritis    Memory loss    Neck pain    Congenital deafness    Essential hypertension    Peripheral vascular disease (HCC)    Myasthenia gravis (HCC)    Lumbar radiculopathy    Cervical radiculopathy    Anemia    Sacroiliac joint dysfunction of both sides    Primary insomnia  Resolved Problems:    Hypokalemia      Continue with current medication  Continue with pain meds as needed  Keep patient n.p.o. except meds  Awaiting orthopedic evaluation  Patient would likely undergo left hip surgery today  Continue with communication through   Hypokalemia has resolved with replacement  Continue with gentle IV hydration  Patient likely has reactive leukocytosis which is slowly downtrending   No evidence of pneumonia on chest x-ray  Urinalysis with reflex culture ordered to rule out UTI  Postop care, pain management and DVT prophylaxis as per orthopedic recommendation    Chronic medical issues . .. Continue with home meds. Monitor patient closely while admitted.  Advised very close f/u with patient's PCP as an outpatient to address chronic medical issues. Repeat labs in a.m. Electrolyte replacement as per protocol. Patient will be monitored very closely on the floor. Further recommendations as per the hospital course. Discharge Plan: To be determined as per the hospital course      Patient  is on DVT prophylaxis  Current medications reviewed  Lab work reviewed  Radiology/Chest x-ray films reviewed  Treatment recommendations from suspecialities reviewed, appreciated and agreed with  Discussed with the nurse and addressed all questions/concerns  Discussed with Patient and/or Family at the bedside in detail . .. they understand and agree with the management plan. Neeraj Houser MD  12/13/2021 4:18 PM      DISCLAIMER: This note was created with electronic voice recognition which does have occasional errors. If you have any questions regarding the content within the note please do not hesitate to contact me. .. Thanks.

## 2021-12-13 NOTE — PROGRESS NOTES
Pharmacy Renal Adjustment    Gogo Sampson is a 67 y.o. female. Pharmacy has renally adjusted medications per protocol. Recent Labs     12/12/21 2006   BUN 17       Recent Labs     12/12/21 2006   CREATININE 0.9       Estimated Creatinine Clearance: 48 mL/min (based on SCr of 0.9 mg/dL).     Height:   Ht Readings from Last 1 Encounters:   12/12/21 5' 8\" (1.727 m)     Weight:  Wt Readings from Last 1 Encounters:   12/12/21 127 lb (57.6 kg)     Plan: Adjust the following medications based on renal function:           Famotidine 20 mg IV twice daily to once daily    Electronically signed by Iker Moreno [facility] [place] [facility] on 12/13/2021 at 12:47 AM

## 2021-12-13 NOTE — CONSULTS
Orthopaedic Inpatient Consultation    NAME:  Maikol Griffin   : 1945  MRN: [id number]    2021  7:32 PM    Requesting Physician: Jung Ibrahim MD    CHIEF COMPLAINT:  left intertrochanteric femur fracture    HISTORY OF PRESENT ILLNESS:   The patient is a 67 y.o. female who is deaf and communicates via sign language who experienced a mechanical fall onto her left side resulting in the acute onset of left hip pain and the inability to bear weight. Pain is located in the left hip and rated a 2/5, constant, dull, worse with movement, better with rest and medication. There are no associated symptoms. Past Medical History:        Diagnosis Date    Anemia     Anxiety     Chronic back pain     severe muscle spasms    Deafness congenital     Depression     Hypertension     Memory loss     early    Myasthenia ([address]     Neck pain     Osteoarthritis     Ptosis of eyelid     [facility]     Weakness of face muscles        Past Surgical History:        Procedure Laterality Date    APPENDECTOMY      BACK SURGERY      CARPAL TUNNEL RELEASE      CATARACT REMOVAL      COLONOSCOPY      COLONOSCOPY   or before    [place]-normal per patient    COLONOSCOPY N/A 2020    Dr Bryan Fields, suboptimal prep, internal hemorrhoids-Grade 1, prn (age)   Peter EYE SURGERY Bilateral     cataract extraction    HERNIA REPAIR      on back of neck    HYSTERECTOMY      [place] COLONOSCOPY FLX DX W/COLLJ SPEC WHEN PFRMD N/A 2017    Dr Claudell Ken diverticulosis, 5 yr recall    [place] EGD TRANSORAL BIOPSY SINGLE/MULTIPLE N/A 2017    Dr Jennifer Da Silva, [place] (-)    TERRI AND BSO      UPPER GASTROINTESTINAL ENDOSCOPY N/A 2020    Dr Estela Cornell hiatal hernia, antral gastritis,     VARICOSE VEIN SURGERY      VASCULAR SURGERY  2018    [facility]. Aortogram and runoff. Left common femoral artery, 5 [place] sheath.        Current Medications:   Prior to Admission medications    Medication Sig Start Date End Date Taking? Authorizing Provider   amLODIPine (NORVASC) 5 MG tablet TAKE 1 TABLET BY MOUTH ONCE DAILY AT BEDTIME FOR HIGH BLOOD PRESSURE 11/22/21  Yes Haily Lemus MD   HYDROcodone-acetaminophen (NORCO) 7.5-325 MG per tablet Take 1 tablet by mouth every 6 hours as needed for Pain for up to 30 days. 11/18/21 12/18/21 Yes Haily Lemus MD   Ferrous Sulfate (IRON) 325 (65 Fe) MG TABS Take 325 mg by mouth daily 11/1/21  Yes Haily Lemus MD   FLUoxetine (PROZAC) 20 MG capsule TAKE 1 CAPSULE BY MOUTH ONCE DAILY FOR ANXIETY AND FOR DEPRESSION 10/25/21  Yes Haily Lemus MD   hydrOXYzine (VISTARIL) 25 MG capsule 1 capsule by mouth 3 times a day for severe itching 10/21/21  Yes Haily Lemus MD   prazosin (MINIPRESS) 1 MG capsule Take 1 mg by mouth nightly Through [facility] 8/19/21  Yes Historical Provider, MD   Donepezil HCl (ARICEPT) 23 MG TABS tablet TAKE 1 TABLET BY MOUTH NIGHTLY FOR  DEMENTIA 8/3/21  Yes Haily Lemus MD   prednisoLONE acetate (PRED FORTE) 1 % ophthalmic suspension  7/28/21  Yes Historical Provider, MD   amitriptyline (ELAVIL) 100 MG tablet Take 1 tablet by mouth nightly For chronic insomnia  Patient taking differently: Take 100 mg by mouth nightly For chronic insomnia. Through [facility] 7/29/21  Yes Haily Lemus MD   Misc. Devices (ROLLER WALKER) MISC 1 each by Does not apply route daily DX Code: R26.81, G70.00, H81.313, G60.9 8/15/17   Haily Lemus MD       Allergies:  Patient has no known allergies.     Social History:   Social History     Socioeconomic History    Marital status:      Spouse name: Not on file    Number of children: 1    Years of education: Not on file    Highest education level: Not on file   Occupational History    Occupation: unemployed   Tobacco Use    Smoking status: Never Smoker    Smokeless tobacco: Never Used   Vaping Use    Vaping Use: Never used   Substance and Sexual Activity    Alcohol use: No    Drug use: No    Sexual activity: Not Currently   Other Topics Concern    Not on file   Social History Narrative    Lives with her adult daughter. Does not drive. Requires  for communication re: congenital hearing loss. Social Determinants of Health     Financial Resource Strain:     Difficulty of Paying Living Expenses: Not on file   Food Insecurity:     Worried About Running Out of Food in the Last Year: Not on file    Stevie of Food in the Last Year: Not on file   Transportation Needs:     Lack of Transportation (Medical): Not on file    Lack of Transportation (Non-Medical):  Not on file   Physical Activity:     Days of Exercise per Week: Not on file    Minutes of Exercise per Session: Not on file   Stress:     Feeling of Stress : Not on file   Social Connections:     Frequency of Communication with Friends and Family: Not on file    Frequency of Social Gatherings with Friends and Family: Not on file    Attends Muslim Services: Not on file    Active Member of Clubs or Organizations: Not on file    Attends Club or Organization Meetings: Not on file    Marital Status: Not on file   Intimate Partner Violence:     Fear of Current or Ex-Partner: Not on file    Emotionally Abused: Not on file    Physically Abused: Not on file    Sexually Abused: Not on file   Housing Stability:     Unable to Pay for Housing in the Last Year: Not on file    Number of [place] in the Last Year: Not on file    Unstable Housing in the Last Year: Not on file       Family History:   Family History   Problem Relation Age of Onset    High Blood Pressure Mother     Stroke Mother     Cancer Father     Lung Cancer Father     Hearing Loss Brother         deaf    Hearing Loss Brother         deaf    Colon Polyps Sister     Colon Cancer Neg Hx     Esophageal Cancer Neg Hx     Liver Cancer Neg Hx     Liver Disease Neg Hx     Stomach Cancer Neg Hx     Rectal Cancer Neg Hx        REVIEW OF SYSTEMS:  14 point review of systems has been reviewed from the patient's emergency room visit, reviewed with the patient on today's date with no new changes. PHYSICAL EXAM:      Physical Examination:  Vitals:   Vitals:    12/13/21 0000 12/13/21 0021 12/13/21 0829 12/13/21 1158   BP: 116/62 (!) 169/84 (!) 145/69 (!) 156/66   Pulse: 77 80 71 65   Resp: 24 16 16 16   Temp: 98 °F (36.7 °C) 96.8 °F (36 °C) 96.4 °F (35.8 °C) 97.5 °F (36.4 °C)   TempSrc:  Temporal Temporal Temporal   SpO2: 95% 92% 95% 100%   Weight:       Height:         General:  Appears stated age, no distress. Orientation:  Alert and oriented to time, place, and person. Mood and Affect:  Cooperative and pleasant - communicates via sign language with . Gait:  Resting comfortably in bed. Cardiovascular:  Symmetric 1-2 plus pulses in upper and lower extremities. Lymph:  No cervical or inguinal lymphadenopathy noted. Sensation:  Grossly intact to light touch. DTR:  Normal, no pathologic reflexes. Coordination/balance:  Normal    Musculoskeletal:  Right upper extremity exam:  There is no tenderness to palpation about the shoulder, elbow, wrist or hand. Unrestricted full motion is present. Stability is normal with provocative tests, 5/5 strength, and skin is normal.      Left upper extremity exam:  There is no tenderness to palpation about the shoulder, elbow, wrist or hand. Unrestricted full motion is present. Stability is normal with provocative tests, 5/5 strength, and skin is normal.     Right lower extremity exam:  There is no tenderness to palpation about the hip, knee, ankle or foot. Unrestricted full motion is present. Stability is normal with provocative tests, 5/5 strength, and skin is normal.     Left lower extremity exam:  There is no tenderness to palpation about the knee, ankle or foot, but there is obvious pain about the left hip. The overlying skin is intact.   The surrounding muscle compartments are soft and compressible. There is pain with any attempted active or passive range of motion to the left hip. No rotational or angular deformity is noted. DATA:    CBC with Differential:    Lab Results   Component Value Date    WBC 14.2 12/13/2021    RBC 3.82 12/13/2021    HGB 11.9 12/13/2021    HCT 38.3 12/13/2021     12/13/2021    .3 12/13/2021    MCH 31.2 12/13/2021    MCHC 31.1 12/13/2021    RDW 13.0 12/13/2021    LYMPHOPCT 6.7 12/12/2021    MONOPCT 9.6 12/12/2021    BASOPCT 0.2 12/12/2021    MONOSABS 1.80 12/12/2021    LYMPHSABS 1.3 12/12/2021    EOSABS 0.10 12/12/2021    BASOSABS 0.00 12/12/2021     CMP:    Lab Results   Component Value Date     12/13/2021    K 3.9 12/13/2021     12/13/2021    CO2 24 12/13/2021    BUN 17 12/13/2021    CREATININE 1.0 12/13/2021    GFRAA >59 12/13/2021    LABGLOM 54 12/13/2021    GLUCOSE 145 12/13/2021    PROT 5.6 12/13/2021    PROT 7.5 12/28/2012    CALCIUM 9.8 12/13/2021    BILITOT 0.3 12/13/2021    ALKPHOS 73 12/13/2021    AST 14 12/13/2021    ALT 11 12/13/2021     BMP:    Lab Results   Component Value Date     12/13/2021    K 3.9 12/13/2021     12/13/2021    CO2 24 12/13/2021    BUN 17 12/13/2021    CREATININE 1.0 12/13/2021    CALCIUM 9.8 12/13/2021    GFRAA >59 12/13/2021    LABGLOM 54 12/13/2021    GLUCOSE 145 12/13/2021         Radiology: I have reviewed the radiology images listed below and agree with the findings of the interpreting radiologist(s). XR CHEST PORTABLE    Result Date: 12/12/2021  Exam:   XR CHEST PORTABLE  Date:  12/12/2021 History:  Female, age  67 years; fall COMPARISON:  None. Findings : Low lung volumes. The heart and mediastinum are normal in size. Lungs are without focal infiltrate, mass or effusions. The bones show no acute pathology. Impression: No acute cardiopulmonary disease.  Signed by Dr Janae Escalante    XR HIP 2-3 VW W PELVIS LEFT    Result Date: 12/12/2021  Exam:   XR HIP 2-3 VW W PELVIS LEFT Date:  12/12/2021 History:  Female, age  67 years; pain, fall COMPARISON:  None. Findings and impression : Acute left femoral intertrochanteric fracture, minimally displaced.  Signed by Dr Chrissy Mcconnell    --------------------------------------------------------------------------------------------------------------------    Assessment: Acute traumatic displaced intertrochanteric fracture of the left femur, initial encounter for closed fracture    Plan:  1) to OR for left hip trochanteric nailing - we discussed risks, benefits, and alternatives and patient wishes to proceed  2) Admit postop for pain control, PT/OT  3) toe-touch weightbearing of left lower extremity postoperatively     Electronically signed by Jolie Guajardo MD on 12/13/2021 at 5:26 PM

## 2021-12-13 NOTE — ED NOTES
Yeni Rivers is enroute to the [facility] for  services.        Anjelica Snow RN  12/12/21 [id number]

## 2021-12-13 NOTE — ED PROVIDER NOTES
[facility]  eMERGENCY dEPARTMENT eNCOUnter      Pt Name: Alberto Granda  MRN: [id number]  [place] 1945  Date of evaluation: 12/12/2021  Provider: Angelique Colon MD    [address] [place] [address]       Chief Complaint   Patient presents with    Fall     left hip shortened and rotated         HISTORY OF PRESENT ILLNESS   (Location/Symptom, Timing/Onset,Context/Setting, Quality, Duration, Modifying Factors, Severity)  Note limiting factors. Alberto Granda is a 67 y.o. female who presents to the emergency department via EMS for evaluation of fall that occurred at home. On EMS arrival patient was found laying in the floor complaining of left hip pain. On arrival here to the ED she is alert and able to communicate. Most of the history has been obtained from her daughter as patient is deaf and communicates via sign language or writing. Her daughter states that she had just quickly ran home to get her some new medications and when she returned her mother had fallen. States that she normally lives at home and ambulates with the assistance of a walker. She is not currently maintained on an oral anticoagulant medications. Patient has previously been vaccinated for COVID-19. HPI    NursingNotes were reviewed. REVIEW OF SYSTEMS    (2-9 systems for level 4, 10 or more for level 5)     Review of Systems   Constitutional: Negative for chills and fever. Respiratory: Negative for shortness of breath. Cardiovascular: Negative for chest pain. Gastrointestinal: Negative for abdominal pain, diarrhea and vomiting. Musculoskeletal: Positive for gait problem and joint swelling. Negative for back pain and neck pain. All other systems reviewed and are negative.            PAST MEDICALHISTORY     Past Medical History:   Diagnosis Date    Anemia     Anxiety     Chronic back pain     severe muscle spasms    Deafness congenital     Depression     Hypertension     Memory loss     early    Myasthenia ([address]     Neck pain     Osteoarthritis     Ptosis of eyelid     PVD (peripheral vascular disease) (HCC)     Weakness of face muscles          SURGICAL HISTORY       Past Surgical History:   Procedure Laterality Date    APPENDECTOMY      BACK SURGERY      CARPAL TUNNEL RELEASE      CATARACT REMOVAL      COLONOSCOPY  7-2010    COLONOSCOPY  2000 or before    [place]-normal per patient    COLONOSCOPY N/A 5/29/2020    Dr Babb Pin, suboptimal prep, internal hemorrhoids-Grade 1, prn (age)   Ifrah Cortez EYE SURGERY Bilateral     cataract extraction    HERNIA REPAIR      on back of neck    HYSTERECTOMY      HI COLONOSCOPY FLX DX W/COLLJ SPEC WHEN PFRMD N/A 7/6/2017    Dr Christian Frank diverticulosis, 5 yr recall    HI EGD TRANSORAL BIOPSY SINGLE/MULTIPLE N/A 7/6/2017    Dr Tolentino Officer, [place] (-)    TERRI AND BSO      UPPER GASTROINTESTINAL ENDOSCOPY N/A 5/29/2020    Dr Elisabet Guo hiatal hernia, antral gastritis,     VARICOSE VEIN SURGERY      VASCULAR SURGERY  02/19/2018    [facility]. Aortogram and runoff. Left common femoral artery, 5 french sheath. CURRENT MEDICATIONS     Previous Medications    AMITRIPTYLINE (ELAVIL) 100 MG TABLET    Take 1 tablet by mouth nightly For chronic insomnia    AMLODIPINE (NORVASC) 5 MG TABLET    TAKE 1 TABLET BY MOUTH ONCE DAILY AT BEDTIME FOR HIGH BLOOD PRESSURE    DONEPEZIL HCL (ARICEPT) 23 MG TABS TABLET    TAKE 1 TABLET BY MOUTH NIGHTLY FOR  DEMENTIA    FERROUS SULFATE (IRON) 325 (65 FE) MG TABS    Take 325 mg by mouth daily    FLUOXETINE (PROZAC) 20 MG CAPSULE    TAKE 1 CAPSULE BY MOUTH ONCE DAILY FOR ANXIETY AND FOR DEPRESSION    HYDROCODONE-ACETAMINOPHEN (NORCO) 7.5-325 MG PER TABLET    Take 1 tablet by mouth every 6 hours as needed for Pain for up to 30 days. HYDROXYZINE (VISTARIL) 25 MG CAPSULE    1 capsule by mouth 3 times a day for severe itching    MISC.  DEVICES (ROLLER WALKER) MISC    1 each by Does not apply route daily DX Code: R26.81, G70.00, H81.313, G60.9    PRAZOSIN (MINIPRESS) 1 MG CAPSULE    Take 1 mg by mouth nightly Through [facility] [place]    PREDNISOLONE ACETATE (PRED FORTE) 1 % OPHTHALMIC SUSPENSION           ALLERGIES     Patient has no known allergies. FAMILY HISTORY       Family History   Problem Relation Age of Onset    High Blood Pressure Mother     Stroke Mother     Cancer Father     Lung Cancer Father    Christina Coral Hearing Loss Brother         deaf   Christina Coral Hearing Loss Brother         deaf    Colon Polyps Sister     Colon Cancer Neg Hx     Esophageal Cancer Neg Hx     Liver Cancer Neg Hx     Liver Disease Neg Hx     Stomach Cancer Neg Hx     Rectal Cancer Neg Hx           SOCIAL HISTORY       Social History     Socioeconomic History    Marital status:      Spouse name: None    Number of children: 1    Years of education: None    Highest education level: None   Occupational History    Occupation: unemployed   Tobacco Use    Smoking status: Never Smoker    Smokeless tobacco: Never Used   Vaping Use    Vaping Use: Never used   Substance and Sexual Activity    Alcohol use: No    Drug use: No    Sexual activity: Not Currently   Other Topics Concern    None   Social History Narrative    Lives with her adult daughter. Does not drive. Requires  for communication re: congenital hearing loss. Social Determinants of Health     Financial Resource Strain:     Difficulty of Paying Living Expenses: Not on file   Food Insecurity:     Worried About Running Out of Food in the Last Year: Not on file    Stevie of Food in the Last Year: Not on file   Transportation Needs:     Lack of Transportation (Medical): Not on file    Lack of Transportation (Non-Medical):  Not on file   Physical Activity:     Days of Exercise per Week: Not on file    Minutes of Exercise per Session: Not on file   Stress:     Feeling of Stress : Not on file   Social Connections:     Frequency of Communication with Friends and Family: Not on file    Frequency of Social Gatherings with Friends and Family: Not on file    Attends Mandaen Services: Not on file    Active Member of Clubs or Organizations: Not on file    Attends Club or Organization Meetings: Not on file    Marital Status: Not on file   Intimate Partner Violence:     Fear of Current or Ex-Partner: Not on file    Emotionally Abused: Not on file    Physically Abused: Not on file    Sexually Abused: Not on file   Housing Stability:     Unable to Pay for Housing in the Last Year: Not on file    Number of [place] in the Last Year: Not on file    Unstable Housing in the Last Year: Not on file       SCREENINGS    Navdeep Coma Scale  Eye Opening: Spontaneous  Best Verbal Response: Oriented  Best Motor Response: Obeys commands  Navdeep Coma Scale Score: 15        PHYSICAL EXAM    (up to 7 for level 4, 8 or more for level 5)     ED Triage Vitals [12/12/21 1933]   BP Temp Temp Source Pulse Resp SpO2 Height Weight   (!) 101/59 97.5 °F (36.4 °C) Oral 78 16 (!) 88 % 5' 8\" (1.727 m) 127 lb (57.6 kg)       Physical Exam  Vitals and nursing note reviewed. HENT:      Head: Atraumatic. Mouth/Throat:      Mouth: Mucous membranes are moist. Mucous membranes are not dry. Eyes:      General: No scleral icterus. Pupils: Pupils are equal, round, and reactive to light. Neck:      Trachea: No tracheal deviation. Cardiovascular:      Rate and Rhythm: Normal rate and regular rhythm. Heart sounds: Normal heart sounds. No murmur heard. Pulmonary:      Effort: Pulmonary effort is normal. No respiratory distress. Breath sounds: Normal breath sounds. No stridor. Abdominal:      General: There is no distension. Palpations: Abdomen is soft. Tenderness: There is no abdominal tenderness. There is no guarding. Musculoskeletal:      Right shoulder: No tenderness. Left shoulder: No tenderness. Right wrist: No tenderness. Left wrist: No tenderness. Thoracic back: No tenderness. Lumbar back: No tenderness. Right hip: No tenderness. Normal range of motion. Left hip: Tenderness present. Right knee: No tenderness. Left knee: No tenderness. Right lower leg: No edema. Left lower leg: No edema. Skin:     Capillary Refill: Capillary refill takes less than 2 seconds. Coloration: Skin is not pale. Findings: No rash. Comments: Multiple ecchymoses noted to the anterior aspects of the lower extremities bilaterally. Neurological:      General: No focal deficit present. Mental Status: She is alert and oriented to person, place, and time. Mental status is at baseline. Psychiatric:         Behavior: Behavior is cooperative. DIAGNOSTIC RESULTS     EKG: All EKG's areinterpreted by the Emergency Department Physician who either signs or Co-signs this chart in the absence of a cardiologist.    2010: Normal sinus rhythm at 69, there is no acute ST elevation identified. QTc: 485 MS. RADIOLOGY:  Non-plain film images such as CT, Ultrasound and MRI are read by the radiologist. Plain radiographic images are visualized and preliminarily interpreted bythe emergency physician with the below findings:        XR HIP 2-3 VW W PELVIS LEFT   Final Result      XR CHEST PORTABLE   Final Result   Impression:   No acute cardiopulmonary disease.    Signed by Dr Man Metcalf:  Xiao Kent - Abnormal; Notable for the following components:       Result Value    Potassium 3.3 (*)     Total Protein 5.7 (*)     All other components within normal limits   CBC WITH AUTO DIFFERENTIAL - Abnormal; Notable for the following components:    WBC 18.8 (*)     RBC 4.00 (*)     MCH 31.3 (*)     MCHC 32.1 (*)     MPV 9.0 (*)     Neutrophils % 82.1 (*)     Lymphocytes % 6.7 (*)     Neutrophils Absolute 15.4 (*)     Monocytes Absolute 1.80 (*)     All other components within normal limits COVID-19, RAPID   TYPE AND SCREEN       All other labs were within normal range or not returned as of this dictation. EMERGENCY DEPARTMENT COURSE and DIFFERENTIAL DIAGNOSIS/MDM:   Vitals:    Vitals:    12/12/21 1933 12/12/21 2115   BP: (!) 101/59 114/65   Pulse: 78 98   Resp: 16 15   Temp: 97.5 °F (36.4 °C)    TempSrc: Oral    SpO2: (!) 88% 94%   Weight: 127 lb (57.6 kg)    Height: 5' 8\" (1.727 m)        MDM    Reassessment    Patient's radiograph reveal evidence of a left intertrochanteric hip fracture. She has received a dose of IV morphine here in the ED. She did have a little bit of hypoxia after this dose of pain medication. She was placed on 2 L nasal cannula and maintaining oxygen saturation of 96% at that time. CONSULTS:    Case was discussed with Dr. Koki Mckinnon regarding orthopedic consultation. Case was discussed with Dr. Sushma Monge regarding inpatient admission to the hospitalist service. PROCEDURES:  Unless otherwise noted below, none     Procedures    FINAL IMPRESSION      1. Closed intertrochanteric fracture of hip, left, initial encounter ([facility] [address]    2.  Fall, initial encounter          DISPOSITION/PLAN   DISPOSITION Decision To Admit 12/12/2021 09:55:52 PM      (Please note that portions of this note were completed with a voice recognition program.  Efforts were made to edit thedictations but occasionally words are mis-transcribed.)    Angelique Colon MD (electronically signed)  Attending Emergency Physician         Angelique Colon MD  12/12/21 5598

## 2021-12-13 NOTE — OP NOTE
Patient Name: Leandro Majano  MRN: [id number]  : 1945    [facility]    DATE of SURGERY: 2021    SURGEON: Maki Saldana MD    ASSISTANT: NONE     PREOPERATIVE DIAGNOSIS: Acute traumatic displaced intertrochanteric fracture of the Left femur, initial encounter for closed fracture    POSTOPERATIVE DIAGNOSIS: Acute traumatic displaced intertrochanteric fracture of the Left femur, initial encounter for closed fracture    PROCEDURE PERFORMED: Cephalomedullary Nailing Left closed displaced Intertrochanteric hip fracture      IMPLANTS: Synthes short TFN    ANESTHESIA USED: General endotrachial anesthesia    OPERATIVE INDICATIONS: 67 y.o. female status post fall with the above named diagnosis. Surgical indications include fracture displacement, stabilization of fracture, and mobilization of the patient. Risks include, but are not limited to, anesthesia, bleeding, infection, pain, damage to local structures, need for further surgery, malunion, nonunion, fracture displacement, failure of hardware, intraoperative death. Risks, benefits, and alternative were discussed and the patient wishes to proceed with surgery. ESTIMATED BLOOD LOSS: < 50 mL    DRAINS: none     COMPLICATIONS: none    SPECIMENS: none    FINDINGS: see op note    PROCEDURE in DETAIL:  The patient was seen in the preoperative holding room, the informed consent was reviewed and signed, and the correct operative extremity marked with the patients agreement. The patient was transported to the operating room, where a timeout was performed identifying the correct patient and operative site. Perioperative antibiotics were administered prior to incision. Once anesthetized, both feet were placed into well-padded boots and the patient was positioned on the fracture table. Traction and internal rotation were applied to the operative extremity and the fracture was noted to adequately align. A sterile prep and drape was then performed.     A guidepin was placed at the tip of the greater trochanter on the AP plane and in line with the intramedullary canal on the lateral view. The wire was advanced to the level of the lesser trochanter followed by introduction of the starting reamer. The nail of choice was then placed into the intramedullary canal.  Utilizing the attachment jig, a guidepin was then placed into the central aspect of the femoral head on both the AP and lateral views. Length was measured for the spiral blade and the path of the screw was drilled to the appropriate depth. The spiral blade was placed without complication and the set screw was placed proximally, loosened a 1/4 turn to allow for compression of the fracture. Again utilizing the attachment jig, a distal interlocking screw was placed into the static portion of the nail gaining excellent purchase. C-arm images were used in multiple planes showing adequate alignment of the fracture without hardware complication. Incisions were irrigated, closed in layers, and the skin was sealed with an adhesive skin dressing. A sterile dressing was applied, the patient awakened, transported the recovery room in stable condition.     POSTOPERATIVE PLAN:  1) TTWB LLE x 6 weeks  2) DVT prophylaxis x 6 weeks  3) PT/OT     Electronically signed by Dameon Lemus MD on 12/13/2021 at 5:32 PM

## 2021-12-13 NOTE — ED NOTES
Bed: 02-A  Expected date:   Expected time:   Means of arrival:   Comments:  Ge Fields RN  12/12/21 1932

## 2021-12-14 PROBLEM — Z51.5 PALLIATIVE CARE PATIENT: Status: ACTIVE | Noted: 2021-01-01

## 2021-12-14 NOTE — PROGRESS NOTES
Occupational Therapy   Occupational Therapy Initial Assessment  Date: 2021   Patient Name: Ammy Boggs  MRN: [id number]     : 1945    Date of Service: 2021    Discharge Recommendations:          Assessment   Performance deficits / Impairments: Decreased ADL status; Decreased functional mobility ; Decreased endurance; Decreased balance; Decreased strength; Decreased cognition  Assessment: Will progress as tolerated  Treatment Diagnosis: s/p L hip pin  Prognosis: Good  Decision Making: Low Complexity  REQUIRES OT FOLLOW UP: Yes  Activity Tolerance  Activity Tolerance: Patient Tolerated treatment well           Patient Diagnosis(es): The primary encounter diagnosis was Closed comminuted intertrochanteric fracture of left femur, initial encounter ([facility] [address] Diagnoses of Closed intertrochanteric fracture of hip, left, initial encounter ([address] and Fall, initial encounter were also pertinent to this visit. has a past medical history of Anemia, Anxiety, Chronic back pain, Deafness congenital, Depression, Hypertension, Memory loss, Myasthenia ([address] Neck pain, Osteoarthritis, Ptosis of eyelid, PVD (peripheral vascular disease) ([address] and Weakness of face muscles. has a past surgical history that includes Appendectomy; Carpal tunnel release; Varicose vein surgery; Colonoscopy (); hernia repair; Cataract removal; Colonoscopy ( or before); pr egd transoral biopsy single/multiple (N/A, 2017); pr colonoscopy flx dx w/collj spec when pfrmd (N/A, 2017); vascular surgery (2018); will and bso (cervix removed); eye surgery (Bilateral); Hysterectomy; Upper gastrointestinal endoscopy (N/A, 2020); Colonoscopy (N/A, 2020); and back surgery.     Treatment Diagnosis: s/p L hip pin      Restrictions  Restrictions/Precautions  Restrictions/Precautions: Weight Bearing  Lower Extremity Weight Bearing Restrictions  Left Lower Extremity Weight Bearing: Toe Touch Weight Bearing    Subjective General  Chart Reviewed: Yes  Patient assessed for rehabilitation services?: Yes  Family / Caregiver Present: No  Diagnosis: s/p L hip nail  Patient Currently in Pain: Yes  Pain Assessment  Pain Assessment: Faces  Pain Level: 10  Mcpherson-Baker Pain Rating: Hurts even more  Patient's Stated Pain Goal: No pain  Pain Type: Surgical pain  Pain Location: Hip  Pain Orientation: Left  Pain Descriptors: Sore  Pain Frequency: Intermittent  Pain Onset: On-going  Clinical Progression: Not changed  Functional Pain Assessment: Prevents or interferes some active activities and ADLs  Non-Pharmaceutical Pain Intervention(s): Repositioned  Response to Pain Intervention: Patient Satisfied  Vital Signs  Patient Currently in Pain: Yes  Social/Functional History  Social/Functional History  Lives With: Daughter  Home Equipment: 4 wheeled walker  Additional Comments: Unsure of PLOF       Objective   Vision: Within Functional Limits  Hearing: Exceptions to [facility]  Hearing Exceptions:  (Deaf,  uses sign language and )    Orientation  Overall Orientation Status: Within Normal Limits        ADL  Feeding: Independent  Grooming: Supervision  UE Bathing: Supervision  LE Bathing: Maximum assistance  UE Dressing: Supervision  LE Dressing: Dependent/Total  Toileting: Dependent/Total           Transfers  Stand Pivot Transfers: Unable to assess  Slide Board: Maximum assistance; 2 Person assistance  Sit to stand: Maximum assistance; 2 Person assistance  Transfer Comments: max A of 2 to stand mostly erect but unable to tfer     Cognition  Overall Cognitive Status: WFL (Slightly confused.   Follows 1 step directions)                 LUE AROM (degrees)  LUE AROM : WFL  RUE AROM (degrees)  RUE AROM : WFL  LUE Strength  Gross LUE Strength: WFL  RUE Strength  Gross RUE Strength: WFL                   Plan   Plan  Times per week: 3-5x/week  Times per day: Daily    G-Code     OutComes Score                                                  AM-PAC Score Goals  Short term goals  Time Frame for Short term goals: 1 week  Short term goal 1: Mod A of 2 sliding board tfers to drop arm chair  Short term goal 2: Mod A of 2 to stand up to walker  Long term goals  Long term goal 1: Return to PLOF       Therapy Time   Individual Concurrent Group Co-treatment   Time In           Time Out           Minutes                   Adam Patrick, OT

## 2021-12-14 NOTE — PROGRESS NOTES
Orthopedic Surgery Progress Note    Apollo Susy  12/14/2021      Subjective:     Systemic or Specific Complaints: Patient resting comfortably in bed this a.m. Unable to formally communicate due to lack of  and the patient's sole reliance on sign language. Appears in no signs of distress.       Objective:     Patient Vitals for the past 24 hrs:   BP Temp Temp src Pulse Resp SpO2   12/14/21 0523 (!) 174/94 97.3 °F (36.3 °C) Temporal 86 16 95 %   12/14/21 0200 (!) 156/94 97.9 °F (36.6 °C) Temporal 79 16 92 %   12/13/21 2345 (!) 149/136 97.3 °F (36.3 °C) Temporal 80 16 92 %   12/13/21 2049 (!) 138/93 97.5 °F (36.4 °C) Temporal 78 16 94 %   12/13/21 2011 (!) 159/76 -- -- 78 12 93 %   12/13/21 2010 -- -- -- 77 -- --   12/13/21 2006 (!) 160/77 -- -- 76 16 92 %   12/13/21 2001 (!) 161/77 97.3 °F (36.3 °C) Temporal 77 15 95 %   12/13/21 1956 (!) 159/77 -- -- 77 15 95 %   12/13/21 1947 (!) 152/78 -- -- 79 16 92 %   12/13/21 1928 (!) 148/80 -- -- 75 16 (!) 88 %   12/13/21 1915 (!) 173/98 97.3 °F (36.3 °C) Temporal 65 19 97 %   12/13/21 1913 -- 97.3 °F (36.3 °C) Temporal -- -- --   12/13/21 1158 (!) 156/66 97.5 °F (36.4 °C) Temporal 65 16 100 %   12/13/21 0829 (!) 145/69 96.4 °F (35.8 °C) Temporal 71 16 95 %       left lower  General: alert, appears stated age and cooperative   Wound: clean, dry, intact             Dressing: clean, dry, and intact   Extremity: Distal NVI           DVT Exam: No evidence of DVT seen on physical exam.                   Data Review:  Recent Labs     12/13/21  0512 12/14/21  0210   HGB 11.9* 11.4*     Recent Labs     12/14/21 0210      K 4.1   CREATININE 0.8       Assessment:     POD# 1 status post left hip trochanteric entry nailing    Plan:      1:  DVT prophylaxis, ICE, elevate  2:  Pain control  3:  Physical therapy/Occupational therapy  4:  Anticipate discharge once discharge arrangements made and if pain well controlled  5: Toe touch weight bearing LLE Electronically signed by Cristino Peace MD on 12/14/2021 at 7:30 AM

## 2021-12-14 NOTE — PROGRESS NOTES
Physical Therapy    Facility/Department: [facility] SURG SERVICES  Initial Assessment    NAME: Reshma Napier  : 1945  MRN: [id number]    Date of Service: 2021    Discharge Recommendations:  (P) Continue to assess pending progress, Patient would benefit from continued therapy after discharge        Assessment   Body structures, Functions, Activity limitations: (P) Decreased functional mobility ; Decreased strength; Increased pain; Decreased endurance  Assessment: (P) Pt has pain iwth activity but tolerates sb TF well. Prognosis: Fair  Decision Making: Low Complexity  PT Education: Goals; PT Role; Transfer Training; Gait Training; Functional Mobility Training; Weight-bearing Education; Family Education; General Safety  REQUIRES PT FOLLOW UP: Yes  Activity Tolerance  Activity Tolerance: (P) Patient Tolerated treatment well       Patient Diagnosis(es): The primary encounter diagnosis was Closed comminuted intertrochanteric fracture of left femur, initial encounter ([address] Diagnoses of Closed intertrochanteric fracture of hip, left, initial encounter ([address] and Fall, initial encounter were also pertinent to this visit. has a past medical history of Anemia, Anxiety, Chronic back pain, Deafness congenital, Depression, Hypertension, Memory loss, Myasthenia ([address] Neck pain, Osteoarthritis, Ptosis of eyelid, PVD (peripheral vascular disease) ([address] and Weakness of face muscles. has a past surgical history that includes Appendectomy; Carpal tunnel release; Varicose vein surgery; Colonoscopy (); hernia repair; Cataract removal; Colonoscopy ( or before); pr egd transoral biopsy single/multiple (N/A, 2017); pr colonoscopy flx dx w/collj spec when pfrmd (N/A, 2017); vascular surgery (2018); will and bso (cervix removed); eye surgery (Bilateral); Hysterectomy; Upper gastrointestinal endoscopy (N/A, 2020);  Colonoscopy (N/A, 2020); and back surgery. Restrictions  Restrictions/Precautions  Restrictions/Precautions: Weight Bearing  Lower Extremity Weight Bearing Restrictions  Left Lower Extremity Weight Bearing: Toe Touch Weight Bearing  Vision/Hearing        Subjective  General  Patient assessed for rehabilitation services?: Yes  Diagnosis: fall with L hip fx, cm nail by Dr Helena Espinosa. General Comment  Comments: Pt is deaf.  present to communticate with pt. Subjective  Subjective: Pt reports fatigue and ready to use SB for TF BTB. Pt states she knows she is confused and forgets that she has had surgery. Pain Screening  Patient Currently in Pain: Yes  Pain Assessment  Pain Assessment: Faces  Mcpherson-Baker Pain Rating: Hurts even more  Patient's Stated Pain Goal: No pain  Pain Type: Surgical pain  Pain Location: Hip  Pain Orientation: Left  Pain Descriptors: Sore  Pain Frequency: Intermittent  Pain Onset: On-going  Clinical Progression: Not changed  Functional Pain Assessment: Prevents or interferes some active activities and ADLs  Non-Pharmaceutical Pain Intervention(s): Repositioned  Response to Pain Intervention: Patient Satisfied  Vital Signs  Patient Currently in Pain: Yes  Pre Treatment Pain Screening  Intervention List: Patient able to continue with treatment; Nurse/physician notified    Orientation     Social/Functional History  Social/Functional History  Lives With: Daughter  Home Equipment: 4 wheeled walker  Additional Comments: Unsure of PLOF  Cognition        Objective          AROM RLE (degrees)  RLE AROM: WFL  AROM LLE (degrees)  LLE AROM : WFL  Strength RLE  Strength RLE: WFL  Strength LLE  Comment: as anticipated for post op        Bed mobility  Supine to Sit: Moderate assistance; Maximum assistance; 2 Person assistance  Transfers  Sit to Stand:  Moderate Assistance; Maximum Assistance; 2 Person Assistance  Stand to sit: Moderate Assistance; 2 Person Assistance  Lateral Transfers: (P) 2 Person Assistance; Maximum Assistance  Comment: (P) used sliding board to TF from recliner to bed. Balance  Sitting - Static: (P) Good  Sitting - Dynamic: (P) Good; -  Standing - Static: Poor        Plan   Plan  Times per week: (P) 5-7  Plan weeks: (P) 2  Current Treatment Recommendations: (P) Functional Mobility Training, Transfer Training, Balance Training, Pain Management, Patient/Caregiver Education & Training, Safety Education & Training, Positioning  Plan Comment: (P) ASSIST OF TWO.  SB TF'S AND PROGRESS TO USE OF RW IF ABLE TO MAINTAIN TTWB ON L LE  Safety Devices  Type of devices: (P) Call light within reach, Gait belt, Sitter present, Nurse notified, Left in bed, Bed alarm in place                                                       Goals  Short term goals  Time Frame for Short term goals: (P) 2 wks  Short term goal 1: (P) SUP<>SIT, MIN A 2  Short term goal 2: (P) SB TF'S WITH MIN A 2  Short term goal 3: (P) STAND PIVOT TF WITH WALKER AND MIN A 2  Patient Goals   Patient goals : (P) d/c to rehab       Therapy Time   Individual Concurrent Group Co-treatment   Time In           Time Out           Minutes                   Lacey Field PT    Electronically signed by Lacey Field PT on 12/14/2021 at 2:44 PM

## 2021-12-14 NOTE — BRIEF OP NOTE
Brief Postoperative Note      Patient: Carlos Banks  YOB: 1945  MRN: [id number]    Date of Procedure: 12/13/2021    Pre-Op Diagnosis: Left intertrochanteric femur fracture    Post-Op Diagnosis: Same       Procedure(s):  SHORT TFN    Surgeon(s):  Sage Lopez MD    Assistant:  * No surgical staff found *    Anesthesia: Choice    Estimated Blood Loss (mL): less than 50     Complications: None    Specimens:   * No specimens in log *    Implants:  Implant Name Type Inv.  Item Serial No.  Lot No. LRB No. Used Action   NAIL IM L170MM [id number] 130DEG SHT FEM GRN TI PAWAN REBA  NAIL IM L170MM [id number] 130DEG SHT FEM GRN TI PAWAN REBA  DEPU[employer]- [id number] Left 1 Implanted   SCREW BNE L38MM DIA5MM TIB LT GRN TI ST PAWAN MARK FULL THRD  SCREW BNE L38MM DIA5MM TIB LT GRN TI ST PAWAN MARK FULL THRD  DEPU[employer]-  Left 1 Implanted   BLADE SURG L90MM DIA10.5MM NONSTERILE TI PAWAN FEN HELCL GLD  BLADE SURG L90MM DIA10.5MM NONSTERILE TI PAWAN FEN HELCL GLD  DEPUY SYNTHES [facility]  Left 1 Implanted         Drains:   Urethral Catheter Straight-tip (Active)       Findings: Left intertrochanteric/basicervical femur fracture    Electronically signed by Sage Lopez MD on 12/13/2021 at 7:04 PM

## 2021-12-14 NOTE — PROGRESS NOTES
Patient's daughter and  left this evening. Unable to get translation ipad at this time, reached out to clinical house for resource. RN used written communication to communicate with patient.

## 2021-12-14 NOTE — ACP (ADVANCE CARE PLANNING)
SN asked pt and dtr/POA re: code status. Dtr signed back and forth with the pt and states she knows pt's PCP said to make sure pt is DNR, but dtr states she is not sure she wants to do this. But dtr also states she does not want pt to be a vegetable(her words). Dtr also states that pt had said to her at one time that she wants to die naturally, but the the dtr said she is not sure what she wants to do and she will let Diya Cohn RN know what she decides.
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
## 2021-12-14 NOTE — CARE COORDINATION
NOEMY met with Pt/family in the room regarding DC planning options. Pt daughter would like to try and get Pt into either [place] [address] or [address] [place] [address].  NOEMY has sent the requested referral. Awaiting approval/denial.  Electronically signed by Bibi Garcias on 12/14/2021 at 2:55 PM

## 2021-12-14 NOTE — PROGRESS NOTES
Physical Therapy    Facility/Department: [facility] SURG SERVICES  Initial Assessment    NAME: Napoleon Thompson  : 1945  MRN: [id number]    Date of Service: 2021    Discharge Recommendations:  Continue to assess pending progress, Patient would benefit from continued therapy after discharge (ANTICIAPTE D/C TO REHAB FACILITY)        Assessment   Body structures, Functions, Activity limitations: Decreased functional mobility ; Decreased strength; Increased pain; Decreased endurance  Assessment: pt WOULD BENEFIT FROM SKILLED PT TO PROGRESS HER MOBILITY POST L HIP ORIF. Prognosis: Fair  Decision Making: Low Complexity  PT Education: Goals; PT Role; Transfer Training; Gait Training; Functional Mobility Training; Weight-bearing Education; Family Education; General Safety  REQUIRES PT FOLLOW UP: Yes  Activity Tolerance  Activity Tolerance: Patient Tolerated treatment well       Patient Diagnosis(es): The primary encounter diagnosis was Closed comminuted intertrochanteric fracture of left femur, initial encounter ([address] Diagnoses of Closed intertrochanteric fracture of hip, left, initial encounter ([address] and Fall, initial encounter were also pertinent to this visit. has a past medical history of Anemia, Anxiety, Chronic back pain, Deafness congenital, Depression, Hypertension, Memory loss, Myasthenia ([address] Neck pain, Osteoarthritis, Ptosis of eyelid, PVD (peripheral vascular disease) ([address] and Weakness of face muscles. has a past surgical history that includes Appendectomy; Carpal tunnel release; Varicose vein surgery; Colonoscopy (); hernia repair; Cataract removal; Colonoscopy ( or before); pr egd transoral biopsy single/multiple (N/A, 2017); pr colonoscopy flx dx w/collj spec when pfrmd (N/A, 2017); vascular surgery (2018); will and bso (cervix removed); eye surgery (Bilateral); Hysterectomy; Upper gastrointestinal endoscopy (N/A, 2020);  Colonoscopy (N/A, 2020); and back surgery. Restrictions  Restrictions/Precautions  Restrictions/Precautions: Weight Bearing  Lower Extremity Weight Bearing Restrictions  Left Lower Extremity Weight Bearing: Toe Touch Weight Bearing  Vision/Hearing        Subjective  General  Diagnosis: fall with L hip fx, cm nail by Dr Mono Cruz. General Comment  Comments: Pt is deaf and uses sign language. IPAD was brought into room but pt's dtr states she will just interpret for pt until the live  has arrived. Subjective  Subjective: RN reports pt has received pain meds prior to PT. Pt states she is ready to work with PT and reports understanding of TTWB on L LE. Pain Screening  Patient Currently in Pain: Yes  Pain Assessment  Pain Assessment: Faces  Mcpherson-Baker Pain Rating: Hurts even more  Patient's Stated Pain Goal: No pain  Pain Type: Surgical pain; Acute pain  Pain Location: Hip  Pain Orientation: Left  Pain Descriptors: Patient unable to describe  Pain Frequency: Intermittent  Clinical Progression: Gradually improving  Non-Pharmaceutical Pain Intervention(s): Repositioned  Response to Pain Intervention: Patient Satisfied  Vital Signs  Patient Currently in Pain: Yes  Pre Treatment Pain Screening  Intervention List: Patient able to continue with treatment; Nurse/physician notified    Orientation     Social/Functional History  Social/Functional History  Lives With: Daughter  Home Equipment: 4 wheeled walker  Cognition        Objective          AROM RLE (degrees)  RLE AROM: WFL  AROM LLE (degrees)  LLE AROM : WFL  Strength RLE  Strength RLE: WFL  Strength LLE  Comment: as anticipated for post op        Bed mobility  Supine to Sit: Moderate assistance; Maximum assistance; 2 Person assistance  Transfers  Sit to Stand: Moderate Assistance; Maximum Assistance; 2 Person Assistance  Stand to sit: Moderate Assistance; 2 Person Assistance  Lateral Transfers: 2 Person Assistance;  Moderate Assistance  Comment: initially worked on standing with walker, but Pt

## 2021-12-15 PROBLEM — E55.9 VITAMIN D DEFICIENCY: Status: ACTIVE | Noted: 2021-01-01

## 2021-12-15 NOTE — PROGRESS NOTES
Occupational Therapy  Facility/Department: [facility] SURG SERVICES  Daily Treatment Note  NAME: Pato Coe  : 1945  MRN: [id number]    Date of Service: 12/15/2021    Discharge Recommendations:          Assessment   Performance deficits / Impairments: Decreased ADL status; Decreased functional mobility ; Decreased endurance; Decreased balance; Decreased strength; Decreased cognition  Assessment: Pt.tolerated tx well. Pt. required maxA with bed mobilty to change brief. Pt. seemed to be in lots of pain. Pt. would benefit from continued skilled OT services. Treatment Diagnosis: s/p L hip pin  Prognosis: Good  Decision Making: Low Complexity  REQUIRES OT FOLLOW UP: Yes  Activity Tolerance  Activity Tolerance: Patient Tolerated treatment well  Safety Devices  Safety Devices in place: Yes  Type of devices: Call light within reach; Bed alarm in place; Left in bed         Patient Diagnosis(es): The primary encounter diagnosis was Closed comminuted intertrochanteric fracture of left femur, initial encounter ([address] Diagnoses of Closed intertrochanteric fracture of hip, left, initial encounter ([address] and Fall, initial encounter were also pertinent to this visit. has a past medical history of Anemia, Anxiety, Chronic back pain, Deafness congenital, Depression, Hypertension, Memory loss, Myasthenia ([address] Neck pain, Osteoarthritis, Ptosis of eyelid, PVD (peripheral vascular disease) ([address] and Weakness of face muscles. has a past surgical history that includes Appendectomy; Carpal tunnel release; Varicose vein surgery; Colonoscopy (); hernia repair; Cataract removal; Colonoscopy ( or before); pr egd transoral biopsy single/multiple (N/A, 2017); pr colonoscopy flx dx w/collj spec when pfrmd (N/A, 2017); vascular surgery (2018); will and bso (cervix removed); eye surgery (Bilateral); Hysterectomy; Upper gastrointestinal endoscopy (N/A, 2020);  Colonoscopy (N/A, 2020); and back surgery.     Restrictions  Restrictions/Precautions  Restrictions/Precautions: Weight Bearing  Lower Extremity Weight Bearing Restrictions  Left Lower Extremity Weight Bearing: Toe Touch Weight Bearing  Subjective   General  Chart Reviewed: Yes  Patient assessed for rehabilitation services?: Yes  Response to previous treatment: Patient with no complaints from previous session  Family / Caregiver Present: Yes  Diagnosis: s/p L hip nail  Pain Assessment  Pain Assessment: 0-10  Mcpherson-Baker Pain Rating: Hurts even more  Vital Signs  Patient Currently in Pain: Yes   Orientation     Objective    ADL  UE Dressing: Maximum assistance  LE Dressing: Dependent/Total           Bed mobility  Rolling to Left: Maximum assistance; 2 Person assistance  Rolling to Right: Maximum assistance; 2 Person assistance  Transfers  Transfer Comments: max A of 2 to stand mostly erect but unable to tfer                                                           Plan   Plan  Times per week: 3-5x/week  Times per day: Daily  Goals  Short term goals  Time Frame for Short term goals: 1 week  Short term goal 1: Mod A of 2 sliding board tfers to drop arm chair  Short term goal 2: Mod A of 2 to stand up to walker  Long term goals  Long term goal 1: Return to PLOF       Therapy Time   Individual Concurrent Group Co-treatment   Time In           Time Out           Minutes              Electronically signed by BYRON Palm Student on 12/15/2021 at 4:29 PM

## 2021-12-15 NOTE — PROGRESS NOTES
Physician Progress Note      PATIENT:               Davy Gandara  CSN #:                  [id number]  :                       1945  ADMIT DATE:       2021 7:32 PM  [address] [place] DATE:  RESPONDING  PROVIDER #:        ANAHI RATLIFF MD          QUERY TEXT:    Pt admitted with fracture of left femur. Pt noted to have osteoporosis. If   possible, please document in progress notes and discharge summary if you are   evaluating and/or treating any of the following: The medical record reflects the following:  Risk Factors: Osteoporosis, Post menopausal female  Clinical Indicators: Thin, frail, fall in house in floor with left femur   fracture  Treatment: Ortho consult, IM nailing of fracture, X-ray femur  Options provided:  -- Osteoporotic left femur fracture  -- Osteoporotic left femur fracture following fall which would not usually   break a normal, healthy bone  -- Traumatic left femur fracture  -- Other - I will add my own diagnosis  -- Disagree - Not applicable / Not valid  -- Disagree - Clinically unable to determine / Unknown  -- Refer to Clinical Documentation Reviewer    PROVIDER RESPONSE TEXT:    This patient has an osteoporotic left femur fracture.     Query created by: Chanda Gross on 2021 1:46 PM      Electronically signed by:  José Ellis MD 12/15/2021 12:12 AM

## 2021-12-15 NOTE — CARE COORDINATION
Pt has been accepted at [address] [place] [address]. Pt is able to DC to the facility on Thursday December 16th if medically cleared. Pt will need an updated negative covid test before DC.    [facility] (formerly [address] [place] [address])   [phone] P  [phone] F  [phone] Referral fax number for   Electronically signed by Laxmi Feng on 12/15/2021 at 4:16 PM

## 2021-12-15 NOTE — PROGRESS NOTES
[place], [place] [address]    DEPARTMENT OF HOSPITALIST MEDICINE      PROGRESS  NOTE:    NOTE: Portions of this note have been copied forward, however, changed to reflect the most current clinical status of this patient. Patient:  Jade Chance  YOB: 1945  Date of Service: 12/14/2021  MRN: [id number]   Acct: [de-identified]   Primary Care Physician: Ralph Chaves MD  Advance Directive: Full Code  Admit Date: 12/12/2021       Hospital Day: 2      CHIEF COMPLAINT:  Chief Complaint   Patient presents with   Charlotte Bees     left hip shortened and rotated       SUBJECTIVE:  Patient sitting in chair at the bedside during my morning rounds. Spoke with communicated through . [address]  COURSE:    12/14/2021  Patient underwent left hip surgery yesterday which he tolerated well. Pain is under control on meds. Awaiting arrangement for transfer to skilled nursing facility as per case management. Patient has leukocytosis without any identified source of infection likely reactive in nature. Urinalysis with reflex culture ordered. 12/13/2021  Very pleasant lady with the congenital hearing loss admitted with the left hip fracture. Clinically with patient during my morning rounds with the help of  at the bedside. He has chronic myasthenia gravis and muscle weakness which contributes to her weakness. She is n.p.o. and will be going for surgery later on today!      12/12/2021  History of Present Illness   Elderly female with multiple medical problems   Thin frail and elderlly   Congenital hearing loss   Sign  called for her and to communicate with her.         History of myasthenia gravis and muscle weakness and myopathy and this can be causing her tot get weaker and more difficulty with ambulation.      She has chronic osteoarthritis and weakness and lethargy and osteoporosis      She is being admitted due to fall and left hip fracture.      REVIEW  OF  SYSTEMS:    Constitutional:  No fevers, chills, nausea, vomiting, + tiredness and fatigue, + chronic hearing loss   Lungs:   No hemoptysis, pleurisy, cough, SOB   Heart:  No chest pressure with exertion, palpitations,    Abdomen:   No new masses, no bright red blood per rectum   Extremities: + Left hip pain after fall, no new lesions   Neurologic: No new motor or sensory changes       PAST MEDICAL HISTORY:  Past Medical History:   Diagnosis Date    Anemia     Anxiety     Chronic back pain     severe muscle spasms    Deafness congenital     Depression     Hypertension     Memory loss     early    Myasthenia ([address]     Neck pain     Osteoarthritis     Ptosis of eyelid     PVD (peripheral vascular disease) (HCC)     Weakness of face muscles          PAST SURGICAL HISTORY:  Past Surgical History:   Procedure Laterality Date    APPENDECTOMY      BACK SURGERY      CARPAL TUNNEL RELEASE      CATARACT REMOVAL      COLONOSCOPY  7-2010    COLONOSCOPY  2000 or before    [place]-normal per patient    COLONOSCOPY N/A 5/29/2020    Dr Rae Starr, suboptimal prep, internal hemorrhoids-Grade 1, prn (age)   [facility] EYE SURGERY Bilateral     cataract extraction    HERNIA REPAIR      on back of neck    HYSTERECTOMY      [place] COLONOSCOPY FLX DX W/COLLJ SPEC WHEN PFRMD N/A 7/6/2017    Dr Yip-minimal diverticulosis, 5 yr recall    [place] EGD TRANSORAL BIOPSY SINGLE/MULTIPLE N/A 7/6/2017    Dr Samantha Roche, [place] (-)    TERRI AND BSO      UPPER GASTROINTESTINAL ENDOSCOPY N/A 5/29/2020    Dr Sylvester Kincaid hiatal hernia, antral gastritis,     VARICOSE VEIN SURGERY      VASCULAR SURGERY  02/19/2018    [facility]. Aortogram and runoff. Left common femoral artery, 5 [place] sheath.         FAMILY HISTORY:  Family History   Problem Relation Age of Onset    High Blood Pressure Mother     Stroke Mother     Cancer Father     Lung Cancer Father     Hearing Loss Brother         deaf    Hearing Loss Brother         deaf    Colon Polyps Sister     Colon Cancer Neg Hx     Esophageal Cancer Neg Hx     Liver Cancer Neg Hx     Liver Disease Neg Hx     Stomach Cancer Neg Hx     Rectal Cancer Neg Hx            OBJECTIVE:  BP (!) 149/75   Pulse 74   Temp 98.1 °F (36.7 °C) (Temporal)   Resp 18   Ht 5' 8\" (1.727 m)   Wt 127 lb (57.6 kg)   SpO2 93%   BMI 19.31 kg/m²   I/O this shift:  In: 210 [P.O.:210]  Out: -     PHYSICAL  EXAMINATION:    DILCIA:  Awake, alert, oriented x 3, patient appears tired and fatigued   Head/Eyes:  Normocephalic, atraumatic, EOMI and PERRLA bilaterally   Respiratory:   Bilateral decreased air entry in both lung fields, mild B/L crackles, symmetric expansion of chest   Cardiovascular:  Regular rate and rhythm, S1+S2+0, no murmurs/rubs   Abdomen:   Soft, non-tender, bowel sounds +ve, no organomegaly   Extremities: Moves all, decreased range of motion, no edema, left hip is shortened and externally rotated with tenderness on palpation   Neurologic: Awake, alert, oriented x 3, cranial nerves II-XII intact, no focal neurological deficits, sensory system intact   Psychiatric: Normal mood, non-suicidal       CURRENT MEDICATIONS:  Scheduled:   Donepezil HCl  23 mg Oral Nightly    FLUoxetine  20 mg Oral Daily    sodium chloride flush  5-40 mL IntraVENous 2 times per day    sennosides-docusate sodium  1 tablet Oral BID    apixaban  2.5 mg Oral BID        PRN:  ondansetron, 4 mg, Q8H PRN   Or  ondansetron, 4 mg, Q6H PRN  polyethylene glycol, 17 g, Daily PRN  acetaminophen, 650 mg, Q6H PRN   Or  acetaminophen, 650 mg, Q6H PRN  morphine, 1 mg, Q4H PRN  artificial tears, , PRN  sodium chloride flush, 5-40 mL, PRN  sodium chloride, 25 mL, PRN  ondansetron, 4 mg, Q8H PRN   Or  ondansetron, 4 mg, Q6H PRN  oxyCODONE, 5 mg, Q4H PRN   Or  oxyCODONE, 10 mg, Q4H PRN  morphine, 2 mg, Q2H PRN   Or  morphine, 4 mg, Q2H PRN  magnesium hydroxide, 30 mL, Daily PRN  polyethylene glycol, 17 g, Daily PRN        Infusions:   sodium chloride Stopped (12/13/21 1559)    sodium chloride         Laboratory Data:  Recent Labs     12/12/21 2006 12/13/21  0512 12/14/21  0210   WBC 18.8* 14.2* 19.0*   HGB 12.5 11.9* 11.4*    263 226     Recent Labs     12/12/21 2006 12/13/21  0512 12/14/21  0210    141 138   K 3.3* 3.9 4.1    106 103   CO2 25 24 23   BUN 17 17 11   CREATININE 0.9 1.0* 0.8   GLUCOSE 102 145* 119*     Recent Labs     12/12/21 2006 12/13/21  0512   AST 16 14   ALT 12 11   BILITOT 0.3 0.3   ALKPHOS 78 73     Troponin T: No results for input(s): TROPONINI in the last 72 hours. Pro-BNP: No results for input(s): BNP in the last 72 hours. INR:   Recent Labs     12/13/21 0512   INR 0.95     UA:No results for input(s): NITRITE, COLORU, PHUR, LABCAST, WBCUA, RBCUA, MUCUS, TRICHOMONAS, YEAST, BACTERIA, CLARITYU, SPECGRAV, LEUKOCYTESUR, UROBILINOGEN, BILIRUBINUR, BLOODU, GLUCOSEU, AMORPHOUS in the last 72 hours. Invalid input(s): Lorella Poll  A1C: No results for input(s): LABA1C in the last 72 hours. ABG:No results for input(s): PHART, ZAK0PGW, PO2ART, BAJ4MQM, BEART, HGBAE, X1DOYMES, CARBOXHGBART in the last 72 hours. Imaging:    XR CHEST PORTABLE    Result Date: 12/12/2021  Impression: No acute cardiopulmonary disease.  Signed by Dr Stevenson Morales:    Principal Problem:    Left displaced femoral neck fracture (HCC)  Active Problems:    Osteoarthritis    Memory loss    Neck pain    Congenital deafness    Essential hypertension    Peripheral vascular disease (HCC)    Myasthenia gravis ([address]    Lumbar radiculopathy    Cervical radiculopathy    Anemia    Sacroiliac joint dysfunction of both sides    Primary insomnia    Closed comminuted intertrochanteric fracture of left femur ([address]    Palliative care patient  Resolved Problems:    Hypokalemia      Continue with current medication  Continue with pain meds as needed  Keep patient n.p.o. except meds  Patient underwent cephalomedullary nailing left closed displaced intertrochanteric hip fracture . ..... POD # 1  Follow-up closely as per orthopedics  Continue with communication through   Hypokalemia has resolved with replacement  Continue with gentle IV hydration  Patient likely has reactive leukocytosis   No evidence of pneumonia on chest x-ray  Urinalysis with reflex culture ordered to rule out UTI  Postop care, pain management and DVT prophylaxis as per orthopedics recommendations      Chronic medical issues . .. Continue with home meds. Monitor patient closely while admitted. Advised very close f/u with patient's PCP as an outpatient to address chronic medical issues. Repeat labs in a.m. Electrolyte replacement as per protocol. Patient will be monitored very closely on the floor. Further recommendations as per the hospital course. Discharge Plan: To be determined as per the hospital course      Patient  is on DVT prophylaxis  Current medications reviewed  Lab work reviewed  Radiology/Chest x-ray films reviewed  Treatment recommendations from suspecialities reviewed, appreciated and agreed with  Discussed with the nurse and addressed all questions/concerns  Discussed with Patient and/or Family at the bedside in detail . .. they understand and agree with the management plan. Hanane Agudelo MD  12/14/2021 6:21 PM      DISCLAIMER: This note was created with electronic voice recognition which does have occasional errors. If you have any questions regarding the content within the note please do not hesitate to contact me. .. Thanks.

## 2021-12-15 NOTE — DISCHARGE INSTR - DIET

## 2021-12-16 NOTE — PROGRESS NOTES
Occupational Therapy  Pt in bed upon arrival for therapy with Daughter present. Pt throwing up not feeling well. Assisted patient on getting basin to throw up in and change gown.  Electronically signed by BYRON Lozada on 12/16/2021 at 12:02 PM

## 2021-12-16 NOTE — PROGRESS NOTES
[place], [facility], [place] [address]    DEPARTMENT OF HOSPITALIST MEDICINE      PROGRESS  NOTE:    NOTE: Portions of this note have been copied forward, however, changed to reflect the most current clinical status of this patient. Patient:  Michael Wise  YOB: 1945  Date of Service: 12/15/2021  MRN: [id number]   Acct: [de-identified]   Primary Care Physician: Maranda Mims MD  Advance Directive: Full Code  Admit Date: 12/12/2021       Hospital Day: 3      CHIEF COMPLAINT:  Chief Complaint   Patient presents with   Jessica Abdias     left hip shortened and rotated       SUBJECTIVE:  Patient lying in bed during my morning rounds. Pain is under control. No major issues. [address]  COURSE:    12/15/2021  Patient has a bed available in skilled nursing facility and will likely be transferred tomorrow. COVID-19 screening test ordered in the morning. Patient has severely depleted vitamin D level of 5.2 hence started on vitamin D 50,000 units twice weekly for 1 week then q. weekly. 12/14/2021  Patient underwent left hip surgery yesterday which he tolerated well. Pain is under control on meds. Awaiting arrangement for transfer to skilled nursing facility as per case management. Patient has leukocytosis without any identified source of infection likely reactive in nature. Urinalysis with reflex culture ordered. 12/13/2021  Very pleasant lady with the congenital hearing loss admitted with the left hip fracture. Clinically with patient during my morning rounds with the help of  at the bedside. He has chronic myasthenia gravis and muscle weakness which contributes to her weakness.   She is n.p.o. and will be going for surgery later on today!      12/12/2021  History of Present Illness   Elderly female with multiple medical problems   Thin frail and elderlly   Congenital hearing loss   Sign  called for her and to communicate with her.         History of myasthenia gravis and muscle weakness and myopathy and this can be causing her tot get weaker and more difficulty with ambulation.      She has chronic osteoarthritis and weakness and lethargy and osteoporosis      She is being admitted due to fall and left hip fracture.        REVIEW  OF  SYSTEMS:    Constitutional:  No fevers, chills, nausea, vomiting, + tiredness and fatigue, + chronic hearing loss   Lungs:   No hemoptysis, pleurisy, cough, SOB   Heart:  No chest pressure with exertion, palpitations,    Abdomen:   No new masses, no bright red blood per rectum   Extremities: + Left hip pain after fall, no new lesions   Neurologic: No new motor or sensory changes       PAST MEDICAL HISTORY:  Past Medical History:   Diagnosis Date    Anemia     Anxiety     Chronic back pain     severe muscle spasms    Deafness congenital     Depression     Hypertension     Memory loss     early    Myasthenia ([address]     Neck pain     Osteoarthritis     Ptosis of eyelid     PVD (peripheral vascular disease) (HCC)     Weakness of face muscles          PAST SURGICAL HISTORY:  Past Surgical History:   Procedure Laterality Date    APPENDECTOMY      BACK SURGERY      CARPAL TUNNEL RELEASE      CATARACT REMOVAL      COLONOSCOPY  7-2010    COLONOSCOPY  2000 or before    [place]-normal per patient    COLONOSCOPY N/A 5/29/2020    Dr Genesis Domínguez, suboptimal prep, internal hemorrhoids-Grade 1, prn (age)   [facility] EYE SURGERY Bilateral     cataract extraction    HERNIA REPAIR      on back of neck    HYSTERECTOMY      [place] COLONOSCOPY FLX DX W/COLLJ SPEC WHEN PFRMD N/A 7/6/2017    Dr Yip-minimal diverticulosis, 5 yr recall    [place] EGD TRANSORAL BIOPSY SINGLE/MULTIPLE N/A 7/6/2017    Dr Denice Ybarra, [place] (-)    TERRI AND BSO      UPPER GASTROINTESTINAL ENDOSCOPY N/A 5/29/2020    Dr Amber Gurrola hiatal hernia, antral gastritis,     VARICOSE VEIN SURGERY      VASCULAR SURGERY  02/19/2018    [facility]. Aortogram and runoff. Left common femoral artery, 5 [place] sheath. FAMILY HISTORY:  Family History   Problem Relation Age of Onset    High Blood Pressure Mother     Stroke Mother     Cancer Father     Lung Cancer Father     Hearing Loss Brother         deaf   Verl Raw Hearing Loss Brother         deaf    Colon Polyps Sister     Colon Cancer Neg Hx     Esophageal Cancer Neg Hx     Liver Cancer Neg Hx     Liver Disease Neg Hx     Stomach Cancer Neg Hx     Rectal Cancer Neg Hx            OBJECTIVE:  BP (!) 141/89   Pulse 89   Temp 98 °F (36.7 °C) (Temporal)   Resp 18   Ht 5' 8\" (1.727 m)   Wt 127 lb (57.6 kg)   SpO2 90%   BMI 19.31 kg/m²   No intake/output data recorded.     PHYSICAL  EXAMINATION:    DILCIA:  Awake, alert, oriented x 3, patient appears tired and fatigued   Head/Eyes:  Normocephalic, atraumatic, EOMI and PERRLA bilaterally   Respiratory:   Bilateral decreased air entry in both lung fields, mild B/L crackles, symmetric expansion of chest   Cardiovascular:  Regular rate and rhythm, S1+S2+0, no murmurs/rubs   Abdomen:   Soft, non-tender, bowel sounds +ve, no organomegaly   Extremities: Moves all, decreased range of motion, no edema, left hip is shortened and externally rotated with tenderness on palpation   Neurologic: Awake, alert, oriented x 3, cranial nerves II-XII intact, no focal neurological deficits, sensory system intact   Psychiatric: Normal mood, non-suicidal       CURRENT MEDICATIONS:  Scheduled:   Donepezil HCl  23 mg Oral Nightly    FLUoxetine  20 mg Oral Daily    sodium chloride flush  5-40 mL IntraVENous 2 times per day    sennosides-docusate sodium  1 tablet Oral BID    apixaban  2.5 mg Oral BID        PRN:  ondansetron, 4 mg, Q8H PRN   Or  ondansetron, 4 mg, Q6H PRN  polyethylene glycol, 17 g, Daily PRN  acetaminophen, 650 mg, Q6H PRN   Or  acetaminophen, 650 mg, Q6H PRN  morphine, 1 mg, Q4H PRN  artificial tears, , PRN  sodium chloride flush, 5-40 mL, PRN  sodium chloride, 25 mL, PRN  ondansetron, 4 mg, Q8H PRN   Or  ondansetron, 4 mg, Q6H PRN  oxyCODONE, 5 mg, Q4H PRN   Or  oxyCODONE, 10 mg, Q4H PRN  morphine, 2 mg, Q2H PRN   Or  morphine, 4 mg, Q2H PRN  magnesium hydroxide, 30 mL, Daily PRN  polyethylene glycol, 17 g, Daily PRN        Infusions:   sodium chloride Stopped (12/13/21 1559)    sodium chloride         Laboratory Data:  Recent Labs     12/13/21  0512 12/14/21  0210 12/15/21  0523   WBC 14.2* 19.0* 18.1*   HGB 11.9* 11.4* 10.4*    226 246     Recent Labs     12/13/21  0512 12/14/21  0210 12/15/21  0523    138 133*   K 3.9 4.1 3.8    103 100   CO2 24 23 23   BUN 17 11 13   CREATININE 1.0* 0.8 0.8   GLUCOSE 145* 119* 104     Recent Labs     12/13/21 0512   AST 14   ALT 11   BILITOT 0.3   ALKPHOS 73     Troponin T: No results for input(s): TROPONINI in the last 72 hours. Pro-BNP: No results for input(s): BNP in the last 72 hours. INR:   Recent Labs     12/13/21 0512   INR 0.95     UA:No results for input(s): NITRITE, COLORU, PHUR, LABCAST, WBCUA, RBCUA, MUCUS, TRICHOMONAS, YEAST, BACTERIA, CLARITYU, SPECGRAV, LEUKOCYTESUR, UROBILINOGEN, BILIRUBINUR, BLOODU, GLUCOSEU, AMORPHOUS in the last 72 hours. Invalid input(s): Kim Millers  A1C: No results for input(s): LABA1C in the last 72 hours. ABG:No results for input(s): PHART, XFG3DOA, PO2ART, TXE2GIR, BEART, HGBAE, M3YJRIWS, CARBOXHGBART in the last 72 hours. Imaging:    XR CHEST PORTABLE    Result Date: 12/12/2021  Impression: No acute cardiopulmonary disease.  Signed by Dr Anna Bojorquez:    Principal Problem:    Left displaced femoral neck fracture (HCC)  Active Problems:    Osteoarthritis    Memory loss    Neck pain    Congenital deafness    Essential hypertension    Peripheral vascular disease (HCC)    Myasthenia gravis (HCC)    Lumbar radiculopathy    Cervical radiculopathy    Anemia    Sacroiliac joint dysfunction of both sides    Primary insomnia    Closed comminuted intertrochanteric fracture of left femur [facility])    Palliative care patient  Resolved Problems:    Hypokalemia      Continue with current medication  Continue with pain meds as needed  Keep patient n.p.o. except meds  Patient underwent cephalomedullary nailing left closed displaced intertrochanteric hip fracture . ..... POD # 2  Follow-up closely as per orthopedics  Postop hemoglobin is stable at 10.4  Continue with communication through   Hypokalemia has resolved with replacement  Continue with gentle IV hydration  Patient likely has reactive leukocytosis   No evidence of pneumonia on chest x-ray  Urinalysis with reflex culture ordered to rule out UTI  Patient has severely deficient vitamin D level of 5.2  Patient started on vitamin D 50,000 units twice weekly for 1 week then weekly  Postop care, pain management and DVT prophylaxis as per orthopedics recommendations  COVID-19 test ordered in the morning for screening prior to transfer to skilled nursing facility      Chronic medical issues . .. Continue with home meds. Monitor patient closely while admitted. Advised very close f/u with patient's PCP as an outpatient to address chronic medical issues. Repeat labs in a.m. Electrolyte replacement as per protocol. Patient will be monitored very closely on the floor. Further recommendations as per the hospital course. Discharge Plan: Skilled nursing facility in a.m. once bed is available      Patient  is on DVT prophylaxis  Current medications reviewed  Lab work reviewed  Radiology/Chest x-ray films reviewed  Treatment recommendations from suspecialities reviewed, appreciated and agreed with  Discussed with the nurse and addressed all questions/concerns  Discussed with Patient and/or Family at the bedside in detail . .. they understand and agree with the management plan.       Neeraj Houser MD  12/15/2021 10:47 PM      DISCLAIMER: This note was created with electronic voice recognition which does have occasional errors. If you have any questions regarding the content within the note please do not hesitate to contact me. .. Thanks.

## 2021-12-16 NOTE — DISCHARGE SUMMARY
Amanda Peralta nito, [address] [place] [address] MEDICINE      DISCHARGE SUMMARY:      PATIENT NAME:  Lottie Schmitt  :  1945  MRN:  [id number]    Admission Date:   2021  7:32 PM Attending: Bhavana Conti MD   Discharge Date:   2021              PCP: Meme Villareal MD  Length of Stay: 4 days     Chief Complaint on Admission:   Chief Complaint   Patient presents with    Fall     left hip shortened and rotated       Consultants:     IP CONSULT TO [address] [place] [address]  IP CONSULT TO CASE MANAGEMENT       Discharge Problem List:   Principal Problem:    Left displaced femoral neck fracture (HCC)  Active Problems:    Osteoarthritis    Memory loss    Neck pain    Congenital deafness    Essential hypertension    Peripheral vascular disease ([address]    Myasthenia gravis ([address]    Lumbar radiculopathy    Cervical radiculopathy    Anemia    Sacroiliac joint dysfunction of both sides    Primary insomnia    Closed comminuted intertrochanteric fracture of left femur ([address]    Palliative care patient    Vitamin D deficiency  Resolved Problems:    Hypokalemia       CUMULATIVE  HOSPITAL  COURSE  AND  TREATMENT:    2021  Patient seen and evaluated at bedside. She is fast asleep. No complaints of any pain or any acute complaints as per nursing staff. Patient has been prescribed pain medications and DVT prophylaxis as per orthopedics with complete discharge instructions. She has been accepted in a skilled nursing facility and is being discharged in stable condition. 12/15/2021  Patient has a bed available in skilled nursing facility and will likely be transferred tomorrow. COVID-19 screening test ordered in the morning. Patient has severely depleted vitamin D level of 5.2 hence started on vitamin D 50,000 units twice weekly for 1 week then q. weekly.     2021  Patient underwent left hip surgery yesterday which he tolerated well. Pain is under control on meds. Awaiting arrangement for transfer to skilled nursing facility as per case management. Patient has leukocytosis without any identified source of infection likely reactive in nature. Urinalysis with reflex culture ordered.     12/13/2021  Very pleasant lady with the congenital hearing loss admitted with the left hip fracture. Clinically with patient during my morning rounds with the help of  at the bedside. He has chronic myasthenia gravis and muscle weakness which contributes to her weakness. She is n.p.o. and will be going for surgery later on today!        12/12/2021  History of Present Illness   Elderly female with multiple medical problems   Thin frail and elderlly   Congenital hearing loss   Sign  called for her and to communicate with her.         History of myasthenia gravis and muscle weakness and myopathy and this can be causing her tot get weaker and more difficulty with ambulation.      She has chronic osteoarthritis and weakness and lethargy and osteoporosis      She is being admitted due to fall and left hip fracture.         OBJECTIVE:  BP (!) 150/81   Pulse 77   Temp 96.8 °F (36 °C) (Temporal)   Resp 18   Ht 5' 8\" (1.727 m)   Wt 127 lb (57.6 kg)   SpO2 98%   BMI 19.31 kg/m²       Heart: RRR   Lungs: Bilateral fair air entry   Abdomen: Soft, non-tender   Extremities: No edema   Neurologic: Alert and oriented   Skin: Warm and dry          Laboratory Data:  Recent Labs     12/14/21  0210 12/15/21  0523 12/16/21  0740   WBC 19.0* 18.1* 12.8*   HGB 11.4* 10.4* 10.0*    246 239     Recent Labs     12/14/21  0210 12/15/21  0523 12/16/21  0740    133* 131*   K 4.1 3.8 4.5    100 98   CO2 23 23 26   BUN 11 13 16   CREATININE 0.8 0.8 0.7   GLUCOSE 119* 104 96     No results for input(s): AST, ALT, ALB, BILITOT, ALKPHOS in the last 72 hours. Troponin T: No results for input(s): TROPONINI in the last 72 hours.   Pro-BNP: No results for input(s): BNP in the last 72 hours. INR:   No results for input(s): INR in the last 72 hours. UA:No results for input(s): NITRITE, COLORU, PHUR, LABCAST, WBCUA, RBCUA, MUCUS, TRICHOMONAS, YEAST, BACTERIA, CLARITYU, SPECGRAV, LEUKOCYTESUR, UROBILINOGEN, BILIRUBINUR, BLOODU, GLUCOSEU, AMORPHOUS in the last 72 hours. Invalid input(s): Evelyn Whitlock  A1C: No results for input(s): LABA1C in the last 72 hours. ABG:No results for input(s): PHART, CLY6WTR, PO2ART, TDN9XZO, BEART, HGBAE, W8UYIPHO, CARBOXHGBART in the last 72 hours. Impressions of imaging performed in 48 hours before discharge:    No results found. Medication List      START taking these medications    apixaban 2.5 MG Tabs tablet  Commonly known as: Eliquis  Take 1 tablet by mouth 2 times daily     docusate sodium 100 MG capsule  Commonly known as: Colace  Take 1 capsule by mouth 2 times daily     ondansetron 4 MG tablet  Commonly known as: Zofran  Take 1 tablet by mouth every 8 hours as needed for Nausea or Vomiting     oxyCODONE-acetaminophen 7.5-325 MG per tablet  Commonly known as: Percocet  Take 1 tablet by mouth every 8 hours as needed for Pain for up to 3 days.         CHANGE how you take these medications    amitriptyline 100 MG tablet  Commonly known as: ELAVIL  Take 1 tablet by mouth nightly For chronic insomnia  What changed: additional instructions        CONTINUE taking these medications    amLODIPine 5 MG tablet  Commonly known as: NORVASC  TAKE 1 TABLET BY MOUTH ONCE DAILY AT BEDTIME FOR HIGH BLOOD PRESSURE     Donepezil HCl 23 MG Tabs tablet  Commonly known as: ARICEPT  TAKE 1 TABLET BY MOUTH NIGHTLY FOR  DEMENTIA     FLUoxetine 20 MG capsule  Commonly known as: PROZAC  TAKE 1 CAPSULE BY MOUTH ONCE DAILY FOR ANXIETY AND FOR DEPRESSION     hydrOXYzine 25 MG capsule  Commonly known as: Vistaril  1 capsule by mouth 3 times a day for severe itching     Iron 325 (65 Fe) MG Tabs  Take 325 mg by mouth daily     prazosin 1 MG capsule  Commonly known as: MINIPRESS     prednisoLONE acetate 1 % ophthalmic suspension  Commonly known as: PRED FORTE     Roller Walker Misc  1 each by Does not apply route daily DX Code: R26.81, G70.00, H81.313, G60.9        STOP taking these medications    HYDROcodone-acetaminophen 7.5-325 MG per tablet  Commonly known as: [address]           Where to Get Your Medications      These medications were sent to [id number] [place] [address], [place] [address]  [address], [address]    Phone: [phone]   · apixaban 2.5 MG Tabs tablet  · docusate sodium 100 MG capsule  · ondansetron 4 MG tablet     You can get these medications from any pharmacy    Bring a paper prescription for each of these medications  · oxyCODONE-acetaminophen 7.5-325 MG per tablet           ISSUES TO BE ADDRESSED AT HOSPITAL FOLLOW-UP VISIT:    Advised patient to follow-up closely with PCP upon discharge for management of chronic medical issues  Please see the detailed discharge directions delivered from earlier today! Condition on Discharge: gradually improving  Discharge Disposition: Skilled Facility    Recommended Follow Up:  Kyaw Alvarado MD  [address] [place] [address]  [phone]    Schedule an appointment as soon as possible for a visit in 2 weeks   Facility please call and schedule an appointment with 's office. Maranda Mims MD  hospitals  [phone]    Call  Call for a Hospital Follow up appointment for 1 week after DC from facility.     Facility Provider      Follow up upon arrival at facility    Followup Appointments Scheduled at Time of Discharge:  Future Appointments   Date Time Provider Nisha Bishop   35/08/9229  4:37 AM [address] [place] [address] [place], APRN [facility]   9/35/7319 67:24 AM DENIZ Rivera [facility]   2/2/2022  4:00 PM Maranda Mims MD [facility]        Discharge Instructions:   Please see the discharge paperwork. Patient was seen at bedside today, and the examination shows improvement since yesterday. Detailed discharge directions delivered to the patient by myself and our nursing staff, who verbalizes understanding and is very happy and satisfied with the plan. Patient has been advised to continue all medications as prescribed and advised, and f/u with PCP within 1 week. Patient is stable from medical standpoint to be discharged. Total time spent during patient evaluation and assessment, discussion with the nurse/family, addressing discharge medications/scripts and coordination of care for safe discharge was in excess of 35 minutes.       Signed Electronically:    Hanane Agudelo MD  12:12 PM 12/16/2021

## 2021-12-16 NOTE — PROGRESS NOTES
Physical Therapy  Name: Julisa Hurley  MRN:  [id number]  Date of service:  12/16/2021 12/16/21 1206   Subjective   Subjective Attempt: Pt declined mobility at this time due to nausea and vomiting.           Electronically signed by Kaleta Dakin, PTA on 12/16/2021 at 12:07 PM

## 2021-12-16 NOTE — PROGRESS NOTES
Physician Progress Note      PATIENT:               David Pisano  CSN #:                  [id number]  :                       1945  ADMIT DATE:       2021 7:32 PM  DISCH DATE:  RESPONDING  PROVIDER #:        ANAHI RATLIFF MD        QUERY TEXT:    Type of Anemia: Please provide further specificity, if known. Clinical indicators include: ferrous sulfate, iron, fe, cancer, rbc,   hemoglobin, hematocrit, platelets, anemia, hemoptysis, bright red blood, hgb,   chronic medical issues  Options provided:  -- Anemia due to acute blood loss  -- Anemia due to chronic blood loss  -- Anemia due to iron deficiency  -- Anemia due to postoperative blood loss  -- Anemia due to chronic disease  -- Other - I will add my own diagnosis  -- Disagree - Not applicable / Not valid  -- Disagree - Clinically Unable to determine / Unknown        PROVIDER RESPONSE TEXT:    The patient has anemia due to postoperative blood loss.       Electronically signed by:  Jaky Gaston MD 12/15/2021 7:07 PM

## 2021-12-17 NOTE — CARE COORDINATION
ACM confirmed pt was DC on 12/16 to [facility]. Spoke to [employer].   Electronically signed by Mario Pritchett RN on 12/17/2021 at 9:26 AM

## 2022-01-01 ENCOUNTER — APPOINTMENT (OUTPATIENT)
Dept: CT IMAGING | Age: 77
End: 2022-01-01
Payer: MEDICARE

## 2022-01-01 ENCOUNTER — CARE COORDINATION (OUTPATIENT)
Dept: CARE COORDINATION | Age: 77
End: 2022-01-01

## 2022-01-01 ENCOUNTER — APPOINTMENT (OUTPATIENT)
Dept: GENERAL RADIOLOGY | Age: 77
End: 2022-01-01
Payer: MEDICARE

## 2022-01-01 ENCOUNTER — HOSPITAL ENCOUNTER (INPATIENT)
Age: 77
LOS: 6 days | Discharge: HOSPICE | DRG: 643 | End: 2022-03-03
Attending: EMERGENCY MEDICINE | Admitting: INTERNAL MEDICINE
Payer: MEDICARE

## 2022-01-01 ENCOUNTER — HOSPITAL ENCOUNTER (EMERGENCY)
Age: 77
Discharge: HOME | End: 2022-02-13
Payer: MEDICARE

## 2022-01-01 VITALS
RESPIRATION RATE: 16 BRPM | OXYGEN SATURATION: 96 % | HEIGHT: 68 IN | WEIGHT: 107.5 LBS | BODY MASS INDEX: 16.29 KG/M2 | TEMPERATURE: 97.2 F | DIASTOLIC BLOOD PRESSURE: 72 MMHG | SYSTOLIC BLOOD PRESSURE: 114 MMHG | HEART RATE: 87 BPM

## 2022-01-01 VITALS
HEART RATE: 78 BPM | RESPIRATION RATE: 15 BRPM | OXYGEN SATURATION: 95 % | BODY MASS INDEX: 17.43 KG/M2 | HEIGHT: 68 IN | WEIGHT: 115 LBS | TEMPERATURE: 98.4 F | SYSTOLIC BLOOD PRESSURE: 152 MMHG | DIASTOLIC BLOOD PRESSURE: 78 MMHG

## 2022-01-01 DIAGNOSIS — S09.90XA INJURY OF HEAD, INITIAL ENCOUNTER: Primary | ICD-10-CM
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
## 2022-01-01 DIAGNOSIS — S16.1XXA ACUTE STRAIN OF NECK MUSCLE, INITIAL ENCOUNTER: ICD-10-CM

## 2022-01-01 DIAGNOSIS — E83.52 HYPERCALCEMIA: Primary | ICD-10-CM

## 2022-01-01 DIAGNOSIS — S70.02XA CONTUSION OF LEFT HIP, INITIAL ENCOUNTER: ICD-10-CM

## 2022-01-01 LAB
ALBUMIN SERPL-MCNC: 2.72 G/DL (ref 3.75–5.01)
ALBUMIN SERPL-MCNC: 2.9 G/DL (ref 3.5–5.2)
ALBUMIN SERPL-MCNC: 3 G/DL (ref 3.5–5.2)
ALP BLD-CCNC: 93 U/L (ref 35–104)
ALP BLD-CCNC: 93 U/L (ref 35–104)
ALPHA-1-GLOBULIN: 0.36 G/DL (ref 0.19–0.46)
ALPHA-2-GLOBULIN: 0.69 G/DL (ref 0.48–1.05)
ALT SERPL-CCNC: 10 U/L (ref 5–33)
ALT SERPL-CCNC: 9 U/L (ref 5–33)
ANION GAP SERPL CALCULATED.3IONS-SCNC: 10 MMOL/L (ref 7–19)
ANION GAP SERPL CALCULATED.3IONS-SCNC: 10 MMOL/L (ref 7–19)
ANION GAP SERPL CALCULATED.3IONS-SCNC: 11 MMOL/L (ref 7–19)
ANION GAP SERPL CALCULATED.3IONS-SCNC: 12 MMOL/L (ref 7–19)
ANION GAP SERPL CALCULATED.3IONS-SCNC: 13 MMOL/L (ref 7–19)
ANION GAP SERPL CALCULATED.3IONS-SCNC: 8 MMOL/L (ref 7–19)
ANION GAP SERPL CALCULATED.3IONS-SCNC: 9 MMOL/L (ref 7–19)
AST SERPL-CCNC: 20 U/L (ref 5–32)
AST SERPL-CCNC: 21 U/L (ref 5–32)
BASOPHILS ABSOLUTE: 0.1 K/UL (ref 0–0.2)
BASOPHILS ABSOLUTE: 0.1 K/UL (ref 0–0.2)
BASOPHILS RELATIVE PERCENT: 0.9 % (ref 0–1)
BASOPHILS RELATIVE PERCENT: 0.9 % (ref 0–1)
BETA GLOBULIN: 0.61 G/DL (ref 0.48–1.1)
BILIRUB SERPL-MCNC: 0.3 MG/DL (ref 0.2–1.2)
BILIRUB SERPL-MCNC: <0.2 MG/DL (ref 0.2–1.2)
BUN BLDV-MCNC: 11 MG/DL (ref 8–23)
BUN BLDV-MCNC: 5 MG/DL (ref 8–23)
BUN BLDV-MCNC: 5 MG/DL (ref 8–23)
BUN BLDV-MCNC: 6 MG/DL (ref 8–23)
BUN BLDV-MCNC: 6 MG/DL (ref 8–23)
BUN BLDV-MCNC: 7 MG/DL (ref 8–23)
BUN BLDV-MCNC: 8 MG/DL (ref 8–23)
CALCIUM IONIZED: 1.44 MMOL/L (ref 1.12–1.32)
CALCIUM IONIZED: 1.53 MMOL/L (ref 1.12–1.32)
CALCIUM IONIZED: 1.65 MMOL/L (ref 1.12–1.32)
CALCIUM SERPL-MCNC: 10.9 MG/DL (ref 8.8–10.2)
CALCIUM SERPL-MCNC: 10.9 MG/DL (ref 8.8–10.2)
CALCIUM SERPL-MCNC: 11.3 MG/DL (ref 8.8–10.2)
CALCIUM SERPL-MCNC: 11.4 MG/DL (ref 8.8–10.2)
CALCIUM SERPL-MCNC: 11.6 MG/DL (ref 8.8–10.2)
CALCIUM SERPL-MCNC: 9.7 MG/DL (ref 8.8–10.2)
CALCIUM SERPL-MCNC: 9.7 MG/DL (ref 8.8–10.2)
CHLORIDE BLD-SCNC: 100 MMOL/L (ref 98–111)
CHLORIDE BLD-SCNC: 101 MMOL/L (ref 98–111)
CHLORIDE BLD-SCNC: 101 MMOL/L (ref 98–111)
CHLORIDE BLD-SCNC: 102 MMOL/L (ref 98–111)
CHLORIDE BLD-SCNC: 104 MMOL/L (ref 98–111)
CHLORIDE BLD-SCNC: 104 MMOL/L (ref 98–111)
CHLORIDE BLD-SCNC: 105 MMOL/L (ref 98–111)
CO2: 24 MMOL/L (ref 22–29)
CO2: 25 MMOL/L (ref 22–29)
CO2: 26 MMOL/L (ref 22–29)
CO2: 27 MMOL/L (ref 22–29)
CO2: 27 MMOL/L (ref 22–29)
CREAT SERPL-MCNC: 0.5 MG/DL (ref 0.5–0.9)
CREAT SERPL-MCNC: 0.6 MG/DL (ref 0.5–0.9)
CREAT SERPL-MCNC: 0.7 MG/DL (ref 0.5–0.9)
EKG P AXIS: -37 DEGREES
EKG P-R INTERVAL: 192 MS
EKG Q-T INTERVAL: 400 MS
EKG QRS DURATION: 94 MS
EKG QTC CALCULATION (BAZETT): 435 MS
EKG T AXIS: -8 DEGREES
EOSINOPHILS ABSOLUTE: 0.2 K/UL (ref 0–0.6)
EOSINOPHILS ABSOLUTE: 0.3 K/UL (ref 0–0.6)
EOSINOPHILS RELATIVE PERCENT: 1.5 % (ref 0–5)
EOSINOPHILS RELATIVE PERCENT: 4.2 % (ref 0–5)
GAMMA GLOBULIN: 0.82 G/DL (ref 0.62–1.51)
GFR AFRICAN AMERICAN: >59
GFR NON-AFRICAN AMERICAN: >60
GLUCOSE BLD-MCNC: 128 MG/DL (ref 74–109)
GLUCOSE BLD-MCNC: 75 MG/DL (ref 74–109)
GLUCOSE BLD-MCNC: 76 MG/DL (ref 74–109)
GLUCOSE BLD-MCNC: 77 MG/DL (ref 74–109)
GLUCOSE BLD-MCNC: 78 MG/DL (ref 74–109)
GLUCOSE BLD-MCNC: 87 MG/DL (ref 74–109)
GLUCOSE BLD-MCNC: 89 MG/DL (ref 74–109)
HCT VFR BLD CALC: 34 % (ref 37–47)
HCT VFR BLD CALC: 34.1 % (ref 37–47)
HCT VFR BLD CALC: 35.3 % (ref 37–47)
HCT VFR BLD CALC: 38.9 % (ref 37–47)
HCT VFR BLD CALC: 39 % (ref 37–47)
HCT VFR BLD CALC: 39.3 % (ref 37–47)
HCT VFR BLD CALC: 40.2 % (ref 37–47)
HEMOGLOBIN: 10.9 G/DL (ref 12–16)
HEMOGLOBIN: 11.3 G/DL (ref 12–16)
HEMOGLOBIN: 11.4 G/DL (ref 12–16)
HEMOGLOBIN: 12.6 G/DL (ref 12–16)
HEMOGLOBIN: 12.7 G/DL (ref 12–16)
HEMOGLOBIN: 13.1 G/DL (ref 12–16)
HEMOGLOBIN: 13.3 G/DL (ref 12–16)
IMMATURE GRANULOCYTES #: 0 K/UL
IMMATURE GRANULOCYTES #: 0 K/UL
LYMPHOCYTES ABSOLUTE: 2.1 K/UL (ref 1.1–4.5)
LYMPHOCYTES ABSOLUTE: 2.4 K/UL (ref 1.1–4.5)
LYMPHOCYTES RELATIVE PERCENT: 19.2 % (ref 20–40)
LYMPHOCYTES RELATIVE PERCENT: 32 % (ref 20–40)
MAGNESIUM: 1.3 MG/DL (ref 1.6–2.4)
MAGNESIUM: 1.5 MG/DL (ref 1.6–2.4)
MAGNESIUM: 1.7 MG/DL (ref 1.6–2.4)
MAGNESIUM: 1.7 MG/DL (ref 1.6–2.4)
MAGNESIUM: 2 MG/DL (ref 1.6–2.4)
MAGNESIUM: 2.3 MG/DL (ref 1.6–2.4)
MCH RBC QN AUTO: 30 PG (ref 27–31)
MCH RBC QN AUTO: 30.4 PG (ref 27–31)
MCH RBC QN AUTO: 30.4 PG (ref 27–31)
MCH RBC QN AUTO: 30.5 PG (ref 27–31)
MCH RBC QN AUTO: 30.7 PG (ref 27–31)
MCH RBC QN AUTO: 30.8 PG (ref 27–31)
MCH RBC QN AUTO: 31.1 PG (ref 27–31)
MCHC RBC AUTO-ENTMCNC: 32 G/DL (ref 33–37)
MCHC RBC AUTO-ENTMCNC: 32.3 G/DL (ref 33–37)
MCHC RBC AUTO-ENTMCNC: 32.3 G/DL (ref 33–37)
MCHC RBC AUTO-ENTMCNC: 32.6 G/DL (ref 33–37)
MCHC RBC AUTO-ENTMCNC: 32.6 G/DL (ref 33–37)
MCHC RBC AUTO-ENTMCNC: 33.2 G/DL (ref 33–37)
MCHC RBC AUTO-ENTMCNC: 33.8 G/DL (ref 33–37)
MCV RBC AUTO: 89.9 FL (ref 81–99)
MCV RBC AUTO: 92 FL (ref 81–99)
MCV RBC AUTO: 93.7 FL (ref 81–99)
MCV RBC AUTO: 94 FL (ref 81–99)
MCV RBC AUTO: 94.2 FL (ref 81–99)
MCV RBC AUTO: 95.4 FL (ref 81–99)
MCV RBC AUTO: 95.5 FL (ref 81–99)
MONOCYTES ABSOLUTE: 1 K/UL (ref 0–0.9)
MONOCYTES ABSOLUTE: 1 K/UL (ref 0–0.9)
MONOCYTES RELATIVE PERCENT: 13.8 % (ref 0–10)
MONOCYTES RELATIVE PERCENT: 9.2 % (ref 0–10)
NEUTROPHILS ABSOLUTE: 3.6 K/UL (ref 1.5–7.5)
NEUTROPHILS ABSOLUTE: 7.6 K/UL (ref 1.5–7.5)
NEUTROPHILS RELATIVE PERCENT: 48.8 % (ref 50–65)
NEUTROPHILS RELATIVE PERCENT: 68.9 % (ref 50–65)
PDW BLD-RTO: 13.1 % (ref 11.5–14.5)
PDW BLD-RTO: 13.3 % (ref 11.5–14.5)
PDW BLD-RTO: 13.6 % (ref 11.5–14.5)
PDW BLD-RTO: 13.7 % (ref 11.5–14.5)
PDW BLD-RTO: 13.8 % (ref 11.5–14.5)
PLATELET # BLD: 248 K/UL (ref 130–400)
PLATELET # BLD: 278 K/UL (ref 130–400)
PLATELET # BLD: 290 K/UL (ref 130–400)
PLATELET # BLD: 292 K/UL (ref 130–400)
PLATELET # BLD: 295 K/UL (ref 130–400)
PLATELET # BLD: 316 K/UL (ref 130–400)
PLATELET # BLD: 364 K/UL (ref 130–400)
PMV BLD AUTO: 10 FL (ref 9.4–12.3)
PMV BLD AUTO: 10.1 FL (ref 9.4–12.3)
PMV BLD AUTO: 10.2 FL (ref 9.4–12.3)
PMV BLD AUTO: 10.5 FL (ref 9.4–12.3)
PMV BLD AUTO: 9.7 FL (ref 9.4–12.3)
PMV BLD AUTO: 9.7 FL (ref 9.4–12.3)
PMV BLD AUTO: 9.8 FL (ref 9.4–12.3)
POTASSIUM REFLEX MAGNESIUM: 2.4 MMOL/L (ref 3.5–5)
POTASSIUM REFLEX MAGNESIUM: 2.6 MMOL/L (ref 3.5–5)
POTASSIUM REFLEX MAGNESIUM: 3.2 MMOL/L (ref 3.5–5)
POTASSIUM REFLEX MAGNESIUM: 3.3 MMOL/L (ref 3.5–5)
POTASSIUM REFLEX MAGNESIUM: 3.8 MMOL/L (ref 3.5–5)
POTASSIUM SERPL-SCNC: 2.6 MMOL/L (ref 3.5–5)
POTASSIUM SERPL-SCNC: 3.1 MMOL/L (ref 3.5–5)
POTASSIUM SERPL-SCNC: 3.1 MMOL/L (ref 3.5–5)
POTASSIUM SERPL-SCNC: 3.7 MMOL/L (ref 3.5–5)
PROTEIN ELECTROPHORESIS, SERUM: ABNORMAL
RBC # BLD: 3.57 M/UL (ref 4.2–5.4)
RBC # BLD: 3.63 M/UL (ref 4.2–5.4)
RBC # BLD: 3.7 M/UL (ref 4.2–5.4)
RBC # BLD: 4.14 M/UL (ref 4.2–5.4)
RBC # BLD: 4.14 M/UL (ref 4.2–5.4)
RBC # BLD: 4.37 M/UL (ref 4.2–5.4)
RBC # BLD: 4.37 M/UL (ref 4.2–5.4)
SARS-COV-2, NAAT: NOT DETECTED
SODIUM BLD-SCNC: 135 MMOL/L (ref 136–145)
SODIUM BLD-SCNC: 137 MMOL/L (ref 136–145)
SODIUM BLD-SCNC: 137 MMOL/L (ref 136–145)
SODIUM BLD-SCNC: 139 MMOL/L (ref 136–145)
SODIUM BLD-SCNC: 140 MMOL/L (ref 136–145)
SODIUM BLD-SCNC: 141 MMOL/L (ref 136–145)
SODIUM BLD-SCNC: 142 MMOL/L (ref 136–145)
SPE/IFE INTERPRETATION: ABNORMAL
T4 FREE: 1.25 NG/DL (ref 0.93–1.7)
TOTAL PROTEIN: 5.1 G/DL (ref 6.6–8.7)
TOTAL PROTEIN: 5.2 G/DL (ref 6.3–8.2)
TOTAL PROTEIN: 5.7 G/DL (ref 6.6–8.7)
TSH REFLEX FT4: 5.71 UIU/ML (ref 0.35–5.5)
VITAMIN D 25-HYDROXY: 74.6 NG/ML
WBC # BLD: 11 K/UL (ref 4.8–10.8)
WBC # BLD: 6.4 K/UL (ref 4.8–10.8)
WBC # BLD: 6.9 K/UL (ref 4.8–10.8)
WBC # BLD: 7 K/UL (ref 4.8–10.8)
WBC # BLD: 7.4 K/UL (ref 4.8–10.8)
WBC # BLD: 7.7 K/UL (ref 4.8–10.8)
WBC # BLD: 9.3 K/UL (ref 4.8–10.8)

## 2022-01-01 PROCEDURE — 99285 EMERGENCY DEPT VISIT HI MDM: CPT

## 2022-01-01 PROCEDURE — 72125 CT NECK SPINE W/O DYE: CPT

## 2022-01-01 PROCEDURE — 84132 ASSAY OF SERUM POTASSIUM: CPT

## 2022-01-01 PROCEDURE — 1210000000 HC MED SURG R&B

## 2022-01-01 PROCEDURE — 2580000003 HC RX 258

## 2022-01-01 PROCEDURE — 80048 BASIC METABOLIC PNL TOTAL CA: CPT

## 2022-01-01 PROCEDURE — 85027 COMPLETE CBC AUTOMATED: CPT

## 2022-01-01 PROCEDURE — 82330 ASSAY OF CALCIUM: CPT

## 2022-01-01 PROCEDURE — 83735 ASSAY OF MAGNESIUM: CPT

## 2022-01-01 PROCEDURE — 36415 COLL VENOUS BLD VENIPUNCTURE: CPT

## 2022-01-01 PROCEDURE — 6360000002 HC RX W HCPCS

## 2022-01-01 PROCEDURE — 6370000000 HC RX 637 (ALT 250 FOR IP): Performed by: INTERNAL MEDICINE

## 2022-01-01 PROCEDURE — 85025 COMPLETE CBC W/AUTO DIFF WBC: CPT

## 2022-01-01 PROCEDURE — 99283 EMERGENCY DEPT VISIT LOW MDM: CPT

## 2022-01-01 PROCEDURE — 6360000002 HC RX W HCPCS: Performed by: INTERNAL MEDICINE

## 2022-01-01 PROCEDURE — 80053 COMPREHEN METABOLIC PANEL: CPT

## 2022-01-01 PROCEDURE — 93005 ELECTROCARDIOGRAM TRACING: CPT | Performed by: EMERGENCY MEDICINE

## 2022-01-01 PROCEDURE — 6360000002 HC RX W HCPCS: Performed by: PHYSICIAN ASSISTANT

## 2022-01-01 PROCEDURE — 84165 PROTEIN E-PHORESIS SERUM: CPT

## 2022-01-01 PROCEDURE — 73030 X-RAY EXAM OF SHOULDER: CPT

## 2022-01-01 PROCEDURE — 87635 SARS-COV-2 COVID-19 AMP PRB: CPT

## 2022-01-01 PROCEDURE — 6370000000 HC RX 637 (ALT 250 FOR IP)

## 2022-01-01 PROCEDURE — 84443 ASSAY THYROID STIM HORMONE: CPT

## 2022-01-01 PROCEDURE — 96374 THER/PROPH/DIAG INJ IV PUSH: CPT

## 2022-01-01 PROCEDURE — 73502 X-RAY EXAM HIP UNI 2-3 VIEWS: CPT

## 2022-01-01 PROCEDURE — 82306 VITAMIN D 25 HYDROXY: CPT

## 2022-01-01 PROCEDURE — 84155 ASSAY OF PROTEIN SERUM: CPT

## 2022-01-01 PROCEDURE — 93010 ELECTROCARDIOGRAM REPORT: CPT | Performed by: INTERNAL MEDICINE

## 2022-01-01 PROCEDURE — 70450 CT HEAD/BRAIN W/O DYE: CPT

## 2022-01-01 PROCEDURE — 84439 ASSAY OF FREE THYROXINE: CPT

## 2022-01-01 PROCEDURE — 73110 X-RAY EXAM OF WRIST: CPT

## 2022-01-01 RX ORDER — DONEPEZIL HYDROCHLORIDE 23 MG/1
23 TABLET, FILM COATED ORAL NIGHTLY
Status: DISCONTINUED | OUTPATIENT
Start: 2022-01-01 | End: 2022-01-01 | Stop reason: HOSPADM

## 2022-01-01 RX ORDER — 0.9 % SODIUM CHLORIDE 0.9 %
500 INTRAVENOUS SOLUTION INTRAVENOUS ONCE
Status: DISCONTINUED | OUTPATIENT
Start: 2022-01-01 | End: 2022-01-01 | Stop reason: HOSPADM

## 2022-01-01 RX ORDER — SODIUM CHLORIDE 9 MG/ML
INJECTION, SOLUTION INTRAVENOUS CONTINUOUS
Status: ACTIVE | OUTPATIENT
Start: 2022-01-01 | End: 2022-01-01

## 2022-01-01 RX ORDER — PRAZOSIN HYDROCHLORIDE 1 MG/1
1 CAPSULE ORAL NIGHTLY
Status: DISCONTINUED | OUTPATIENT
Start: 2022-01-01 | End: 2022-01-01 | Stop reason: HOSPADM

## 2022-01-01 RX ORDER — POLYETHYLENE GLYCOL 3350 17 G/17G
17 POWDER, FOR SOLUTION ORAL DAILY PRN
Status: DISCONTINUED | OUTPATIENT
Start: 2022-01-01 | End: 2022-01-01 | Stop reason: HOSPADM

## 2022-01-01 RX ORDER — HYDROXYZINE PAMOATE 25 MG/1
25 CAPSULE ORAL 3 TIMES DAILY PRN
Status: DISCONTINUED | OUTPATIENT
Start: 2022-01-01 | End: 2022-01-01 | Stop reason: HOSPADM

## 2022-01-01 RX ORDER — CINACALCET 30 MG/1
30 TABLET, FILM COATED ORAL DAILY
Status: DISCONTINUED | OUTPATIENT
Start: 2022-01-01 | End: 2022-01-01 | Stop reason: HOSPADM

## 2022-01-01 RX ORDER — POTASSIUM CHLORIDE 20 MEQ/1
20 TABLET, EXTENDED RELEASE ORAL DAILY
Qty: 30 TABLET | Refills: 0 | Status: SHIPPED | OUTPATIENT
Start: 2022-01-01 | End: 2022-03-30

## 2022-01-01 RX ORDER — MAGNESIUM OXIDE 400 MG/1
400 TABLET ORAL DAILY
Qty: 30 TABLET | Refills: 0 | Status: SHIPPED | OUTPATIENT
Start: 2022-01-01

## 2022-01-01 RX ORDER — AMLODIPINE BESYLATE 5 MG/1
5 TABLET ORAL NIGHTLY
Status: DISCONTINUED | OUTPATIENT
Start: 2022-01-01 | End: 2022-01-01 | Stop reason: HOSPADM

## 2022-01-01 RX ORDER — POTASSIUM CHLORIDE 7.45 MG/ML
10 INJECTION INTRAVENOUS PRN
Status: DISCONTINUED | OUTPATIENT
Start: 2022-01-01 | End: 2022-01-01 | Stop reason: HOSPADM

## 2022-01-01 RX ORDER — MAGNESIUM SULFATE 1 G/100ML
1000 INJECTION INTRAVENOUS PRN
Status: DISCONTINUED | OUTPATIENT
Start: 2022-01-01 | End: 2022-01-01 | Stop reason: HOSPADM

## 2022-01-01 RX ORDER — SODIUM CHLORIDE 0.9 % (FLUSH) 0.9 %
5-40 SYRINGE (ML) INJECTION PRN
Status: DISCONTINUED | OUTPATIENT
Start: 2022-01-01 | End: 2022-01-01 | Stop reason: HOSPADM

## 2022-01-01 RX ORDER — AMITRIPTYLINE HYDROCHLORIDE 50 MG/1
100 TABLET, FILM COATED ORAL NIGHTLY
Status: DISCONTINUED | OUTPATIENT
Start: 2022-01-01 | End: 2022-01-01 | Stop reason: HOSPADM

## 2022-01-01 RX ORDER — ONDANSETRON 4 MG/1
4 TABLET, ORALLY DISINTEGRATING ORAL EVERY 8 HOURS PRN
Status: DISCONTINUED | OUTPATIENT
Start: 2022-01-01 | End: 2022-01-01 | Stop reason: HOSPADM

## 2022-01-01 RX ORDER — ACETAMINOPHEN 650 MG/1
650 SUPPOSITORY RECTAL EVERY 6 HOURS PRN
Status: DISCONTINUED | OUTPATIENT
Start: 2022-01-01 | End: 2022-01-01 | Stop reason: HOSPADM

## 2022-01-01 RX ORDER — ACETAMINOPHEN 325 MG/1
650 TABLET ORAL EVERY 6 HOURS PRN
Status: DISCONTINUED | OUTPATIENT
Start: 2022-01-01 | End: 2022-01-01 | Stop reason: HOSPADM

## 2022-01-01 RX ORDER — FERROUS SULFATE 325(65) MG
325 TABLET ORAL DAILY
Status: DISCONTINUED | OUTPATIENT
Start: 2022-01-01 | End: 2022-01-01 | Stop reason: HOSPADM

## 2022-01-01 RX ORDER — SODIUM CHLORIDE 9 MG/ML
25 INJECTION, SOLUTION INTRAVENOUS PRN
Status: DISCONTINUED | OUTPATIENT
Start: 2022-01-01 | End: 2022-01-01 | Stop reason: HOSPADM

## 2022-01-01 RX ORDER — HYDRALAZINE HYDROCHLORIDE 20 MG/ML
5 INJECTION INTRAMUSCULAR; INTRAVENOUS EVERY 6 HOURS PRN
Status: DISCONTINUED | OUTPATIENT
Start: 2022-01-01 | End: 2022-01-01 | Stop reason: HOSPADM

## 2022-01-01 RX ORDER — SODIUM CHLORIDE 9 MG/ML
INJECTION, SOLUTION INTRAVENOUS CONTINUOUS
Status: DISCONTINUED | OUTPATIENT
Start: 2022-01-01 | End: 2022-01-01 | Stop reason: HOSPADM

## 2022-01-01 RX ORDER — SODIUM CHLORIDE 0.9 % (FLUSH) 0.9 %
5-40 SYRINGE (ML) INJECTION EVERY 12 HOURS SCHEDULED
Status: DISCONTINUED | OUTPATIENT
Start: 2022-01-01 | End: 2022-01-01 | Stop reason: HOSPADM

## 2022-01-01 RX ORDER — MORPHINE SULFATE 4 MG/ML
4 INJECTION, SOLUTION INTRAMUSCULAR; INTRAVENOUS ONCE
Status: COMPLETED | OUTPATIENT
Start: 2022-01-01 | End: 2022-01-01

## 2022-01-01 RX ORDER — CINACALCET 30 MG/1
30 TABLET, FILM COATED ORAL DAILY
Qty: 30 TABLET | Refills: 0 | Status: SHIPPED | OUTPATIENT
Start: 2022-01-01

## 2022-01-01 RX ORDER — LANOLIN ALCOHOL/MO/W.PET/CERES
400 CREAM (GRAM) TOPICAL DAILY
Status: DISCONTINUED | OUTPATIENT
Start: 2022-01-01 | End: 2022-01-01 | Stop reason: HOSPADM

## 2022-01-01 RX ORDER — ONDANSETRON 2 MG/ML
4 INJECTION INTRAMUSCULAR; INTRAVENOUS EVERY 6 HOURS PRN
Status: DISCONTINUED | OUTPATIENT
Start: 2022-01-01 | End: 2022-01-01 | Stop reason: HOSPADM

## 2022-01-01 RX ORDER — DOCUSATE SODIUM 100 MG/1
100 CAPSULE, LIQUID FILLED ORAL 2 TIMES DAILY
Status: DISCONTINUED | OUTPATIENT
Start: 2022-01-01 | End: 2022-01-01 | Stop reason: HOSPADM

## 2022-01-01 RX ORDER — FLUOXETINE HYDROCHLORIDE 20 MG/1
20 CAPSULE ORAL DAILY
Status: DISCONTINUED | OUTPATIENT
Start: 2022-01-01 | End: 2022-01-01 | Stop reason: HOSPADM

## 2022-01-01 RX ORDER — POTASSIUM CHLORIDE 20 MEQ/1
40 TABLET, EXTENDED RELEASE ORAL PRN
Status: DISCONTINUED | OUTPATIENT
Start: 2022-01-01 | End: 2022-01-01 | Stop reason: HOSPADM

## 2022-01-01 RX ORDER — ACETAMINOPHEN 500 MG
500 TABLET ORAL 4 TIMES DAILY PRN
Qty: 120 TABLET | Refills: 5 | Status: SHIPPED | OUTPATIENT
Start: 2022-01-01

## 2022-01-01 RX ORDER — CALCITONIN SALMON 200 [USP'U]/ML
100 INJECTION, SOLUTION INTRAMUSCULAR; SUBCUTANEOUS DAILY
Status: COMPLETED | OUTPATIENT
Start: 2022-01-01 | End: 2022-01-01

## 2022-01-01 RX ADMIN — MORPHINE SULFATE 4 MG: 4 INJECTION INTRAVENOUS at 20:49

## 2022-01-01 RX ADMIN — AMLODIPINE BESYLATE 5 MG: 5 TABLET ORAL at 20:57

## 2022-01-01 RX ADMIN — MAGNESIUM SULFATE HEPTAHYDRATE 1000 MG: 1 INJECTION, SOLUTION INTRAVENOUS at 07:24

## 2022-01-01 RX ADMIN — POTASSIUM CHLORIDE 10 MEQ: 7.45 INJECTION INTRAVENOUS at 06:28

## 2022-01-01 RX ADMIN — SODIUM CHLORIDE, PRESERVATIVE FREE 10 ML: 5 INJECTION INTRAVENOUS at 20:59

## 2022-01-01 RX ADMIN — AMITRIPTYLINE HYDROCHLORIDE 100 MG: 50 TABLET, FILM COATED ORAL at 21:54

## 2022-01-01 RX ADMIN — Medication 400 MG: at 08:15

## 2022-01-01 RX ADMIN — DONEPEZIL HYDROCHLORIDE 23 MG: 23 TABLET, FILM COATED ORAL at 23:08

## 2022-01-01 RX ADMIN — ENOXAPARIN SODIUM 40 MG: 100 INJECTION SUBCUTANEOUS at 15:42

## 2022-01-01 RX ADMIN — SODIUM CHLORIDE, PRESERVATIVE FREE 10 ML: 5 INJECTION INTRAVENOUS at 08:15

## 2022-01-01 RX ADMIN — DOCUSATE SODIUM 100 MG: 100 CAPSULE, LIQUID FILLED ORAL at 20:57

## 2022-01-01 RX ADMIN — Medication 400 MG: at 09:16

## 2022-01-01 RX ADMIN — DOCUSATE SODIUM 100 MG: 100 CAPSULE, LIQUID FILLED ORAL at 09:51

## 2022-01-01 RX ADMIN — SODIUM CHLORIDE: 9 INJECTION, SOLUTION INTRAVENOUS at 16:46

## 2022-01-01 RX ADMIN — DOCUSATE SODIUM 100 MG: 100 CAPSULE, LIQUID FILLED ORAL at 21:54

## 2022-01-01 RX ADMIN — FERROUS SULFATE TAB 325 MG (65 MG ELEMENTAL FE) 325 MG: 325 (65 FE) TAB at 09:16

## 2022-01-01 RX ADMIN — HYDROXYZINE PAMOATE 25 MG: 25 CAPSULE ORAL at 12:37

## 2022-01-01 RX ADMIN — POTASSIUM CHLORIDE 10 MEQ: 7.45 INJECTION INTRAVENOUS at 07:56

## 2022-01-01 RX ADMIN — POTASSIUM CHLORIDE 10 MEQ: 7.45 INJECTION INTRAVENOUS at 11:36

## 2022-01-01 RX ADMIN — ENOXAPARIN SODIUM 40 MG: 100 INJECTION SUBCUTANEOUS at 15:30

## 2022-01-01 RX ADMIN — AMLODIPINE BESYLATE 5 MG: 5 TABLET ORAL at 22:52

## 2022-01-01 RX ADMIN — FERROUS SULFATE TAB 325 MG (65 MG ELEMENTAL FE) 325 MG: 325 (65 FE) TAB at 16:33

## 2022-01-01 RX ADMIN — POTASSIUM CHLORIDE 10 MEQ: 7.45 INJECTION INTRAVENOUS at 12:18

## 2022-01-01 RX ADMIN — MAGNESIUM SULFATE HEPTAHYDRATE 1000 MG: 1 INJECTION, SOLUTION INTRAVENOUS at 06:19

## 2022-01-01 RX ADMIN — FERROUS SULFATE TAB 325 MG (65 MG ELEMENTAL FE) 325 MG: 325 (65 FE) TAB at 08:15

## 2022-01-01 RX ADMIN — POTASSIUM CHLORIDE 10 MEQ: 7.45 INJECTION INTRAVENOUS at 13:52

## 2022-01-01 RX ADMIN — CALCITONIN SALMON 100 UNITS: 200 INJECTION, SOLUTION INTRAMUSCULAR; SUBCUTANEOUS at 16:34

## 2022-01-01 RX ADMIN — PRAZOSIN HYDROCHLORIDE 1 MG: 1 CAPSULE ORAL at 20:58

## 2022-01-01 RX ADMIN — AMLODIPINE BESYLATE 5 MG: 5 TABLET ORAL at 20:58

## 2022-01-01 RX ADMIN — DONEPEZIL HYDROCHLORIDE 23 MG: 23 TABLET, FILM COATED ORAL at 22:51

## 2022-01-01 RX ADMIN — ACETAMINOPHEN 650 MG: 325 TABLET ORAL at 20:57

## 2022-01-01 RX ADMIN — PRAZOSIN HYDROCHLORIDE 1 MG: 1 CAPSULE ORAL at 22:51

## 2022-01-01 RX ADMIN — AMITRIPTYLINE HYDROCHLORIDE 100 MG: 50 TABLET, FILM COATED ORAL at 23:08

## 2022-01-01 RX ADMIN — DOCUSATE SODIUM 100 MG: 100 CAPSULE, LIQUID FILLED ORAL at 20:58

## 2022-01-01 RX ADMIN — HYDROXYZINE PAMOATE 25 MG: 25 CAPSULE ORAL at 21:43

## 2022-01-01 RX ADMIN — DONEPEZIL HYDROCHLORIDE 23 MG: 23 TABLET, FILM COATED ORAL at 21:40

## 2022-01-01 RX ADMIN — SODIUM CHLORIDE, PRESERVATIVE FREE 10 ML: 5 INJECTION INTRAVENOUS at 09:00

## 2022-01-01 RX ADMIN — CINACALCET HYDROCHLORIDE 30 MG: 30 TABLET, FILM COATED ORAL at 08:15

## 2022-01-01 RX ADMIN — AMITRIPTYLINE HYDROCHLORIDE 100 MG: 50 TABLET, FILM COATED ORAL at 20:57

## 2022-01-01 RX ADMIN — FLUOXETINE HYDROCHLORIDE 20 MG: 20 CAPSULE ORAL at 09:51

## 2022-01-01 RX ADMIN — MAGNESIUM SULFATE HEPTAHYDRATE 1000 MG: 1 INJECTION, SOLUTION INTRAVENOUS at 15:15

## 2022-01-01 RX ADMIN — DONEPEZIL HYDROCHLORIDE 23 MG: 23 TABLET, FILM COATED ORAL at 20:58

## 2022-01-01 RX ADMIN — POTASSIUM CHLORIDE 10 MEQ: 7.45 INJECTION INTRAVENOUS at 09:15

## 2022-01-01 RX ADMIN — SODIUM CHLORIDE, PRESERVATIVE FREE 10 ML: 5 INJECTION INTRAVENOUS at 21:40

## 2022-01-01 RX ADMIN — SODIUM CHLORIDE, PRESERVATIVE FREE 10 ML: 5 INJECTION INTRAVENOUS at 20:57

## 2022-01-01 RX ADMIN — DONEPEZIL HYDROCHLORIDE 23 MG: 23 TABLET, FILM COATED ORAL at 21:55

## 2022-01-01 RX ADMIN — FERROUS SULFATE TAB 325 MG (65 MG ELEMENTAL FE) 325 MG: 325 (65 FE) TAB at 08:00

## 2022-01-01 RX ADMIN — DOCUSATE SODIUM 100 MG: 100 CAPSULE, LIQUID FILLED ORAL at 08:15

## 2022-01-01 RX ADMIN — SODIUM CHLORIDE, PRESERVATIVE FREE 10 ML: 5 INJECTION INTRAVENOUS at 21:55

## 2022-01-01 RX ADMIN — FLUOXETINE HYDROCHLORIDE 20 MG: 20 CAPSULE ORAL at 16:33

## 2022-01-01 RX ADMIN — DOCUSATE SODIUM 100 MG: 100 CAPSULE, LIQUID FILLED ORAL at 09:16

## 2022-01-01 RX ADMIN — MAGNESIUM SULFATE HEPTAHYDRATE 1000 MG: 1 INJECTION, SOLUTION INTRAVENOUS at 17:52

## 2022-01-01 RX ADMIN — ACETAMINOPHEN 650 MG: 325 TABLET ORAL at 15:45

## 2022-01-01 RX ADMIN — FLUOXETINE HYDROCHLORIDE 20 MG: 20 CAPSULE ORAL at 08:00

## 2022-01-01 RX ADMIN — ENOXAPARIN SODIUM 40 MG: 100 INJECTION SUBCUTANEOUS at 18:06

## 2022-01-01 RX ADMIN — FLUOXETINE HYDROCHLORIDE 20 MG: 20 CAPSULE ORAL at 08:15

## 2022-01-01 RX ADMIN — MAGNESIUM SULFATE HEPTAHYDRATE 1000 MG: 1 INJECTION, SOLUTION INTRAVENOUS at 09:09

## 2022-01-01 RX ADMIN — POTASSIUM CHLORIDE 10 MEQ: 7.45 INJECTION INTRAVENOUS at 04:13

## 2022-01-01 RX ADMIN — Medication 400 MG: at 12:08

## 2022-01-01 RX ADMIN — SODIUM CHLORIDE, PRESERVATIVE FREE 10 ML: 5 INJECTION INTRAVENOUS at 09:51

## 2022-01-01 RX ADMIN — DONEPEZIL HYDROCHLORIDE 23 MG: 23 TABLET, FILM COATED ORAL at 21:06

## 2022-01-01 RX ADMIN — DOCUSATE SODIUM 100 MG: 100 CAPSULE, LIQUID FILLED ORAL at 22:52

## 2022-01-01 RX ADMIN — AMITRIPTYLINE HYDROCHLORIDE 100 MG: 50 TABLET, FILM COATED ORAL at 22:52

## 2022-01-01 RX ADMIN — AMITRIPTYLINE HYDROCHLORIDE 100 MG: 50 TABLET, FILM COATED ORAL at 20:58

## 2022-01-01 RX ADMIN — CALCITONIN SALMON 100 UNITS: 200 INJECTION, SOLUTION INTRAMUSCULAR; SUBCUTANEOUS at 07:55

## 2022-01-01 RX ADMIN — PRAZOSIN HYDROCHLORIDE 1 MG: 1 CAPSULE ORAL at 21:55

## 2022-01-01 RX ADMIN — FLUOXETINE HYDROCHLORIDE 20 MG: 20 CAPSULE ORAL at 09:16

## 2022-01-01 RX ADMIN — ACETAMINOPHEN 650 MG: 325 TABLET ORAL at 21:54

## 2022-01-01 RX ADMIN — SODIUM CHLORIDE, PRESERVATIVE FREE 10 ML: 5 INJECTION INTRAVENOUS at 09:16

## 2022-01-01 RX ADMIN — FERROUS SULFATE TAB 325 MG (65 MG ELEMENTAL FE) 325 MG: 325 (65 FE) TAB at 09:52

## 2022-01-01 RX ADMIN — AMITRIPTYLINE HYDROCHLORIDE 100 MG: 50 TABLET, FILM COATED ORAL at 21:40

## 2022-01-01 RX ADMIN — PRAZOSIN HYDROCHLORIDE 1 MG: 1 CAPSULE ORAL at 23:08

## 2022-01-01 RX ADMIN — POTASSIUM CHLORIDE 40 MEQ: 20 TABLET, EXTENDED RELEASE ORAL at 20:58

## 2022-01-01 RX ADMIN — PRAZOSIN HYDROCHLORIDE 1 MG: 1 CAPSULE ORAL at 21:40

## 2022-01-01 RX ADMIN — ENOXAPARIN SODIUM 40 MG: 100 INJECTION SUBCUTANEOUS at 15:45

## 2022-01-01 RX ADMIN — PRAZOSIN HYDROCHLORIDE 1 MG: 1 CAPSULE ORAL at 20:57

## 2022-01-01 RX ADMIN — POTASSIUM CHLORIDE 40 MEQ: 20 TABLET, EXTENDED RELEASE ORAL at 11:58

## 2022-01-01 RX ADMIN — AMLODIPINE BESYLATE 5 MG: 5 TABLET ORAL at 21:40

## 2022-01-01 RX ADMIN — AMLODIPINE BESYLATE 5 MG: 5 TABLET ORAL at 21:55

## 2022-01-01 RX ADMIN — DOCUSATE SODIUM 100 MG: 100 CAPSULE, LIQUID FILLED ORAL at 21:40

## 2022-01-01 ASSESSMENT — PAIN SCALES - GENERAL
PAINLEVEL_OUTOF10: 5
PAINLEVEL_OUTOF10: 3
PAINLEVEL_OUTOF10: 9
PAINLEVEL_OUTOF10: 4
PAINLEVEL_OUTOF10: 0
PAINLEVEL_OUTOF10: 7
PAINLEVEL_OUTOF10: 0
PAINLEVEL_OUTOF10: 3

## 2022-01-01 ASSESSMENT — PAIN DESCRIPTION - LOCATION: LOCATION: BACK

## 2022-01-01 ASSESSMENT — ENCOUNTER SYMPTOMS
ABDOMINAL PAIN: 0
NAUSEA: 1
SHORTNESS OF BREATH: 0
BACK PAIN: 0

## 2022-01-01 ASSESSMENT — PAIN DESCRIPTION - PAIN TYPE: TYPE: CHRONIC PAIN

## 2022-02-14 NOTE — ED NOTES
Maylin Lauren at bedside for in-person ASL.    Agency: [facility] for the [address] [place] [address], [address] [place] [address]  02/13/22 4178

## 2022-02-14 NOTE — ED NOTES
Massiel #[id number] also joined video call for assessment with patient.       Karel Koyanagi, RN  02/13/22 9572

## 2022-02-14 NOTE — ED PROVIDER NOTES
[facility] EMERGENCY DEPT  eMERGENCY dEPARTMENT eNCOUnter      Pt Name: Lorrie Gonzalez  MRN: [id number]  [place] 1945  Date of evaluation: 2/13/2022  Provider: Robert Butcher Dr       Chief Complaint   Patient presents with    Fall     pt from [facility] after unwitnessed fall. per EMS, pt is supposed to be an assist but was found down by staff. c/o neck pain, arrived in c-collar    Neck Pain         HISTORY OF PRESENT ILLNESS   (Location/Symptom, Timing/Onset,Context/Setting, Quality, Duration, Modifying Factors, Severity)  Note limiting factors. Lorrie Gonzalez is a 67 y.o. female with history of osteoarthritis, Alzheimer's, and recent short TFN of the left hip following dislocated femoral neck fracture who presents to the emergency department with complaint of a fall. The patient is currently at a rehabilitation center, [facility] after her recent hip surgery. The patient is supposed to be up with assist but according to EMS, staff stated they came in and found her in the floor. She is complaining of neck and left hip pain. The patient uses ASL, so  was used during each encounter to ensure improvement. HPI    NursingNotes were reviewed. REVIEW OF SYSTEMS    (2-9 systems for level 4, 10 or more for level 5)     Review of Systems   Constitutional: Negative for chills and fever. Respiratory: Negative for shortness of breath. Cardiovascular: Negative for chest pain. Gastrointestinal: Negative for abdominal pain. Musculoskeletal: Positive for arthralgias and neck pain. Negative for back pain. Neurological: Negative for dizziness, numbness and headaches. All other systems reviewed and are negative.            PAST MEDICALHISTORY     Past Medical History:   Diagnosis Date    Anemia     Anxiety     Chronic back pain     severe muscle spasms    Deafness congenital     Depression     Hypertension     Memory loss     early    [facility]henia [facility]  Neck pain     Osteoarthritis     Ptosis of eyelid     PVD (peripheral vascular disease) (HCC)     Weakness of face muscles          SURGICAL HISTORY       Past Surgical History:   Procedure Laterality Date    APPENDECTOMY      BACK SURGERY      CARPAL TUNNEL RELEASE      CATARACT REMOVAL      COLONOSCOPY  7-2010    COLONOSCOPY  2000 or before    [employer]-normal per patient    COLONOSCOPY N/A 5/29/2020    Dr Paola Chopra, suboptimal prep, internal hemorrhoids-Grade 1, prn (age)   [facility] EYE SURGERY Bilateral     cataract extraction    HERNIA REPAIR      on back of neck    HIP SURGERY Left 12/13/2021    SHORT TFN performed by Fatoumata Waldrop MD at [facility] COLONOSCOPY FLX DX W/COLLJ SPEC WHEN PFRMD N/A 7/6/2017    Dr Farrah Douglas diverticulosis, 5 yr recall    OK EGD TRANSORAL BIOPSY SINGLE/MULTIPLE N/A 7/6/2017    Dr Kita Styles, Winnie (-)    TERRI AND BSO      UPPER GASTROINTESTINAL ENDOSCOPY N/A 5/29/2020    Dr Jens Munson hiatal hernia, antral gastritis,     VARICOSE VEIN SURGERY      VASCULAR SURGERY  02/19/2018    [facility]. Aortogram and runoff. Left common femoral artery, 5 Croatian sheath. CURRENT MEDICATIONS     Previous Medications    AMITRIPTYLINE (ELAVIL) 100 MG TABLET    Take 1 tablet by mouth nightly For chronic insomnia    AMLODIPINE (NORVASC) 5 MG TABLET    TAKE 1 TABLET BY MOUTH ONCE DAILY AT BEDTIME FOR HIGH BLOOD PRESSURE    DOCUSATE SODIUM (COLACE) 100 MG CAPSULE    Take 1 capsule by mouth 2 times daily    DONEPEZIL HCL (ARICEPT) 23 MG TABS TABLET    TAKE 1 TABLET BY MOUTH NIGHTLY FOR  DEMENTIA    FERROUS SULFATE (IRON) 325 (65 FE) MG TABS    Take 325 mg by mouth daily    FLUOXETINE (PROZAC) 20 MG CAPSULE    TAKE 1 CAPSULE BY MOUTH ONCE DAILY FOR ANXIETY AND FOR DEPRESSION    HYDROXYZINE (VISTARIL) 25 MG CAPSULE    1 capsule by mouth 3 times a day for severe itching    MISC.  DEVICES (ROLLER WALKER) MISC    1 each by Does not apply route daily DX Code: R26.81, G70.00, H81.313, G60.9    ONDANSETRON (ZOFRAN) 4 MG TABLET    Take 1 tablet by mouth every 8 hours as needed for Nausea or Vomiting    PRAZOSIN (MINIPRESS) 1 MG CAPSULE    Take 1 mg by mouth nightly Through [address] (PRED [facility]) 1 % OPHTHALMIC SUSPENSION           ALLERGIES     Patient has no known allergies. FAMILY HISTORY       Family History   Problem Relation Age of Onset    High Blood Pressure Mother     Stroke Mother     Cancer Father     Lung Cancer Father    Peter Hearing Loss Brother         deaf   Peter Hearing Loss Brother         deaf    Colon Polyps Sister     Colon Cancer Neg Hx     Esophageal Cancer Neg Hx     Liver Cancer Neg Hx     Liver Disease Neg Hx     Stomach Cancer Neg Hx     Rectal Cancer Neg Hx           SOCIAL HISTORY       Social History     Socioeconomic History    Marital status:      Spouse name: Not on file    Number of children: 1    Years of education: Not on file    Highest education level: Not on file   Occupational History    Occupation: unemployed   Tobacco Use    Smoking status: Never Smoker    Smokeless tobacco: Never Used   Vaping Use    Vaping Use: Never used   Substance and Sexual Activity    Alcohol use: No    Drug use: No    Sexual activity: Not Currently   Other Topics Concern    Not on file   Social History Narrative    Lives with her adult daughter. Does not drive. Requires  for communication re: congenital hearing loss. Social Determinants of Health     Financial Resource Strain:     Difficulty of Paying Living Expenses: Not on file   Food Insecurity:     Worried About Running Out of Food in the Last Year: Not on file    Stevie of Food in the Last Year: Not on file   Transportation Needs:     Lack of Transportation (Medical): Not on file    Lack of Transportation (Non-Medical):  Not on file   Physical Activity:     Days of Exercise per Week: Not on file    Minutes of deformity. Normal range of motion. Normal strength. Normal pulse. Right upper arm: Normal.      Left upper arm: Normal. No swelling, deformity, tenderness or bony tenderness. Right elbow: Normal.      Left elbow: No swelling or deformity. Normal range of motion. No tenderness. Right forearm: Normal.      Left forearm: No swelling, deformity, tenderness or bony tenderness. Right wrist: Normal.      Left wrist: Tenderness and bony tenderness present. No swelling or deformity. Normal range of motion. Normal pulse. Right hand: Normal.      Left hand: Normal. No swelling, deformity, tenderness or bony tenderness. Normal range of motion. Normal pulse. Cervical back: Signs of trauma, tenderness and bony tenderness present. No swelling or deformity. No pain with movement. Decreased range of motion: ROM not tested, pt in c-collar per EMS. Thoracic back: No swelling, deformity, tenderness or bony tenderness. Normal range of motion. Lumbar back: No swelling, deformity, tenderness or bony tenderness. Normal range of motion. Left hip: Tenderness and bony tenderness present. No deformity. Normal range of motion (Painful ROM). Normal strength. Comments: Bilateral lower extremities are negative for deformity, tenderness, swelling, or decrease in ROM except for left hip. Bilateral lower extremities are NV intact. Skin:     General: Skin is warm and dry. Neurological:      General: No focal deficit present. Mental Status: She is alert and oriented to person, place, and time. Mental status is at baseline. DIAGNOSTIC RESULTS     RADIOLOGY:  Non-plain film images such as CT, Ultrasound and MRI are read by the radiologist. Equilla Greening radiographic images are visualized and preliminarily interpreted bythe emergency physician with the below findings:      CT Cervical Spine WO Contrast   Final Result   No fracture, no acute osseous cervical spine abnormality.    Multilevel degenerative changes as detailed above. Signed by Dr Osbaldo Abbott. [place]      CT Head WO Contrast   Final Result   Impression: No acute intracranial abnormality. There are chronic   findings associated with aging. Signed by Dr Osbaldo Abbott. [place]      XR SHOULDER LEFT (MIN 2 VIEWS)   Final Result   No fracture, no acute osseous abnormality. Degenerative   changes as described, stable. Signed by Dr Osbaldo Abbott. [place]      XR WRIST LEFT (MIN 3 VIEWS)   Final Result   No fracture, no acute osseous abnormality. Advanced   degenerative changes at the first carpometacarpal joint. Signed by Dr Osbaldo Abbott. [place]      XR HIP 2-3 VW W PELVIS LEFT   Final Result   Interval ORIF of the left intertrochanteric hip fracture. There is avulsion of the lesser trochanter with approximately 2 cm of   displacement, acute versus subacute. Signed by Dr Osbaldo Abbott. [facility]          EMERGENCY DEPARTMENT COURSE and DIFFERENTIAL DIAGNOSIS/MDM:   Vitals:    Vitals:    02/13/22 1920 02/13/22 2101   BP: (!) 154/100 (!) 143/72   Pulse:  82   Resp: 16 16   Temp: 98.4 °F (36.9 °C)    TempSrc: Oral    SpO2: 98% 97%   Weight: 115 lb (52.2 kg)    Height: 5' 8\" (1.727 m)        MDM  Patient is a 48-year-old female who presents following a fall at her nursing home. The patient does have a history of Alzheimer's but at this time has been able to answer all of her orientation questions appropriately. Her vital signs do show some mild hypertension which did improve. CT of the head and C-spine were negative for any signs of acute trauma including skull fracture, intracranial abnormality, or vertebral fracture or malalignment. Plain films of the left wrist and left shoulder were negative for any bony abnormality or fracture. There is avulsion of the lesser trochanter with approximately 2 cm of displacement on plain films of left hip. I have spoken with orthopedic surgeon regarding these findings.   At this time, this is not worrisome and the patient is safe to be discharged back to her rehabilitation center. I have discussed these findings with the patient. She did confirm understanding of the instructions as well as return precautions. These will also be passed to the nursing facility report is called. The patient is currently resting in the ED comfortably. She was given medicine for pain control and did confirm improvement of pain. She will continue to be monitored in the ER until EMS arrives for transport. CONSULTS:  Ihsan Cotto. Quintin Hogan [address]      1. Injury of head, initial encounter    2. Fall, initial encounter    3. Contusion of left hip, initial encounter    4.  Acute strain of neck muscle, initial encounter          DISPOSITION/PLAN   DISPOSITION Discharge - Pending Orders Complete 02/13/2022 09:31:04 PM      PATIENT REFERRED TO:  Arleen Tadeo MD  [address] [place], [place] [id number] [phone]            (Please note that portions of this note were completed with a voice recognition program.  Efforts were made to edit thedictations but occasionally words are mis-transcribed.)    SNEHAL Pace (electronically signed)     Jose Angel Pacema  02/13/22 96 [id number]

## 2022-02-14 NOTE — ED NOTES
CH in touch with [facility] in person  services. They are attempting to get in touch with someone.        Leti Montoya RN  02/13/22 8920

## 2022-02-14 NOTE — ED NOTES
Frank Salinas RN at [employer] point updated with pt results and that pt will be discharged back to facility.       Jean Liao RN  02/13/22 4074

## 2022-02-14 NOTE — ED NOTES
Bed: 17  Expected date:   Expected time:   Means of arrival:   Comments:  Brooke Blanco, SULEMAN  02/13/22 1920

## 2022-02-14 NOTE — ED NOTES
Daughter, Jasmyne Cody, updated with pt results and plan for discharge back to [facility]. All questions answered at this time by RN.       Jesse Gibson RN  02/13/22 3770

## 2022-02-15 NOTE — CARE COORDINATION
Flora with the therapy department at [facility] returned call to [facility]. She stated that patient continues with therapy and started weight bearing, ambulated 3-6' with the parallel bars. Pt is requiring mid to moderate assist with bed mobility and transfers. She is maximum assist for toileting and lower body dressing. While patient is progressing in therapy, there is no discharge date discussion at this time. Plan:  Due to 60 day current stay at local nursing facility, ACM unable to work with patient toward goals of care. Will discharge patient for now. Should pt discharge home from SNF, will evaluate at that time for support needs/goals of care.   Electronically signed by Cristina Magana RN on 2/15/2022 at 12:54 PM

## 2022-02-15 NOTE — CARE COORDINATION
ACM called [facility] to ask status of patient's rehab. Eloisa Ho on the patient's unit stated that pt continues with rehab. Call was transferred to rehab unit and ACM left voicemail requesting call back with report of patient's progress.   Electronically signed by Reza Tinoco RN on 2/15/2022 at 8:07 AM

## 2022-02-23 ENCOUNTER — LAB REQUISITION (OUTPATIENT)
Dept: LAB | Facility: HOSPITAL | Age: 77
End: 2022-02-23

## 2022-02-23 DIAGNOSIS — Z00.00 ENCOUNTER FOR GENERAL ADULT MEDICAL EXAMINATION WITHOUT ABNORMAL FINDINGS: ICD-10-CM

## 2022-02-23 LAB
ANION GAP SERPL CALCULATED.3IONS-SCNC: 12 MMOL/L (ref 5–15)
BASOPHILS # BLD AUTO: 0.08 10*3/MM3 (ref 0–0.2)
BASOPHILS NFR BLD AUTO: 1 % (ref 0–1.5)
BUN SERPL-MCNC: 14 MG/DL (ref 8–23)
BUN/CREAT SERPL: 18.9 (ref 7–25)
CALCIUM SPEC-SCNC: 12.8 MG/DL (ref 8.6–10.5)
CHLORIDE SERPL-SCNC: 95 MMOL/L (ref 98–107)
CO2 SERPL-SCNC: 34 MMOL/L (ref 22–29)
CREAT SERPL-MCNC: 0.74 MG/DL (ref 0.57–1)
DEPRECATED RDW RBC AUTO: 45.8 FL (ref 37–54)
EOSINOPHIL # BLD AUTO: 0.24 10*3/MM3 (ref 0–0.4)
EOSINOPHIL NFR BLD AUTO: 3.1 % (ref 0.3–6.2)
ERYTHROCYTE [DISTWIDTH] IN BLOOD BY AUTOMATED COUNT: 13.4 % (ref 12.3–15.4)
GFR SERPL CREATININE-BSD FRML MDRD: 76 ML/MIN/1.73
GFR SERPL CREATININE-BSD FRML MDRD: 92 ML/MIN/1.73
GLUCOSE SERPL-MCNC: 74 MG/DL (ref 65–99)
HCT VFR BLD AUTO: 41.8 % (ref 34–46.6)
HGB BLD-MCNC: 13.4 G/DL (ref 12–15.9)
IMM GRANULOCYTES # BLD AUTO: 0.02 10*3/MM3 (ref 0–0.05)
IMM GRANULOCYTES NFR BLD AUTO: 0.3 % (ref 0–0.5)
LYMPHOCYTES # BLD AUTO: 2.2 10*3/MM3 (ref 0.7–3.1)
LYMPHOCYTES NFR BLD AUTO: 28.6 % (ref 19.6–45.3)
MCH RBC QN AUTO: 29.5 PG (ref 26.6–33)
MCHC RBC AUTO-ENTMCNC: 32.1 G/DL (ref 31.5–35.7)
MCV RBC AUTO: 92.1 FL (ref 79–97)
MONOCYTES # BLD AUTO: 1.03 10*3/MM3 (ref 0.1–0.9)
MONOCYTES NFR BLD AUTO: 13.4 % (ref 5–12)
NEUTROPHILS NFR BLD AUTO: 4.11 10*3/MM3 (ref 1.7–7)
NEUTROPHILS NFR BLD AUTO: 53.6 % (ref 42.7–76)
NRBC BLD AUTO-RTO: 0 /100 WBC (ref 0–0.2)
PLATELET # BLD AUTO: 411 10*3/MM3 (ref 140–450)
PMV BLD AUTO: 10.7 FL (ref 6–12)
POTASSIUM SERPL-SCNC: 2.8 MMOL/L (ref 3.5–5.2)
RBC # BLD AUTO: 4.54 10*6/MM3 (ref 3.77–5.28)
SODIUM SERPL-SCNC: 141 MMOL/L (ref 136–145)
WBC NRBC COR # BLD: 7.68 10*3/MM3 (ref 3.4–10.8)

## 2022-02-23 PROCEDURE — 85025 COMPLETE CBC W/AUTO DIFF WBC: CPT

## 2022-02-23 PROCEDURE — 80048 BASIC METABOLIC PNL TOTAL CA: CPT

## 2022-02-23 PROCEDURE — 36415 COLL VENOUS BLD VENIPUNCTURE: CPT

## 2022-02-25 PROBLEM — E83.52 HYPERCALCEMIA: Status: ACTIVE | Noted: 2022-01-01

## 2022-02-25 NOTE — ED PROVIDER NOTES
[facility] EMERGENCY DEPT  eMERGENCY dEPARTMENT eNCOUnter      Pt Name: Ray Montero  MRN: [id number]  [place] 1945  Date of evaluation: 2/25/2022  Provider: Diamond Allison MD    [address] [place] [address]       Chief Complaint   Patient presents with    Abnormal Lab     Calcium 13 at SNF         HISTORY OF PRESENT ILLNESS   (Location/Symptom, Timing/Onset,Context/Setting, Quality, Duration, Modifying Factors, Severity)  Note limiting factors. Ray Montero is a 67 y.o. female who presents to the emergency department with elevated calcium. The patient per report has a calcium that was in the 12-14 range over the last couple days. The patient has dementia per the daughter and only speaks sign language. Therefore history is essentially extremely limited. The daughter does tell me the patient's recent being constipated and had some nausea. Little else is known. 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 The patient had a broken hip back around December 16 and had a hip replacement. She otherwise does not give any other history. She is awake and interacting with her daughter through sign language. The patient is DNR. She currently lives at [place] [address]. The history is provided by the patient, medical records and a relative. The history is limited by the condition of the patient. NursingNotes were reviewed.     REVIEW OF SYSTEMS    (2-9 systems for level 4, 10 or more for level 5)     Review of Systems   Unable to perform ROS: Dementia             PAST MEDICAL HISTORY     Past Medical History:   Diagnosis Date    Anemia     Anxiety     Chronic back pain     severe muscle spasms    Deafness congenital     Depression     Hypertension     Memory loss     early    Myasthenia ([address]     Neck pain     Osteoarthritis     Ptosis of eyelid     PVD (peripheral vascular disease) (HCC)     Weakness of face muscles          SURGICAL HISTORY       Past Surgical History:   Procedure Laterality Date    APPENDECTOMY      BACK SURGERY      CARPAL TUNNEL RELEASE      CATARACT REMOVAL      COLONOSCOPY  7-2010    COLONOSCOPY  2000 or before    [place]-normal per patient    COLONOSCOPY N/A 5/29/2020    Dr Fara Cali, suboptimal prep, internal hemorrhoids-Grade 1, prn (age)   Ilana Cho EYE SURGERY Bilateral     cataract extraction    HERNIA REPAIR      on back of neck    HIP SURGERY Left 12/13/2021    SHORT TFN performed by Chaya Garcia MD at [facility] COLONOSCOPY FLX DX W/COLLJ SPEC WHEN PFRMD N/A 7/6/2017    Dr Radha Wagner diverticulosis, 5 yr recall    MO EGD TRANSORAL BIOPSY SINGLE/MULTIPLE N/A 7/6/2017    Dr Omar Gomes, [place] (-)    TERRI AND BSO      UPPER GASTROINTESTINAL ENDOSCOPY N/A 5/29/2020    Dr Timur Felder hiatal hernia, antral gastritis,     VARICOSE VEIN SURGERY      VASCULAR SURGERY  02/19/2018    [facility]. Aortogram and runoff. Left common femoral artery, 5 french sheath. CURRENT MEDICATIONS       Previous Medications    ACETAMINOPHEN (TYLENOL) 500 MG TABLET    Take 1 tablet by mouth 4 times daily as needed for Pain    AMITRIPTYLINE (ELAVIL) 100 MG TABLET    Take 1 tablet by mouth nightly For chronic insomnia    AMLODIPINE (NORVASC) 5 MG TABLET    TAKE 1 TABLET BY MOUTH ONCE DAILY AT BEDTIME FOR HIGH BLOOD PRESSURE    DOCUSATE SODIUM (COLACE) 100 MG CAPSULE    Take 1 capsule by mouth 2 times daily    DONEPEZIL HCL (ARICEPT) 23 MG TABS TABLET    TAKE 1 TABLET BY MOUTH NIGHTLY FOR  DEMENTIA    FERROUS SULFATE (IRON) 325 (65 FE) MG TABS    Take 325 mg by mouth daily    FLUOXETINE (PROZAC) 20 MG CAPSULE    TAKE 1 CAPSULE BY MOUTH ONCE DAILY FOR ANXIETY AND FOR DEPRESSION    HYDROXYZINE (VISTARIL) 25 MG CAPSULE    1 capsule by mouth 3 times a day for severe itching    MISC.  DEVICES (ROLLER WALKER) MISC    1 each by Does not apply route daily DX Code: R26.81, G70.00, H81.313, G60.9    ONDANSETRON (ZOFRAN) 4 MG TABLET    Take 1 tablet by mouth every 8 hours as needed for Nausea or Vomiting    PRAZOSIN (MINIPRESS) 1 MG CAPSULE    Take 1 mg by mouth nightly Through [address] ([facility] 1 % OPHTHALMIC SUSPENSION           ALLERGIES     Patient has no known allergies. FAMILY HISTORY       Family History   Problem Relation Age of Onset    High Blood Pressure Mother     Stroke Mother     Cancer Father     Lung Cancer Father    Genevia Nares Hearing Loss Brother         deaf   Genevia Nares Hearing Loss Brother         deaf    Colon Polyps Sister     Colon Cancer Neg Hx     Esophageal Cancer Neg Hx     Liver Cancer Neg Hx     Liver Disease Neg Hx     Stomach Cancer Neg Hx     Rectal Cancer Neg Hx           SOCIAL HISTORY       Social History     Socioeconomic History    Marital status:      Spouse name: None    Number of children: 1    Years of education: None    Highest education level: None   Occupational History    Occupation: unemployed   Tobacco Use    Smoking status: Never Smoker    Smokeless tobacco: Never Used   Vaping Use    Vaping Use: Never used   Substance and Sexual Activity    Alcohol use: No    Drug use: No    Sexual activity: Not Currently   Other Topics Concern    None   Social History Narrative    Lives with her adult daughter. Does not drive. Requires  for communication re: congenital hearing loss. Social Determinants of Health     Financial Resource Strain:     Difficulty of Paying Living Expenses: Not on file   Food Insecurity:     Worried About Running Out of Food in the Last Year: Not on file    Stevie of Food in the Last Year: Not on file   Transportation Needs:     Lack of Transportation (Medical): Not on file    Lack of Transportation (Non-Medical):  Not on file   Physical Activity:     Days of Exercise per Week: Not on file    Minutes of Exercise per Session: Not on file   Stress:     Feeling of Stress : Not on file   Social Connections:     Frequency of Communication with Friends and Family: Not on file    Frequency of Social Gatherings with Friends and Family: Not on file    Attends Christianity Services: Not on file    Active Member of Clubs or Organizations: Not on file    Attends Club or Organization Meetings: Not on file    Marital Status: Not on file   Intimate Partner Violence:     Fear of Current or Ex-Partner: Not on file    Emotionally Abused: Not on file    Physically Abused: Not on file    Sexually Abused: Not on file   Housing Stability:     Unable to Pay for Housing in the Last Year: Not on file    Number of [place] in the Last Year: Not on file    Unstable Housing in the Last Year: Not on file       SCREENINGS             PHYSICAL EXAM    (up to 7 for level 4, 8 or more for level 5)     ED Triage Vitals [02/25/22 1258]   BP Temp Temp src Pulse Resp SpO2 Height Weight   (!) 140/98 -- -- 79 17 97 % -- --       Physical Exam  Vitals and nursing note reviewed. Constitutional:       Comments: Thin frail mildly tremulous at times   HENT:      Head: Normocephalic and atraumatic. Mouth/Throat:      Mouth: Mucous membranes are dry. Eyes:      Pupils: Pupils are equal, round, and reactive to light. Cardiovascular:      Rate and Rhythm: Normal rate and regular rhythm. Pulmonary:      Effort: Pulmonary effort is normal. No respiratory distress. Abdominal:      General: Abdomen is flat. Palpations: Abdomen is soft. Musculoskeletal:         General: No tenderness or deformity. Cervical back: Normal range of motion and neck supple. Skin:     General: Skin is warm and dry. Neurological:      Mental Status: She is alert.       Comments: Seems to be about baseline per daughter very hard to assess secondary to dementia and finding which barriers   Psychiatric:      Comments: Calm unable to really assess secondary to dementia and sign language barriers         DIAGNOSTIC RESULTS     EKG: All EKG's are interpreted by the Emergency Department Physician who either signs or Co-signs this chart in the absence of a cardiologist.        RADIOLOGY:   Non-plain film images such as CT, Ultrasound and MRI are read by the radiologist. Janine Longo images are visualized and preliminarily interpreted by the emergency physician with the below findings:        Interpretation per the Radiologist below, if available at the time of this note:    No orders to display         ED BEDSIDE ULTRASOUND:   Performed by ED Physician - none    LABS:  Labs Reviewed   CALCIUM, IONIZED - Abnormal; Notable for the following components:       Result Value    Calcium, Ion 1.65 (*)     All other components within normal limits    Narrative:     Jose Malagon tel. ,  Dr. Almodovar Seen, ER, 02/25/2022 13:47, by Arcenio Bazzi   CBC WITH AUTO DIFFERENTIAL - Abnormal; Notable for the following components:    WBC 11.0 (*)     Neutrophils % 68.9 (*)     Lymphocytes % 19.2 (*)     Neutrophils Absolute 7.6 (*)     Monocytes Absolute 1.00 (*)     All other components within normal limits   TSH WITH REFLEX TO FT4 - Abnormal; Notable for the following components:    TSH Reflex FT4 5.71 (*)     All other components within normal limits   COVID-19, RAPID   MAGNESIUM   VITAMIN D 25 HYDROXY   T4, FREE       All other labs were within normal range or not returned as of this dictation. EMERGENCY DEPARTMENT COURSE and DIFFERENTIALDIAGNOSIS/MDM:   Vitals:    Vitals:    02/25/22 1258   BP: (!) 140/98   Pulse: 79   Resp: 17   SpO2: 97%       MDM  Number of Diagnoses or Management Options  Hypercalcemia  Diagnosis management comments: Patient with severe hypercalcemia IV fluids started. Patient will need be admitted again history and everything is very limited secondary dementia and sign language barriers. She appears dry I bolused her with fluids I discussed with the hospitalist service about starting bisphosphonates and Calcitrol. The patient would not sit still for an EKG.   Her ionized calcium is markedly high. Her PTH intact is high at she probably has primary hyper parathyroidism. The patient will be admitted to the hospital service for further evaluation. Patient DNR/DNI. She does not appear really in any acute distress at this time. Likely her constipation is caused by her hypercalcemia and her other symptoms. She will be admitted at this time discussed with her daughter. Amount and/or Complexity of Data Reviewed  Clinical lab tests: ordered and reviewed  Decide to obtain previous medical records or to obtain history from someone other than the patient: yes  Obtain history from someone other than the patient: yes  Discuss the patient with other providers: yes    Patient Progress  Patient progress: stable        CONSULTS:  None    PROCEDURES:  Unless otherwise notedbelow, none     Procedures    FINAL IMPRESSION     1. Hypercalcemia          DISPOSITION/PLAN   DISPOSITION Admitted 02/25/2022 01:50:33 PM      PATIENT REFERRED TO:  No follow-up provider specified.     DISCHARGE MEDICATIONS:  New Prescriptions    No medications on file          (Please note that portions of this note were completed with a voice recognition program.  Efforts were made to edit the dictations butoccasionally words are mis-transcribed.)    Olga Calix MD (electronically signed)  Yessi Watts MD  02/25/22 7584

## 2022-02-25 NOTE — H&P
prep, internal hemorrhoids-Grade 1, prn (age)   Peter EYE SURGERY Bilateral     cataract extraction    HERNIA REPAIR      on back of neck    HIP SURGERY Left 12/13/2021    SHORT TFN performed by Gricelda Alatorre MD at [facility] COLONOSCOPY FLX DX W/COLLJ SPEC WHEN PFRMD N/A 7/6/2017    Dr Amarjit Hobbs diverticulosis, 5 yr recall    DC EGD TRANSORAL BIOPSY SINGLE/MULTIPLE N/A 7/6/2017    Dr Daniel Salas, [place] (-)    TERRI AND BSO      UPPER GASTROINTESTINAL ENDOSCOPY N/A 5/29/2020    Dr Lawyer Crespo hiatal hernia, antral gastritis,     VARICOSE VEIN SURGERY      VASCULAR SURGERY  02/19/2018    [facility]. Aortogram and runoff. Left common femoral artery, 5 french sheath. Home Medications:  Prior to Admission medications    Medication Sig Start Date End Date Taking?  Authorizing Provider   acetaminophen (TYLENOL) 500 MG tablet Take 1 tablet by mouth 4 times daily as needed for Pain 2/13/22   SNEHAL Bowen   docusate sodium (COLACE) 100 MG capsule Take 1 capsule by mouth 2 times daily 12/13/21   Gricelda Alatorre MD   ondansetron (ZOFRAN) 4 MG tablet Take 1 tablet by mouth every 8 hours as needed for Nausea or Vomiting 12/13/21   Gricelda Alatorre MD   amLODIPine (NORVASC) 5 MG tablet TAKE 1 TABLET BY MOUTH ONCE DAILY AT BEDTIME FOR HIGH BLOOD PRESSURE 11/22/21   Moe Elmore MD   Ferrous Sulfate (IRON) 325 (65 Fe) MG TABS Take 325 mg by mouth daily 11/1/21   Moe Elmore MD   FLUoxetine (PROZAC) 20 MG capsule TAKE 1 CAPSULE BY MOUTH ONCE DAILY FOR ANXIETY AND FOR DEPRESSION 10/25/21   Moe Elmore MD   hydrOXYzine (VISTARIL) 25 MG capsule 1 capsule by mouth 3 times a day for severe itching 10/21/21   Moe Elmore MD   prazosin (MINIPRESS) 1 MG capsule Take 1 mg by mouth nightly Through [facility] 8/19/21   Historical Provider, MD   Donepezil HCl (ARICEPT) 23 MG TABS tablet TAKE 1 TABLET BY MOUTH NIGHTLY FOR  DEMENTIA 8/3/21   Moe Elmore MD   prednisoLONE acetate (PRED FORTE) 1 % ophthalmic suspension  7/28/21   Historical Provider, MD   amitriptyline (ELAVIL) 100 MG tablet Take 1 tablet by mouth nightly For chronic insomnia  Patient taking differently: Take 100 mg by mouth nightly For chronic insomnia. Through [facility] 7/29/21   Darren Dominguez MD   Misc. Devices (ROLLER WALKER) MISC 1 each by Does not apply route daily DX Code: R26.81, G70.00, H81.313, G60.9 8/15/17   Darren Dominguez MD       Allergies:    Patient has no known allergies. Social History:    The patient currently lives SNF  Tobacco:   reports that she has never smoked. She has never used smokeless tobacco.  Alcohol:   reports no history of alcohol use. Illicit Drugs: Never    Family History:      Problem Relation Age of Onset    High Blood Pressure Mother     Stroke Mother     Cancer Father     Lung Cancer Father     Hearing Loss Brother         deaf   Naomi Orts Hearing Loss Brother         deaf    Colon Polyps Sister     Colon Cancer Neg Hx     Esophageal Cancer Neg Hx     Liver Cancer Neg Hx     Liver Disease Neg Hx     Stomach Cancer Neg Hx     Rectal Cancer Neg Hx        Review of Systems:   Review of Systems   Constitutional: Positive for activity change, appetite change and fatigue. Gastrointestinal: Positive for nausea. Musculoskeletal: Positive for gait problem and myalgias. Skin: Positive for pallor. Neurological: Positive for weakness. 14 point review of systems is negative except as specifically addressed above. Physical Examination:  BP (!) 140/98   Pulse 79   Resp 17   SpO2 97%   Physical Exam  Vitals and nursing note reviewed. Constitutional:       General: She is not in acute distress. Appearance: She is ill-appearing (chronically). HENT:      Mouth/Throat:      Mouth: Mucous membranes are dry. Pharynx: Oropharynx is clear. Eyes:      Extraocular Movements: Extraocular movements intact.       Conjunctiva/sclera: Conjunctivae normal.      Pupils: Pupils are equal, round, and reactive to light. Cardiovascular:      Rate and Rhythm: Normal rate and regular rhythm. Pulses: Normal pulses. Heart sounds: Normal heart sounds. No murmur heard. Pulmonary:      Effort: Pulmonary effort is normal. No respiratory distress. Breath sounds: Normal breath sounds. Chest:      Chest wall: No tenderness. Abdominal:      General: Bowel sounds are normal. There is no distension. Palpations: Abdomen is soft. Tenderness: There is no abdominal tenderness. There is no guarding or rebound. Musculoskeletal:         General: No swelling. Normal range of motion. Cervical back: Normal range of motion and neck supple. No rigidity or tenderness. Right lower leg: No edema. Left lower leg: No edema. Skin:     General: Skin is warm and dry. Capillary Refill: Capillary refill takes less than 2 seconds. Coloration: Skin is pale. Findings: Bruising (generalized) present. Neurological:      General: No focal deficit present. Mental Status: She is alert and oriented to person, place, and time. Cranial Nerves: No cranial nerve deficit. Motor: Weakness present.       Comments: Able to sign only    Psychiatric:         Mood and Affect: Mood normal.         Behavior: Behavior normal.        Diagnostic Data:  CBC:  Recent Labs     02/25/22  1340   WBC 11.0*   HGB 13.3   HCT 39.3        ABGs:  Lab Results   Component Value Date    PHART 7.430 09/18/2016    PO2ART 81.0 09/18/2016    QBY7ZSL 33.0 09/18/2016     Urinalysis:  Lab Results   Component Value Date    NITRU Negative 03/05/2017    WBCUA 9 03/05/2017    BACTERIA 4+ 03/05/2017    RBCUA 0 03/05/2017    RBCUA 0 06/19/2014    BLOODU SMALL 07/29/2020    BLOODU Negative 03/05/2017    SPECGRAV 1.030 07/29/2020    SPECGRAV 1.019 03/05/2017    GLUCOSEU neg 07/29/2020    GLUCOSEU Negative 03/05/2017       Assessment/Plan:    Hypercalcemia   -Intact PTH   -TSH   -Vitamin D   -Ionized Calcium -Calcitonin    -IVF   -Daily labs    -Monitor on telemetry    -Fall precautions    Essential hypertension   -Resume home medication: Norvasc     Myasthenia gravis/Deafness/  Memory loss   -Resume home medication: Aricept & Prozac      DVT prophylactic: Lovenox     Signed:  Fely Blackwood, DENIZ - CNP, 2/25/2022 2:11 PM

## 2022-02-25 NOTE — PROGRESS NOTES
4 Eyes Skin Assessment    Jaziel Vines is being assessed upon: Admission    I agree that Parisa Lundberg, RN, along Patricia RN have performed a thorough Head to Toe Skin Assessment on the patient. ALL assessment sites listed below have been assessed. Areas assessed by both nurses:     [x]   Head, Face, and Ears   [x]   Shoulders, Back, and Chest  [x]   Arms, Elbows, and Hands   [x]   Coccyx, Sacrum, and Ischium  [x]   Legs, Feet, and Heels    Does the Patient have Skin Breakdown?  No    Julian Prevention initiated: Yes  Wound Care Orders initiated: No    WOC nurse consulted for Pressure Injury (Stage 3,4, Unstageable, DTI, NWPT, and Complex wounds) and New or Established Ostomies: No        Primary Nurse eSignature: Isabel Lovelace RN on 2/25/2022 at 4:25 PM      Co-Signer eSignature: {Esignature:[id number]}

## 2022-02-26 PROBLEM — E43 SEVERE MALNUTRITION (HCC): Status: ACTIVE | Noted: 2022-01-01

## 2022-02-26 NOTE — PROGRESS NOTES
Comprehensive Nutrition Assessment    Type and Reason for Visit:  Positive Nutrition Screen    Nutrition Assessment:  Pt is severely malnourished,  has had poor appetite and wt loss since admission to nursing facility. Will order ONS and follow. Malnutrition Assessment:  Malnutrition Status:  Severe malnutrition    Context:  Acute Illness     Findings of the 6 clinical characteristics of malnutrition:  Energy Intake:  1 - 75% or less of estimated energy requirements for 7 or more days  Weight Loss:  1 - 7.5% over 3 months     Body Fat Loss:  7 - Moderate body fat loss     Muscle Mass Loss:  7 - Moderate muscle mass loss    Fluid Accumulation:  No significant fluid accumulation     Strength:  Not Performed    Estimated Daily Nutrient Needs:  Energy (kcal):  1687-8975; Weight Used for Energy Requirements:  Admission     Protein (g):  ; Weight Used for Protein Requirements:  Admission        Fluid (ml/day):  9588-2154; Method Used for Fluid Requirements:  1 ml/kcal      Current Nutrition Therapies:    ADULT DIET;  Full Liquid  ADULT ORAL NUTRITION SUPPLEMENT; Lunch, Dinner; Standard High Calorie/High Protein Oral Supplement    Anthropometric Measures:  · Height: 5' 8\" (172.7 cm)  · Current Body Weight: 110 lb (49.9 kg)   · Admission Body Weight: 110 lb (49.9 kg)    · Usual Body Weight: 123 lb (55.8 kg)     · Ideal Body Weight: 140 lbs; % Ideal Body Weight     · BMI: 16.7  · BMI Categories: Underweight (BMI less than 22) age over 72       Nutrition Diagnosis:   · Severe malnutrition,In context of acute illness or injury related to inadequate protein-energy intake as evidenced by weight loss 7.5% in 3 months,moderate muscle loss,moderate loss of subcutaneous fat,poor intake prior to admission      Nutrition Interventions:   Food and/or Nutrient Delivery:  Continue Current Diet,Start Oral Nutrition Supplement  Nutrition Education/Counseling:  No recommendation at this time   Coordination of Nutrition Care: Continue to monitor while inpatient    Goals:  PO >50%, wt stable       Nutrition Monitoring and Evaluation:   Behavioral-Environmental Outcomes:  None Identified   Food/Nutrient Intake Outcomes:  Food and Nutrient Intake,Supplement Intake  Physical Signs/Symptoms Outcomes:  Biochemical Data,Chewing or Swallowing,Nutrition Focused Physical Findings,Skin,Weight     Discharge Planning:     Too soon to determine     Electronically signed by Erin Pérez MS, RD, LD on 2/26/22 at 4:07 PM CST    Contact: [phone]

## 2022-02-26 NOTE — PROGRESS NOTES
[facility] Hospitalists Progress Note    Patient:  Lilian Reynoso  YOB: 1945  Date of Service: 2/26/2022  MRN: [id number]   Acct: [de-identified]   Primary Care Physician: Darren Dominguez MD  Advance Directive: DNR  Admit Date: 2/25/2022       Hospital Day: 1      CHIEF COMPLAINT:     Chief Complaint   Patient presents with    Abnormal Lab     Calcium 13 at [facility]       2/26/2022 7:23 AM  Subjective / Interval History:   02/26/2022  No acute changes or acute overnight event reported. Patient seen and examined. Lying comfortably in bed in no acute distress. Family (daughter) at bedside. Review of Systems:   Review of Systems  ROS: 14 point review of systems is negative except as specifically addressed above.     No diet orders on file    Intake/Output Summary (Last 24 hours) at 2/26/2022 0723  Last data filed at 2/26/2022 5591  Gross per 24 hour   Intake 1300 ml   Output --   Net 1300 ml       Medications:   sodium chloride      sodium chloride 150 mL/hr at 02/25/22 1646     Current Facility-Administered Medications   Medication Dose Route Frequency Provider Last Rate Last Admin    potassium chloride (KLOR-CON M) extended release tablet 40 mEq  40 mEq Oral PRN Patience Jenniffer, DO        Or    potassium bicarb-citric acid (EFFER-K) effervescent tablet 40 mEq  40 mEq Oral PRN Patience Jenniffer, DO        Or    potassium chloride 10 mEq/100 mL IVPB (Peripheral Line)  10 mEq IntraVENous PRN Patience Jenniffer, DO        0.9 % sodium chloride bolus  500 mL IntraVENous Once Estella Camp MD        sodium chloride flush 0.9 % injection 5-40 mL  5-40 mL IntraVENous 2 times per day DENIZ Leone CNP        sodium chloride flush 0.9 % injection 5-40 mL  5-40 mL IntraVENous PRN DENIZ Leone CNP        0.9 % sodium chloride infusion  25 mL IntraVENous PRN DENIZ Leone CNP        enoxaparin (LOVENOX) injection 40 mg  40 mg SubCUTAneous Q24H DENIZ Leone CNP        ondansetron (ZOFRAN-ODT) disintegrating tablet 4 mg  4 mg Oral Q8H PRN [facility], APRN - CNP        Or    [facility] injection 4 mg  4 mg IntraVENous Q6H PRN [facility], APRN - CNP        polyethylene glycol (GLYCOLAX) packet 17 g  17 g Oral Daily PRN [facility], APRN - CNP        acetaminophen (TYLENOL) tablet 650 mg  650 mg Oral Q6H PRN [facility], APRN - CNP        Or    acetaminophen (TYLENOL) suppository 650 mg  650 mg Rectal Q6H PRN [facility], APRN - CNP        0.9 % sodium chloride infusion   IntraVENous Continuous [facility], APRN -  mL/hr at 02/25/22 1646 New Bag at 02/25/22 1646    calcitonin (MIACALCIN) injection 100 Units  100 Units IntraMUSCular Daily [facility], APRN - CNP   100 Units at 02/25/22 1634    amitriptyline (ELAVIL) tablet 100 mg  100 mg Oral Nightly [facility], APRN - CNP   100 mg at 02/25/22 2252    amLODIPine (NORVASC) tablet 5 mg  5 mg Oral Nightly [facility], APRN - CNP   5 mg at 02/25/22 2252    docusate sodium (COLACE) capsule 100 mg  100 mg Oral BID [facility], APRN - CNP   100 mg at 02/25/22 2252    Donepezil HCl (ARICEPT) tablet 23 mg  23 mg Oral Nightly [facility], APRN - CNP   23 mg at 02/25/22 2251    ferrous sulfate (IRON 325) tablet 325 mg  325 mg Oral Daily [facility], APRN - CNP   325 mg at 02/25/22 1633    FLUoxetine (PROZAC) capsule 20 mg  20 mg Oral Daily [facility], APRN - CNP   20 mg at 02/25/22 1633    prazosin (MINIPRESS) capsule 1 mg  1 mg Oral Nightly [facility], APRN - CNP   1 mg at 02/25/22 2251    hydrOXYzine (VISTARIL) capsule 25 mg  25 mg Oral TID PRN [facility], APRN - CNP        hydrALAZINE (APRESOLINE) injection 5 mg  5 mg IntraVENous Q6H PRN [facility], APRN - CNP             sodium chloride      sodium chloride 150 mL/hr at 02/25/22 1646      sodium chloride  500 mL IntraVENous Once    sodium chloride flush  5-40 mL IntraVENous 2 times per day    enoxaparin  40 mg SubCUTAneous Q24H    calcitonin  100 Units IntraMUSCular Daily    amitriptyline  100 mg Oral Nightly    amLODIPine  5 mg Oral Nightly    docusate sodium  100 mg Oral BID    Donepezil HCl  23 mg Oral Nightly    ferrous sulfate  325 mg Oral Daily    FLUoxetine  20 mg Oral Daily    prazosin  1 mg Oral Nightly     potassium chloride **OR** potassium alternative oral replacement **OR** potassium chloride, sodium chloride flush, sodium chloride, ondansetron **OR** ondansetron, polyethylene glycol, acetaminophen **OR** acetaminophen, hydrOXYzine, hydrALAZINE  No diet orders on file       Labs:   CBC with DIFF:  Recent Labs     02/25/22  1340 02/26/22  0357   WBC 11.0* 9.3   RBC 4.37 3.63*   HGB 13.3 11.3*   HCT 39.3 34.0*   MCV 89.9 93.7   MCH 30.4 31.1*   MCHC 33.8 33.2   RDW 13.1 13.3    316   MPV 9.7 10.0   NEUTOPHILPCT 68.9*  --    LYMPHOPCT 19.2*  --    MONOPCT 9.2  --    EOSRELPCT 1.5  --    BASOPCT 0.9  --    NEUTROABS 7.6*  --    LYMPHSABS 2.1  --    MONOSABS 1.00*  --    EOSABS 0.20  --    BASOSABS 0.10  --        CMP/BMP:  Recent Labs     02/26/22  0357 02/26/22  0618     --    K 2.4* 2.6*     --    CO2 27  --    ANIONGAP 10  --    GLUCOSE 78  --    BUN 11  --    CREATININE 0.7  --    LABGLOM >60  --    CALCIUM 10.9*  --          CRP:  No results for input(s): CRP in the last 72 hours. Sed Rate:  No results for input(s): SEDRATE in the last 72 hours. HgBA1c:  No components found for: HGBA1C  FLP:    Lab Results   Component Value Date    TRIG 137 07/29/2020    HDL 75 07/29/2020    LDLCALC 134 07/29/2020    LDLDIRECT 126 08/05/2014     TSH:    Lab Results   Component Value Date    TSH 3.240 03/19/2020     Troponin T: No results for input(s): TROPONINI in the last 72 hours. Pro-BNP: No results for input(s): BNP in the last 72 hours. INR: No results for input(s): INR in the last 72 hours.   ABGs:   Lab Results   Component Value Date    PHART 7.430 09/18/2016 PO2ART 81.0 09/18/2016    CTQ0UGG 33.0 09/18/2016     UA:No results for input(s): NITRITE, COLORU, PHUR, LABCAST, WBCUA, RBCUA, MUCUS, TRICHOMONAS, YEAST, BACTERIA, CLARITYU, SPECGRAV, LEUKOCYTESUR, UROBILINOGEN, BILIRUBINUR, BLOODU, GLUCOSEU, AMORPHOUS in the last 72 hours. Invalid input(s): Brandon Sin      Culture Results:    No results for input(s): CXSURG in the last 720 hours. Blood Culture Recent: No results for input(s): BC in the last 720 hours. No results for input(s): BC, BLOODCULT2, ORG in the last 72 hours. Cultures:   No results for input(s): CULTURE in the last 72 hours. No results for input(s): BC, Paullette Butt in the last 72 hours. No results for input(s): CXSURG in the last 72 hours. Recent Labs     02/25/22  1340 02/26/22  0357   MG 1.7 1.5*     No results for input(s): AST, ALT, ALB, BILITOT, ALKPHOS, ALB in the last 72 hours. RAD:   XR WRIST LEFT (MIN 3 VIEWS)    Result Date: 2/13/2022  EXAMINATION: XR WRIST LEFT (MIN 3 VIEWS) 2/13/2022 10:00 PM HISTORY: Fall, left wrist pain. Report: 3 views of the left wrist were obtained. COMPARISON: There are no correlative imaging studies for comparison. Osseous alignment is normal, no fracture is identified. There is mild narrowing of the radiocarpal joint and there is narrowing of the first carpal metacarpal joint with associated spurring and sclerosis. No fracture, no acute osseous abnormality. Advanced degenerative changes at the first carpometacarpal joint. Signed by Dr Cate Hunt. Leigh Ann    CT Head WO Contrast    Result Date: 2/13/2022  EXAMINATION: CT HEAD WO CONTRAST 2/13/2022 10:03 PM HISTORY: Unwitnessed fall at nursing home. Head and neck pain. DOSE: 670 mGycm. Automatic exposure control was utilized in an effort to use as little radiation as possible, without compromising image quality. REPORT: Axial CT of the head was performed without contrast, reconstructed sagittal and coronal images are also reviewed.  COMPARISON: CT head without contrast 9/18/2016. No intracranial hemorrhage, mass or mass effect is identified. The ventricles and basal cisterns are within normal limits. There is mildly decreased attenuation in the periventricular and subcortical white matter tracts compatible chronic small vessel white matter ischemic disease. Mild diffuse cortical atrophy is present. Review of bone windows is unremarkable. Impression: No acute intracranial abnormality. There are chronic findings associated with aging. Signed by Dr Ryan Beverly. Shayhojorge    CT Cervical Spine WO Contrast    Result Date: 2/13/2022  EXAMINATION: CT CERVICAL SPINE WO CONTRAST 2/13/2022 10:04 PM HISTORY: Unwitnessed fall at nursing home. Head and neck pain. DOSE: 519 mGycm. Automatic exposure control was utilized in an effort to use as little radiation as possible, without compromising image quality. REPORT: Spiral CT of the cervical spine was performed without contrast, reconstructed coronal and sagittal images are also reviewed. Comparison: CT of the cervical spine without contrast 6/27/2012. Sagittal images demonstrate slight anterolisthesis of C4 relative to C5, grade 1. There is disc space collapse, hypertrophic endplate spurring and small degenerative endplate defects at W1-5 and C6-7. No acute fracture is identified. The prevertebral soft tissues are within normal limits. Geographic focus of decreased attenuation with coarse trabecula within the T1 vertebral body is compatible with a benign hemangioma. This appears stable. There is is no osseous spinal canal stenosis. Review of soft tissue windows shows no gross evidence of traumatic cervical disc herniation. There is normal aeration of the visualized paranasal sinuses and mastoid air cells. Facet overgrowth is seen several levels, as well as uncinate spurring. No high-grade neural foraminal narrowing is identified. The airways patent. The visualized lungs are clear.     No fracture, no acute osseous cervical spine abnormality. Multilevel degenerative changes as detailed above. Signed by Dr Ioana Peralta. Leigh Ann    XR SHOULDER LEFT (MIN 2 VIEWS)    Result Date: 2/13/2022  EXAMINATION: XR SHOULDER LEFT (MIN 2 VIEWS) 2/13/2022 9:59 PM HISTORY: Trauma, pain. Report: 3 views of the left shoulder were obtained. COMPARISON: X-rays of the left shoulder 10/7/2019. Osseous alignment is normal, no fracture is identified. There is moderate narrowing of the subacromial interval which is stable. There is spurring of the inferior medial humeral head. There is narrowing of the glenohumeral joint. The soft tissues appear within normal limits. No fracture, no acute osseous abnormality. Degenerative changes as described, stable. Signed by Dr Ioana Peralta. Leigh Ann    XR HIP 2-3 VW W PELVIS LEFT    Result Date: 2/13/2022  EXAMINATION: XR HIP 2-3 VW W PELVIS LEFT 2/13/2022 9:55 PM HISTORY: Left hip trauma, recent left hip fixation. Report: An AP view the pelvis was obtained as well as 2 separate views of the left hip. COMPARISON: X-rays of the pelvis left hip 12/12/2021. There is evidence of interval open reduction internal fixation of the intertrochanteric left hip fracture, appears to be avulsion of the lesser trochanter with displacement of about 2 cm. This is not clearly seen on the previous x-rays. The right hip appears within normal limits. There is diffuse osteopenia. The sacrum and SI joints appear within normal limits. Interval ORIF of the left intertrochanteric hip fracture. There is avulsion of the lesser trochanter with approximately 2 cm of displacement, acute versus subacute. Signed by Dr Ioana Peralta.  Leigh Ann        Objective:   Vitals:   BP (!) 140/97   Pulse 83   Temp 97.2 °F (36.2 °C) (Temporal)   Resp 20   Ht 5' 8\" (1.727 m)   Wt 110 lb 2 oz (50 kg)   SpO2 93%   BMI 16.74 kg/m²     Patient Vitals for the past 24 hrs:   BP Temp Temp src Pulse Resp SpO2 Height Weight   02/26/22 0404 (!) 140/97 97.2 °F (36.2 °C) Temporal 83 20 93 % -- --   02/25/22 2145 114/89 97.2 °F (36.2 °C) Temporal 113 14 91 % -- --   02/25/22 1534 -- -- -- -- -- 99 % 5' 8\" (1.727 m) 110 lb 2 oz (50 kg)   02/25/22 1525 (!) 139/128 97.2 °F (36.2 °C) Temporal 106 16 (!) 83 % -- --   02/25/22 1258 (!) 140/98 -- -- 79 17 97 % -- --       24HR INTAKE/OUTPUT:      Intake/Output Summary (Last 24 hours) at 2/26/2022 0719  Last data filed at 2/26/2022 0529  Gross per 24 hour   Intake 1300 ml   Output --   Net 1300 ml       Physical Exam  Vitals and nursing note reviewed. Constitutional:       General: She is not in acute distress. Appearance: Normal appearance. She is not ill-appearing, toxic-appearing or diaphoretic. Comments: Lethargic   HENT:      Head: Normocephalic and atraumatic. Right Ear: External ear normal.      Left Ear: External ear normal.      Nose: Nose normal. No congestion or rhinorrhea. Mouth/Throat:      Mouth: Mucous membranes are moist.      Pharynx: Oropharynx is clear. No oropharyngeal exudate or posterior oropharyngeal erythema. Eyes:      General: No scleral icterus. Right eye: No discharge. Left eye: No discharge. Extraocular Movements: Extraocular movements intact. Conjunctiva/sclera: Conjunctivae normal.   Cardiovascular:      Rate and Rhythm: Normal rate and regular rhythm. Pulses: Normal pulses. Heart sounds: Normal heart sounds. No murmur heard. No friction rub. No gallop. Pulmonary:      Effort: Pulmonary effort is normal. No respiratory distress. Breath sounds: Normal breath sounds. No stridor. No wheezing, rhonchi or rales. Chest:      Chest wall: No tenderness. Abdominal:      General: Bowel sounds are normal. There is no distension. Palpations: Abdomen is soft. Tenderness: There is no abdominal tenderness. There is no guarding or rebound. Musculoskeletal:         General: No swelling, tenderness, deformity or signs of injury. Normal range of motion. Cervical back: Normal range of motion and neck supple. No rigidity. No muscular tenderness. Right lower leg: No edema. Left lower leg: No edema. Skin:     General: Skin is warm and dry. Capillary Refill: Capillary refill takes less than 2 seconds. Coloration: Skin is not jaundiced or pale. Findings: No bruising, erythema, lesion or rash. Neurological:      General: No focal deficit present. Mental Status: Mental status is at baseline. Cranial Nerves: No cranial nerve deficit. Motor: No weakness. Coordination: Coordination normal.   Psychiatric:         Mood and Affect: Mood normal.         Behavior: Behavior normal.           Assessment/plan:       Hospital Problems           Last Modified POA    * (Principal) Hypercalcemia 2/25/2022 Yes    Memory loss 2/25/2022 Yes    Essential hypertension 2/25/2022 Yes    Peripheral vascular disease ([address] (Chronic) 2/25/2022 Yes    Myasthenia gravis ([address] 2/25/2022 Yes    Deafness 2/25/2022 Yes          Principal Problem:    Hypercalcemia  Active Problems:    Memory loss    Essential hypertension    Peripheral vascular disease ([address]    Myasthenia gravis ([address]    Deafness  Resolved Problems:    * No resolved hospital problems. *        Brief Summary  Ms. Graciela Saba, a 69-year-old female, history of GBS, dementia, presenting to [address] [place] [address] ED on account of abnormal lab (hypercalcemia). History obtained from family, given patient nonverbal and only speaks sign language. Patient has reportedly change in mental status, as well as lethargic, constipated and nauseated over the last few days. Patient was recently discharged from [address] [place] [address] on to [facility]) 12/16/2022, after the hip replacement. Initial work-up significant for;  25-hydroxy vitamin D level of 74.6  TSH of 5.71  Free T4: 1.25  Calcium of 13.3 with an ionized calcium of 165 mmol/L.   Potassium of 3.3  Intact PTH of 85.4      PTH dependent Hypercalcemia  · Patient not currently on any thiazide diuretics or lithium. · Aggressive IV hydration  · Calcitonin 100 units IM x2 doses (02/25/2022-02/26/2022)  · Calcium improved to 10.9 (02/26/2022)  · Continue IV fluids for now  · Continue monitoring calcium level  · Urine calcium and creatinine level, to assess urine calcium to creatinine ratio      Hypokalemia  · Replace as per protocol    Hypomagnesemia  · Replace as per protocol      Myasthenia gravis  GBS  Dementia  · Continue management      Essential HTN  · Continue home regimen          Continue management of other chronic medical conditions - see above and orders. Advance Directive: DNR    No diet orders on file         Consults Made:   None    DVT prophylaxis: Enoxaparin      Discharge planning: tbd    Time Spent is 35 mins in the examination, evaluation, counseling and review of medications, assessment and plan.      Electronically signed by   Shyla Scott MD, MPH, MD,   Internal Medicine Hospitalist   2/26/2022 7:23 AM

## 2022-02-27 NOTE — PROGRESS NOTES
[facility] Progress Note    Patient:  Louise Bumpers  YOB: 1945  Date of Service: 2/27/2022  MRN: [id number]   Acct: [de-identified]   Primary Care Physician: Syed Rodriguez MD  Advance Directive: DNR  Admit Date: 2/25/2022       Hospital Day: 2      CHIEF COMPLAINT:     Chief Complaint   Patient presents with    Abnormal Lab     Calcium 13 at [facility]       2/27/2022 10:26 AM  Subjective / Interval History:   02/27/2022  Patient seen and examined this AM.  Doing well. No new complaints. Laying comfortably in bed no acute distress. Family at bedside. 02/26/2022  No acute changes or acute overnight event reported. Patient seen and examined. Lying comfortably in bed in no acute distress. Family (daughter) at bedside. Review of Systems:   Review of Systems  ROS: 14 point review of systems is negative except as specifically addressed above. ADULT DIET;  Full Liquid  ADULT ORAL NUTRITION SUPPLEMENT; Lunch, Dinner; Standard High Calorie/High Protein Oral Supplement    Intake/Output Summary (Last 24 hours) at 2/27/2022 1026  Last data filed at 2/27/2022 0604  Gross per 24 hour   Intake 5398.6 ml   Output --   Net 5398.6 ml       Medications:   sodium chloride 100 mL/hr at 02/26/22 1217    sodium chloride      sodium chloride 150 mL/hr at 02/25/22 1646     Current Facility-Administered Medications   Medication Dose Route Frequency Provider Last Rate Last Admin    potassium chloride (KLOR-CON M) extended release tablet 40 mEq  40 mEq Oral PRN Patience Jenniffer, DO        Or    potassium bicarb-citric acid (EFFER-K) effervescent tablet 40 mEq  40 mEq Oral PRN Patience Jenniffer, DO        Or    potassium chloride 10 mEq/100 mL IVPB (Peripheral Line)  10 mEq IntraVENous PRN Patience Jenniffer, DO 40 mL/hr at 02/27/22 0628 10 mEq at 02/27/22 0628    0.9 % sodium chloride infusion   IntraVENous Continuous Sona Orta  mL/hr at 02/26/22 1217 Rate Change at 02/26/22 1217    magnesium sulfate 1000 mg in dextrose 5% 100 mL IVPB  1,000 mg IntraVENous PRN Jennie Torres MD   Stopped at 02/26/22 2058    0.9 % sodium chloride bolus  500 mL IntraVENous Once Aishwarya Davenport MD        sodium chloride flush 0.9 % injection 5-40 mL  5-40 mL IntraVENous 2 times per day Gaby Shaper, APRN - CNP   10 mL at 02/27/22 0951    sodium chloride flush 0.9 % injection 5-40 mL  5-40 mL IntraVENous PRN Gaby Shaper, APRN - CNP        0.9 % sodium chloride infusion  25 mL IntraVENous PRN Gaby Shaper, APRN - CNP        enoxaparin (LOVENOX) injection 40 mg  40 mg SubCUTAneous Q24H Gaby Shaper, APRN - CNP   40 mg at 02/26/22 1545    ondansetron (ZOFRAN-ODT) disintegrating tablet 4 mg  4 mg Oral Q8H PRN Gaby Shaper, APRN - CNP        Or    [facility]ans[facility]ron [facility] injection 4 mg  4 mg IntraVENous Q6H PRN Gaby Shaper, APRN - CNP        polyethylene glycol (GLYCOLAX) packet 17 g  17 g Oral Daily PRN Gaby Shaper, APRN - CNP        acetaminophen (TYLENOL) tablet 650 mg  650 mg Oral Q6H PRN Gaby Shaper, APRN - CNP   650 mg at 02/26/22 2057    Or    acetaminophen (TYLENOL) suppository 650 mg  650 mg Rectal Q6H PRN Gaby Shaper, APRN - CNP        0.9 % sodium chloride infusion   IntraVENous Continuous Gaby Shaper, APRN -  mL/hr at 02/25/22 1646 New Bag at 02/25/22 1646    amitriptyline (ELAVIL) tablet 100 mg  100 mg Oral Nightly Gaby Shaper, APRN - CNP   100 mg at 02/26/22 2057    amLODIPine (NORVASC) tablet 5 mg  5 mg Oral Nightly Gaby Shaper, APRN - CNP   5 mg at 02/26/22 2057    docusate sodium (COLACE) capsule 100 mg  100 mg Oral BID Gaby Shaper, APRN - CNP   100 mg at 02/27/22 0511    Donepezil HCl (ARICEPT) tablet 23 mg  23 mg Oral Nightly Gaby Shaper, APRN - CNP   23 mg at 02/26/22 2106    ferrous sulfate (IRON 325) tablet 325 mg  325 mg Oral Daily Gaby DENIZ Jones CNP   325 mg at 02/27/22 5157    FLUoxetine (PROZAC) capsule 20 mg  20 mg Oral Daily Claire An, APRN - CNP   20 mg at 02/27/22 7995    prazosin (MINIPRESS) capsule 1 mg  1 mg Oral Nightly Claire An, APRN - CNP   1 mg at 02/26/22 2057    hydrOXYzine (VISTARIL) capsule 25 mg  25 mg Oral TID PRN Claire An, APRN - CNP        hydrALAZINE (APRESOLINE) injection 5 mg  5 mg IntraVENous Q6H PRN Claire An, APRN - CNP             sodium chloride 100 mL/hr at 02/26/22 1217    sodium chloride      sodium chloride 150 mL/hr at 02/25/22 1646      sodium chloride  500 mL IntraVENous Once    sodium chloride flush  5-40 mL IntraVENous 2 times per day    enoxaparin  40 mg SubCUTAneous Q24H    amitriptyline  100 mg Oral Nightly    amLODIPine  5 mg Oral Nightly    docusate sodium  100 mg Oral BID    Donepezil HCl  23 mg Oral Nightly    ferrous sulfate  325 mg Oral Daily    FLUoxetine  20 mg Oral Daily    prazosin  1 mg Oral Nightly     potassium chloride **OR** potassium alternative oral replacement **OR** potassium chloride, magnesium sulfate, sodium chloride flush, sodium chloride, ondansetron **OR** ondansetron, polyethylene glycol, acetaminophen **OR** acetaminophen, hydrOXYzine, hydrALAZINE  ADULT DIET;  Full Liquid  ADULT ORAL NUTRITION SUPPLEMENT; Lunch, Dinner; Standard High Calorie/High Protein Oral Supplement       Labs:   CBC with DIFF:  Recent Labs     02/25/22  1340 02/26/22  0357 02/27/22  0226   WBC 11.0* 9.3 7.7   RBC 4.37 3.63* 3.70*   HGB 13.3 11.3* 11.4*   HCT 39.3 34.0* 35.3*   MCV 89.9 93.7 95.4   MCH 30.4 31.1* 30.8   MCHC 33.8 33.2 32.3*   RDW 13.1 13.3 13.7    316 278   MPV 9.7 10.0 9.8   NEUTOPHILPCT 68.9*  --   --    LYMPHOPCT 19.2*  --   --    MONOPCT 9.2  --   --    EOSRELPCT 1.5  --   --    BASOPCT 0.9  --   --    NEUTROABS 7.6*  --   --    LYMPHSABS 2.1  --   --    MONOSABS 1.00*  --   --    EOSABS 0.20  --   --    BASOSABS 0.10  --   --        CMP/BMP:  Recent Labs     02/26/22  0357 02/26/22  0618 02/27/22  0226     --  135*   K 2.4* 2. 6* 2.6*     --  101   CO2 27  --  26   ANIONGAP 10  --  8   GLUCOSE 78  --  128*   BUN 11  --  8   CREATININE 0.7  --  0.5   LABGLOM >60  --  >60   CALCIUM 10.9*  --  9.7         CRP:  No results for input(s): CRP in the last 72 hours. Sed Rate:  No results for input(s): SEDRATE in the last 72 hours. HgBA1c:  No components found for: HGBA1C  FLP:    Lab Results   Component Value Date    TRIG 137 07/29/2020    HDL 75 07/29/2020    LDLCALC 134 07/29/2020    LDLDIRECT 126 08/05/2014     TSH:    Lab Results   Component Value Date    TSH 3.240 03/19/2020     Troponin T: No results for input(s): TROPONINI in the last 72 hours. Pro-BNP: No results for input(s): BNP in the last 72 hours. INR: No results for input(s): INR in the last 72 hours. ABGs:   Lab Results   Component Value Date    PHART 7.430 09/18/2016    PO2ART 81.0 09/18/2016    JVV9VLL 33.0 09/18/2016     UA:No results for input(s): NITRITE, COLORU, PHUR, LABCAST, WBCUA, RBCUA, MUCUS, TRICHOMONAS, YEAST, BACTERIA, CLARITYU, SPECGRAV, LEUKOCYTESUR, UROBILINOGEN, BILIRUBINUR, BLOODU, GLUCOSEU, AMORPHOUS in the last 72 hours. Invalid input(s): Radha Hill      Culture Results:    No results for input(s): CXSURG in the last 720 hours. Blood Culture Recent: No results for input(s): BC in the last 720 hours. No results for input(s): BC, BLOODCULT2, ORG in the last 72 hours. Cultures:   No results for input(s): CULTURE in the last 72 hours. No results for input(s): BC, Raymon Meiers in the last 72 hours. No results for input(s): CXSURG in the last 72 hours. Recent Labs     02/25/22  1340 02/26/22  0357 02/27/22  0226   MG 1.7 1.5* 2.0     No results for input(s): AST, ALT, ALB, BILITOT, ALKPHOS, ALB in the last 72 hours. RAD:   XR WRIST LEFT (MIN 3 VIEWS)    Result Date: 2/13/2022  EXAMINATION: XR WRIST LEFT (MIN 3 VIEWS) 2/13/2022 10:00 PM HISTORY: Fall, left wrist pain. Report: 3 views of the left wrist were obtained. COMPARISON: There are no correlative imaging studies for comparison. Osseous alignment is normal, no fracture is identified. There is mild narrowing of the radiocarpal joint and there is narrowing of the first carpal metacarpal joint with associated spurring and sclerosis. No fracture, no acute osseous abnormality. Advanced degenerative changes at the first carpometacarpal joint. Signed by Dr Osbaldo Abbott. Leigh Ann    CT Head WO Contrast    Result Date: 2/13/2022  EXAMINATION: CT HEAD WO CONTRAST 2/13/2022 10:03 PM HISTORY: Unwitnessed fall at nursing home. Head and neck pain. DOSE: 670 mGycm. Automatic exposure control was utilized in an effort to use as little radiation as possible, without compromising image quality. REPORT: Axial CT of the head was performed without contrast, reconstructed sagittal and coronal images are also reviewed. COMPARISON: CT head without contrast 9/18/2016. No intracranial hemorrhage, mass or mass effect is identified. The ventricles and basal cisterns are within normal limits. There is mildly decreased attenuation in the periventricular and subcortical white matter tracts compatible chronic small vessel white matter ischemic disease. Mild diffuse cortical atrophy is present. Review of bone windows is unremarkable. Impression: No acute intracranial abnormality. There are chronic findings associated with aging. Signed by Dr Osbaldo Beltrán    CT Cervical Spine WO Contrast    Result Date: 2/13/2022  EXAMINATION: CT CERVICAL SPINE WO CONTRAST 2/13/2022 10:04 PM HISTORY: Unwitnessed fall at nursing home. Head and neck pain. DOSE: 519 mGycm. Automatic exposure control was utilized in an effort to use as little radiation as possible, without compromising image quality. REPORT: Spiral CT of the cervical spine was performed without contrast, reconstructed coronal and sagittal images are also reviewed. Comparison: CT of the cervical spine without contrast 6/27/2012.  Sagittal images demonstrate slight anterolisthesis of C4 relative to C5, grade 1. There is disc space collapse, hypertrophic endplate spurring and small degenerative endplate defects at Y5-1 and C6-7. No acute fracture is identified. The prevertebral soft tissues are within normal limits. Geographic focus of decreased attenuation with coarse trabecula within the T1 vertebral body is compatible with a benign hemangioma. This appears stable. There is is no osseous spinal canal stenosis. Review of soft tissue windows shows no gross evidence of traumatic cervical disc herniation. There is normal aeration of the visualized paranasal sinuses and mastoid air cells. Facet overgrowth is seen several levels, as well as uncinate spurring. No high-grade neural foraminal narrowing is identified. The airways patent. The visualized lungs are clear. No fracture, no acute osseous cervical spine abnormality. Multilevel degenerative changes as detailed above. Signed by Dr Princess Walker. Leigh Ann    XR SHOULDER LEFT (MIN 2 VIEWS)    Result Date: 2/13/2022  EXAMINATION: XR SHOULDER LEFT (MIN 2 VIEWS) 2/13/2022 9:59 PM HISTORY: Trauma, pain. Report: 3 views of the left shoulder were obtained. COMPARISON: X-rays of the left shoulder 10/7/2019. Osseous alignment is normal, no fracture is identified. There is moderate narrowing of the subacromial interval which is stable. There is spurring of the inferior medial humeral head. There is narrowing of the glenohumeral joint. The soft tissues appear within normal limits. No fracture, no acute osseous abnormality. Degenerative changes as described, stable. Signed by Dr Princess Walker. Leigh Ann    XR HIP 2-3 VW W PELVIS LEFT    Result Date: 2/13/2022  EXAMINATION: XR HIP 2-3 VW W PELVIS LEFT 2/13/2022 9:55 PM HISTORY: Left hip trauma, recent left hip fixation. Report: An AP view the pelvis was obtained as well as 2 separate views of the left hip. COMPARISON: X-rays of the pelvis left hip 12/12/2021.  There is evidence of interval open reduction internal fixation of the intertrochanteric left hip fracture, appears to be avulsion of the lesser trochanter with displacement of about 2 cm. This is not clearly seen on the previous x-rays. The right hip appears within normal limits. There is diffuse osteopenia. The sacrum and SI joints appear within normal limits. Interval ORIF of the left intertrochanteric hip fracture. There is avulsion of the lesser trochanter with approximately 2 cm of displacement, acute versus subacute. Signed by Dr Jose Beltrán        Objective:   Vitals:   /70   Pulse 78   Temp 97.2 °F (36.2 °C) (Temporal)   Resp 16   Ht 5' 8\" (1.727 m)   Wt 107 lb 8 oz (48.8 kg)   SpO2 100%   BMI 16.35 kg/m²     Patient Vitals for the past 24 hrs:   BP Temp Temp src Pulse Resp SpO2 Weight   02/27/22 0459 132/70 97.2 °F (36.2 °C) Temporal 78 16 100 % 107 lb 8 oz (48.8 kg)   02/26/22 2149 125/73 96.8 °F (36 °C) Temporal 69 12 93 % --       24HR INTAKE/OUTPUT:      Intake/Output Summary (Last 24 hours) at 2/27/2022 1026  Last data filed at 2/27/2022 0604  Gross per 24 hour   Intake 5398.6 ml   Output --   Net 5398.6 ml       Physical Exam  Vitals and nursing note reviewed. Constitutional:       General: She is not in acute distress. Appearance: Normal appearance. She is not ill-appearing, toxic-appearing or diaphoretic. Comments: Lethargic   HENT:      Head: Normocephalic and atraumatic. Right Ear: External ear normal.      Left Ear: External ear normal.      Nose: Nose normal. No congestion or rhinorrhea. Mouth/Throat:      Mouth: Mucous membranes are moist.      Pharynx: Oropharynx is clear. No oropharyngeal exudate or posterior oropharyngeal erythema. Eyes:      General: No scleral icterus. Right eye: No discharge. Left eye: No discharge. Extraocular Movements: Extraocular movements intact.       Conjunctiva/sclera: Conjunctivae normal.   Cardiovascular:      Rate and Rhythm: Normal rate and regular rhythm. Pulses: Normal pulses. Heart sounds: Normal heart sounds. No murmur heard. No friction rub. No gallop. Pulmonary:      Effort: Pulmonary effort is normal. No respiratory distress. Breath sounds: Normal breath sounds. No stridor. No wheezing, rhonchi or rales. Chest:      Chest wall: No tenderness. Abdominal:      General: Bowel sounds are normal. There is no distension. Palpations: Abdomen is soft. Tenderness: There is no abdominal tenderness. There is no guarding or rebound. Musculoskeletal:         General: No swelling, tenderness, deformity or signs of injury. Normal range of motion. Cervical back: Normal range of motion and neck supple. No rigidity. No muscular tenderness. Right lower leg: No edema. Left lower leg: No edema. Skin:     General: Skin is warm and dry. Capillary Refill: Capillary refill takes less than 2 seconds. Coloration: Skin is not jaundiced or pale. Findings: No bruising, erythema, lesion or rash. Neurological:      General: No focal deficit present. Mental Status: Mental status is at baseline. Cranial Nerves: No cranial nerve deficit. Motor: No weakness.       Coordination: Coordination normal.   Psychiatric:         Mood and Affect: Mood normal.         Behavior: Behavior normal.           Assessment/plan:       Hospital Problems           Last Modified POA    * (Principal) Hypercalcemia 2/25/2022 Yes    Osteoarthritis 2/26/2022 Yes    Memory loss 2/25/2022 Yes    Congenital deafness 2/26/2022 Yes    Essential hypertension 2/25/2022 Yes    Peripheral vascular disease ([address] (Chronic) 2/25/2022 Yes    Myasthenia gravis ([address] 2/25/2022 Yes    Deafness 2/25/2022 Yes    Severe malnutrition ([address] 2/26/2022 Yes          Principal Problem:    Hypercalcemia  Active Problems:    Osteoarthritis    Memory loss    Congenital deafness    Essential hypertension    Peripheral vascular counseling and review of medications, assessment and plan.      Electronically signed by   Jasen Melendrez MD, MPH, MD,   Internal Medicine Hospitalist   2/27/2022 10:26 AM

## 2022-02-28 NOTE — CONSULTS
Palliative Care:    Pt is known to palliative care team.  She presents from ([facility]) where she is currently undergoing therapy for a recent fx hip. She is resting in bed at time of this visit. Asking if she can have something different to eat. Offered peanut butter and crackers which she accepted. This nurse is fluent in [address] and able to communicate with pt to initiate consult. Also spoke with her daughter, Kam Levin, for additional information d/t pt \"dementia\". Past Medical History:        Past Medical History:   Diagnosis Date    Anemia     Anxiety     Chronic back pain     severe muscle spasms    Deafness congenital     Depression     Hypertension     Memory loss     early    Myasthenia ([address]     Neck pain     Osteoarthritis     Palliative care patient 02/28/2022    Ptosis of eyelid     [facility]per[facility]     Weakness of face muscles        Advance Directives:   DNR- ACP completed previously      Pain/Other Symptoms:    Pt tells me she has a headache. Would like Tylenol. Nurse notified. Activity:    Up with assist         Psychological/Spiritual:   Pt unable to attend Denominational services at this time. Welcomes palliative care  visit. Plan:   Medical management     Patient/family discussion r/t goals:  Spoke with pt chanelr, Khushboo, she tells me their goal is to have pt return to  on dc and resume her therapy. Ultimate goal is home with dtr. She reviews some hx from last adm d/t fx hip. Sent to  for therapy. She did become weak and fatigued not long after arriving, sent to ed and found to have UTI. About 1 1/2 weeks ago pt fell, sent to ed and then back to . This admission d/t abn lab elevated calcium level. Dtr has plans in place for assistance at home through [facility] as well as meals on wheels. States she has had ramps installed and is currently renovating restroom for walk in shower.   Per dtr, she tells me pt was dx by Dr. Ayse reynolds \"couple years ago with new onset Alzheimer's\". Pt is frail looking, malnourished, wt 107 lbs. ,BMI 16.35. Dtr tells me she weighed 110 lbs just a few days ago. Palliative following for support and ongoing discussions for goals of care as the need arises.                 Electronically signed by Amna Pelaez RN on 2/28/2022 at 10:05 AM

## 2022-02-28 NOTE — CARE COORDINATION
Pt is a resident from [address] [place] [address]. Pt may DC back once medically cleared.    [address] [place] [address] (formerly [address] [place] [address])   [phone] P  [phone] F  [phone] Referral fax number for   Electronically signed by Felipa Roman on 2/28/2022 at 11:01 AM

## 2022-02-28 NOTE — DISCHARGE INSTR - DIET
Good nutrition is important when healing from an illness, injury, or surgery. Follow any nutrition recommendations given to you during your hospital stay. If you were given an oral nutrition supplement while in the hospital, continue to take this supplement at home. You can take it with meals, in-between meals, and/or before bedtime. These supplements can be purchased at most local grocery stores, pharmacies, and chain [employer]-stores. If you have any questions about your diet or nutrition, call the hospital and ask for the dietitian.     Full liquid diet  advance as tolerated to general diet  Standard high calorie high protein oral supplement lunch and dinner

## 2022-02-28 NOTE — PROGRESS NOTES
Attempted to replace patient's potassium with oral repletion per order. Utilized  to aid in communication. Patient refused at this time. Will share with day shift so that they may reattempt.

## 2022-02-28 NOTE — PROGRESS NOTES
[facility] Hospitalists Progress Note    Patient:  Pham Bartholomew  YOB: 1945  Date of Service: 2/28/2022  MRN: [id number]   Acct: [de-identified]   Primary Care Physician: Gonzalo Angelo MD  Advance Directive: DNR  Admit Date: 2/25/2022       Hospital Day: 3      CHIEF COMPLAINT:     Chief Complaint   Patient presents with    Abnormal Lab     Calcium 13 at [facility]       2/28/2022 1:16 PM  Subjective / Interval History:   02/28/2022  Patient seen and examined this AM.  No new complaints. No acute changes or acute overnight event reported. Patient laying comfortably in bed in no acute distress. Patient with persistent electrolyte abnormalities. Potassium improved to 3.2 --> 3.1 , however magnesium now 1.3    02/27/2022  Patient seen and examined this AM.  Doing well. No new complaints. Laying comfortably in bed no acute distress. Family at bedside. 02/26/2022  No acute changes or acute overnight event reported. Patient seen and examined. Lying comfortably in bed in no acute distress. Family (daughter) at bedside. Review of Systems:   Review of Systems  ROS: 14 point review of systems is negative except as specifically addressed above. ADULT DIET;  Full Liquid  ADULT ORAL NUTRITION SUPPLEMENT; Lunch, Dinner; Standard High Calorie/High Protein Oral Supplement    Intake/Output Summary (Last 24 hours) at 2/28/2022 1316  Last data filed at 2/27/2022 2115  Gross per 24 hour   Intake 120 ml   Output --   Net 120 ml       Medications:   sodium chloride      sodium chloride Stopped (02/28/22 0739)     Current Facility-Administered Medications   Medication Dose Route Frequency Provider Last Rate Last Admin    magnesium oxide (MAG-OX) tablet 400 mg  400 mg Oral Daily Refugio Us MD   400 mg at 02/28/22 1208    potassium chloride (KLOR-CON M) extended release tablet 40 mEq  40 mEq Oral PRN Patience Jenniffer, DO   40 mEq at 02/28/22 1158    Or    potassium bicarb-citric acid (EFFER-K) effervescent tablet 40 mEq  40 mEq Oral PRN Patience Jenniffer, DO        Or    potassium chloride 10 mEq/100 mL IVPB (Peripheral Line)  10 mEq IntraVENous PRN Patience Jenniffer, DO 40 mL/hr at 02/27/22 0628 10 mEq at 02/27/22 1596    magnesium sulfate 1000 mg in dextrose 5% 100 mL IVPB  1,000 mg IntraVENous PRN Jennie Torres MD   Stopped at 02/28/22 0930    0.9 % sodium chloride bolus  500 mL IntraVENous Once Aishwarya Davenport MD        sodium chloride flush 0.9 % injection 5-40 mL  5-40 mL IntraVENous 2 times per day Gaby Shaper, APRN - CNP   10 mL at 02/27/22 0951    sodium chloride flush 0.9 % injection 5-40 mL  5-40 mL IntraVENous PRN Gaby Shaper, APRN - CNP        0.9 % sodium chloride infusion  25 mL IntraVENous PRN Gaby Shaper, APRN - CNP        enoxaparin (LOVENOX) injection 40 mg  40 mg SubCUTAneous Q24H Gaby Shaper, APRN - CNP   40 mg at 02/27/22 1806    ondansetron (ZOFRAN-ODT) disintegrating tablet 4 mg  4 mg Oral Q8H PRN Gaby Shaper, APRN - CNP        Or    [facility] injection 4 mg  4 mg IntraVENous Q6H PRN Gaby Shaper, APRN - CNP        polyethylene glycol (GLYCOLAX) packet 17 g  17 g Oral Daily PRN Gaby Shaper, APRN - CNP        acetaminophen (TYLENOL) tablet 650 mg  650 mg Oral Q6H PRN Gaby Shaper, APRN - CNP   650 mg at 02/26/22 2057    Or    acetaminophen (TYLENOL) suppository 650 mg  650 mg Rectal Q6H PRN Gaby Shaper, APRN - CNP        0.9 % sodium chloride infusion   IntraVENous Continuous Gaby Shaper, APRN - CNP   Stopped at 02/28/22 0739    amitriptyline (ELAVIL) tablet 100 mg  100 mg Oral Nightly Gaby Shaper, APRN - CNP   100 mg at 02/27/22 2308    amLODIPine (NORVASC) tablet 5 mg  5 mg Oral Nightly Gaby Shaper, APRN - CNP   5 mg at 02/26/22 2057    docusate sodium (COLACE) capsule 100 mg  100 mg Oral BID DENIZ Rossi CNP   100 mg at 02/27/22 7479    Donepezil HCl (ARICEPT) tablet 23 mg  23 mg Oral Nightly --   --   --   --    LYMPHOPCT 19.2*  --   --   --   --    MONOPCT 9.2  --   --   --   --    EOSRELPCT 1.5  --   --   --   --    BASOPCT 0.9  --   --   --   --    NEUTROABS 7.6*  --   --   --   --    LYMPHSABS 2.1  --   --   --   --    MONOSABS 1.00*  --   --   --   --    EOSABS 0.20  --   --   --   --    BASOSABS 0.10  --   --   --   --     < > = values in this interval not displayed. CMP/BMP:  Recent Labs     02/26/22  0357 02/26/22  0618 02/27/22  0226 02/28/22  0346 02/28/22  1058     --  135* 137  --    K 2.4*   < > 2.6* 3.2* 3.1*     --  101 104  --    CO2 27  --  26 24  --    ANIONGAP 10  --  8 9  --    GLUCOSE 78  --  128* 75  --    BUN 11  --  8 5*  --    CREATININE 0.7  --  0.5 0.5  --    LABGLOM >60  --  >60 >60  --    CALCIUM 10.9*  --  9.7 9.7  --     < > = values in this interval not displayed. CRP:  No results for input(s): CRP in the last 72 hours. Sed Rate:  No results for input(s): SEDRATE in the last 72 hours. HgBA1c:  No components found for: HGBA1C  FLP:    Lab Results   Component Value Date    TRIG 137 07/29/2020    HDL 75 07/29/2020    LDLCALC 134 07/29/2020    LDLDIRECT 126 08/05/2014     TSH:    Lab Results   Component Value Date    TSH 3.240 03/19/2020     Troponin T: No results for input(s): TROPONINI in the last 72 hours. Pro-BNP: No results for input(s): BNP in the last 72 hours. INR: No results for input(s): INR in the last 72 hours. ABGs:   Lab Results   Component Value Date    PHART 7.430 09/18/2016    PO2ART 81.0 09/18/2016    OHO3YYJ 33.0 09/18/2016     UA:No results for input(s): NITRITE, COLORU, PHUR, LABCAST, WBCUA, RBCUA, MUCUS, TRICHOMONAS, YEAST, BACTERIA, CLARITYU, SPECGRAV, LEUKOCYTESUR, UROBILINOGEN, BILIRUBINUR, BLOODU, GLUCOSEU, AMORPHOUS in the last 72 hours. Invalid input(s): Zora Escudero      Culture Results:    No results for input(s): CXSURG in the last 720 hours.     Blood Culture Recent: No results for input(s): BC in the last 720 hours. No results for input(s): BC, BLOODCULT2, ORG in the last 72 hours. Cultures:   No results for input(s): CULTURE in the last 72 hours. No results for input(s): BC, Dalia Peter in the last 72 hours. No results for input(s): CXSURG in the last 72 hours. Recent Labs     02/27/22  0226 02/28/22  0346 02/28/22  1058   MG 2.0 1.3* 2.3     No results for input(s): AST, ALT, ALB, BILITOT, ALKPHOS, ALB in the last 72 hours. RAD:   XR WRIST LEFT (MIN 3 VIEWS)    Result Date: 2/13/2022  EXAMINATION: XR WRIST LEFT (MIN 3 VIEWS) 2/13/2022 10:00 PM HISTORY: Fall, left wrist pain. Report: 3 views of the left wrist were obtained. COMPARISON: There are no correlative imaging studies for comparison. Osseous alignment is normal, no fracture is identified. There is mild narrowing of the radiocarpal joint and there is narrowing of the first carpal metacarpal joint with associated spurring and sclerosis. No fracture, no acute osseous abnormality. Advanced degenerative changes at the first carpometacarpal joint. Signed by Dr Jaime Elder. [place]    CT Head WO Contrast    Result Date: 2/13/2022  EXAMINATION: CT HEAD WO CONTRAST 2/13/2022 10:03 PM HISTORY: Unwitnessed fall at nursing home. Head and neck pain. DOSE: 670 mGycm. Automatic exposure control was utilized in an effort to use as little radiation as possible, without compromising image quality. REPORT: Axial CT of the head was performed without contrast, reconstructed sagittal and coronal images are also reviewed. COMPARISON: CT head without contrast 9/18/2016. No intracranial hemorrhage, mass or mass effect is identified. The ventricles and basal cisterns are within normal limits. There is mildly decreased attenuation in the periventricular and subcortical white matter tracts compatible chronic small vessel white matter ischemic disease. Mild diffuse cortical atrophy is present. Review of bone windows is unremarkable.     Impression: No acute intracranial abnormality. There are chronic findings associated with aging. Signed by Dr Mira Hernandez. Leigh Ann    CT Cervical Spine WO Contrast    Result Date: 2/13/2022  EXAMINATION: CT CERVICAL SPINE WO CONTRAST 2/13/2022 10:04 PM HISTORY: Unwitnessed fall at nursing home. Head and neck pain. DOSE: 519 mGycm. Automatic exposure control was utilized in an effort to use as little radiation as possible, without compromising image quality. REPORT: Spiral CT of the cervical spine was performed without contrast, reconstructed coronal and sagittal images are also reviewed. Comparison: CT of the cervical spine without contrast 6/27/2012. Sagittal images demonstrate slight anterolisthesis of C4 relative to C5, grade 1. There is disc space collapse, hypertrophic endplate spurring and small degenerative endplate defects at H2-5 and C6-7. No acute fracture is identified. The prevertebral soft tissues are within normal limits. Geographic focus of decreased attenuation with coarse trabecula within the T1 vertebral body is compatible with a benign hemangioma. This appears stable. There is is no osseous spinal canal stenosis. Review of soft tissue windows shows no gross evidence of traumatic cervical disc herniation. There is normal aeration of the visualized paranasal sinuses and mastoid air cells. Facet overgrowth is seen several levels, as well as uncinate spurring. No high-grade neural foraminal narrowing is identified. The airways patent. The visualized lungs are clear. No fracture, no acute osseous cervical spine abnormality. Multilevel degenerative changes as detailed above. Signed by Dr Mira Hernandez. [place]    XR SHOULDER LEFT (MIN 2 VIEWS)    Result Date: 2/13/2022  EXAMINATION: XR SHOULDER LEFT (MIN 2 VIEWS) 2/13/2022 9:59 PM HISTORY: Trauma, pain. Report: 3 views of the left shoulder were obtained. COMPARISON: X-rays of the left shoulder 10/7/2019. Osseous alignment is normal, no fracture is identified.  There is moderate narrowing of the subacromial interval which is stable. There is spurring of the inferior medial humeral head. There is narrowing of the glenohumeral joint. The soft tissues appear within normal limits. No fracture, no acute osseous abnormality. Degenerative changes as described, stable. Signed by Dr Molly White. Leigh Ann    XR HIP 2-3 VW W PELVIS LEFT    Result Date: 2/13/2022  EXAMINATION: XR HIP 2-3 VW W PELVIS LEFT 2/13/2022 9:55 PM HISTORY: Left hip trauma, recent left hip fixation. Report: An AP view the pelvis was obtained as well as 2 separate views of the left hip. COMPARISON: X-rays of the pelvis left hip 12/12/2021. There is evidence of interval open reduction internal fixation of the intertrochanteric left hip fracture, appears to be avulsion of the lesser trochanter with displacement of about 2 cm. This is not clearly seen on the previous x-rays. The right hip appears within normal limits. There is diffuse osteopenia. The sacrum and SI joints appear within normal limits. Interval ORIF of the left intertrochanteric hip fracture. There is avulsion of the lesser trochanter with approximately 2 cm of displacement, acute versus subacute. Signed by Dr Molly White. Leigh Ann        Objective:   Vitals:   /72   Pulse 85   Temp 99.1 °F (37.3 °C) (Temporal)   Resp 18   Ht 5' 8\" (1.727 m)   Wt 107 lb 8 oz (48.8 kg)   SpO2 95%   BMI 16.35 kg/m²     Patient Vitals for the past 24 hrs:   BP Temp Temp src Pulse Resp SpO2   02/28/22 0521 126/72 99.1 °F (37.3 °C) Temporal 85 18 95 %   02/27/22 2300 120/79 -- -- -- -- --   02/27/22 2115 108/74 98.1 °F (36.7 °C) Oral 83 18 94 %       24HR INTAKE/OUTPUT:      Intake/Output Summary (Last 24 hours) at 2/28/2022 1316  Last data filed at 2/27/2022 2115  Gross per 24 hour   Intake 120 ml   Output --   Net 120 ml       Physical Exam  Vitals and nursing note reviewed. Constitutional:       General: She is not in acute distress. Appearance: Normal appearance.  She is not ill-appearing, toxic-appearing or diaphoretic. HENT:      Head: Normocephalic and atraumatic. Right Ear: External ear normal.      Left Ear: External ear normal.      Nose: Nose normal. No congestion or rhinorrhea. Mouth/Throat:      Mouth: Mucous membranes are moist.      Pharynx: Oropharynx is clear. No oropharyngeal exudate or posterior oropharyngeal erythema. Eyes:      General: No scleral icterus. Right eye: No discharge. Left eye: No discharge. Extraocular Movements: Extraocular movements intact. Conjunctiva/sclera: Conjunctivae normal.   Cardiovascular:      Rate and Rhythm: Normal rate and regular rhythm. Pulses: Normal pulses. Heart sounds: Normal heart sounds. No murmur heard. No friction rub. No gallop. Pulmonary:      Effort: Pulmonary effort is normal. No respiratory distress. Breath sounds: Normal breath sounds. No stridor. No wheezing, rhonchi or rales. Chest:      Chest wall: No tenderness. Abdominal:      General: Bowel sounds are normal. There is no distension. Palpations: Abdomen is soft. Tenderness: There is no abdominal tenderness. There is no guarding or rebound. Musculoskeletal:         General: No swelling, tenderness, deformity or signs of injury. Normal range of motion. Cervical back: Normal range of motion and neck supple. No rigidity. No muscular tenderness. Right lower leg: No edema. Left lower leg: No edema. Skin:     General: Skin is warm and dry. Capillary Refill: Capillary refill takes less than 2 seconds. Coloration: Skin is not jaundiced or pale. Findings: No bruising, erythema, lesion or rash. Neurological:      General: No focal deficit present. Mental Status: She is alert. Mental status is at baseline. Cranial Nerves: No cranial nerve deficit. Motor: No weakness.       Coordination: Coordination normal.   Psychiatric:         Mood and Affect: Mood normal. Behavior: Behavior normal.           Assessment/plan:       Hospital Problems           Last Modified POA    * (Principal) Hypercalcemia 2/25/2022 Yes    Osteoarthritis 2/26/2022 Yes    Memory loss 2/25/2022 Yes    Congenital deafness 2/26/2022 Yes    Essential hypertension 2/25/2022 Yes    Peripheral vascular disease ([address] (Chronic) 2/25/2022 Yes    Myasthenia gravis ([address] 2/25/2022 Yes    Deafness 2/25/2022 Yes    Palliative care patient 2/28/2022 Yes    Severe malnutrition ([address] 2/26/2022 Yes          Principal Problem:    Hypercalcemia  Active Problems:    Osteoarthritis    Memory loss    Congenital deafness    Essential hypertension    Peripheral vascular disease ([address]    Myasthenia gravis ([address]    Deafness    Palliative care patient    Severe malnutrition ([address]  Resolved Problems:    * No resolved hospital problems. *        Brief Summary  Ms. Trinity Arboleda, a 54-year-old female, history of GBS, dementia, presenting to [facility] ED on account of abnormal lab (hypercalcemia). History obtained from family, given patient nonverbal and only speaks sign language. Patient has reportedly change in mental status, as well as lethargic, constipated and nauseated over the last few days. Patient was recently discharged from [facility] on to [facility]) 12/16/2022, after the hip replacement. Initial work-up significant for;  25-hydroxy vitamin D level of 74.6  TSH of 5.71  Free T4: 1.25  Calcium of 13.3 with an ionized calcium of 165 mmol/L. Potassium of 3.3  Intact PTH of 85.4      PTH dependent Hypercalcemia-resolved  · Patient not currently on any thiazide diuretics or lithium.   · Aggressive IV hydration  · Calcitonin 100 units IM x2 doses (02/25/2022-02/26/2022)  · Calcium improved to 10.9 --> 9.7 --> 9.7 (02/28/2022)  · Continue IV fluids for now  · Continue monitoring calcium level  · Urine calcium and creatinine level, to assess urine calcium to creatinine ratio-patient currently not producing adequate urine and family declines Krishnamurthy catheter placement. Hypokalemia-persistent  · Persistently low Potassium: 2.4-> 2.6 --> 2.6 --> 3.2 --> 3.1  (02/28/2022)  · Replace as per protocol    Hypomagnesemia  · Replace as per protocol      Myasthenia gravis  GBS  Dementia  · Continue management      Essential HTN  · Continue home regimen    Severe Malnutrition  · As per dietitian malnutrition assessment, quoted below  \"Malnutrition Assessment:  Malnutrition Status:  Severe malnutrition    Context:  Acute Illness     Findings of the 6 clinical characteristics of malnutrition:  Energy Intake:  1 - 75% or less of estimated energy requirements for 7 or more days  Weight Loss:  1 - 7.5% over 3 months     Body Fat Loss:  7 - Moderate body fat loss     Muscle Mass Loss:  7 - Moderate muscle mass loss    Fluid Accumulation:  No significant fluid accumulation     Strength:  Not Performed\"  · Management as per dietitian recommendations        Continue management of other chronic medical conditions - see above and orders. Advance Directive: DNR    ADULT DIET; Full Liquid  ADULT ORAL NUTRITION SUPPLEMENT; Lunch, Dinner; Standard High Calorie/High Protein Oral Supplement         Consults Made:   PALLIATIVE CARE EVAL    DVT prophylaxis: Enoxaparin      Discharge planning:   Continue management as above  Including electrolyte replacement. Time Spent is 25 mins in the examination, evaluation, counseling and review of medications, assessment and plan.      Electronically signed by   Fei Dobbins MD, MPH, MD,   Internal Medicine Hospitalist   2/28/2022 1:16 PM

## 2022-03-01 NOTE — CARE COORDINATION
NOEMY spoke with Pt family Khushboo over the phone regarding DC planning options. Khushboo wanted to see if a bed would be available at [facility] instead SNF placement in [place]. If a bed is not available at [facility] then they would like NOEMY to reach back out to [facility] and see if they have a bed available.  NOEMY has notified [facility] of the referral. Awaiting approval/denial.  [place] [id number] F  [phone] E-fax for admissions  Electronically signed by Mireya Carrion on 3/1/2022 at 10:29 AM

## 2022-03-01 NOTE — PROGRESS NOTES
[facility]    Patient:  Arjun Naranjo  YOB: 1945  Date of Service: 3/1/2022  MRN: [id number]   Acct: [de-identified]   Primary Care Physician: Yeni Lopez MD  Advance Directive: DNR  Admit Date: 2/25/2022       Hospital Day: 4      CHIEF COMPLAINT:     Chief Complaint   Patient presents with    Abnormal Lab     Calcium 13 at [facility]       3/1/2022 9:11 AM  Subjective / Interval History:   03/01/2022  Patient seen and examined this AM.  Laying comfortably in bed no acute distress. No acute changes or acute overnight event reported. No new changes. Potassium and magnesium levels improved. 02/28/2022  Patient seen and examined this AM.  No new complaints. No acute changes or acute overnight event reported. Patient laying comfortably in bed in no acute distress. Patient with persistent electrolyte abnormalities. Potassium improved to 3.2 --> 3.1 , however magnesium now 1.3    02/27/2022  Patient seen and examined this AM.  Doing well. No new complaints. Laying comfortably in bed no acute distress. Family at bedside. 02/26/2022  No acute changes or acute overnight event reported. Patient seen and examined. Lying comfortably in bed in no acute distress. Family (daughter) at bedside. Review of Systems:   Review of Systems  ROS: 14 point review of systems is negative except as specifically addressed above. ADULT DIET;  Full Liquid  ADULT ORAL NUTRITION SUPPLEMENT; Lunch, Dinner; Standard High Calorie/High Protein Oral Supplement    Intake/Output Summary (Last 24 hours) at 3/1/2022 0911  Last data filed at 2/28/2022 1842  Gross per 24 hour   Intake 1240 ml   Output --   Net 1240 ml       Medications:   sodium chloride      sodium chloride Stopped (02/28/22 0739)     Current Facility-Administered Medications   Medication Dose Route Frequency Provider Last Rate Last Admin    magnesium oxide (MAG-OX) tablet 400 mg  400 mg Oral Daily Deborah Virgen Levi Lay MD   400 mg at 02/28/22 1208    potassium chloride (KLOR-CON M) extended release tablet 40 mEq  40 mEq Oral PRN Patience Jenniffer, DO   40 mEq at 02/28/22 1158    Or    potassium bicarb-citric acid (EFFER-K) effervescent tablet 40 mEq  40 mEq Oral PRN Patience Jenniffer, DO        Or    potassium chloride 10 mEq/100 mL IVPB (Peripheral Line)  10 mEq IntraVENous PRN Patience Jenniffer, DO 40 mL/hr at 02/27/22 0628 10 mEq at 02/27/22 8064    magnesium sulfate 1000 mg in dextrose 5% 100 mL IVPB  1,000 mg IntraVENous PRN Nisha Langston MD   Stopped at 02/28/22 0930    0.9 % sodium chloride bolus  500 mL IntraVENous Once Olga Calix MD        sodium chloride flush 0.9 % injection 5-40 mL  5-40 mL IntraVENous 2 times per day Claudis Fent, APRN - CNP   10 mL at 02/28/22 2140    sodium chloride flush 0.9 % injection 5-40 mL  5-40 mL IntraVENous PRN Claudis Fent, APRN - CNP        0.9 % sodium chloride infusion  25 mL IntraVENous PRN Claudis Fent, APRN - CNP        enoxaparin (LOVENOX) injection 40 mg  40 mg SubCUTAneous Q24H Claudis Fent, APRN - CNP   40 mg at 02/27/22 1806    ondansetron (ZOFRAN-ODT) disintegrating tablet 4 mg  4 mg Oral Q8H PRN Claudis Fent, APRN - CNP        Or    [facility]dansetron [facility]) injection 4 mg  4 mg IntraVENous Q6H PRN Claudis Fent, APRN - CNP        polyethylene glycol (GLYCOLAX) packet 17 g  17 g Oral Daily PRN Claudis Fent, APRN - CNP        acetaminophen (TYLENOL) tablet 650 mg  650 mg Oral Q6H PRN Claudis Fent, APRN - CNP   650 mg at 02/26/22 2057    Or    acetaminophen (TYLENOL) suppository 650 mg  650 mg Rectal Q6H PRN Claudis Fent, APRN - CNP        0.9 % sodium chloride infusion   IntraVENous Continuous Claudis Fent, APRN - CNP   Stopped at 02/28/22 0739    amitriptyline (ELAVIL) tablet 100 mg  100 mg Oral Nightly Claudis Fent, APRN - CNP   100 mg at 02/28/22 4952    amLODIPine (NORVASC) tablet 5 mg  5 mg Oral Nightly DENIZ Cotto - CNP   5 mg at 02/28/22 2140    docusate sodium (COLACE) capsule 100 mg  100 mg Oral BID DENIZ Zhu CNP   100 mg at 02/28/22 2140    Donepezil HCl (ARICEPT) tablet 23 mg  23 mg Oral Nightly DENIZ Zhu CNP   23 mg at 02/28/22 2140    ferrous sulfate (IRON 325) tablet 325 mg  325 mg Oral Daily DENIZ Zhu CNP   325 mg at 02/28/22 0800    FLUoxetine (PROZAC) capsule 20 mg  20 mg Oral Daily DENIZ Zhu CNP   20 mg at 02/28/22 0800    prazosin (MINIPRESS) capsule 1 mg  1 mg Oral Nightly DENIZ Zhu CNP   1 mg at 02/28/22 2140    hydrOXYzine (VISTARIL) capsule 25 mg  25 mg Oral TID PRN DENIZ Zhu CNP   25 mg at 02/28/22 2143    hydrALAZINE (APRESOLINE) injection 5 mg  5 mg IntraVENous Q6H PRN DENIZ Zhu CNP             sodium chloride      sodium chloride Stopped (02/28/22 0739)      magnesium oxide  400 mg Oral Daily    sodium chloride  500 mL IntraVENous Once    sodium chloride flush  5-40 mL IntraVENous 2 times per day    enoxaparin  40 mg SubCUTAneous Q24H    amitriptyline  100 mg Oral Nightly    amLODIPine  5 mg Oral Nightly    docusate sodium  100 mg Oral BID    Donepezil HCl  23 mg Oral Nightly    ferrous sulfate  325 mg Oral Daily    FLUoxetine  20 mg Oral Daily    prazosin  1 mg Oral Nightly     potassium chloride **OR** potassium alternative oral replacement **OR** potassium chloride, magnesium sulfate, sodium chloride flush, sodium chloride, ondansetron **OR** ondansetron, polyethylene glycol, acetaminophen **OR** acetaminophen, hydrOXYzine, hydrALAZINE  ADULT DIET;  Full Liquid  ADULT ORAL NUTRITION SUPPLEMENT; Lunch, Dinner; Standard High Calorie/High Protein Oral Supplement       Labs:   CBC with DIFF:  Recent Labs     02/27/22  0226 02/28/22  0346 03/01/22  0253   WBC 7.7 6.9 7.0   RBC 3.70* 3.57* 4.14*   HGB 11.4* 10.9* 12.6   HCT 35.3* 34.1* 39.0   MCV 95.4 95.5 94.2   MCH 30.8 30.5 30.4   MCHC 32.3* 32.0* 32.3*   RDW 13.7 13.7 13.7    248 295   MPV 9.8 9.7 10.1       CMP/BMP:  Recent Labs     02/27/22  0226 02/27/22  0226 02/28/22  0346 02/28/22  1058 03/01/22  0253   *  --  137  --  139   K 2.6*   < > 3.2* 3.1* 3.8     --  104  --  104   CO2 26  --  24  --  25   ANIONGAP 8  --  9  --  10   GLUCOSE 128*  --  75  --  87   BUN 8  --  5*  --  5*   CREATININE 0.5  --  0.5  --  0.5   LABGLOM >60  --  >60  --  >60   CALCIUM 9.7  --  9.7  --  10.9*    < > = values in this interval not displayed. CRP:  No results for input(s): CRP in the last 72 hours. Sed Rate:  No results for input(s): SEDRATE in the last 72 hours. HgBA1c:  No components found for: HGBA1C  FLP:    Lab Results   Component Value Date    TRIG 137 07/29/2020    HDL 75 07/29/2020    LDLCALC 134 07/29/2020    LDLDIRECT 126 08/05/2014     TSH:    Lab Results   Component Value Date    TSH 3.240 03/19/2020     Troponin T: No results for input(s): TROPONINI in the last 72 hours. Pro-BNP: No results for input(s): BNP in the last 72 hours. INR: No results for input(s): INR in the last 72 hours. ABGs:   Lab Results   Component Value Date    PHART 7.430 09/18/2016    PO2ART 81.0 09/18/2016    LRM4TCP 33.0 09/18/2016     UA:No results for input(s): NITRITE, COLORU, PHUR, LABCAST, WBCUA, RBCUA, MUCUS, TRICHOMONAS, YEAST, BACTERIA, CLARITYU, SPECGRAV, LEUKOCYTESUR, UROBILINOGEN, BILIRUBINUR, BLOODU, GLUCOSEU, AMORPHOUS in the last 72 hours. Invalid input(s): Yamile Allis      Culture Results:    No results for input(s): CXSURG in the last 720 hours. Blood Culture Recent: No results for input(s): BC in the last 720 hours. No results for input(s): BC, BLOODCULT2, ORG in the last 72 hours. Cultures:   No results for input(s): CULTURE in the last 72 hours. No results for input(s): BC, Giovani Syrian in the last 72 hours. No results for input(s): CXSURG in the last 72 hours.       Recent Labs     02/27/22  0226 02/28/22  0346 02/28/22  1058   MG 2.0 1.3* 2.3     No results for input(s): AST, ALT, ALB, BILITOT, ALKPHOS, ALB in the last 72 hours. RAD:   XR WRIST LEFT (MIN 3 VIEWS)    Result Date: 2/13/2022  EXAMINATION: XR WRIST LEFT (MIN 3 VIEWS) 2/13/2022 10:00 PM HISTORY: Fall, left wrist pain. Report: 3 views of the left wrist were obtained. COMPARISON: There are no correlative imaging studies for comparison. Osseous alignment is normal, no fracture is identified. There is mild narrowing of the radiocarpal joint and there is narrowing of the first carpal metacarpal joint with associated spurring and sclerosis. No fracture, no acute osseous abnormality. Advanced degenerative changes at the first carpometacarpal joint. Signed by Dr Ioana Peralta. [place]    CT Head WO Contrast    Result Date: 2/13/2022  EXAMINATION: CT HEAD WO CONTRAST 2/13/2022 10:03 PM HISTORY: Unwitnessed fall at nursing home. Head and neck pain. DOSE: 670 mGycm. Automatic exposure control was utilized in an effort to use as little radiation as possible, without compromising image quality. REPORT: Axial CT of the head was performed without contrast, reconstructed sagittal and coronal images are also reviewed. COMPARISON: CT head without contrast 9/18/2016. No intracranial hemorrhage, mass or mass effect is identified. The ventricles and basal cisterns are within normal limits. There is mildly decreased attenuation in the periventricular and subcortical white matter tracts compatible chronic small vessel white matter ischemic disease. Mild diffuse cortical atrophy is present. Review of bone windows is unremarkable. Impression: No acute intracranial abnormality. There are chronic findings associated with aging. Signed by Dr Ioana Peralta. [place]    CT Cervical Spine WO Contrast    Result Date: 2/13/2022  EXAMINATION: CT CERVICAL SPINE WO CONTRAST 2/13/2022 10:04 PM HISTORY: Unwitnessed fall at nursing home. Head and neck pain. DOSE: 519 mGycm.  Automatic exposure control was utilized in an effort to use as little radiation as possible, without compromising image quality. REPORT: Spiral CT of the cervical spine was performed without contrast, reconstructed coronal and sagittal images are also reviewed. Comparison: CT of the cervical spine without contrast 6/27/2012. Sagittal images demonstrate slight anterolisthesis of C4 relative to C5, grade 1. There is disc space collapse, hypertrophic endplate spurring and small degenerative endplate defects at J1-1 and C6-7. No acute fracture is identified. The prevertebral soft tissues are within normal limits. Geographic focus of decreased attenuation with coarse trabecula within the T1 vertebral body is compatible with a benign hemangioma. This appears stable. There is is no osseous spinal canal stenosis. Review of soft tissue windows shows no gross evidence of traumatic cervical disc herniation. There is normal aeration of the visualized paranasal sinuses and mastoid air cells. Facet overgrowth is seen several levels, as well as uncinate spurring. No high-grade neural foraminal narrowing is identified. The airways patent. The visualized lungs are clear. No fracture, no acute osseous cervical spine abnormality. Multilevel degenerative changes as detailed above. Signed by Dr Osbaldo Abbott. [place]    XR SHOULDER LEFT (MIN 2 VIEWS)    Result Date: 2/13/2022  EXAMINATION: XR SHOULDER LEFT (MIN 2 VIEWS) 2/13/2022 9:59 PM HISTORY: Trauma, pain. Report: 3 views of the left shoulder were obtained. COMPARISON: X-rays of the left shoulder 10/7/2019. Osseous alignment is normal, no fracture is identified. There is moderate narrowing of the subacromial interval which is stable. There is spurring of the inferior medial humeral head. There is narrowing of the glenohumeral joint. The soft tissues appear within normal limits. No fracture, no acute osseous abnormality. Degenerative changes as described, stable. Signed by Dr Osbaldo Beltrán    XR HIP 2-3 VW W PELVIS LEFT    Result Date: 2/13/2022  EXAMINATION: XR HIP 2-3 VW W PELVIS LEFT 2/13/2022 9:55 PM HISTORY: Left hip trauma, recent left hip fixation. Report: An AP view the pelvis was obtained as well as 2 separate views of the left hip. COMPARISON: X-rays of the pelvis left hip 12/12/2021. There is evidence of interval open reduction internal fixation of the intertrochanteric left hip fracture, appears to be avulsion of the lesser trochanter with displacement of about 2 cm. This is not clearly seen on the previous x-rays. The right hip appears within normal limits. There is diffuse osteopenia. The sacrum and SI joints appear within normal limits. Interval ORIF of the left intertrochanteric hip fracture. There is avulsion of the lesser trochanter with approximately 2 cm of displacement, acute versus subacute. Signed by Dr Chad Del Castillo. [facility]        Objective:   Vitals:   /75   Pulse 83   Temp 97.9 °F (36.6 °C) (Temporal)   Resp 15   Ht 5' 8\" (1.727 m)   Wt 107 lb 8 oz (48.8 kg)   SpO2 100%   BMI 16.35 kg/m²     Patient Vitals for the past 24 hrs:   BP Temp Temp src Pulse Resp SpO2   03/01/22 0726 132/75 97.9 °F (36.6 °C) Temporal 83 15 100 %   03/01/22 0246 117/67 96.4 °F (35.8 °C) Temporal 80 17 91 %   02/28/22 2149 -- -- -- 84 -- --   02/28/22 2105 129/73 97.7 °F (36.5 °C) Temporal 81 16 97 %   02/28/22 1623 (!) 143/81 96.8 °F (36 °C) Temporal 73 16 93 %       24HR INTAKE/OUTPUT:      Intake/Output Summary (Last 24 hours) at 3/1/2022 0911  Last data filed at 2/28/2022 1842  Gross per 24 hour   Intake 1240 ml   Output --   Net 1240 ml       Physical Exam  Vitals and nursing note reviewed. Constitutional:       General: She is not in acute distress. Appearance: Normal appearance. She is not ill-appearing, toxic-appearing or diaphoretic. HENT:      Head: Normocephalic and atraumatic.       Right Ear: External ear normal.      Left Ear: External ear normal.      Nose: Nose normal. No congestion or rhinorrhea. Mouth/Throat:      Mouth: Mucous membranes are moist.      Pharynx: Oropharynx is clear. No oropharyngeal exudate or posterior oropharyngeal erythema. Eyes:      General: No scleral icterus. Right eye: No discharge. Left eye: No discharge. Extraocular Movements: Extraocular movements intact. Conjunctiva/sclera: Conjunctivae normal.   Cardiovascular:      Rate and Rhythm: Normal rate and regular rhythm. Pulses: Normal pulses. Heart sounds: Normal heart sounds. No murmur heard. No friction rub. No gallop. Pulmonary:      Effort: Pulmonary effort is normal. No respiratory distress. Breath sounds: Normal breath sounds. No stridor. No wheezing, rhonchi or rales. Chest:      Chest wall: No tenderness. Abdominal:      General: Bowel sounds are normal. There is no distension. Palpations: Abdomen is soft. Tenderness: There is no abdominal tenderness. There is no guarding or rebound. Musculoskeletal:         General: No swelling, tenderness, deformity or signs of injury. Normal range of motion. Cervical back: Normal range of motion and neck supple. No rigidity. No muscular tenderness. Right lower leg: No edema. Left lower leg: No edema. Skin:     General: Skin is warm and dry. Capillary Refill: Capillary refill takes less than 2 seconds. Coloration: Skin is not jaundiced or pale. Findings: No bruising, erythema, lesion or rash. Neurological:      General: No focal deficit present. Mental Status: She is alert. Mental status is at baseline. Cranial Nerves: No cranial nerve deficit. Motor: No weakness.       Coordination: Coordination normal.   Psychiatric:         Mood and Affect: Mood normal.         Behavior: Behavior normal.           Assessment/plan:       Hospital Problems           Last Modified POA    * (Principal) Hypercalcemia 2/25/2022 Yes    Osteoarthritis 2/26/2022 Yes protocol    Hypomagnesemia  · Replace as per protocol  · Magnesium 2.3 (03/0/2022)      Myasthenia gravis  GBS  Dementia  · Continue management      Essential HTN  · Continue home regimen    Severe Malnutrition  · As per dietitian malnutrition assessment, quoted below  \"Malnutrition Assessment:  Malnutrition Status:  Severe malnutrition    Context:  Acute Illness     Findings of the 6 clinical characteristics of malnutrition:  Energy Intake:  1 - 75% or less of estimated energy requirements for 7 or more days  Weight Loss:  1 - 7.5% over 3 months     Body Fat Loss:  7 - Moderate body fat loss     Muscle Mass Loss:  7 - Moderate muscle mass loss    Fluid Accumulation:  No significant fluid accumulation     Strength:  Not Performed\"  · Management as per dietitian recommendations        Continue management of other chronic medical conditions - see above and orders. Advance Directive: DNR    ADULT DIET; Full Liquid  ADULT ORAL NUTRITION SUPPLEMENT; Lunch, Dinner; Standard High Calorie/High Protein Oral Supplement         Consults Made:   PALLIATIVE CARE EVAL    DVT prophylaxis: Enoxaparin      Discharge planning:   Patient clinically stable for discharge back to SNF. Family reportedly now requests a different SNF. Placement initiated      Time Spent is 25 mins in the examination, evaluation, counseling and review of medications, assessment and plan.      Electronically signed by   Omar Holder MD, MPH, MD,   Internal Medicine Hospitalist   3/1/2022 9:11 AM

## 2022-03-01 NOTE — PROGRESS NOTES
Palliative care RN in to see pt. This nurse is fluent in [address] She is attempting to eat her breakfast that was brought this morning. Asked if I would put some sugar in her tea, which I did. She tells me she is cold. I offered to get her a warm blanket, she tells me \"no\" she's cold because she just woke up. Pt then began to tell me she was uncomfortable here and afraid to touch anything since this is not her home. I explained to her she was in the hospital.  She proceeded to talk with me about a man who was here to see her but has left to go fishing. She returned her focus to her breakfast tray. I explained to pt that I would be back to see her. She thanked me for coming to visit her. Will call and speak with pt Khushboo lambert, later today d/t she is at work. Palliative following for goc discussion as needed.     Electronically signed by Hakan Rueda RN on 3/1/2022 at 11:48 AM

## 2022-03-02 NOTE — PROGRESS NOTES
Palliative care follow up visit. This nurse is fluent in [address]. Pt remains confused, thinks she is at her home. Talks about going to [facility] last evening to purchase some items and  meds. This RN assisted in getting pt lunch tray set up for pt. Called and spoke with pt dtr, Hodan Cervantes, about goals of care. Initially she has wanted to get pt home states \"This has been my goal since she went to  for therapy\". Talked with dtr about outcomes of therapy vs home with comfort care. Dtr realizes her mom is not improving, having more frequent trips to the hospital.  Jasmeet Meyer, tells me her goal is for pt to be able to spend her days, however long they may be with her family and dogs. She agrees hospice care would be best option to provide this. I have asked Dr. Karrie Bain for hospice order. Dtr tells me she will need a hospital bed, O2 conc, small w/c, OBT. Bettie Gu aware of consult.   Electronically signed by Tushar Salmeron RN on 3/2/2022 at 1:31 PM

## 2022-03-02 NOTE — PLAN OF CARE
Nutrition Problem #1: Severe malnutrition,In context of acute illness or injury  Intervention: Food and/or Nutrient Delivery: Continue Current Diet,Modify Oral Nutrition Supplement  Nutritional Goals: PO >50%, wt stable    Problem: Nutrition  Goal: Optimal nutrition therapy  3/2/2022 1204 by Tea Randall MS, RD, LD  Outcome: Ongoing  3/2/2022 0056 by Tatiana Mobley RN  Outcome: Ongoing
Problem: Falls - Risk of:  Goal: Will remain free from falls  Description: Will remain free from falls  Outcome: Ongoing  Goal: Absence of physical injury  Description: Absence of physical injury  Outcome: Ongoing     Problem: Skin Integrity:  Goal: Will show no infection signs and symptoms  Description: Will show no infection signs and symptoms  Outcome: Ongoing  Goal: Absence of new skin breakdown  Description: Absence of new skin breakdown  Outcome: Ongoing     Problem: Pain:  Goal: Pain level will decrease  Description: Pain level will decrease  Outcome: Ongoing  Goal: Control of acute pain  Description: Control of acute pain  Outcome: Ongoing  Goal: Control of chronic pain  Description: Control of chronic pain  Outcome: Ongoing     Problem: Nutrition  Goal: Optimal nutrition therapy  Outcome: Ongoing     Problem: Fluid Volume:  Goal: Ability to achieve a balanced intake and output will improve  Description: Ability to achieve a balanced intake and output will improve  Outcome: Ongoing     Problem: Physical Regulation:  Goal: Ability to maintain clinical measurements within normal limits will improve  Description: Ability to maintain clinical measurements within normal limits will improve  Outcome: Ongoing  Goal: Will show no signs and symptoms of electrolyte imbalance  Description: Will show no signs and symptoms of electrolyte imbalance  Outcome: Ongoing
Problem: Nutrition  Goal: Optimal nutrition therapy  Outcome: Ongoing  Note: Nutrition Problem #1: Severe malnutrition,In context of acute illness or injury  Intervention: Food and/or Nutrient Delivery: Continue Current Diet,Start Oral Nutrition Supplement  Nutritional Goals: PO >50%, wt stable
Regulation:  Goal: Ability to maintain clinical measurements within normal limits will improve  Description: Ability to maintain clinical measurements within normal limits will improve  3/2/2022 1410 by Genesis Miller RN  Outcome: Ongoing  3/2/2022 0056 by Yancy Peralta RN  Outcome: Ongoing  Goal: Will show no signs and symptoms of electrolyte imbalance  Description: Will show no signs and symptoms of electrolyte imbalance  3/2/2022 1410 by Genesis Miller RN  Outcome: Ongoing  3/2/2022 0056 by Yancy Peralta RN  Outcome: Ongoing

## 2022-03-02 NOTE — CARE COORDINATION
[facility] not have a bed available at this time. SW reached back out to [facility] and they have a bed available for Pt when medically cleared.  NOEMY spoke with Hospitalist staff and was notified Pt was not discharging today and most likely tomorrow at the [facility] (formerly [address] [place] [address])   [phone] P  [phone] F  [phone] Referral fax number for   Electronically signed by Archana Ortiz on 3/2/2022 at 10:33 AM

## 2022-03-02 NOTE — PROGRESS NOTES
Procedure Component Value Units Date/Time     Calcium, Ionized [[id number]] (Abnormal) Collected: 03/02/22 0846     Specimen: Blood Updated: 03/02/22 0914      Calcium, Ion 1.44 mmol/L      Comprehensive Metabolic Panel [[id number]]

## 2022-03-02 NOTE — PROGRESS NOTES
Comprehensive Nutrition Assessment    Type and Reason for Visit:  Reassess    Nutrition Recommendations/Plan: Modify ONS to TID    Nutrition Assessment:  Pt remains Severly Malnourished. PO intake is poor. Will modify ONS orders to TID and recommend staff assist with feeding. Malnutrition Assessment:  Malnutrition Status:  Severe malnutrition    Context:  Acute Illness     Findings of the 6 clinical characteristics of malnutrition:  Energy Intake:  1 - 75% or less of estimated energy requirements for 7 or more days  Weight Loss:  1 - 7.5% over 3 months     Body Fat Loss:  7 - Moderate body fat loss     Muscle Mass Loss:  7 - Moderate muscle mass loss    Fluid Accumulation:  No significant fluid accumulation     Strength:  Not Performed    Estimated Daily Nutrient Needs:  Energy (kcal):  5084-0584 kcals/day; Weight Used for Energy Requirements:  Current (25-30)     Protein (g):  83-98 g/PRO/day; Weight Used for Protein Requirements:  Current (1.7-2.0)        Fluid (ml/day):  0532-4951 mL/day; Method Used for Fluid Requirements:  1 ml/kcal       Current Nutrition Therapies:    ADULT DIET;  Full Liquid  ADULT ORAL NUTRITION SUPPLEMENT; Lunch, Dinner; Standard High Calorie/High Protein Oral Supplement    Anthropometric Measures:  · Height: 5' 8\" (172.7 cm)  · Current Body Weight: 107 lb (48.5 kg)    · Ideal Body Weight: 140 lbs  · BMI: 16.3   · BMI Categories: Underweight (BMI less than 22) age over 72       Nutrition Diagnosis:   · Severe malnutrition,In context of acute illness or injury related to inadequate protein-energy intake as evidenced by weight loss 7.5% in 3 months,moderate muscle loss,moderate loss of subcutaneous fat,poor intake prior to admission    Nutrition Interventions:   Food and/or Nutrient Delivery:  Continue Current Diet,Modify Oral Nutrition Supplement  Coordination of Nutrition Care:  Continue to monitor while inpatient    Goals:  PO >50%, wt stable       Nutrition Monitoring and Evaluation:   Food/Nutrient Intake Outcomes:  Food and Nutrient Intake,Supplement Intake  Physical Signs/Symptoms Outcomes:  Biochemical Data,Chewing or Swallowing,Nutrition Focused Physical Findings,Skin,Weight     Electronically signed by Magdalena Echeverria, MS, RD, LD on 3/2/22 at 12:03 PM CST    Contact: [phone]

## 2022-03-02 NOTE — PROGRESS NOTES
Consult received. Spoke with the pts dtr per phone. She does want the pt to receive home hospice services when the pt is dc'd. The needed equipment has been ordered and will be set up tomorrow am. It will then be ok to dc the pt when medically ready. Hospice will meet the pt at her home for adm to hospice.

## 2022-03-03 NOTE — DISCHARGE SUMMARY
Component Value Date    TRIG 137 07/29/2020    HDL 75 07/29/2020    LDLCALC 134 07/29/2020    LDLDIRECT 126 08/05/2014     TSH:    Lab Results   Component Value Date    TSH 3.240 03/19/2020     Troponin T: No results for input(s): TROPONINI in the last 72 hours. Pro-BNP: No results for input(s): BNP in the last 72 hours. INR: No results for input(s): INR in the last 72 hours. ABGs:   Lab Results   Component Value Date    PHART 7.430 09/18/2016    PO2ART 81.0 09/18/2016    LRU7XNL 33.0 09/18/2016     UA:No results for input(s): NITRITE, COLORU, PHUR, LABCAST, WBCUA, RBCUA, MUCUS, TRICHOMONAS, YEAST, BACTERIA, CLARITYU, SPECGRAV, LEUKOCYTESUR, UROBILINOGEN, BILIRUBINUR, BLOODU, GLUCOSEU, AMORPHOUS in the last 72 hours. Invalid input(s): Fredrica Pert      Culture Results:    No results for input(s): CXSURG in the last 720 hours. Blood Culture Recent: No results for input(s): BC in the last 720 hours. Cultures:   No results for input(s): CULTURE in the last 72 hours. No results for input(s): BC, [place] in the last 72 hours. No results for input(s): CXSURG in the last 72 hours. Recent Labs     02/28/22  1058 03/02/22  0309   MG 2.3 1.7     Recent Labs     03/02/22  0846 03/03/22  0339   AST 20 21   ALT 10 9   BILITOT 0.3 <0.2   ALKPHOS 93 93           Significant Diagnostic Studies:   No results found. Hospital Course:   Ms. Ananth Booth, a 65-year-old female, history of GBS, dementia, presenting to [facility] ED on account of abnormal lab (hypercalcemia).     History obtained from family, given patient nonverbal and only speaks sign language.   Patient has reportedly change in mental status, as well as lethargic, constipated and nauseated over the last few days.     Patient was recently discharged from [address] [place] [address] on to [facility]) 12/16/2022, after the hip replacement.     Initial work-up significant for;  25-hydroxy vitamin D level of 74.6  TSH of 5.71  Free T4: 1.25  Calcium of 13.3 with an ionized calcium of 1.65 mmol/L. Potassium of 3.3  Intact PTH of 85.4     Further hospital course, as per problem list below.     PTH dependent Hypercalcemia  ? Primary Hyperparathyroidism  · Patient not currently on any thiazide diuretics or lithium. · Intact PTH of 85.4  · IV hydration  · Calcitonin 100 units IM x2 doses (02/25/2022-02/26/2022)  · Calcium improved to 10.9 --> 9.7 --> 9.7 --> 10.9 --> 11.3 --> 11.4 --> 11.6 (03/03/2022)  · Ionized calcium: 1.65 on presentation (02/25/2022), now improved to --> 1.44 (03/02/2022)  · Continued IV fluids for now  · Continued monitoring calcium level  · Urine calcium and creatinine level, to assess urine calcium to creatinine ratio-patient currently not producing adequate urine and family declines Krishnamurthy catheter placement. · Cinacalcet (Sensipar) 30 mg p.o. daily (start date: 03/02/2022), follow treatment of possible primary hyperparathyroidism, given progressively worsening calcium level.   · Monitored calcium and ionized calcium levels.        Hypokalemia  · Persistently low Potassium: 2.4-> 2.6 --> 2.6 --> 3.2 --> 3.1 --> 3.8 --> 3.3 --> 3.1 --> 3.7 (03/03/2022)  · Replaced as per protocol     Hypomagnesemia  · Replaced as per protocol  · Magnesium 2.3 (03/0/2022)        Myasthenia gravis  GBS  Dementia  · Continued management        Essential HTN  · Continued home regimen     Severe Malnutrition  · As per dietitian malnutrition assessment, quoted below  \"Malnutrition Assessment:  Malnutrition Status:  Severe malnutrition    Context:  Acute Illness     Findings of the 6 clinical characteristics of malnutrition:  Energy Intake:  1 - 75% or less of estimated energy requirements for 7 or more days  Weight Loss:  1 - 7.5% over 3 months     Body Fat Loss:  7 - Moderate body fat loss     Muscle Mass Loss:  7 - Moderate muscle mass loss    Fluid Accumulation:  No significant fluid accumulation     Strength:  Not Performed\"  · Management as per dietitian General: Skin is warm and dry. Capillary Refill: Capillary refill takes less than 2 seconds. Coloration: Skin is not jaundiced or pale. Findings: No bruising, erythema, lesion or rash. Neurological:      General: No focal deficit present. Mental Status: She is alert. Mental status is at baseline. Cranial Nerves: No cranial nerve deficit. Sensory: No sensory deficit. Motor: No weakness.       Coordination: Coordination normal.   Psychiatric:         Mood and Affect: Mood normal.         Behavior: Behavior normal.         Discharge Medications:        Medication List      START taking these medications    cinacalcet 30 MG tablet  Commonly known as: SENSIPAR  Take 1 tablet by mouth daily  Start taking on: March 4, 2022     magnesium oxide 400 MG tablet  Commonly known as: MAG-OX  Take 1 tablet by mouth daily     potassium chloride 20 MEQ extended release tablet  Commonly known as: KLOR-CON M  Take 1 tablet by mouth daily        CHANGE how you take these medications    amitriptyline 100 MG tablet  Commonly known as: ELAVIL  Take 1 tablet by mouth nightly For chronic insomnia  What changed: additional instructions        CONTINUE taking these medications    acetaminophen 500 MG tablet  Commonly known as: TYLENOL  Take 1 tablet by mouth 4 times daily as needed for Pain     amLODIPine 5 MG tablet  Commonly known as: NORVASC  TAKE 1 TABLET BY MOUTH ONCE DAILY AT BEDTIME FOR HIGH BLOOD PRESSURE     docusate sodium 100 MG capsule  Commonly known as: Colace  Take 1 capsule by mouth 2 times daily     Donepezil HCl 23 MG Tabs tablet  Commonly known as: ARICEPT  TAKE 1 TABLET BY MOUTH NIGHTLY FOR  DEMENTIA     FLUoxetine 20 MG capsule  Commonly known as: PROZAC  TAKE 1 CAPSULE BY MOUTH ONCE DAILY FOR ANXIETY AND FOR DEPRESSION     hydrOXYzine 25 MG capsule  Commonly known as: Vistaril  1 capsule by mouth 3 times a day for severe itching     Iron 325 (65 Fe) MG Tabs  Take 325 mg by mouth daily ondansetron 4 MG tablet  Commonly known as: Zofran  Take 1 tablet by mouth every 8 hours as needed for Nausea or Vomiting     prazosin 1 MG capsule  Commonly known as: MINIPRESS     prednisoLONE acetate 1 % ophthalmic suspension  Commonly known as: PRED FORTE     Roller Walker Misc  1 each by Does not apply route daily DX Code: R26.81, G70.00, H81.313, G60.9           Where to Get Your Medications      These medications were sent to [address] [place] [address]  [address] [place] [address], [address] [address]    Phone: [phone]   · cinacalcet 30 MG tablet  · magnesium oxide 400 MG tablet  · potassium chloride 20 MEQ extended release tablet           Discharge Instructions: Follow up with Moe Elmore MD in 7 days. Take medications as directed. Resume activity as tolerated. Diet: ADULT DIET; Full Liquid  ADULT ORAL NUTRITION SUPPLEMENT; Lunch, Dinner, Breakfast; Standard High Calorie/High Protein Oral Supplement        DISCHARGE STATUS:    Condition: Fair  Disposition: Patient is medically stable and will be discharged [facility]), with Hospice    Extended Emergency Contact Information  Primary Emergency Contact: [facility] [place]  Address: [address] [facility], [address] [place] [address] Phone: [phone]  Mobile Phone: [phone]  Relation: Child   needed? No  Secondary Emergency Contact: [address] [place] [address] Phone: [phone]  Relation: Grandchild       Time Spent on discharge is  34 mins in the examination, evaluation, counseling and review of medications and discharge plan. Electronically signed by   Larry Zambrano MD, MPH, MD,   Internal Medicine Hospitalist   3/3/2022 8:58 AM      Thank you Moe Elmore MD for the opportunity to be involved in this patient's care.  If you have any questions or concerns please feel free to contact me at [phone]        EMR Dragon/Transcription disclaimer: Much of this encounter note is an electronic transcription/translation of spoken language to printed text.  The electronic translation of spoken language may permit erroneous, or at times, nonsensical words or phrases to be inadvertently transcribed; although attempts have made to review the note for such errors, some may still exist.

## 2022-03-03 NOTE — PROGRESS NOTES
CLINICAL PHARMACY NOTE: MEDS TO BEDS    Total # of Prescriptions Filled: 7   The following medications were delivered to the patient:  · Morphine Sulfate   · Prochlorperazine 10 mg  · Hyoscyamine Sulfate 0.125 mg  · Acetaminophen 650 mg supp  · Bisacodyl 10 mg supp  · Haloperidol 2mg/ml  · Lorazepam 0.5 mg    Additional Documentation:    Handed scripts to Roxy Lr) at nurses station

## (undated) DEVICE — BIT DRL L330MM DIA4.2MM CALIB 100MM 3 FLUT QUIK CPL

## (undated) DEVICE — GLOVE SURG SZ 9 L12IN FNGR THK13MIL BRN LTX SYN POLYMER W

## (undated) DEVICE — ENDO KIT,LOURDES HOSPITAL: Brand: MEDLINE INDUSTRIES, INC.

## (undated) DEVICE — SURGICAL PROCEDURE PACK LOWER EXTREMITY LOURDES HOSP

## (undated) DEVICE — SYSTEM SKIN CLSR 22CM DERMBND PRINEO

## (undated) DEVICE — SUTURE VCRL SZ 3-0 L27IN ABSRB UD L26MM SH 1/2 CIR J416H

## (undated) DEVICE — C-ARMOR C-ARM EQUIPMENT COVERS CLEAR STERILE UNIVERSAL FIT 12 PER CASE: Brand: C-ARMOR

## (undated) DEVICE — C-ARM: Brand: UNBRANDED

## (undated) DEVICE — TOTAL TRAY, 16FR 10ML SIL FOLEY, URN: Brand: MEDLINE

## (undated) DEVICE — WRAP AROUND LENS-STERLIE

## (undated) DEVICE — PAD,ARMBOARD,CONV,FOAM,2X8X20",12PR/CS: Brand: MEDLINE

## (undated) DEVICE — COVER POS PERINL POST NS 082501

## (undated) DEVICE — FORCEPS BX L240CM JAW DIA2.4MM ORNG L CAP W/ NDL DISP RAD

## (undated) DEVICE — BANDAGE COBAN 6 IN WND 6INX5YD FOAM

## (undated) DEVICE — FORCEPS BX L240CM DIA2.4MM L NDL RAD JAW 4 133340

## (undated) DEVICE — 6617 IOBAN II PATIENT ISOLATION DRAPE 5/BX,4BX/CS: Brand: STERI-DRAPE™ IOBAN™ 2

## (undated) DEVICE — GUIDEWIRE ORTH L400MM DIA3.2MM FOR TFN

## (undated) DEVICE — BIT DRL L L266MM DIA16MM FEM FLX CANN QUIK CPL

## (undated) DEVICE — GUIDEWIRE ORTH 32X475 MM FOR NAILING SYS STRL TFN ADV

## (undated) DEVICE — DRESSING FOAM W4XL4IN SIL FACE BORD ADH PD SUP ABSRB COR

## (undated) DEVICE — GLOVE SURG SZ 85 L12IN FNGR THK94MIL TRNSLUC YEL LTX